# Patient Record
Sex: MALE | Race: WHITE | NOT HISPANIC OR LATINO | Employment: UNEMPLOYED | URBAN - METROPOLITAN AREA
[De-identification: names, ages, dates, MRNs, and addresses within clinical notes are randomized per-mention and may not be internally consistent; named-entity substitution may affect disease eponyms.]

---

## 2018-12-10 ENCOUNTER — HOSPITAL ENCOUNTER (OUTPATIENT)
Dept: BEHAVIORAL HEALTH | Facility: CLINIC | Age: 23
Discharge: HOME | End: 2018-12-10
Attending: SOCIAL WORKER | Admitting: SOCIAL WORKER
Payer: COMMERCIAL

## 2018-12-10 VITALS — HEIGHT: 77 IN | WEIGHT: 230 LBS | BODY MASS INDEX: 27.16 KG/M2

## 2018-12-10 PROCEDURE — H0001 ALCOHOL AND/OR DRUG ASSESS: HCPCS

## 2018-12-10 ASSESSMENT — PAIN SCALES - GENERAL: PAINLEVEL: NO PAIN (0)

## 2018-12-10 ASSESSMENT — ANXIETY QUESTIONNAIRES
5. BEING SO RESTLESS THAT IT IS HARD TO SIT STILL: NOT AT ALL
7. FEELING AFRAID AS IF SOMETHING AWFUL MIGHT HAPPEN: NOT AT ALL
GAD7 TOTAL SCORE: 0
1. FEELING NERVOUS, ANXIOUS, OR ON EDGE: NOT AT ALL
3. WORRYING TOO MUCH ABOUT DIFFERENT THINGS: NOT AT ALL
IF YOU CHECKED OFF ANY PROBLEMS ON THIS QUESTIONNAIRE, HOW DIFFICULT HAVE THESE PROBLEMS MADE IT FOR YOU TO DO YOUR WORK, TAKE CARE OF THINGS AT HOME, OR GET ALONG WITH OTHER PEOPLE: NOT DIFFICULT AT ALL
6. BECOMING EASILY ANNOYED OR IRRITABLE: NOT AT ALL
2. NOT BEING ABLE TO STOP OR CONTROL WORRYING: NOT AT ALL

## 2018-12-10 ASSESSMENT — PATIENT HEALTH QUESTIONNAIRE - PHQ9
SUM OF ALL RESPONSES TO PHQ QUESTIONS 1-9: 0
5. POOR APPETITE OR OVEREATING: NOT AT ALL

## 2018-12-10 ASSESSMENT — MIFFLIN-ST. JEOR: SCORE: 2145.65

## 2018-12-10 NOTE — PROGRESS NOTES
[facility]   [address] [place] [address]  [address] [place] [id number]      ADULT CD ASSESSMENT ADDENDUM      Patient Name: Sebastián Nogueira  Cell Phone:   Home: [phone] (home)    Mobile:   Telephone Information:   Mobile [phone]       Email:  [place]  Emergency Contact: Jaguar Nogueira   Tel: [phone]    ________________________________________________________________________      The patient reported being:  Single, no serious involvement    With which race do you identify? White    Initial Screening Questions     1. Are you currently having severe withdrawal symptoms that are putting yourself or others in danger?  No    2. Are you currently having severe medical problems that require immediate attention?  No    3. Are you currently having severe emotional or behavioral problems that are putting yourself or others at risk of harm?  No    4. Do you have sufficient reading skills that will enable you to understand written materials, including the program rules and client rights materials?  Yes    Family History and other additional information     Who raised you? (parents, grandparents, adoptive parents, step-parents, etc.)    Both Parents    Please tell me what it was like growing up in your family. (please include any history of substance abuse, mental health issues, emotional/physical/sexual abuse, forms of discipline, and support)     Per client:raised by parents, two sisters who are living, one brother who is living, both parents are living. No CD or MH within family. It was great and it was awesome, grew up with parents and they were cool. No abuse, felt supported by both equally. Grew up in [place], [place].       Do you have any children or Stepchildren? No    Are you being investigated by Child Protection Services? No    Do you have a child protection worker, probation office or ? Yes, please explain: client reported [facility]    How would you describe your current  "finances?  Some money problems    If you are having problems, (unpaid bills, bankruptcy, IRS problems) please explain:  No    If working or a student are you able to function appropriately in that setting? Yes    Describe your preferred learning style:  by reading, by hands-on practice and by watching someone else demonstrate    What are your some of your personal strengths?  \"willpower\"    Do you currently participate in community brie activities, such as attending Druze, temple, Latter-day or Mandaeism services?  No    How does your spirituality impact your recovery?  Declined reporting he does not participate in this     Do you currently self-administer your medications?  Yes    Have you ever had to lie to people important to you about how much you dooley?     No   Have you ever felt the need to bet more and more money?     No   Have you ever attempted treatment for a gambling problem?     No   Have you ever touched or fondled someone else inappropriately or forced them to have sex with you against their will?     No   Are you or have you ever been a registered sex offender?     No   Is there any history of sexual abuse in your family?     No   Have you ever felt obsessed by your sexual behavior, such as having sex with many partners, masturbating often, using pornography often?     No     Have you ever received therapy or stayed in the hospital for mental health problems?     No     Have you ever hurt yourself, such as cutting, burning or hitting yourself?     No     Have you ever purged, binged or restricted yourself as a way to control your weight?     No     Are you on a special diet?     No     Do you have any concerns regarding your nutritional status?     No     Have you had any appetite changes in the last 3 months?     No   Have you had weight loss or weight gain of more than 10 lbs in the last 3 months?   If patient gained or lost more than 10 lbs, then refer to program RN / attending Physician for " assessment.     No   Was the patient informed of BMI?    Above,  Referral to primary care physician     Yes   Have you engaged in any risk-taking behavior that would put you at risk for exposure to blood-borne or sexually transmitted diseases?     No   Do you have any dental problems?     No   Have you ever lived through any trauma or stressful life events?     No   In the past month, have you had any of the following symptoms related to the trauma listed above? (dreams, intense memories, flashbacks, physical reactions, etc.)     No   Have you ever believed people were spying on you, or that someone was plotting against you or trying to hurt you?     No   Have you ever believed someone was reading your mind or could hear your thoughts or that you could actually read someone's mind or hear what another person was thinking?     No   Have you ever believed that someone of some force outside of yourself was putting thoughts into your mind or made you act in a way that was not your usual self?  Have you ever though you were possessed?     No   Have you ever believed you were being sent special messages through the TV, radio or newspaper?     No   Have you ever heard things other people couldn't hear, such as voices or other noises?     No   Have you ever had visions when you were awake?  Or have you ever seen things other people couldn't see?     No   Do you have a valid 's license?       Yes     PHQ-9, LISA-7 and Suicide Risk Assessment   PHQ-9 on 12/10/2018 LISA-7 on 12/10/2018   The patient's PHQ-9 score was 0 out of 27, indicating no depression.     The patient's LISA-7 score was 0 out of 21, indicating minimal anxiety.         [place]-Suicide Severity Rating Scale   Suicide Ideation   1.) Have you ever wished you were dead or that you could go to sleep and not wake up?     Lifetime:  No Past Month:  No   2.) Have you actually had any thoughts of killing yourself?   Lifetime:  No Past Month:  No   3.) Have you  been thinking about how you might do this?     Lifetime:  NA Past Month:  NA   4.) Have you had these thoughts and had some intention of acting on them?     Lifetime:  NA Past Month:  NA   5.) Have you started to work out the details of how to kill yourself?   Lifetime:  NA Past Month:  NA   6.) Do you intend to carry out this plan?      Lifetime:  No Past Month:  NA   Intensity of Ideation   Intensity of ideation (1 being least severe, 5 being most severe):     Lifetime:  NA Past Month:  NA   How often do you have these thoughts?  N/A      When you have the thoughts how long do they last?  N/A   Can you stop thinking about killing yourself or wanting to die if you want to?  N/A   Are there things - anyone or anything (i.e. family, Zoroastrianism, pain of death) that stopped you from wanting to die or acting on thoughts of suicide?  Does not apply   What sort of reasons did you have for thinking about wanting to die or killing yourself (ie end pain, stop how you were feeling, get attention or reaction, revenge)?  Does not apply   Suicidal Behavior   (Suicide Attempt) - Have you made a suicide attempt?     Lifetime:  No Past Month:  NA   Have you engaged in self-harm (non-suicidal self-injury)?  No   (Interrupted Attempt) - Has there been a time when you started to do something to end your life but someone or something stopped you before you actually did anything?  NA   (Aborted or Self-Interrupted Attempt) - Has there been a time when you started to do something to try to end your life but you stopped yourself before you actually did anything?  NA   (Preparatory Acts of Behavior) - Have you taken any steps towards making suicide attempt or preparing to kill yourself (such as collecting pills, getting a gun, giving valuables away or writing a suicide note)?  NA   Actual Lethality/Medical Damage:  NA     2008  [employer]  Used with permission by Mary Espinoza, PhD.       Guide to C-SSRS  "Risk Ratings   NO IDEATION:  with no active thoughts IDEATION: with a wish to die. IDEATION: with active thoughts. Risk Ratings   If Yes No No 0 - Very Low Risk   If NA Yes No 1 - Low Risk   If NA Yes Yes 2 - Low/moderate risk   IDEATION: associated thoughts of methods without intent or plan INTENT: Intent to follow through on suicide PLAN: Plan to follow through on suicide Risk Ratings cont...   If Yes No No 3 - Moderate Risk   If Yes Yes No 4 - High Risk   If Yes Yes Yes 5 - High Risk   The patient's ADDITIONAL RISK FACTORS and lack of PROTECTIVE FACTORS may increase their overall suicide risk ratings.     Additional Risk Factors:    Significant legal problems including being at risk of incarceration   Protective Factors:    Having people in his/her life that would prevent the patient from considering a suicide attempt (i.e. young children, spouse, parents, etc.)     An absence of mental health issues or stable and well treated mental health issues     An absence of chronic health problems or stable and well treated chronic health issues     Having restricted access to highly lethal means of suicide     Risk Status   Past month:0. - Very Low Risk:  Evaluation Counselors:  Document in Epic / SBAR to counselor \"Very Low Risk\".      Treatment Counselors:  Reassess upon admission as applicable, assess weekly in progress notes under Dimension 3 and summarize in Discharge / Treatment summary under Dimension 3.    Past 24 hours:0. - Very Low Risk:  Evaluation Counselors:  Document in Epic / Tuition[employer]ioAR to counselor \"Very Low Risk\".      Treatment Counselors:  Reassess upon admission as applicable, assess weekly in progress notes under Dimension 3 and summarize in Discharge / Treatment summary under Dimension 3.   Additional information to support suicide risk rating: There was no additional information to provide at this time.  Please see the above suicide risk rating information.     Mental Health Status   Physical " Appearance/Attire: Appears stated age and Neat   Hygiene: well groomed   Eye Contact: at examiner   Speech Rate:  regular   Speech Volume: regular   Speech Quality: fluid   Cognitive/Perceptual:  reality based   Cognition: memory intact    Judgment: able to concentrate   Insight: able to concentrate   Orientation:  time, place, person and situation   Thought::   concrete   Hallucinations:  none   General Behavioral Tone: cooperative   Psychomotor Activity: no problem noted   Gait:  no problem   Mood: appropriate   Affect: congruence/appropriate   Counselor Notes: NA       Criteria for Diagnosis: DSM-5 Criteria for Substance Use Disorders      Alcohol Use Disorder Mild - 305.00 (F10.10)       Level of Care   I.) Intoxication and Withdrawal: 0   II.) Biomedical:  0   III.) Emotional and Behavioral:  0   IV.) Readiness to Change:  1   V.) Relapse Potential: 2   VI.) Recovery Environmental: 2       Initial Problem List     The patient is currently living in an unhealthy and/or using environment  The patient has current legal issues    Patient/Client is willing to follow treatment recommendations. Yes    Counselor: [facility]      Vulnerable Adult Checklist for OUTPATIENTS     1.  Do you have a physical, emotional or mental infirmity or dysfunction?       No    2.  Does this issue impair your ability to provide for your own care without help, including providing yourself with food, shelter, clothing, healthcare or supervision?       No    3.  Because of this issue, I need assistance to protect myself from maltreatment by others.      No    Based on the above information:    This person is not a functional Vulnerable Adult according to [place] Statute 626.5572 subdivision 21.

## 2018-12-10 NOTE — PROGRESS NOTES
COMPREHENSIVE ASSESSMENT SUMMARY    PATIENT NAME: Sebastián Nogueira  MEDICAL RECORD NUMBER: [id number]  PATIENT ADDRESS: [address] [place] [id number]  HOME TELEPHONE NUMBER: [phone] (home)   MOBILE TELEPHONE NUMBER:   Telephone Information:   Mobile [phone]     STATISTICS: YOB: 1995     Age: 23 year old     Gender: male    RELATIONSHIP STATUS:  Single, in no serious relationship    DATE OF ASSESSMENT: 12/10/18  EVALUATION COUNSELOR: SCOT [place]ays    REFERRAL SOURCE: Probation    REASON FOR EVALUATION:     Per EMR intake:  recvd call from client asking for a cd eval for probation     etoh     L/u @ July/2018  No medical  No mh  Legal:  2nd DWI; assessment required by probation  Hx of IP at [place] when an adolescent, hx of op in [place] after that     Per client:Probation, [facility], DWI, second, late July of 2018, just court ordered, no probation violation. I got pulled over had been drinking early. No accident. CHEPE, .17 just right over double the legal limit. First DWI, [facility], roughly 2015. Resolved prior to second DWI. I did one class for the first DWI.         HEALTH HISTORY AND MEDICATIONS:     Clt denied any chronic or acute physical health conditions. Client denied any prescribed medications.     HISTORY OF PREVIOUS TREATMENT AND COUNSELING:     Client reported two past treatment attendance as an adolescent. Client denied current counseling.     HISTORY OF ALCOHOL AND DRUG USE:     Alcohol:age of first use: 16. per EMR intake:etoh     L/u @ July/2018 Per client: on breathalyzer test four times per day until resolved court. January 2018 have another court date or can give them 1200 bucks and get off it. When they released me they screwed up and did not put me on right away but the next week they put me on it for release. No positive. False positive but blew into within the next 15 minutes and it was fine. Prior to July 2018, consume alcohol once or  "twice per month. Pattern since 19. Adolescent thought I was using marijuana then. Consumption 4-6 drinks prior to July of 2018. Per EMR collateral ED note on 3/1/14:  \"       Chief Complaint   Patient presents with     Alcohol Intoxication       Was found on campus wondering around.  Does not live on campus.  Was trasspassed and found not to have a place to go.    DIMITRIOS Nogueira is a 18 year old male who presents with alcohol intoxication.  The patient was wandering around the [facility] campus when he was found by campus police intoxicated.  The patient had an no place to go at that time and was brought to the emergency department.  He states that he was drinking with a friend earlier in the evening.  He states that he does not have a ride home.  He drinks alcohol occasionally and denies any other drug use.  He has no history of withdrawal or seizure.  He denies any pain.   Alcohol Breath Test 0.227 (*)   \". Date of last use July of 2018.       SUMMARY OF SUBSTANCE USE DISORDER SYMPTOMS ACKNOWLEDGED BY THE PATIENT: The patient identified positively with 3 of the 11 DSM-5 criteria for a primary diagnostic impression of substance use disorder mild.     SUMMARY OF COLLATERAL DATA:    Probation: Called collateral on 12/10/18 and left VM. No call back or VM received by collateral contact on 12/11/18. Left VM for collateral contact on 12/12/18. Received VM from collateral on 12/12/18. Pre-trial release for DWI,his DMT was a .17. Second DWI in 10 years, typically when this stuff happens we are looking for IOP and or level II driving with care class. He does have  DWI from 2015 from [facility], as well as a minor consumption in 2012, obstruction of justice in 2012, possession of 1.4g of marijuana from 2018 in [place]. Home monitoring for alcohol.         [facility] Records (EMR)-[facility] medical record was reviewed for collateral purposes of this assessment and largely agreed with the information from the " client.      IMPRESSION:    Alcohol Use Disorder Mild - 305.00 (F10.10)     [facility] PLACEMENT CRITERIA:    DIMENSION 1: Intoxication and Withdrawal:  The patient scored a 0.    Clt reported reason for the assessment was DWI. Clt reported last use date of alcohol as 07/2018 day of DWI. Clt reported he is currently on alcohol monitoring. Clt denied past admission to detox. Clt denied withdrawal symptoms.     DIMENSION 2: Biomedical Conditions:  The patient scored a 0.    Clt denied having any chronic or acute physical health conditions. Clt denied having a primary care clinic or provider. Clt was provided resource. Clt reported he is able to get the services he needs. Clt denied any prescribed medications at this time.     DIMENSION 3: Emotional and Behavioral:  The patient scored a 0.    Client denied past or current mental health diagnosis.Clt denied past or current prescribed medications for MH symptoms. Clt denied past or current therapy. Clt denied past or current trauma/abuse issues. Clt denied past or current SI. Clt denied past suicide attempts. Clt's PHQ-9 score was 0 out of 27, indicating no depression. Clt's LISA-7 score was 0 out of 21, indicating minimal anxiety.    DIMENSION 4: Readiness to Change:  The patient scored a 1.    Clt reported motivation for the assessment was to comply with court order. Clt denied anyone has expressed concern about his alcohol use to him. Clt denied having a problem with alcohol use. Clt reported he plans to comply with requirements of probation. Clt appears to be in the contemplative stage of change evidenced by his verbal report and past behaviors.    DIMENSION 5: Relapse Potential:  The patient scored a 2.    Client reported prior to the DWI it was not a problem for him to not drink. Clt denied cravings and triggers. Clt reported two past treatment for marijuana when he was an adolescent. Clt denied sober support group attendance. Clt would appear to benefit form a review of  substance abuse/CD.     DIMENSION 6: Recovery Environment:  The patient scored a 2.    Clt reported he works part time. Clt reported he lives with his parents who drink socially. Clt reported using is not important to his social connections. Clt reported his family is his supportive network. Clt denied being in a romantic relationship or having children at this time. Clt reported past and current legal issues.     RECOMMENDATIONS:    1. Abstain from using all non-prescribed mood altering chemicals and substances. Take all medication as prescribed and directed by licensed physicians.   2. Comply with all legal obligations and referrals made by [facility]. Provide random drug screenings and breathalyzers to probation. If continuous positive readings or incidences related to alcohol or other non-prescribed substances then recommendation would be an updated assessment at that time.   3. Complete a Driving with Care Level II class.         INITIAL PROBLEM LIST:    The patient is currently living in an unhealthy and/or using environment  The patient has current legal issues    RATIONALE: Clt reported his use has negatively impacted multiple areas of his life and he currently meets criteria for a substance use disorder occurring within a 12-month period. Clt would appear to benefit form a review of substance abuse/CD.     This information has been disclosed to you from records protected by Federal confidentiality rules (42 CFR part 2). The Federal rules prohibit you from making any further disclosure of this information unless further disclosure is expressly permitted by the written consent of the person to whom it pertains or as otherwise permitted by 42 CFR part 2. A general authorization for the release of medical or other information is NOT sufficient for this purpose. The Federal rules restrict any use of the information to criminally investigate or prosecute any alcohol or drug abuse patient.

## 2018-12-10 NOTE — PROGRESS NOTES
Rule 25 Assessment  Background Information   1. Date of Assessment Request  2. Date of Assessment  12/10/18 3. Date Service Authorized     4.   [facility]   5.  Phone Number   [phone] 6. Referent  Probation 7. Assessment Site  [facility]     8. Client Name   Sebastián Nogueira 9. Date of Birth  1995 Age  23 year old 10. Gender  male  11. PMI/ Insurance No.  [id number]   12. Client's Primary Language:  English 13. Do you require special accommodations, such as an  or assistance with written material? No   14. Current Address: [address] [place] [address] NO  [facility] [address]   15. Client Phone Numbers: [phone] (home)      16. Tell me what has happened to bring you here today.    Per EMR intake:  recvd call from client asking for a cd eval for probation     etoh     L/u @ July/2018  No medical  No mh  Legal:  2nd DWI; assessment required by probation  Hx of IP at [facility] when an adolescent, hx of op in [place] after that    Per client:Probation, [facility], DWI, second, late July of 2018, just court ordered, no probation violation. I got pulled over had been drinking early. No accident. CHEPE, .17 just right over double the legal limit. First DWI, [facility], roughly 2015. Resolved prior to second DWI. I did one class for the first DWI.         17. Have you had other rule 25 assessments?     Yes. When, Where, and What circumstances: Client reported one past treatment IP at [facility] as an adolescent and OP tx when I was 15-16 years old.     DIMENSION I - Acute Intoxication /Withdrawal Potential   1. Chemical use most recent 12 months outside a facility and other significant use history (client self-report)              X = Primary Drug Used   Age of First Use Most Recent Pattern of Use and Duration   Need enough information to show pattern (both frequency and amounts) and to show tolerance for each chemical that has a diagnosis   Date of last use  "and time, if needed   Withdrawal Potential? Requiring special care Method of use  (oral, smoked, snort, IV, etc)     X Alcohol     16  per EMR intake:  etoh     L/u @ July/2018    Per client: on breathalyzer test four times per day until resolved court. January 2018 have another court date or can give them 1200 bucks and get off it. When they released me they screwed up and did not put me on right away but the next week they put me on it for release. No positive. False positive but blew into within the next 15 minutes and it was fine.     Prior to July 2018, consume alcohol once or twice per month. Pattern since 19. Went to treatment not because of alcohol but parents thought I was using marijuana when I was an adolescent. Consumption 4-6 drinks prior to July of 2018.     Per EMR collateral ED note on 3/1/14:  \"  Chief Complaint   Patient presents with     Alcohol Intoxication       Was found on campus wondering around.  Does not live on campus.  Was trasspassed and found not to have a place to go.      DIMITRIOS Nogueira is a 18 year old male who presents with alcohol intoxication.  The patient [facility] the [facility] when he was found by campus police intoxicated.  The patient had an no place to go at that time and was brought to the emergency department.  He states that he was drinking with a friend earlier in the evening.  He states that he does not have a ride home.  He drinks alcohol occasionally and denies any other drug use.  He has no history of withdrawal or seizure.  He denies any pain.   Alcohol Breath Test 0.227 (*)   \".          July of 2018 no oral      Marijuana/  Hashish   N/A    Client reported he went to treatment not because of alcohol but parents thought I was using marijuana when I was an adolescent. Client adamantly denied using marijuana, presently or in the past.       Per probation-possession of 1.4g of marijuana from 2018 in [employer]. NA NA NA      Cocaine/Crack     N/A      "      Meth/  Amphetamines   N/A           Heroin     N/A           Other Opiates/  Synthetics   N/A           Inhalants     N/A           Benzodiazepines     N/A           Hallucinogens     N/A           Barbiturates/  Sedatives/  Hypnotics N/A           Over-the-Counter Drugs   N/A           Other     N/A           Nicotine     N/A          2. Do you use greater amounts of alcohol/other drugs to feel intoxicated or achieve the desired effect?  Yes.  Or use the same amount and get less of an effect?  Yes.  Example: Client reported tolerance to alcohol even based off reported CHEPE and collateral CHEPE reports.     3A. Have you ever been to detox?     No    3B. When was the first time?     NA    3C. How many times since then?     NA    3D. Date of most recent detox:     NA    4.  Withdrawal symptoms: Have you had any of the following withdrawal symptoms?  Past 12 months Recent (past 30 days)   None None     's Visual Observations and Symptoms: No visible withdrawal symptoms at this time    Based on the above information, is withdrawal likely to require attention as part of treatment participation?  No    Dimension I Ratings   Acute intoxication/Withdrawal potential - The placing authority must use the criteria in Dimension I to determine a client s acute intoxication and withdrawal potential.    RISK DESCRIPTIONS - Severity ratin Client displays full functioning with good ability to tolerate and cope with withdrawal discomfort. No signs or symptoms of intoxication or withdrawal or resolving signs or symptoms.    REASONS SEVERITY WAS ASSIGNED (What about the amount of the person s use and date of most recent use and history of withdrawal problems suggests the potential of withdrawal symptoms requiring professional assistance? )     Clt reported reason for the assessment was DWI. Clt reported last use date of alcohol as 2018 day of DWI. Clt reported he is currently on alcohol monitoring. Clt denied past  "admission to detox. Clt denied withdrawal symptoms.          DIMENSION II - Biomedical Complications and Conditions   1. Do you have any current health/medical conditions?(Include any infectious diseases, allergies, or chronic or acute pain, history of chronic conditions)       No    2. Do you have a health care provider? When was your most recent appointment? What concerns were identified?     Per client:have not been to doctor in forever. 2-3 years since seeing a pcp. Reported he is able to get the services he needs.     3. If indicated by answers to items 1 or 2: How do you deal with these concerns? Is that working for you? If you are not receiving care for this problem, why not?      NA    4A. List current medication(s) including over-the-counter or herbal supplements--including pain management:     Denied    4B. Do you follow current medical recommendations/take medications as prescribed?     NA    4C. When did you last take your medication?     NA    4D. Do you need a referral to have a follow up with a primary care physician?    Yes, Recommendations:   physical exam    5. Has a health care provider/healer ever recommended that you reduce or quit alcohol/drug use?     No    6. Are you pregnant?     No    7. Have you had any injuries, assaults/violence towards you, accidents, health related issues, overdose(s) or hospitalizations related to your use of alcohol or other drugs:     Yes, per EMR  Collateral ED note on 3/1/14:  \"  Chief Complaint   Patient presents with     Alcohol Intoxication       Was found on campus wondering around.  Does not live on campus.  Was trasspassed and found not to have a place to go.      DIMITRIOS Nogueira is a 18 year old male who presents with alcohol intoxication.  The patient was wandering around the [facility] campus when he was found by campus police intoxicated.  The patient had an no place to go at that time and was brought to the emergency department.  He states that he " was drinking with a friend earlier in the evening.  He states that he does not have a ride home.  He drinks alcohol occasionally and denies any other drug use.  He has no history of withdrawal or seizure.  He denies any pain.      Alcohol Breath Test 0.227 (*)       8. Do you have any specific physical needs/accommodations? No    Dimension II Ratings   Biomedical Conditions and Complications - The placing authority must use the criteria in Dimension II to determine a client s biomedical conditions and complications.   RISK DESCRIPTIONS - Severity ratin Client displays full functioning with good ability to cope with physical discomfort.    REASONS SEVERITY WAS ASSIGNED (What physical/medical problems does this person have that would inhibit his or her ability to participate in treatment? What issues does he or she have that require assistance to address?)    Clt denied having any chronic or acute physical health conditions. Clt denied having a primary care clinic or provider.Clt reported he is able to get the services he needs. Clt denied any prescribed medications at this time.          DIMENSION III - Emotional, Behavioral, Cognitive Conditions and Complications   1. (Optional) Tell me what it was like growing up in your family. (substance use, mental health, discipline, abuse, support)     Per client:raised by parents, two sisters who are living, one brother who is living, both parents are living. No CD or MH within family. It was great and it was awesome, grew up with parents and they were cool. No abuse, felt supported by both equally. Grew up in [place], [place].     2. When was the last time that you had significant problems...  A. with feeling very trapped, lonely, sad, blue, depressed or hopeless  about the future? Never    B. with sleep trouble, such as bad dreams, sleeping restlessly, or falling  asleep during the day? Never    C. with feeling very anxious, nervous, tense, scared, panicked, or  like  something bad was going to happen? Never    D. with becoming very distressed and upset when something reminded  you of the past? Never    E. with thinking about ending your life or committing suicide? Never    3. When was the last time that you did the following things two or more times?  A. Lied or conned to get things you wanted or to avoid having to do  something? Never    B. Had a hard time paying attention at school, work, or home? Never    C. Had a hard time listening to instructions at school, work, or home? Never    D. Were a bully or threatened other people? Never    E. Started physical fights with other people? Never    Note: These questions are from the Global Appraisal of Individual Needs--Short Screener. Any item marked  past month  or  2 to 12 months ago  will be scored with a severity rating of at least 2.     For each item that has occurred in the past month or past year ask follow up questions to determine how often the person has felt this way or has the behavior occurred? How recently? How has it affected their daily living? And, whether they were using or in withdrawal at the time?    NA    4A. If the person has answered item 2E with  in the past year  or  the past month , ask about frequency and history of suicide in the family or someone close and whether they were under the influence.     NA    Any history of suicide in your family? Or someone close to you?     No    4B. If the person answered item 2E  in the past month  ask about  intent, plan, means and access and any other follow-up information  to determine imminent risk. Document any actions taken to intervene  on any identified imminent risk.      NA    5A. Have you ever been diagnosed with a mental health problem?     No    5B. Are you receiving care for any mental health issues? If yes, what is the focus of that care or treatment?  Are you satisfied with the service? Most recent appointment?  How has it been helpful?     No,  denied past or current therapy.     6. Have you been prescribed medications for emotional/psychological problems?     No, denied past or current.    7. Does your MH provider know about your use?     NA    8A. Have you ever been verbally, emotionally, physically or sexually abused?      No     Follow up questions to learn current risk, continuing emotional impact.      NA    8B. Have you received counseling for abuse?      N/A    9. Have you ever experienced or been part of a group that experienced community violence, historical trauma, rape or assault?     No    10A. [place]:    No    11. Do you have problems with any of the following things in your daily life?    No    Note: If the person has any of the above problems, follow up with items 12, 13, and 14. If none of the issues in item 11 are a problem for the person, skip to item 15.    NA    12. Have you been diagnosed with traumatic brain injury or Alzheimer s?  No    13. If the answer to #12 is no, ask the following questions:    Have you ever hit your head or been hit on the head? No    Were you ever seen in the Emergency Room, hospital or by a doctor because of an injury to your head? No    Have you had any significant illness that affected your brain (brain tumor, meningitis, West Nile Virus, stroke or seizure, heart attack, near drowning or near suffocation)? No    14. If the answer to #12 is yes, ask if any of the problems identified in #11 occurred since the head injury or loss of oxygen. NA    15A. Highest grade of school completed:     High school graduate/GED    15B. Do you have a learning disability? No    15C. Did you ever have tutoring in Math or English? No    15D. Have you ever been diagnosed with Fetal Alcohol Effects or Fetal Alcohol Syndrome? No    16. If yes to item 15 B, C, or D: How has this affected your use or been affected by your use?     NA    Dimension III Ratings   Emotional/Behavioral/Cognitive - The placing authority must use the  criteria in Dimension III to determine a client s emotional, behavioral, and cognitive conditions and complications.   RISK DESCRIPTIONS - Severity ratin Client has good impulse control and coping skills and presents no risk of harm to self or others. Client functions in all significant life areas.     REASONS SEVERITY WAS ASSIGNED - What current issues might with thinking, feelings or behavior pose barriers to participation in a treatment program? What coping skills or other assets does the person have to offset those issues? Are these problems that can be initially accommodated by a treatment provider? If not, what specialized skills or attributes must a provider have?    Client denied past or current mental health diagnosis.Clt denied past or current prescribed medications for MH symptoms. Clt denied past or current therapy. Clt denied past or current trauma/abuse issues. Clt denied past or current SI. Clt denied past suicide attempts. Clt's PHQ-9 score was 0 out of 27, indicating no depression. Clt's LISA-7 score was 0 out of 21, indicating minimal anxiety.         DIMENSION IV - Readiness for Change   1. You ve told me what brought you here today. (first section) What do you think the problem really is?     Per client: to move through the court process.     2. Tell me how things are going. Ask enough questions to determine whether the person has use related problems or assets that can be built upon in the following areas: Family/friends/relationships; Legal; Financial; Emotional; Educational; Recreational/ leisure; Vocational/employment; Living arrangements (DSM)      Per client: family/friend relationships-really good, legal- see above, [place] PO, supposed to call every Monday, leave a message, conditional PO at this point, do not meet up with him or take UAs or anything. 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 Hobbies-hanging out, watching tv.     3. What  activities have you engaged in when using alcohol/other drugs that could be hazardous to you or others (i.e. driving a car/motorcycle/boat, operating machinery, unsafe sex, sharing needles for drugs or tattoos, etc     Per client:driving    4. How much time do you spend getting, using or getting over using alcohol or drugs? (DSM)     Per client: on breathalyzer test four times per day until resolved court. January 2018 have another court date or can give them 1200 bucks and get off it. When they released me they screwed up and did not put me on right away but the next week they put me on it for release. No positive. False positive but blew into within the next 15 minutes and it was fine.     Prior to July 2018, consume alcohol once or twice per month. Pattern since 19. Adolescent thought I was using marijuana then. Consumption 4-6 drinks prior to July of 2018.     Client reported:did not think about it, thought I would be okay. Did not have my glasses and could not find them, was wearing sunglasses, which did not help my driving. Got pulled over. Dumb wearing sunglasses at 10pm at night.     5. Reasons for drinking/drug use (Use the space below to record answers. It may not be necessary to ask each item.)  Like the feeling No   Trying to forget problems No   To cope with stress No   To relieve physical pain No   To cope with anxiety No   To cope with depression No   To relax or unwind No   Makes it easier to talk with people No   Partner encourages use N/A   Most friends drink or use No   To cope with family problems No   Afraid of withdrawal symptoms/to feel better No   Other (specify)  Yes client reported: something to do sometimes, every once in a while it would happen.      A. What concerns other people about your alcohol or drug use/Has anyone told you that you use too much? What did they say? (DSM)     Per client: denied    B. What did you think about that/ do you think you have a problem with alcohol or drug  use?     Per client:denied problem.     6. What changes are you willing to make? What substance are you willing to stop using? How are you going to do that? Have you tried that before? What interfered with your success with that goal?      Per client: whatever the courts ask me to do I guess. Abstaining currently.     7. What would be helpful to you in making this change?     Per client: nothing at this time.     Dimension IV Ratings   Readiness for Change - The placing authority must use the criteria in Dimension IV to determine a client s readiness for change.   RISK DESCRIPTIONS - Severity ratin Client is motivated with active reinforcement, to explore treatment and strategies for change, but ambivalent about illness or need for change.    REASONS SEVERITY WAS ASSIGNED - (What information did the person provide that supports your assessment of his or her readiness to change? How aware is the person of problems caused by continued use? How willing is she or he to make changes? What does the person feel would be helpful? What has the person been able to do without help?)      Clt reported motivation for the assessment was to comply with court order. Clt denied anyone has expressed concern about his alcohol use to him. Clt denied having a problem with alcohol use. Clt reported he plans to comply with requirements of probation. Clt appears to be in the contemplative stage of change evidenced by his verbal report and past behaviors.          DIMENSION V - Relapse, Continued Use, and Continued Problem Potential   1. In what ways have you tried to control, cut-down or quit your use? If you have had periods of sobriety, how did you accomplish that? What was helpful? What happened to prevent you from continuing your sobriety? (DSM)     Per client: alcohol monitoring-never used really that much, not really a problem for me to not drink prior. Trigger-client reported cannot think of anything.     2. Have you experienced  cravings? If yes, ask follow up questions to determine if the person recognizes triggers and if the person has had any success in dealing with them.     NA    3. Have you been treated for alcohol/other drug abuse/dependence?     Yes.  3B. Number of times(lifetime) (over what period) 2.  3C. Number of times completed treatment (lifetime) 2.  3D. During the past three years have you participated in outpatient and/or residential?  No-they were adolescent programs. Client reported his parents made him attend.     Per EMR intake:  Hx of IP at [facility] when an adolescent, hx of op in [place] after that    4. Support group participation: Have you/do you attend support group meetings to reduce/stop your alcohol/drug use? How recently? What was your experience? Are you willing to restart? If the person has not participated, is he or she willing?     Denied any attendance.     5. What would assist you in staying sober/straight?     Client reported nothing additional at this time.     Dimension V Ratings   Relapse/Continued Use/Continued problem potential - The placing authority must use the criteria in Dimension V to determine a client s relapse, continued use, and continued problem potential.   RISK DESCRIPTIONS - Severity ratin (A) Client has minimal recognition and understanding of relapse and recidivism issues and displays moderate vulnerability for further substance use or mental health problems. (B) Client has some coping skills inconsistently applied.    REASONS SEVERITY WAS ASSIGNED - (What information did the person provide that indicates his or her understanding of relapse issues? What about the person s experience indicates how prone he or she is to relapse? What coping skills does the person have that decrease relapse potential?)      Client reported prior to the DWI it was not a problem for him to not drink. Clt denied cravings and triggers. Clt reported two past treatment for marijuana when he was an adolescent.  Clt denied sober support group attendance. Clt would appear to benefit form a review of substance abuse/CD.          DIMENSION VI - Recovery Environment   1. Are you employed/attending school? Tell me about that.     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 Not in school.    2A. Describe a typical day; evening for you. Work, school, social, leisure, volunteer, spiritual practices. Include time spent obtaining, using, recovering from drugs or alcohol. (DSM)     Per client: usually work Tuesday, Wednesday, Thursday, and Fridays from 10-3, sometimes work Saturday nights, sometimes pick shifts up, varies, and then not much after.    2B. How often do you spend more time than you planned using or use more than you planned? (DSM)     Per client: Per client: on breathalyzer test four times per day until resolved court. January 2018 have another court date or can give them 1200 bucks and get off it. When they released me they screwed up and did not put me on right away but the next week they put me on it for release. No positive. False positive but blew into within the next 15 minutes and it was fine.     Prior to July 2018, consume alcohol once or twice per month. Pattern since 19. Consumption 4-6 drinks prior to July of 2018.     Client reported:did not think about it, thought I would be okay. Did not have my glasses and could not find them, was wearing sunglasses, which did not help my driving. Got pulled over. Dumb wearing sunglasses at 10pm at night.     3. How important is using to your social connections? Do many of your family or friends use?     Per client: not really no. Not a big part of social connections.     4A. Are you currently in a significant relationship?     No    4C. Sexual Orientation:     Heterosexual    5A. Who do you live with?      Client  "reported his parents    5B. Tell me about their alcohol/drug use and mental health issues.     Client reported socially drinkers. Not a lot.     5C. Are you concerned for your safety there? No    5D. Are you concerned about the safety of anyone else who lives with you? No    6A. Do you have children who live with you?     No    6B. Do you have children who do not live with you?     No    7A. Who supports you in making changes in your alcohol or drug use? What are they willing to do to support you? Who is upset or angry about you making changes in your alcohol or drug use? How big a problem is this for you?      \"family\"    7B. This table is provided to record information about the person s relationships and available support It is not necessary to ask each item; only to get a comprehensive picture of their support system.  How often can you count on the following people when you need someone?   Partner / Spouse N/A   Parent(s)/Aunt(s)/Uncle(s)/Grandparents Always supportive   Sibling(s)/Cousin(s) Usually supportive   Child(pretty) N/A   Other relative(s) N/A   Friend(s)/neighbor(s) Usually supportive   Child(pretty) s father(s)/mother(s) N/A   Support group member(s) N/A   Community of brie members N/A   /counselor/therapist/healer N/A   Other (specify) N/A     8A. What is your current living situation?     Client reported he is living with his parents.     8B. What is your long term plan for where you will be living?     Per client: where I am probably.     8C. Tell me about your living environment/neighborhood? Ask enough follow up questions to determine safety, criminal activity, availability of alcohol and drugs, supportive or antagonistic to the person making changes.      Per client:it is good, nice, safe.     9. Criminal justice history: Gather current/recent history and any significant history related to substance use--Arrests? Convictions? Circumstances? Alcohol or drug involvement? Sentences? Still " on probation or parole? Expectations of the court? Current court order? Any sex offenses - lifetime? What level? (DSM)    Per client: Probation, [facility], DWI, second, late 2018, just court ordered, no probation violation. I got pulled over had been drinking early. No accident. [place], .17 just right over double the legal limit. First DWI, [facility], roughly . Resolved prior to second DWI. I did one class for the first DWI. PO, supposed to call every Monday, leave a message, conditional PO at this point, do not meet up with him or take UAs or anything.     per EMR intake:Legal:  2nd DWI; assessment required by probation    10. What obstacles exist to participating in treatment? (Time off work, childcare, funding, transportation, pending alf time, living situation)     None    Dimension VI Ratings   Recovery environment - The placing authority must use the criteria in Dimension VI to determine a client s recovery environment.   RISK DESCRIPTIONS - Severity ratin Client is engaged in structured, meaningful activity, but peers, family, significant other, and living environment are unsupportive, or there is criminal justice involvement by the client or among the client s peers, significant others, or in the client s living environment.    REASONS SEVERITY WAS ASSIGNED - (What support does the person have for making changes? What structure/stability does the person have in his or her daily life that will increase the likelihood that changes can be sustained? What problems exist in the person s environment that will jeopardize getting/staying clean and sober?)     Kyler reported he works part time. Clt reported he lives with his parents who drink socially. Clt reported using is not important to his social connections. Clt reported his family is his supportive network. Clt denied being in a romantic relationship or having children at this time. Clt reported past and current legal issues.           Client Choice/Exceptions   Would you like services specific to language, age, gender, culture, Mandaen preference, race, ethnicity, sexual orientation or disability?  No    What particular treatment choices and options would you like to have? NA    Do you have a preference for a particular treatment program? NA    Criteria for Diagnosis     Criteria for Diagnosis  DSM-5 Criteria for Substance Use Disorder  Instructions: Determine whether the client currently meets the criteria for Substance Use Disorder using the diagnostic criteria in the DSM-V pp.481-586. Current means during the most recent 12 months outside a facility that controls access to substances    Category of Substance Severity (ICD-10 Code / DSM 5 Code)     Alcohol Use Disorder Mild  (F10.10) (305.00)   Cannabis Use Disorder NA   Hallucinogen Use Disorder NA   Inhalant Use Disorder NA   Opioid Use Disorder NA   Sedative, Hypnotic, or Anxiolytic Use Disorder NA   Stimulant Related Disorder NA   Tobacco Use Disorder NA   Other (or unknown) Substance Use Disorder NA       Collateral Contact Summary   Number of contacts made: 2    Contact with referring person:  Yes, PO.    If court related records were reviewed, summarize here: NA    Information from collateral contacts supported/largely agreed with information from the client and associated risk ratings.      Rule 25 Assessment Summary and Plan   's Recommendation    1. Abstain from using all non-prescribed mood altering chemicals and substances. Take all medication as prescribed and directed by licensed physicians.   2. Comply with all legal obligations and referrals made by [facility]. Provide random drug screenings and breathalyzers to probation. If continuous positive readings or incidences related to alcohol or other non-prescribed substances then recommendation would be an updated assessment at that time.   3. Complete a Driving with Care Level II class.          Collateral Contacts     Name:    [facility] (EMR)   Relationship:    Medical Records   Phone Number:    NA Releases:    NA     Clt medical record was reviewed for collateral purposes of this assessment and largely agreed with the information from the client.    Collateral Contacts     Name:    NA   Relationship:    NA   Phone Number:    NA Releases:    NA     Client reported he was going to think about signing a release of information for another collateral contact and reported he would come to the clinic to sign a release on 12/12/18. Client never showed.  left VM for client to see if he was declining to sign an additional release of information or if he planned to come in a different day. No VM or callback received on 12/1318. Client declined to sign an additional TANVIR at the time of the assessment for collateral and then did not respond to VM from .         Collateral Contacts     Name:    [facility] Probation/[place]   Relationship:    Probation/PO   Phone Number:    [phone]   Releases:    Yes     Called collateral on 12/10/18 and left VM. No call back or VM received by collateral contact on 12/11/18. Left VM for collateral contact on 12/12/18. Received VM from collateral on 12/12/18. Pre-trial release for DWI,his DMT was a .17. Second DWI in 10 years, typically when this stuff happens we are looking for IOP and or level II driving with care class. He does have  DWI from 2015 from [facility], as well as a minor consumption in 2012, obstruction of justice in 2012, possession of 1.4g of marijuana from 2018 in [place]. Home monitoring for alcohol.     ollateral Contacts      A problematic pattern of alcohol/drug use leading to clinically significant impairment or distress, as manifested by at least two of the following, occurring within a 12-month period:    Alcohol/drug is often taken in larger amounts or over a longer period than was  intended.  Recurrent alcohol/drug use in situations in which it is physically hazardous.  Tolerance, as defined by either of the following: A need for markedly increased amounts of alcohol/drug to achieve intoxication or desired effect. and A markedly diminished effect with continued use of the same amount of alcohol/drug.      Specify if: In early remission:  After full criteria for alcohol/drug use disorder were previously met, none of the criteria for alcohol/drug use disorder have been met for at least 3 months but for less than 12 months (with the exception that Criterion A4,  Craving or a strong desire or urge to use alcohol/drug  may be met).     In sustained remission:   After full criteria for alcohol use disorder were previously met, non of the criteria for alcohol/drug use disorder have been met at any time during a period of 12 months or longer (with the exception that Criterion A4,  Craving or strong desire or urge to use alcohol/drug  may be met).   Specify if:   This additional specifier is used if the individual is in an environment where access to alcohol is restricted.    Mild: Presence of 2-3 symptoms    Moderate: Presence of 4-5 symptoms    Severe: Presence of 6 or more symptoms

## 2018-12-11 ASSESSMENT — ANXIETY QUESTIONNAIRES: GAD7 TOTAL SCORE: 0

## 2021-03-18 ENCOUNTER — TRANSFERRED RECORDS (OUTPATIENT)
Dept: HEALTH INFORMATION MANAGEMENT | Facility: CLINIC | Age: 26
End: 2021-03-18

## 2021-04-02 ENCOUNTER — HOSPITAL ENCOUNTER (OUTPATIENT)
Dept: BEHAVIORAL HEALTH | Facility: CLINIC | Age: 26
End: 2021-04-02
Attending: FAMILY MEDICINE
Payer: COMMERCIAL

## 2021-04-02 PROCEDURE — 999N000216 HC STATISTIC ADULT CD FACE TO FACE-NO CHRG: Mod: TEL

## 2021-04-19 ENCOUNTER — HOSPITAL ENCOUNTER (OUTPATIENT)
Dept: BEHAVIORAL HEALTH | Facility: CLINIC | Age: 26
End: 2021-04-19
Attending: FAMILY MEDICINE
Payer: COMMERCIAL

## 2021-04-19 PROCEDURE — H2035 A/D TX PROGRAM, PER HOUR: HCPCS | Mod: GT

## 2021-04-19 NOTE — PROGRESS NOTES
Service Initiation Individua session.  Video visit     Duration 45 minutes     Data: Client and I discussed SI and Orientation materials.  Client also expressed that he has been trying to control his drinking without success.     Intervention: motivational interviewing, person centered approach      Assessment: Client seems to feel hopeful for recovery.      Plan: Client will do tx planning session week of 4/28.     Client will start group today.  He is going to write down 3 goals he has for self growth and learning about addiction.     Telemedicine Visit: The patient's condition can be safely assessed and treated via synchronous audio and visual telemedicine encounter.       Reason for Telemedicine Visit: Services only offered telehealth     Originating Site (Patient Location): Patient's home     Distant Site (Provider Location): Provider Remote Setting     Consent:  The patient/guardian has verbally consented to: the potential risks and benefits of telemedicine (video visit) versus in person care; bill my insurance or make self-payment for services provided; and responsibility for payment of non-covered services.      Mode of Communication:  Video Conference via uMentioned     As the provider I attest to compliance with applicable laws and regulations related to telemedicine.

## 2021-04-19 NOTE — PROGRESS NOTES
Client:  Sebastián Nogueira  MRN: [id number]    Comprehensive Assessment UPDATE/IS/KAMINI/Jemma Re-Assess   Comprehensive Assessment Update: 4/19/2021    Comprehensive assessment dated 3/18/21 was reviewed and updates are as follows: nONE  Reason for admission today:  IOP Treatment    Dates of last use and substance(s) used:  3/13/21  Patient does not have a history of opiate use.    Safety concerns:  None       Other:  nONE    Health Screening:  Given patient's past history, a medication, and physical condition, is there a fall risk?  No  Does the patient have any pain? No  Is the patient on a special diet? If yes, please explain: no  Does the patient have any concerns regarding your nutritional status? If yes, please explain: no  Has the patient had any appetite changes in the last 3 months?  No  Has the patient had any weight loss or weight gain in the last 3 months? No  Has the patient have a history of an eating disorder or been over-eating, avoiding meals, or inducing vomiting?  No  Does the patient have any dental concerns? (Problems with teeth, pain, cavities, braces)?  YES Due appointment. [place] teeth.  Are immunizations up to date?  No  Any recent exposure to TB, Hepatitis, Measles, or Strep?  No  Client's BMI is 31.8.  Client informed of BMI?  yes   Above,  Referral to primary care physician    Dimension Scale Ratings:      1.  1 Can tolerate and cope with withdrawal discomfort. Not honest with initial eval. PO sent him back for re- eval     2.  1  Tolerates and chrissy with physical discomfort and is able to get the services that s/he needs.     3.  0  Good impulse control and coping skills and presents no risk of harm to self or others. Functions in all life areas and displays no emotional, behavioral, or   cognitive problems or the problems are stable.     4.  2  Displays verbal compliance, but lacks consistent behaviors; has low motivation for change      5.  3  Poor recognition and understanding of  relapse and recidivism issues and displays moderately high vulnerability for further substance use   or mental health problems. Has few coping skills, rarely applied.     6.  3 Not engaged in structured, meaning full activity and criminal justice system is involved due to probation violation. Lives w/ parents and siblings and family is support system. Environment is safe. Clt is unemployed      Initial Service Plan (ISP)    Immediate health, safety, and preliminary service needs identified and plan includes the following based on available information from clients, referral sources, and collateral information.    Safety (SI, SIB, suicide attempts, aggressive behaviors):  NA      Health:  Client does NOT have health issues that would impede participation in treatment    Transportation: Self     Other: NA    Patient does not have any identified barriers to participating in referred services.    Vulnerable Adult Assessment    Does the patient possess a physical or mental infirmity or other physical, mental, or emotional dysfunction?  No. Patient is not a vulnerable adult.    This patient is not a functional Vulnerable Adult according to [place] Statute 626.5572 subdivision 21.      Treatment suggestions for client for the time period until the    initial treatment planning session:  Attend group acclimate to peers    [place] Re-Assessment:     Have you ever wished you were dead or that you could go to sleep and not wake up? Lifetime?  No   Past Month?  No     Have you actually had any thoughts of killing yourself?  Lifetime?  No   Past Month?  No     Have you been thinking about how you might do this? Lifetime?  N/A   Past Month?  N/A     Have you had these thoughts and had some intention of acting on them?  Lifetime?  N/A   Past Month?  N/A     Have you started to work out the details of how to kill yourself?  Lifetime?  N/A   Past Month?  N/A     Do you intend to carry out this plan?   N/A     When you have the  thoughts how long do they last?   N/A    Are there things - anyone or anything (ie Family, Anglican, pain of death) that stopped you from wanting to die or acting on thoughts of suicide?   Does not apply        2008  [employer]  Used with permission by Mary Espinoza, PhD.      [facility] White [facility] Hospital[facility]O       4/19/2021     11:26 AMClient:  Sebastián Nogueira  MRN: [id number]

## 2021-04-26 ENCOUNTER — HOSPITAL ENCOUNTER (INPATIENT)
Facility: CLINIC | Age: 26
LOS: 3 days | Discharge: PSYCHIATRIC OR SUBSTANCE USE FACILITY | End: 2021-04-29
Attending: FAMILY MEDICINE | Admitting: PSYCHIATRY & NEUROLOGY
Payer: COMMERCIAL

## 2021-04-26 ENCOUNTER — TELEPHONE (OUTPATIENT)
Dept: BEHAVIORAL HEALTH | Facility: CLINIC | Age: 26
End: 2021-04-26

## 2021-04-26 DIAGNOSIS — F10.930 ALCOHOL WITHDRAWAL, UNCOMPLICATED (H): ICD-10-CM

## 2021-04-26 DIAGNOSIS — Z20.822 COVID-19 RULED OUT BY LABORATORY TESTING: ICD-10-CM

## 2021-04-26 LAB
ALBUMIN SERPL-MCNC: 4 G/DL (ref 3.4–5)
ALCOHOL BREATH TEST: 0.36 (ref 0–0.01)
ALP SERPL-CCNC: 68 U/L (ref 40–150)
ALT SERPL W P-5'-P-CCNC: 38 U/L (ref 0–70)
AMPHETAMINES UR QL SCN: NEGATIVE
ANION GAP SERPL CALCULATED.3IONS-SCNC: 12 MMOL/L (ref 3–14)
APTT PPP: 25 SEC (ref 22–37)
AST SERPL W P-5'-P-CCNC: 32 U/L (ref 0–45)
BARBITURATES UR QL: NEGATIVE
BASOPHILS # BLD AUTO: 0 10E9/L (ref 0–0.2)
BASOPHILS NFR BLD AUTO: 0.6 %
BENZODIAZ UR QL: NEGATIVE
BILIRUB SERPL-MCNC: 0.4 MG/DL (ref 0.2–1.3)
BUN SERPL-MCNC: 7 MG/DL (ref 7–30)
CALCIUM SERPL-MCNC: 8.7 MG/DL (ref 8.5–10.1)
CANNABINOIDS UR QL SCN: POSITIVE
CHLORIDE SERPL-SCNC: 101 MMOL/L (ref 94–109)
CO2 SERPL-SCNC: 27 MMOL/L (ref 20–32)
COCAINE UR QL: NEGATIVE
CREAT SERPL-MCNC: 0.77 MG/DL (ref 0.66–1.25)
DIFFERENTIAL METHOD BLD: NORMAL
EOSINOPHIL # BLD AUTO: 0 10E9/L (ref 0–0.7)
EOSINOPHIL NFR BLD AUTO: 0.4 %
ERYTHROCYTE [DISTWIDTH] IN BLOOD BY AUTOMATED COUNT: 12.8 % (ref 10–15)
ETHANOL UR QL SCN: POSITIVE
FLUAV+FLUBV AG SPEC QL: NEGATIVE
FLUAV+FLUBV AG SPEC QL: NEGATIVE
GFR SERPL CREATININE-BSD FRML MDRD: >90 ML/MIN/{1.73_M2}
GGT SERPL-CCNC: 134 U/L (ref 0–75)
GLUCOSE SERPL-MCNC: 110 MG/DL (ref 70–99)
HCT VFR BLD AUTO: 42.5 % (ref 40–53)
HGB BLD-MCNC: 14.8 G/DL (ref 13.3–17.7)
IMM GRANULOCYTES # BLD: 0 10E9/L (ref 0–0.4)
IMM GRANULOCYTES NFR BLD: 0.2 %
INR PPP: 0.99 (ref 0.86–1.14)
LABORATORY COMMENT REPORT: NORMAL
LYMPHOCYTES # BLD AUTO: 1.9 10E9/L (ref 0.8–5.3)
LYMPHOCYTES NFR BLD AUTO: 40.7 %
MAGNESIUM SERPL-MCNC: 2.4 MG/DL (ref 1.6–2.3)
MCH RBC QN AUTO: 31.6 PG (ref 26.5–33)
MCHC RBC AUTO-ENTMCNC: 34.8 G/DL (ref 31.5–36.5)
MCV RBC AUTO: 91 FL (ref 78–100)
MONOCYTES # BLD AUTO: 0.4 10E9/L (ref 0–1.3)
MONOCYTES NFR BLD AUTO: 8.5 %
NEUTROPHILS # BLD AUTO: 2.3 10E9/L (ref 1.6–8.3)
NEUTROPHILS NFR BLD AUTO: 49.6 %
NRBC # BLD AUTO: 0 10*3/UL
NRBC BLD AUTO-RTO: 0 /100
OPIATES UR QL SCN: NEGATIVE
PLATELET # BLD AUTO: 232 10E9/L (ref 150–450)
POTASSIUM SERPL-SCNC: 3.4 MMOL/L (ref 3.4–5.3)
PROT SERPL-MCNC: 7.3 G/DL (ref 6.8–8.8)
RBC # BLD AUTO: 4.69 10E12/L (ref 4.4–5.9)
SARS-COV-2 RNA RESP QL NAA+PROBE: NEGATIVE
SODIUM SERPL-SCNC: 140 MMOL/L (ref 133–144)
SPECIMEN SOURCE: NORMAL
SPECIMEN SOURCE: NORMAL
TSH SERPL DL<=0.005 MIU/L-ACNC: 0.55 MU/L (ref 0.4–4)
WBC # BLD AUTO: 4.7 10E9/L (ref 4–11)

## 2021-04-26 PROCEDURE — 80053 COMPREHEN METABOLIC PANEL: CPT | Performed by: FAMILY MEDICINE

## 2021-04-26 PROCEDURE — 250N000011 HC RX IP 250 OP 636: Performed by: FAMILY MEDICINE

## 2021-04-26 PROCEDURE — 85025 COMPLETE CBC W/AUTO DIFF WBC: CPT | Performed by: FAMILY MEDICINE

## 2021-04-26 PROCEDURE — 87635 SARS-COV-2 COVID-19 AMP PRB: CPT | Performed by: FAMILY MEDICINE

## 2021-04-26 PROCEDURE — 85730 THROMBOPLASTIN TIME PARTIAL: CPT | Performed by: FAMILY MEDICINE

## 2021-04-26 PROCEDURE — 84443 ASSAY THYROID STIM HORMONE: CPT | Performed by: FAMILY MEDICINE

## 2021-04-26 PROCEDURE — 83735 ASSAY OF MAGNESIUM: CPT | Performed by: FAMILY MEDICINE

## 2021-04-26 PROCEDURE — 80307 DRUG TEST PRSMV CHEM ANLYZR: CPT | Performed by: FAMILY MEDICINE

## 2021-04-26 PROCEDURE — 250N000013 HC RX MED GY IP 250 OP 250 PS 637: Performed by: PSYCHIATRY & NEUROLOGY

## 2021-04-26 PROCEDURE — 85610 PROTHROMBIN TIME: CPT | Performed by: FAMILY MEDICINE

## 2021-04-26 PROCEDURE — 128N000004 HC R&B CD ADULT

## 2021-04-26 PROCEDURE — HZ2ZZZZ DETOXIFICATION SERVICES FOR SUBSTANCE ABUSE TREATMENT: ICD-10-PCS | Performed by: PSYCHIATRY & NEUROLOGY

## 2021-04-26 PROCEDURE — 96365 THER/PROPH/DIAG IV INF INIT: CPT | Performed by: FAMILY MEDICINE

## 2021-04-26 PROCEDURE — C9803 HOPD COVID-19 SPEC COLLECT: HCPCS | Performed by: FAMILY MEDICINE

## 2021-04-26 PROCEDURE — 82977 ASSAY OF GGT: CPT | Performed by: FAMILY MEDICINE

## 2021-04-26 PROCEDURE — 82075 ASSAY OF BREATH ETHANOL: CPT | Performed by: FAMILY MEDICINE

## 2021-04-26 PROCEDURE — 87804 INFLUENZA ASSAY W/OPTIC: CPT | Performed by: PSYCHIATRY & NEUROLOGY

## 2021-04-26 PROCEDURE — 99285 EMERGENCY DEPT VISIT HI MDM: CPT | Mod: 25 | Performed by: FAMILY MEDICINE

## 2021-04-26 PROCEDURE — 250N000013 HC RX MED GY IP 250 OP 250 PS 637: Performed by: FAMILY MEDICINE

## 2021-04-26 PROCEDURE — 80320 DRUG SCREEN QUANTALCOHOLS: CPT | Performed by: FAMILY MEDICINE

## 2021-04-26 PROCEDURE — 258N000001 HC RX 258: Performed by: FAMILY MEDICINE

## 2021-04-26 PROCEDURE — 250N000009 HC RX 250: Performed by: FAMILY MEDICINE

## 2021-04-26 PROCEDURE — 99285 EMERGENCY DEPT VISIT HI MDM: CPT | Performed by: FAMILY MEDICINE

## 2021-04-26 RX ORDER — ATENOLOL 50 MG/1
50 TABLET ORAL ONCE
Status: COMPLETED | OUTPATIENT
Start: 2021-04-26 | End: 2021-04-26

## 2021-04-26 RX ORDER — ONDANSETRON 4 MG/1
4 TABLET, ORALLY DISINTEGRATING ORAL EVERY 6 HOURS PRN
Status: DISCONTINUED | OUTPATIENT
Start: 2021-04-26 | End: 2021-04-29 | Stop reason: HOSPADM

## 2021-04-26 RX ORDER — ACETAMINOPHEN 325 MG/1
650 TABLET ORAL EVERY 4 HOURS PRN
Status: DISCONTINUED | OUTPATIENT
Start: 2021-04-26 | End: 2021-04-29 | Stop reason: HOSPADM

## 2021-04-26 RX ORDER — FOLIC ACID 1 MG/1
1 TABLET ORAL DAILY
Status: DISCONTINUED | OUTPATIENT
Start: 2021-04-27 | End: 2021-04-29 | Stop reason: HOSPADM

## 2021-04-26 RX ORDER — LIDOCAINE 40 MG/G
CREAM TOPICAL
Status: DISCONTINUED | OUTPATIENT
Start: 2021-04-26 | End: 2021-04-26

## 2021-04-26 RX ORDER — IBUPROFEN 600 MG/1
600 TABLET, FILM COATED ORAL EVERY 6 HOURS PRN
Status: DISCONTINUED | OUTPATIENT
Start: 2021-04-26 | End: 2021-04-29 | Stop reason: HOSPADM

## 2021-04-26 RX ORDER — ATENOLOL 50 MG/1
50 TABLET ORAL DAILY PRN
Status: DISCONTINUED | OUTPATIENT
Start: 2021-04-26 | End: 2021-04-29 | Stop reason: HOSPADM

## 2021-04-26 RX ORDER — MULTIPLE VITAMINS W/ MINERALS TAB 9MG-400MCG
1 TAB ORAL DAILY
Status: DISCONTINUED | OUTPATIENT
Start: 2021-04-27 | End: 2021-04-29 | Stop reason: HOSPADM

## 2021-04-26 RX ORDER — LOPERAMIDE HCL 2 MG
2 CAPSULE ORAL 4 TIMES DAILY PRN
Status: DISCONTINUED | OUTPATIENT
Start: 2021-04-26 | End: 2021-04-29 | Stop reason: HOSPADM

## 2021-04-26 RX ORDER — TRAZODONE HYDROCHLORIDE 50 MG/1
50 TABLET, FILM COATED ORAL
Status: DISCONTINUED | OUTPATIENT
Start: 2021-04-26 | End: 2021-04-29 | Stop reason: HOSPADM

## 2021-04-26 RX ORDER — DIAZEPAM 5 MG
5-20 TABLET ORAL EVERY 30 MIN PRN
Status: DISCONTINUED | OUTPATIENT
Start: 2021-04-26 | End: 2021-04-29 | Stop reason: HOSPADM

## 2021-04-26 RX ORDER — MAGNESIUM HYDROXIDE/ALUMINUM HYDROXICE/SIMETHICONE 120; 1200; 1200 MG/30ML; MG/30ML; MG/30ML
30 SUSPENSION ORAL EVERY 4 HOURS PRN
Status: DISCONTINUED | OUTPATIENT
Start: 2021-04-26 | End: 2021-04-29 | Stop reason: HOSPADM

## 2021-04-26 RX ORDER — AMOXICILLIN 250 MG
1 CAPSULE ORAL 2 TIMES DAILY PRN
Status: DISCONTINUED | OUTPATIENT
Start: 2021-04-26 | End: 2021-04-29 | Stop reason: HOSPADM

## 2021-04-26 RX ORDER — HYDROXYZINE HYDROCHLORIDE 25 MG/1
25 TABLET, FILM COATED ORAL EVERY 4 HOURS PRN
Status: DISCONTINUED | OUTPATIENT
Start: 2021-04-26 | End: 2021-04-29 | Stop reason: HOSPADM

## 2021-04-26 RX ORDER — DIAZEPAM 5 MG
5-20 TABLET ORAL EVERY 30 MIN PRN
Status: DISCONTINUED | OUTPATIENT
Start: 2021-04-26 | End: 2021-04-26

## 2021-04-26 RX ORDER — LANOLIN ALCOHOL/MO/W.PET/CERES
100 CREAM (GRAM) TOPICAL DAILY
Status: DISCONTINUED | OUTPATIENT
Start: 2021-04-27 | End: 2021-04-29 | Stop reason: HOSPADM

## 2021-04-26 RX ADMIN — LIDOCAINE HYDROCHLORIDE 30 ML: 20 SOLUTION ORAL; TOPICAL at 21:51

## 2021-04-26 RX ADMIN — DIAZEPAM 10 MG: 5 TABLET ORAL at 21:07

## 2021-04-26 RX ADMIN — TRAZODONE HYDROCHLORIDE 50 MG: 50 TABLET ORAL at 23:18

## 2021-04-26 RX ADMIN — DIAZEPAM 10 MG: 5 TABLET ORAL at 20:18

## 2021-04-26 RX ADMIN — DIAZEPAM 10 MG: 5 TABLET ORAL at 23:17

## 2021-04-26 RX ADMIN — FOLIC ACID: 5 INJECTION, SOLUTION INTRAMUSCULAR; INTRAVENOUS; SUBCUTANEOUS at 17:47

## 2021-04-26 RX ADMIN — DIAZEPAM 10 MG: 5 TABLET ORAL at 21:56

## 2021-04-26 RX ADMIN — ATENOLOL 50 MG: 50 TABLET ORAL at 21:57

## 2021-04-26 ASSESSMENT — ENCOUNTER SYMPTOMS
VOMITING: 0
APPETITE CHANGE: 0
SLEEP DISTURBANCE: 1
ACTIVITY CHANGE: 0
WEAKNESS: 0
WOUND: 0
FATIGUE: 0
DYSURIA: 0
NUMBNESS: 0
CHILLS: 0
TROUBLE SWALLOWING: 0
SHORTNESS OF BREATH: 0
JOINT SWELLING: 0
VOICE CHANGE: 0
ABDOMINAL PAIN: 0
BACK PAIN: 0
BRUISES/BLEEDS EASILY: 0
NAUSEA: 0
DYSPHORIC MOOD: 1
COUGH: 0
FEVER: 0
SEIZURES: 0
AGITATION: 0
RHINORRHEA: 0
NERVOUS/ANXIOUS: 1
DECREASED CONCENTRATION: 1

## 2021-04-26 ASSESSMENT — ACTIVITIES OF DAILY LIVING (ADL)
CONCENTRATING,_REMEMBERING_OR_MAKING_DECISIONS_DIFFICULTY: NO
DRESS: INDEPENDENT;STREET CLOTHES
DIFFICULTY_COMMUNICATING: NO
FALL_HISTORY_WITHIN_LAST_SIX_MONTHS: NO
VISION_MANAGEMENT: GLASSES WITH PATIENT
ORAL_HYGIENE: INDEPENDENT
DRESSING/BATHING_DIFFICULTY: NO
WALKING_OR_CLIMBING_STAIRS_DIFFICULTY: NO
DOING_ERRANDS_INDEPENDENTLY_DIFFICULTY: NO
HEARING_DIFFICULTY_OR_DEAF: NO
HYGIENE/GROOMING: INDEPENDENT
DIFFICULTY_COMMUNICATING: NO
DIFFICULTY_EATING/SWALLOWING: NO
WEAR_GLASSES_OR_BLIND: YES
TOILETING_ISSUES: NO
LAUNDRY: WITH SUPERVISION

## 2021-04-26 NOTE — TELEPHONE ENCOUNTER
S: 5:24PM- ED MD called to request detox inpt for 25 yrs old male in the [facility] ED.     B: Pt presents to the ED via ambulance seeking detox from alcohol.  Pt had a couple months of sobriety then relapsed.  Pt lives in his parent's condo.  Pt was at [facility] a week ago for intoxication, spent a night there and discharged the next day.  Pt reports drinking 1 L per day; he drinks different things.  His last use was an hour PTA.  Pt denies SI, no active plan.  No medical concerns.  Pt reports getting shaky, anxious when he stops drinking.  No hx of w/d seizures nor DTs.    Pt has no symptoms of COVID.  COVID will be collected.  Pt blew a 0.364 so will need to sober up a little.  Did not check if he could ambulate independently.     COVID: pending  UTOX: positive for cannabis and alcohol.  CBC: WNL  CMP: WNL  Magnesium: 2.4 (H)  INR: WNL  Partial Thromboplastin time: WNL  Breathalyzer: 0.364 @ 3:10PM  Vitals: /93, pulse 137,  everything else is stable    Pt is medically cleared.      A:  Vol.     7:34PM- Dr. Castro accepts for 3A/Veluvali.       Patient cleared and ready for behavioral bed placement: Yes

## 2021-04-27 PROCEDURE — 99221 1ST HOSP IP/OBS SF/LOW 40: CPT | Performed by: PHYSICIAN ASSISTANT

## 2021-04-27 PROCEDURE — 128N000004 HC R&B CD ADULT

## 2021-04-27 PROCEDURE — 250N000011 HC RX IP 250 OP 636: Performed by: PSYCHIATRY & NEUROLOGY

## 2021-04-27 PROCEDURE — 250N000013 HC RX MED GY IP 250 OP 250 PS 637: Performed by: PSYCHIATRY & NEUROLOGY

## 2021-04-27 PROCEDURE — 99207 PR CONSULT E&M CHANGED TO INITIAL LEVEL: CPT | Performed by: PHYSICIAN ASSISTANT

## 2021-04-27 PROCEDURE — 99223 1ST HOSP IP/OBS HIGH 75: CPT | Mod: AI | Performed by: PSYCHIATRY & NEUROLOGY

## 2021-04-27 RX ADMIN — DIAZEPAM 10 MG: 5 TABLET ORAL at 08:41

## 2021-04-27 RX ADMIN — ONDANSETRON 4 MG: 4 TABLET, ORALLY DISINTEGRATING ORAL at 08:41

## 2021-04-27 RX ADMIN — DIAZEPAM 10 MG: 5 TABLET ORAL at 12:11

## 2021-04-27 RX ADMIN — DIAZEPAM 5 MG: 5 TABLET ORAL at 16:15

## 2021-04-27 ASSESSMENT — ACTIVITIES OF DAILY LIVING (ADL)
ORAL_HYGIENE: INDEPENDENT
HYGIENE/GROOMING: HANDWASHING;INDEPENDENT
ORAL_HYGIENE: INDEPENDENT
DRESS: STREET CLOTHES;INDEPENDENT
DRESS: STREET CLOTHES;INDEPENDENT
HYGIENE/GROOMING: HANDWASHING;INDEPENDENT

## 2021-04-27 NOTE — PROGRESS NOTES
Met with Pt to begin discharge planning.  Pt is requesting referral to residential treatment.  Coached Pt to complete paperwork for assessment. Pt has [facility].

## 2021-04-27 NOTE — PROGRESS NOTES
04/27/21 0604   Sleep/Rest/Relaxation   Sleep/Rest/Relaxation appears asleep   Night Time # Hours 6.75 hours     MSSA=6 and 5; patient endorsed mild sweats. We'll continue to monitor.

## 2021-04-27 NOTE — H&P
IDENTIFICATION   Sebastián Nogueira is a 25 year old male who presented to the ER yesterday seeking detox and was admitted to         HISTORY OF PRESENT ILLNESS     Sebastián says that his alcohol use has been out of control. He notes multiple ER visits/hospitalizations in recent months. He notes that he is on probation and does not want to be violated and has been trying to fulfill his probation requirements. He had hoped to do an outpatient treatment program, but was not able to stay sober long enough for this to begin. He was hospitalized for withdrawal in March at [facility], and was able to remain sober for a few few weeks, but the relapsed and has been drinking heavily for the last several weeks. He has been drinking a liter per day. On 4/20 he was observed again at the [facility] ED for intoxication. His friend had called 911 because she was concerned about decreased level of consciousness. In the ED he was noted to have a BAL of 561. He got agitated and had to be restrained and was kept there overnight. He states he is alarmed by how high his alcohol level was, and is seeking detox and residential treatment in order to get help with his alcohol use. He is also concerned about the possibility of getting violated and he believes that getting treatment will be helpful with his legal situation.     Pt has been admitted to  and is being treated with diazepam. He is feeling anxious, which is typical for him with withdrawal.       SUBSTANCE USE HISTORY                                                                 Pt uses cannabis, which he states has not been problematic for him. He denies any recent or regular use of other substances other than alcohol.     He has been using alcohol since age 12. He notes, that even at that point, he would have trouble controlling his use of it and would drink to the point of blacking out. His use of alcohol progressed over the years. He has done one residential  treatment program, which he completed, when he was 14 years old. He tried naltrexone at one point but it made him feel loopy. His longest period of sobriety was 4 months. He was working and was very busy at the time which was helpful. He has not had a withdrawal seizure or withdrawal delirium. Regarding his alcohol use, he acknowledges the following:    - recurrently drinking more than intended  - multiple unsuccessful attempts to stop / control drinking  - cravings  - interpersonal problems due to continued alcohol use  - recurrent use in physically hazardous situations  - tolerance  - withdrawal      RECENT SYMPTOMS   [PSYCH ROS]     PANIC ATTACK:  denies   DEPRESSION:    DENIES- suicidal ideation, depressed mood and anhedonia  DYSREGULATION:   DENIES- suicidal ideation and violent ideation  PSYCHOSIS:    DENIES- auditory hallucinations and visual hallucinations  RONALDO/HYPOMANIA:    DENIES- increased energy and increased activity  TRAUMA RELATED:  none       PSYCHIATRIC HISTORY     Pt denies any history of periods of being down, depressed, lower interest in things, except sometimes during periods of heavy alcohol use. Not while sober. Denies any history of periods of abnormally elevated mood or energy. Denies a history of anxiety or excessive worry.  He denies a history of psychiatric hospitalizations, suicide attempts, or psychotropic medications.     SOCIAL HISTORY                                                                           Pt lives with his parents, who are supportive. He also has a sister who is supportive. 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     FAMILY HISTORY                                                                       patient reported     He denies any family history of significance in terms of alcoholism or mental illness.     MEDICAL / SURGICAL HISTORY                                   Denies any medical history. No history of seizures  "or TBI.     ALLERGY                                Patient has no known allergies.  MEDICATIONS                               No current outpatient medications on file.       VITALS   /85   Pulse 79   Temp 97.8  F (36.6  C) (Temporal)   Resp 16   SpO2 97%    MENTAL STATUS EXAM                                                             Appearance: awake, alert  Attitude: cooperative and pleasant  Eye Contact: good  Mood: \"feeling OK\".   Affect: mood congruent and full range  Speech:  clear, coherent and normal prosody  Language: fluent in English  Psychomotor Behavior:  no evidence of tardive dyskinesia, dystonia, or tics. Some increased psychomotor activity is noted (fidgety, tapping his legs)  Gait/Station: normal  Thought Process:  linear, logical, goal oriented  Associations:  no loose associations  Thought Content:  Denying active suicide ideation plan or intent no evidence of psychotic thinking  Insight:  good  Judgement: intact  Oriented to:  time, person, and place  Attention Span and Concentration:  intact  Recent and Remote Memory:  intact  Fund of Knowledge: appropriate      LABS and DATA     Recent Labs   Lab Test 04/26/21  1636   CR 0.77   GFRESTIMATED >90     Recent Labs   Lab Test 04/26/21  1636   AST 32   ALT 38   ALKPHOS 68           SUBSTANCE USE/PSYCHIATRIC DIAGNOSES                                                                                                    Severe Alcohol Use Disorder         Assessment:   Inpatient psychiatric hospitalization is warranted at this time for safety, stabilization, and possible adjustment in medications.    Patient does not have a biological predisposition.  Psychologically patient is experiencing anxiety  Patient has these particular stressors legal trouble  Patient has chronic illness exacerbation leading to hospitalization progression as described.     Patient has been unable to stop using drugs in the community due to both physical and " psychological symptoms.  Continued use will put the patient at risk for medical and/or psychiatric complications.  Further diagnostic clarification is not needed.  There are no medical comorbidities which impact this treatment [].    [place] PRESCRIPTION MONITORING PROGRAM [] was not checked today:  .     PLAN                                                                                                       Severe alcohol use disorder  - pt manifesting alcohol withdrawal with anxiety, tremor, increased psychomotor activity, insomnia.   - pt being evaluated on MSSA scale for withdrawal, most recently scored a 9  - continue diazepam as needed for MSSA score. Has so far received a total of 60 mg of diazepam.  - continue multivitamin, thiamine, folate supplements  - pt is interested in residential treatment and is working on this with case management  - pt may be interested in ongoing discussion about pharmacologic therapy for alcohol use disorder.         TREATMENT RISK STATEMENT:  The risks, benefits, alternatives and potential adverse effects have been explained and are understood by the pt. The pt agrees to the treatment plan with the ability to do so. The pt knows to call the clinic for any problems or to access emergency care if needed.  Medical and CD concerns are documented above.  Psychotropic drug interaction check was done, including changes made today, and is discussed above.    ADDICTION FELLOW: Jorge Luis Rey MD      Patient was staffed  on 3a with Dr. Kemp who will sign the note.    Supervisor is Dr. Kemp    This patient has been seen and evaluated by me, Timmy Kemp MD on the date of this note. I have discussed this patient with the the fellow and I agree with the findings and plan in this note. I have reviewed today's vital signs, medications, labs and imaging.     I  TIMMY KEMP Was present with the fellow t during hte service and documentation of the note, I have a verified  the history and personally performed the physical exam and medical decision making, I agree with the assessment and plan or care as documented in the note.   I, sabrina genao MD, have personally performed an examination of this patient and I have reviewed the fellow documentation.  I have edited the note to reflect all relevant changes.  I have discussed this patient with the house staff today   I agree with resident findings and plan in this resident H&P.  I have reviewed all vitals and laboratory findings.  ,SABRINA GENAO MD

## 2021-04-27 NOTE — CONSULTS
[facility]  Internal Medicine Consult     Sebastián Nogueira MRN# [id number]   Age: 25 year old YOB: 1995     Date of Admission: 4/26/2021  Date of Consult:  4/27/2021    Primary Care Provider: No Ref-Primary, Physician    Requesting Service: Psychiatry  Reason for Consult: General Medical Evaluation         Assessment and Recommendations:   Sebastián Nogueira is a 25 year old male with a hx of alcohol abuse who was admitted to [facility] station 3A seeking detox from alcohol.     # Alcohol abuse, dependence, acute withdrawal. Management per primary team, psychiatry. Lab work up unremarkable. VSS.   - Agree with MSSA using valium  - Agree with thiamine, folic acid, MVI      Internal Medicine will sign off at this time. Please notify if any intercurrent medical questions or concerns arise. Thank you for involving me in this patients care.         Estephania Mena PA-C  Hospitalist Service  [phone]           Chief Complaint:   Alcohol abuse         History of Present Illness:   Sebastián Nogueira is a 25 year old male with a hx of alcohol abuse who was admitted to [facility] station 3A seeking detox from alcohol.   Pt states that he has been drinking 1L of vodka or cierra daily, on and off, for some time. He states that he will go days drinking, then will take a break for a few days, but will always return to drinking. He states that his longest period of sobriety is 4 months. He also smoke marijuana on occasion, but otherwise denies any other drug use. He denies any hx of heart or lung disease and is not taking any outpatient medications.   He currently denies any chest pain, shortness of breath or difficulty breathing. He endorses anxiety, nausea and overall feels achy. Denies any hx of withdrawal seizures or DTs.         Review of Systems:   A 10 point review of systems was performed and is negative unless otherwise noted in HPI.          Past Medical History:   No past medical history on  file.         Past Surgical History:    No past surgical history on file.          Family History:     Family History   Problem Relation Age of Onset     Dementia Paternal Grandfather              Social History:     Social History     Tobacco Use     Smoking status: Former Smoker     Smokeless tobacco: Never Used   Substance Use Topics     Alcohol use: Yes     Comment: 3 times a week             Medications:     Current Facility-Administered Medications   Medication     acetaminophen (TYLENOL) tablet 650 mg     alum & mag hydroxide-simethicone (MAALOX) suspension 30 mL     atenolol (TENORMIN) tablet 50 mg     diazepam (VALIUM) tablet 5-20 mg     folic acid (FOLVITE) tablet 1 mg     hydrOXYzine (ATARAX) tablet 25 mg     ibuprofen (ADVIL/MOTRIN) tablet 600 mg     loperamide (IMODIUM) capsule 2 mg     multivitamin w/minerals (THERA-VIT-M) tablet 1 tablet     ondansetron (ZOFRAN-ODT) ODT tab 4 mg     senna-docusate (SENOKOT-S/PERICOLACE) 8.6-50 MG per tablet 1 tablet     thiamine (B-1) tablet 100 mg     traZODone (DESYREL) tablet 50 mg            Allergies:   No Known Allergies          Physical Exam:   /80   Pulse 90   Temp 97.9  F (36.6  C) (Temporal)   Resp 16   SpO2 97%    GENERAL: Alert and oriented x 3. NAD. Anxious appearing.   HEENT: Anicteric sclera. Mucous membranes moist.   CV: RRR. S1, S2. No murmurs appreciated.   RESPIRATORY: Effort normal on room air. Lungs CTAB with no wheezing, rales, rhonchi.   GI: Abdomen soft and non distended with normoactive bowel sounds present in all quadrants. No tenderness, rebound, guarding.   MUSCULOSKELETAL: No joint swelling or tenderness. Moves all extremities.   NEUROLOGICAL: No focal deficits. Strength 5/5 bilaterally in upper and lower extremities.   EXTREMITIES: No peripheral edema. Intact bilateral pedal pulses.   SKIN: No jaundice. No rashes.          Labs:   CBC:  Recent Labs   Lab Test 04/26/21  1636   WBC 4.7   RBC 4.69   HGB 14.8   HCT 42.5   MCV 91    MCH 31.6   MCHC 34.8   RDW 12.8          CMP:  Recent Labs   Lab Test 04/26/21  1636      POTASSIUM 3.4   CHLORIDE 101   MARILYN 8.7   CO2 27   BUN 7   CR 0.77   *   AST 32   ALT 38   BILITOTAL 0.4   ALBUMIN 4.0   PROTTOTAL 7.3   ALKPHOS 68       INR:   Recent Labs   Lab Test 04/26/21  1636   INR 0.99             Estephania Mena PA-C  Internal Medicine [facility] Hospitalist   [facility]   Pager: [phone]

## 2021-04-27 NOTE — PLAN OF CARE
Behavioral Team Discussion: (4/27/2021)    Continued Stay Criteria/Rationale: Patient admitted for alcohol withdrawal, complicated by suicidal ideation.  Plan: The following services will be provided to the patient; psychiatric assessment, medication management, therapeutic milieu, individual and group support, and skills groups.   Participants: 3A Provider: Dr. Debbie Kemp MD; 3A RN's: Jay Guerra RN; 3A CM's: Yamilet Dykes MA [facility] and Radha Ervin [facility]   Summary/Recommendation: Providers will assess today for treatment recommendations, discharge planning, and aftercare plans. CM will meet with pt for discharge planning.   Medical/Physical: Internal medicine consult to be completed 4/27/2021.  Precautions:   Behavioral Orders   Procedures     Code 1 - Restrict to Unit     Routine Programming     As clinically indicated     Status 15     Every 15 minutes.     Withdrawal precautions     Rationale for change in precautions or plan: N/A  Progress: No Change.

## 2021-04-27 NOTE — PROGRESS NOTES
04/26/21 6758   Patient Belongings   Did you bring any home meds/supplements to the hospital?  No   Patient Belongings other (see comments)   Patient Belongings Put in Hospital Secure Location (Security or Locker, etc.) other (see comments)   Belongings Search Yes   Clothing Search Yes   Second Staff bill, 3b     Storage bin: 2 pants with string, bag of chips, shoes, back pack, large jacket, robe with string    Box in med room: wallet, phone, essential oil, speaker with cord    Security env: $$167.00 cash, 3 visa cards, [place] passport    ADMISSION:  I am responsible for any personal items that are not sent to the safe or pharmacy. [facility] is not responsible for loss, theft or damage of any property in my possession.    Patient Signature _____________________ Date/Time _____________________    Staff Signature _______________________ Date/Time _____________________    2nd Staff person, if patient is unable/unwilling to sign    ___________________________________ Date/Time _____________________    DISCHARGE:    All personal items have been returned to me.    Patient Signature _____________________ Date/Time _____________________    Staff Signature _______________________ Date/Time _____________________

## 2021-04-27 NOTE — PLAN OF CARE
"S:     Patient admitted voluntarily from [place] ED for alcohol detox.     B:     Patient has been drinking since he was 13-14 years old. First treatment center at age 15-16. States he, \"Gets sober for a month, 4 months, and then I f--- it up again and drink.\" Latest drinking pattern is 1 Liter of Vodka daily for a \"two week binge.\" Last drink this afternoon. UDS positive for marijuana. States he smokes small amounts daily. Last use today. \"But I don't need that like alcohol, I can stop. With alcohol I cannot stop.\"     S:     Patient is calm and cooperative on admission. Ambulatory. States he is, \"Starting to feel the effects of coming off.\" Denies SI, SIB, HI and psychotic symptom. Has access to hunting rifles at home with his parents, \"We keep them locked up.\"     No reported hx of seizures or DTs.     ALT 38  AST 32    Per MAR, patient received a total of 30 mg of Valium in the ED prior to arrival to unit.     Staff RN assessed patient: MSSA on admission 9. Received Valium 10 mg.     Covid-19 negative.       R:    Monitor on MSSA with Valium.    IM consult per unit protocol.     Monitor on withdrawal precautions.   "

## 2021-04-27 NOTE — PLAN OF CARE
Problem: Substance Withdrawal  Goal: Substance Withdrawal  Description: Signs and symptoms of listed problems will be absent or manageable.  Outcome: No Change     S: Pt admitted for alcohol withdrawal, MSSA 12 & 9 requiring valium X 2.     A: Pt reports he is eating about 50% of meals, he was nauseated this am and declined am vitamins.  He was given Zofran with some improvement of nausea, no emesis but reported diarrhea X 2, declined imodium.  He reports anxiety 6/10, depression 2/10 but denies current thoughts plan or intent for self harm or harm towards others.  He signed TANVIR for his sister and she was updated and provided with the number to the patient phone line. His affect is flat, he is disheveled, up for meals only and denies any specific needs or concerns.   R. He was encouraged to take a shower, increase fluids and work on his CD assessment paperwork.  He is interested in Inpatient CD treatment and reports he is on probation.  He was educated on social distancing, wearing a mask, safety on the unit and handwashing.  Will continue to monitor and assist with discharge planning.

## 2021-04-27 NOTE — ED PROVIDER NOTES
ED Provider Note  [facility]      History     Chief Complaint   Patient presents with     Alcohol Intoxication     Requesting detox, drinks 1 liter daily. Denies hx of seizures. Last drink: 1 hr PTA.      Suicidal     SI without a plan.      HPI  Sebastián Nogueira is a 25 year old male who presents emergency room seeking detox of alcohol.  Patient has history the past of having alcohol withdrawal history of detox and treatment.  Patient states that he had been sober for a month but the last month he has been drinking he drinks at least a liter a day of different types of alcohol.  Gets quite shaky and has a lot of anxiety.  Patient denies seizures or active DTs denies any other drug use.  No other medical concerns at this point.  Patient admits to having intermittently chronically some passive suicidal thoughts but has no plan at all.  He does not feel actively suicidal when discussing with patient at this point.  He is here for his alcohol treatment and detox.  Denies any visual changes this point.  No other drug ingestions.    Past Medical History  No past medical history on file.  No past surgical history on file.  No current outpatient medications on file.    No Known Allergies  Family History  Family History   Problem Relation Age of Onset     Dementia Paternal Grandfather      Social History   Social History     Tobacco Use     Smoking status: Former Smoker     Smokeless tobacco: Never Used   Substance Use Topics     Alcohol use: Yes     Comment: 3 times a week     Drug use: No     Comment: vague on the answer      Past medical history, past surgical history, medications, allergies, family history, and social history were reviewed with the patient. No additional pertinent items.       Review of Systems   Constitutional: Negative for activity change, appetite change, chills, fatigue and fever.   HENT: Negative for congestion, rhinorrhea, trouble swallowing and voice change.    Eyes:  Negative for visual disturbance.   Respiratory: Negative for cough and shortness of breath.    Cardiovascular: Negative for chest pain and leg swelling.   Gastrointestinal: Negative for abdominal pain, nausea and vomiting.   Genitourinary: Negative for dysuria.   Musculoskeletal: Negative for back pain, gait problem and joint swelling.   Skin: Negative for rash and wound.   Allergic/Immunologic: Negative for immunocompromised state.   Neurological: Negative for seizures, weakness and numbness.   Hematological: Does not bruise/bleed easily.   Psychiatric/Behavioral: Positive for decreased concentration, dysphoric mood and sleep disturbance. Negative for agitation and suicidal ideas. The patient is nervous/anxious.    All other systems reviewed and are negative.    A complete review of systems was performed with pertinent positives and negatives noted in the HPI, and all other systems negative.    Physical Exam   BP: (!) 144/93  Pulse: 137  Temp: 96  F (35.6  C)  Resp: 18  SpO2: 99 %  Physical Exam  Vitals signs and nursing note reviewed.   Constitutional:       General: He is in acute distress.      Appearance: He is well-developed. He is not ill-appearing, toxic-appearing or diaphoretic.      Comments: Patient nontoxic and cooperative sitting in a chair.  Interactive.  No tremors noted.   HENT:      Head: Normocephalic and atraumatic.      Nose: No congestion.      Mouth/Throat:      Mouth: Mucous membranes are moist.      Pharynx: Oropharynx is clear.   Eyes:      General: No scleral icterus.     Extraocular Movements: Extraocular movements intact.      Conjunctiva/sclera: Conjunctivae normal.      Pupils: Pupils are equal, round, and reactive to light.   Neck:      Musculoskeletal: Normal range of motion and neck supple.   Cardiovascular:      Rate and Rhythm: Regular rhythm. Tachycardia present.   Pulmonary:      Effort: No respiratory distress.      Breath sounds: No stridor.   Abdominal:      General: There is  no distension.      Tenderness: There is no abdominal tenderness.   Musculoskeletal:         General: No swelling or tenderness.   Skin:     General: Skin is warm and dry.      Capillary Refill: Capillary refill takes less than 2 seconds.      Coloration: Skin is not jaundiced or pale.      Findings: No rash.   Neurological:      General: No focal deficit present.      Mental Status: He is alert and oriented to person, place, and time. Mental status is at baseline.   Psychiatric:         Thought Content: Thought content normal.         Judgment: Judgment normal.      Comments: Mild anxiousness noted.         ED Course       Patient eluate here in the ER.  IV established banana bag given.  Breathalyzer 0.364.  Other labs CBC chemistries been reviewed they are stable with a normal limits this point.  Covid testing was negative drug screen positive for THC and for alcohol only.  Patient here is eating a meal sitting cooperative [facility].    Here in the ER planning to admit the patient to 3 a detox.  Patient here stable although still tachycardic per protocol did receive 50 mg of atenolol also along with the Valium as noted.  Patient will continue to encourage oral intake and will reassess still planning for 3 a detox.      Procedures               Results for orders placed or performed during the hospital encounter of 04/26/21   Drug abuse screen 6 urine (tox)     Status: Abnormal   Result Value Ref Range    Amphetamine Qual Urine Negative NEG^Negative    Barbiturates Qual Urine Negative NEG^Negative    Benzodiazepine Qual Urine Negative NEG^Negative    Cannabinoids Qual Urine Positive (A) NEG^Negative    Cocaine Qual Urine Negative NEG^Negative    Ethanol Qual Urine Positive (A) NEG^Negative    Opiates Qualitative Urine Negative NEG^Negative   CBC with platelets differential     Status: None   Result Value Ref Range    WBC 4.7 4.0 - 11.0 10e9/L    RBC Count 4.69 4.4 - 5.9 10e12/L     Hemoglobin 14.8 13.3 - 17.7 g/dL    Hematocrit 42.5 40.0 - 53.0 %    MCV 91 78 - 100 fl    MCH 31.6 26.5 - 33.0 pg    MCHC 34.8 31.5 - 36.5 g/dL    RDW 12.8 10.0 - 15.0 %    Platelet Count 232 150 - 450 10e9/L    Diff Method Automated Method     % Neutrophils 49.6 %    % Lymphocytes 40.7 %    % Monocytes 8.5 %    % Eosinophils 0.4 %    % Basophils 0.6 %    % Immature Granulocytes 0.2 %    Nucleated RBCs 0 0 /100    Absolute Neutrophil 2.3 1.6 - 8.3 10e9/L    Absolute Lymphocytes 1.9 0.8 - 5.3 10e9/L    Absolute Monocytes 0.4 0.0 - 1.3 10e9/L    Absolute Eosinophils 0.0 0.0 - 0.7 10e9/L    Absolute Basophils 0.0 0.0 - 0.2 10e9/L    Abs Immature Granulocytes 0.0 0 - 0.4 10e9/L    Absolute Nucleated RBC 0.0    Partial thromboplastin time     Status: None   Result Value Ref Range    PTT 25 22 - 37 sec   INR     Status: None   Result Value Ref Range    INR 0.99 0.86 - 1.14   Comprehensive metabolic panel     Status: Abnormal   Result Value Ref Range    Sodium 140 133 - 144 mmol/L    Potassium 3.4 3.4 - 5.3 mmol/L    Chloride 101 94 - 109 mmol/L    Carbon Dioxide 27 20 - 32 mmol/L    Anion Gap 12 3 - 14 mmol/L    Glucose 110 (H) 70 - 99 mg/dL    Urea Nitrogen 7 7 - 30 mg/dL    Creatinine 0.77 0.66 - 1.25 mg/dL    GFR Estimate >90 >60 mL/min/[1.73_m2]    GFR Estimate If Black >90 >60 mL/min/[1.73_m2]    Calcium 8.7 8.5 - 10.1 mg/dL    Bilirubin Total 0.4 0.2 - 1.3 mg/dL    Albumin 4.0 3.4 - 5.0 g/dL    Protein Total 7.3 6.8 - 8.8 g/dL    Alkaline Phosphatase 68 40 - 150 U/L    ALT 38 0 - 70 U/L    AST 32 0 - 45 U/L   Magnesium     Status: Abnormal   Result Value Ref Range    Magnesium 2.4 (H) 1.6 - 2.3 mg/dL   TSH     Status: None   Result Value Ref Range    TSH 0.55 0.40 - 4.00 mU/L   Asymptomatic SARS-CoV-2 COVID-19 Virus (Coronavirus) by PCR     Status: None    Specimen: Nasopharyngeal   Result Value Ref Range    SARS-CoV-2 Virus Specimen Source Nasopharyngeal     SARS-CoV-2 PCR Result NEGATIVE     SARS-CoV-2 PCR  Comment (Note)    GGT     Status: Abnormal   Result Value Ref Range     (H) 0 - 75 U/L   Alcohol breath test POCT     Status: Abnormal   Result Value Ref Range    Alcohol Breath Test 0.364 (A) 0.00 - 0.01     Medications   diazepam (VALIUM) tablet 5-20 mg (10 mg Oral Given 4/26/21 2317)   atenolol (TENORMIN) tablet 50 mg (has no administration in time range)   thiamine (B-1) tablet 100 mg (has no administration in time range)   folic acid (FOLVITE) tablet 1 mg (has no administration in time range)   multivitamin w/minerals (THERA-VIT-M) tablet 1 tablet (has no administration in time range)   acetaminophen (TYLENOL) tablet 650 mg (has no administration in time range)   alum & mag hydroxide-simethicone (MAALOX) suspension 30 mL (has no administration in time range)   senna-docusate (SENOKOT-S/PERICOLACE) 8.6-50 MG per tablet 1 tablet (has no administration in time range)   traZODone (DESYREL) tablet 50 mg (50 mg Oral Given 4/26/21 2318)   hydrOXYzine (ATARAX) tablet 25 mg (has no administration in time range)   ibuprofen (ADVIL/MOTRIN) tablet 600 mg (has no administration in time range)   loperamide (IMODIUM) capsule 2 mg (has no administration in time range)   ondansetron (ZOFRAN-ODT) ODT tab 4 mg (has no administration in time range)   dextrose 5% and 0.45% NaCl 1,000 mL with Infuvite Adult 10 mL, thiamine 100 mg, folic acid 1 mg infusion ( Intravenous Stopped 4/26/21 1847)   lidocaine (XYLOCAINE) 2 % 15 mL, alum & mag hydroxide-simethicone (MAALOX) 15 mL GI Cocktail (30 mLs Oral Given 4/26/21 2151)   atenolol (TENORMIN) tablet 50 mg (50 mg Oral Given 4/26/21 2157)        Assessments & Plan (with Medical Decision Making)  Dystonia 5-year-old male history of alcohol use presents emergency room intoxicated seeking alcohol detox with withdrawal symptoms.  Patient underlying anxiety is not actively suicidal homicidal here in the ER.  Patient given a banana bag labs are otherwise stable drug screen positive THC  and alcohol only.  Covid testing negative.  [facility]  Still some persistent tachycardia without any other symptoms more likely alcohol withdrawal he received 50 mg of atenolol orally in the ER along with Valium and oral fluids.  Plan at this point symptoms are still consistent with alcohol withdrawal planning to 3 a detox.  Patient is voluntary.       I have reviewed the nursing notes. I have reviewed the findings, diagnosis, plan and need for follow up with the patient.    There are no discharge medications for this patient.      Final diagnoses:   Alcohol withdrawal, uncomplicated (H)       --  Genaro Darnell  [facility] EMERGENCY DEPARTMENT  4/26/2021    This note was created at least in part by the use of dragon voice dictation system. Inadvertent typographical errors may still exist.  Genaor Darnell MD.    Patient evaluated in the emergency department during the COVID-19 pandemic period. Careful attention to patients safety was addressed throughout the evaluation. Evaluation and treatment management was initiated with disposition made efficiently and appropriate as possible to minimize any risk of potential exposure to patient during this evaluation.       Genaro Darnell MD  04/26/21 7692

## 2021-04-28 PROCEDURE — 250N000013 HC RX MED GY IP 250 OP 250 PS 637: Performed by: PSYCHIATRY & NEUROLOGY

## 2021-04-28 PROCEDURE — 128N000004 HC R&B CD ADULT

## 2021-04-28 PROCEDURE — H0001 ALCOHOL AND/OR DRUG ASSESS: HCPCS

## 2021-04-28 PROCEDURE — 99231 SBSQ HOSP IP/OBS SF/LOW 25: CPT | Performed by: PSYCHIATRY & NEUROLOGY

## 2021-04-28 RX ORDER — LANOLIN ALCOHOL/MO/W.PET/CERES
3 CREAM (GRAM) TOPICAL
Status: DISCONTINUED | OUTPATIENT
Start: 2021-04-28 | End: 2021-04-29 | Stop reason: HOSPADM

## 2021-04-28 RX ADMIN — THIAMINE HCL TAB 100 MG 100 MG: 100 TAB at 07:57

## 2021-04-28 RX ADMIN — MULTIPLE VITAMINS W/ MINERALS TAB 1 TABLET: TAB at 07:57

## 2021-04-28 RX ADMIN — TRAZODONE HYDROCHLORIDE 50 MG: 50 TABLET ORAL at 20:47

## 2021-04-28 RX ADMIN — HYDROXYZINE HYDROCHLORIDE 25 MG: 25 TABLET, FILM COATED ORAL at 16:19

## 2021-04-28 RX ADMIN — FOLIC ACID 1 MG: 1 TABLET ORAL at 07:57

## 2021-04-28 ASSESSMENT — ACTIVITIES OF DAILY LIVING (ADL)
LAUNDRY: WITH SUPERVISION
DRESS: STREET CLOTHES
HYGIENE/GROOMING: INDEPENDENT
ORAL_HYGIENE: INDEPENDENT

## 2021-04-28 NOTE — PROGRESS NOTES
04/28/21 0616   Sleep/Rest/Relaxation   Sleep/Rest/Relaxation appears asleep   Night Time # Hours 6.5 hours     MSSA=5; patient denied having withdrawal symptoms. We'll continue to monitor.

## 2021-04-28 NOTE — PROGRESS NOTES
[facility] [place]   Psychiatric Progress Note        Interim history   This is a 25 year old male with Severe Alcohol Use Disorder.Pt seen in rounds.   The patient's care was discussed with the treatment team during the daily team meeting and/or staff's chart notes were reviewed.  Staff report patient has been visible in the milieu,  no acute eventsovernight.     Patient's mood is better  Energy Level:MODERATE  Sleep:No concerns, sleeps well through night  Appetite:fair  Improving motivation interest   Denied any Suicidal/homicidal ideation/plan intent.  Denied any psychosis  No prior suicde attempts  No access to gun  Pt is in alcohol withdrawal still being monitered every 4 hrs for it,   Pt mssa score are monitered  Tolerating meds and has no side effects.              Medications:     Current Facility-Administered Medications   Medication     acetaminophen (TYLENOL) tablet 650 mg     alum & mag hydroxide-simethicone (MAALOX) suspension 30 mL     atenolol (TENORMIN) tablet 50 mg     diazepam (VALIUM) tablet 5-20 mg     folic acid (FOLVITE) tablet 1 mg     hydrOXYzine (ATARAX) tablet 25 mg     ibuprofen (ADVIL/MOTRIN) tablet 600 mg     loperamide (IMODIUM) capsule 2 mg     multivitamin w/minerals (THERA-VIT-M) tablet 1 tablet     ondansetron (ZOFRAN-ODT) ODT tab 4 mg     senna-docusate (SENOKOT-S/PERICOLACE) 8.6-50 MG per tablet 1 tablet     thiamine (B-1) tablet 100 mg     traZODone (DESYREL) tablet 50 mg             Allergies:   No Known Allergies         Psychiatric Examination:   Blood pressure 131/86, pulse 81, temperature 97.6  F (36.4  C), temperature source Temporal, resp. rate 16, SpO2 97 %.  Weight is 0 lbs 0 oz  There is no height or weight on file to calculate BMI.    Appearance:  awake, alert and adequately groomed  Attitude:  cooperative  Eye Contact:  good  Mood:  better  Affect:  appropriate and in normal range and mood congruent  Speech:  clear, coherent rate /rhythm  are good  Psychomotor Behavior:  no evidence of tardive dyskinesia, dystonia, or tics and intact station, gait and muscle tone  Throught Process:  logical  Associations:  no loose associations  Thought Content:  no evidence of suicidal ideation or homicidal ideation, no evidence of psychotic thought, no auditory hallucinations present and no visual hallucinations present  Insight:  good  Judgement:  intact  Oriented to:  time, person, and place  Attention Span and Concentration:  intact  Recent and Remote Memory:  intact  Language fund of knowledge are adequate         Labs:     No results found for: NTBNPI, NTBNP  Lab Results   Component Value Date    WBC 4.7 04/26/2021    HGB 14.8 04/26/2021    HCT 42.5 04/26/2021    MCV 91 04/26/2021     04/26/2021     Lab Results   Component Value Date    TSH 0.55 04/26/2021         DX Severe Alcohol Use Disorder     PLAN   Alcohol intoxication/withdrawal, presently is on MSSA protocol with Valium. Continue the same MSSA protocol as ordered. Continue thiamine 100 mg p.o. daily, M.V.I. one p.o. daily and folate 1 mg p.o. Daily  Will continue mssa protocal to detox off alcohol on valium,  Pt is c/o of termor , agitation poor sleep and poor appetite, he has sweats, feels shakey  On mssa client scored4 scored today and  needed needed0  mg po as of yet , total dose since admission was 65    MSSA    Eating Disturbances: ate and enjoyed all of it or not applicable  Tremor: 1 - not visibly apparent but can be felt by the examiner placing his fingertip slightly against the patient's fingertips  Sleep Disturbance: slept through the night or not applicable  Clouding of Sensorium: no evidence  Hallucinations: 0 - none  Quality of Contact: 0 - awareness of examiner and people around him/her  Agitation: 0 - normal activity  Paroxysmal Sweats: 0 - no observed sweating  Temperature: 99.5 or below  Pulse: 2 - 80 to 89  Total MSSA Score: 4    Pt interested in antabuse/  Will give info  about campral   Laboratory/Imaging: reviewed with patient   Consults: internal medicine consult reviewed  Patient will be treated in therapeutic milieu with appropriate individual and group therapies as described.  PDMP CHECKED     Supportive psychotheraoy provided, tanya talked about recovery enviroment, relapse prevention, triggers to use.  Discussed with patient many issues of addiction,triggers, relapse, and establishing a solid recovery program.  Asked pt to be med complinat   Medical diagnoses to be addressed this admission:    Plan:         Assessment and Recommendations:   Sebastián Nogueira is a 25 year old male with a hx of alcohol abuse who was admitted to [facility] station 3A seeking detox from alcohol.      # Alcohol abuse, dependence, acute withdrawal. Management per primary team, psychiatry. Lab work up unremarkable. VSS.   - Agree with MSSA using valium  - Agree with thiamine, folic acid, MVI        Internal Medicine will sign off at this time. Please notify if any intercurrent medical questions or concerns arise. Thank you for involving me in this patients care.        Legal Status: voluntary    Safety Assessment:   Checks:  15 min  Precautions: withdrawal precautions  Pt has not required locked seclusion or restraints in the past 24 hours to maintain safety, please refer to RN documentation for further details.  Discussed with patient many issues of addiction,triggers, relapse, and establishing a solid recovery program.  Able to give informed consent:  YES   Discussed Risks/Benefits/Side Effects/Alternatives: YES    After discussion of the indications, risks, benefits, alternatives and consequences of no treatment, the patient elects to complete detox and do trt.

## 2021-04-28 NOTE — PLAN OF CARE
Problem: Substance Withdrawal  Goal: Substance Withdrawal  Description: Signs and symptoms of listed problems will be absent or manageable.  4/27/2021 2229 by Marti Guerra RN  Outcome: Improving    Pt scored 8 & 5 on MSSA requiring valium x 1 this shift.  He was visible on the unit, attended group and ate most of his dinner.  He reports some mild anxiety but denies any needs or concerns.

## 2021-04-28 NOTE — PLAN OF CARE
BRIDGER      Sebastián Nogueira is a 25 year old year old male with a chief complaint of alcohol withdrawl    S = Situation:     Pt's MSSA =3. He has not required Valium in the last 24hrs . He has complaints of anxiety 6/10. He requested prn medication to decrease anxiety.        B  = Background:     Pt's vitals signs are stable.   He was given atrax 25 mg.x2.         A  =  Assessment:     Pt is not displaying withdrawal symptoms.Pt reports atarax was helpful in decreasing his anxiety.    R =   Request or Recommendation:   Pt has been discontinued of alcohol withdrawal precations

## 2021-04-28 NOTE — PLAN OF CARE
"Pt MSSA score today is a 4, no valium indicated. Last valium administered Tuesday April 27, 2021 1615. 35 mg valium administered since arrival to unit 3a. Pt endorses feeling \"a little bit\" of happiness and hopefulness. Also endorses feeling tense and irritable. Denies anxiety and depression. Denies suicidal ideation. States is interested in treatment after detox is complete. Will continue to monitor and intervene as warranted. Pt denies any unmet needs at this time.  "

## 2021-04-28 NOTE — PROGRESS NOTES
"[facility]  UNIVERSAL ADULT DIAGNOSTIC ASSESSMENT - Substance Use Disorder    Provider Name and Credentials: Leann Schuster, [facility], [facility]     PATIENT'S NAME: Sebastián Nogueira  PREFERRED NAME: Jamar  PRONOUNS: he/him/his     MRN: [id number]  : 1995   Last 4 SSN: [id number]  ACCT. NUMBER:  [id number]  DATE OF SERVICE: 2021   START TIME: 9:30 AM  END TIME: 10:15 AM  PREFERRED PHONE: [phone]   May we leave a program related message: Yes  SERVICE MODALITY:  Phone Visit:      Provider verified identity through the following two step process.  Patient provided:  Patient  and Patient's last 4 digits of SSN    The patient has been notified of the following:      \"We have found that certain health care needs can be provided without the need for a face to face visit.  This service lets us provide the care you need with a phone conversation.     a  I will have full access to your [facility] medical record during this entire phone call.   I will be taking notes for your medical record.      Since this is like an office visit, we will bill your insurance company for this service.       There are potential benefits and risks of telephone visits (e.g. limits to patient confidentiality) that differ from in-person visits.?Confidentiality still applies for telephone services, and nobody will record the visit.  It is important to be in a quiet, private space that is free of distractions (including cell phone or other devices) during the visit.??      If during the course of the call I believe a telephone visit is not appropriate, you will not be charged for this service\"     Consent has been obtained for this service by care team member: Yes       Identifying Information:  Patient is a 25 year old,  male who was referred for an assessment by self. The pronoun use throughout this assessment reflects the patient's chosen pronoun. Patient attended the session alone.     Chief Complaint:   The " "reason for seeking services at this time is: \"alcohol treatment.\"  The problem(s) began at age 12. Patient has attempted to resolve these concerns in the past through treatment.  Patient is in active withdrawal, but is currently admitted to [facility] for medical detoxification and withdrawal monitoring and is not an imminent safety risk to self or others, and may proceed with the assessment interview.     Social/Family History:  Patient reported he grew up in [place], [place]. Patient was raised by both parents. Patient reported that his childhood was \"good\".  Patient describes current relationships with family of origin as positive but strained.      The patient describes his cultural background as White, American.  Cultural influences and impact on patient's life structure, values, norms, and healthcare: None identified.  Contextual influences on patient's health include: Individual Factors MH/CD issues.  Patient identified his preferred language to be English. Patient reported he does not need the assistance of an  or other support involved in therapy.     Patient reports he is not involved in community of brie activities. Patients reports spirituality impacts his recovery in the following ways: \"it doesn't, really\".     Patient reported had no significant delays in developmental tasks.  Patient's highest education level was high school graduate. Patient identified the following learning problems: none reported.  Patient reports he is able to understand written materials.    Patient reported the following relationship history.  Patient's current relationship status is single.   Patient identified his sexual orientation as straight.  Patient reported having zero child(pretty).     Patient's current living/housing situation involves staying with his parents.  Patient lives with his parents and he reports that housing is stable. Patient identified family and friends as part of his support " "system.  Patient identified the quality of these relationships as stable and meaningful.      Patient reports engaging in the following recreational/leisure activities: watching TV. Patient is currently unemployed. 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 Patient reports his income is obtained through his savings.  Patient does identify finances as a current stressor.      Patient reports the following substance related arrests or legal issues: pt is on probation for second degree DWI. Patient does report being on probation / parole / [facility], not sure when it ends.    Patient's Strengths and Limitations:  Patient identified the following strengths or resources that will help him succeed in treatment: \"I want it to work\". Things that may interfere with the patient's success in treatment include: legal issues.     Personal and Family Medical History:   Patient did not report a family history of mental health concerns.  Patient reports the following family history:   Family History   Problem Relation Age of Onset     Dementia Paternal Grandfather       Patient reported the following previous mental health diagnoses: None.  Patient reports their primary mental health symptoms include: mild anxiety related to his legal involvement and these do not impact his ability to function.   Patient has not received mental health services in the past.  Psychiatric Hospitalizations: None.  Patient denies a history of civil commitment.  Current mental health services/providers include:  None.    GAIN-SS:  GAIN-SS Tool:    When was the last time that you had significant problems... 4/28/2021   with feeling very trapped, lonely, sad, blue, depressed or hopeless about the future? Past month   with sleep trouble, such as bad dreams, sleeping restlessly, or falling asleep during the " day? Past Month   with feeling very anxious, nervous, tense, scared, panicked or like something bad was going to happen? Past month   with becoming very distressed & upset when something reminded you of the past? Past month   with thinking about ending your life or committing suicide? Never     When was the last time that you did the following things 2 or more times? 4/28/2021   Lied or conned to get things you wanted or to avoid having to do something? Past month   Had a hard time paying attention at school, work or home? Past month   Had a hard time listening to instructions at school, work or home? Past month   Were a bully or threatened other people? Never   Started physical fights with other people? Never     Patient has not had a physical exam to rule out medical causes for current symptoms.  Date of last physical exam was greater than a year ago and client was encouraged to schedule an exam with PCP. The patient does not have a Primary Care Provider and was encouraged to establish care with a PCP.. Patient reports no current medical concerns.  Patient denies any issues with pain..   Patient denies pregnancy.. There are significant appetite / nutritional concerns / weight changes: when drinking, doesn't eat properly, has no appetite. Patient does not report a history of an eating disorder. Patient does not report a history of head injury / trauma / cognitive impairment.     Patient reports not taking any current medications prior to hospitalization.     Medication Adherence:  NA    Patient Allergies:  No Known Allergies    Medical History:  No past medical history on file.    Rating Scales:    PHQ9:    PHQ-9 SCORE 12/10/2018   PHQ-9 Total Score 0       Substance Use:  Patient reported the following biological family members or relatives with chemical health issues: None identified.  Patient has received substance use disorder and/or gambling treatment in the past.  Patient reports the following dates and  locations of treatment services:  [facility] at age 14.  Patient has been to detox.  Patient is not currently receiving any chemical dependency treatment. Patient reports they have attended the following support groups: AA in the past.      Substance Age of first use Pattern and duration of use (include amounts and frequency) Date of last use     Withdrawal potential Route of administration   Has used Alcohol 12 Current/heaviest: Daily, 1L, maybe a bit more for about the past year. Prior to the past year was drinking daily but not as much.  4/26/21  Yes oral   Has used Marijuana   12 Current: Most days, not sure of exact amount, it varies day to day. This has been consistent for the past 10 years.  4/26/21 No smoked        Patient reported the following problems as a result of their substance use: family problems, financial problems, legal issues, occupational / vocational problems and relationship problems.  Patient is concerned about substance use.     Patient reports experiencing the following withdrawal symptoms within the past 12 months: sweating, shaky/jittery/tremors, unable to sleep, agitation, headache, fatigue, sad/depressed feeling, muscle aches, vivid/unpleasant dreams, irritability, sensitivity to noise, high blood pressure, nausea/vomiting, dizziness, diarrhea, diminished appetite, unable to eat, confused/disrupted speech and anxiety/worry and the following within the past 30 days: sweating, shaky/jittery/tremors, unable to sleep, agitation, headache, fatigue, sad/depressed feeling, muscle aches, vivid/unpleasant dreams, irritability, sensitivity to noise, high blood pressure, nausea/vomiting, dizziness, diarrhea, diminished appetite, unable to eat, confused/disrupted speech and anxiety/worry.   Patients reports urges to use Alcohol.  Patient reports he has used more Alcohol than intended and over a longer period of time than intended. Patient reports he has had unsuccessful attempts to cut  "down or control use of Alcohol.  Patient reports only short periods of no use and return to use was due to \"not having coping skills\". Patient reports he has needed to use more Alcohol to achieve the same effect.  Patient does  report diminished effect with use of same amount of Alcohol.     Patient does  report a great deal of time is spent in activities necessary to obtain, use, or recover from Alcohol effects.  Patient does  report important social, occupational, or recreational activities are given up or reduced because of Alcohol use.  Alcohol use is continued despite knowledge of having a persistent or recurrent physical or psychological problem that is likely to have caused or exacerbated by use.  Patient reports the following problem behaviors while under the influence of substances: \"doing stupid stuff\". Patient reports his recovery goals are \"get sober\".     Patient reports substance use has not impacted his ability to function in a school setting. Patient reports substance use has impacted his ability to function in a work setting.  Patients demographics and history impact his recovery in the following ways: current legal involvement, lack of structure or routine. Patient reports engaging in the following recreation/leisure activities while using: None.  Patient reports the following people are supportive of recovery: Family, friends.     Patient does not have a history of gambling concerns and/or treatment. Patient does not have other addictive behaviors he is concerned about.       Dimension Scale Ratings:    Dimension 1 -  Acute Intoxication/Withdrawal: 1 - Minor Problem  Dimension 2 - Biomedical: 0 - No Problem  Dimension 3 - Emotional/Behavioral/Cognitive Conditions: 1 - Minor Problem  Dimension 4 - Readiness to Change:  2 - Moderate Problem  Dimension 5 - Relapse/Continued Use/ Continued Problem Potential: 4 - Extreme Problem  Dimension 6 - Recovery Environment:  3 - Severe Problem    Significant " "Losses / Trauma / Abuse / Neglect Issues:   Patient did not serve in the .  There are indications or report of significant loss, trauma, abuse or neglect issues related to: are no indications and client denies any losses, trauma, abuse, or neglect concerns.  Concerns for possible neglect are not present.     Safety Assessment:   Current Safety Concerns:  Cana Suicide Severity Rating Scale (Short Version)  Cana Suicide Severity Rating (Short Version) 4/26/2021   Over the past 2 weeks have you felt down, depressed, or hopeless? yes   Over the past 2 weeks have you had thoughts of killing yourself? yes   Have you ever attempted to kill yourself? no   Q1 Wished to be Dead (Past Month) yes   Q2 Suicidal Thoughts (Past Month) yes   Q3 Suicidal Thought Method no   Q4 Suicidal Intent without Specific Plan no   Q5 Suicide Intent with Specific Plan no   Q6 Suicide Behavior (Lifetime) no   High Risk Required Interventions On continuous in person observation   Required Interventions Provider notified   Interventions DEC consulted;Monitored via video     Patient denies current homicidal ideation and behaviors.  Patient denies current self-injurious ideation and behaviors.    Patient reported \"doing stupid stuff\" associated with substance use.  Patient denies any high risk behaviors associated with mental health symptoms.  Patient reports the following current concerns for their personal safety: None.  Patient reports there are not  firearms in the house.      History of Safety Concerns:  Patient denied a history of homicidal ideation.     Patient denied a history of personal safety concerns.    Patient denied a history of assaultive behaviors.    Patient denied a history of sexual assault behaviors.     Patient denied a history of risk behaviors associated with substance use.  Patient denies any history of high risk behaviors associated with mental health symptoms.  Patient reports the following protective " factors: positive relationships positive social network, dedication to family/friends, agreement to use safety plan, uses community crisis resources and pets    Risk Plan:  See Recommendations for Safety and Risk Management Plan    Review of Symptoms per patient report:  Substance Use:  blackouts, passing out, vomiting, hangovers, daily use, substance related legal problems, substance use at work, work absence due to substance use, family relationship problems due to substance use, social problems related to substance use and cravings/urges to use     Diagnostic Criteria:  OP BEH DELGADO CRITERIA: Substance is often taken in larger amounts or over a longer period than was intended.  Met for:  Alcohol, There is persistent desire or unsuccessful efforts to cut down or control use of the substance.  Met for:  Alcohol,  A great deal of time is spent in activities necessary to obtain the substance, use the substance, or recover from its effects.  Met for:  Alcohol, Craving, or a strong desire or urge to use the substance.  Met for:  Alcohol, Recurrent use of the substance resulting in a failure to fulfill major role obligations at work, school, or home.  Met for:  Alcohol, Continued use of the substance despite having persistent or recurrent social or interpersonal problems caused or exacerbated by the effects of its use.  Met for:  Alcohol, Important social, occupational, or recreational activities are given up or reduced because of the substance.  Met for:  Alcohol, Recurrent use of the substance in which it is physically hazardous.  Met for:  Alcohol, Use of the substance is continued despite knowledge of having a persistent or recurrent physical or psychological problem that is likely to have been cause or exacerbated by the substance.  Met for:  Alcohol, Tolerance:  either a need for markedly increased amounts of the substance to achieve the desired effect or a markedly diminished effect with continued use of the dame  amount of the substance.  Met for:  Alcohol, Withdrawal:  either patient endorses characteristic withdrawal syndrome for the substance or the substance (or closely related substance) is taken to relieve or avoid withdrawal symptoms.  Met for:  Alcohol       As evidenced by self report and criteria, client meets the following DSM5 Diagnoses:   (Sustained by DSM5 Criteria Listed Above)  Alcohol Use Disorder   303.90 (F10.20) Severe In a controlled environment  304.30 (F12.20) Cannabis Use Disorder Moderate  In a controlled environment.    Recommendations:     1. Plan for Safety and Risk Management:   Recommended that patient call 911 or go to the local ED should there be a change in any of these risk factors..            Report to child / adult protection services was NA.     2. Patient's identified mental health and substance use concerns with a cultural influence will be addressed by standard programming.     3. Recommendations for treatment focus:    Alcohol / Substance Use - Tolerance, withdrawal, cravings, progressive use, loss of control.      4.  Mental Health Referrals:   The following referral(s) will be initiated: Outpatient Chemical Health Treatment with [facility]. Next Scheduled Appointment: Not yet scheduled.    5. DELGADO Referrals:    Recommendations:  Residential or IOP w/ Lodging such as [facility].  Patient reports they are willing to follow these recommendations. Patient does not have a history of opiate use.    6. Records:   These were reviewed at time of assessment.  Information in this assessment was obtained from the medical record and provided by patient who is a good historian.   Patient will have open access to their mental health medical record.    [facility] Assessment ID: [id number]  Provider Name/ Credentials:  Leann Schuster, [facility]   April 28, 2021

## 2021-04-29 ENCOUNTER — BEH TREATMENT PLAN (OUTPATIENT)
Dept: BEHAVIORAL HEALTH | Facility: CLINIC | Age: 26
End: 2021-04-29
Attending: FAMILY MEDICINE

## 2021-04-29 ENCOUNTER — HOSPITAL ENCOUNTER (OUTPATIENT)
Dept: BEHAVIORAL HEALTH | Facility: CLINIC | Age: 26
End: 2021-04-29
Attending: FAMILY MEDICINE
Payer: COMMERCIAL

## 2021-04-29 ENCOUNTER — TELEPHONE (OUTPATIENT)
Dept: BEHAVIORAL HEALTH | Facility: CLINIC | Age: 26
End: 2021-04-29

## 2021-04-29 VITALS
DIASTOLIC BLOOD PRESSURE: 94 MMHG | HEART RATE: 82 BPM | OXYGEN SATURATION: 100 % | SYSTOLIC BLOOD PRESSURE: 133 MMHG | RESPIRATION RATE: 16 BRPM | TEMPERATURE: 98.1 F

## 2021-04-29 VITALS — TEMPERATURE: 98 F | HEIGHT: 77 IN | OXYGEN SATURATION: 100 % | WEIGHT: 275 LBS | BODY MASS INDEX: 32.47 KG/M2

## 2021-04-29 DIAGNOSIS — Z11.3 SCREEN FOR STD (SEXUALLY TRANSMITTED DISEASE): Primary | ICD-10-CM

## 2021-04-29 DIAGNOSIS — F19.20 CHEMICAL DEPENDENCY (H): ICD-10-CM

## 2021-04-29 PROCEDURE — 1002N00001 HC LODGING PLUS FACILITY CHARGE ADULT

## 2021-04-29 PROCEDURE — 250N000013 HC RX MED GY IP 250 OP 250 PS 637: Performed by: PSYCHIATRY & NEUROLOGY

## 2021-04-29 PROCEDURE — H2035 A/D TX PROGRAM, PER HOUR: HCPCS | Mod: HQ

## 2021-04-29 PROCEDURE — 99239 HOSP IP/OBS DSCHRG MGMT >30: CPT | Performed by: PSYCHIATRY & NEUROLOGY

## 2021-04-29 RX ORDER — AMOXICILLIN 250 MG
2 CAPSULE ORAL DAILY PRN
COMMUNITY
End: 2021-05-22

## 2021-04-29 RX ORDER — ACETAMINOPHEN 325 MG/1
325-650 TABLET ORAL EVERY 4 HOURS PRN
COMMUNITY
End: 2021-05-22

## 2021-04-29 RX ORDER — MAGNESIUM HYDROXIDE/ALUMINUM HYDROXICE/SIMETHICONE 120; 1200; 1200 MG/30ML; MG/30ML; MG/30ML
30 SUSPENSION ORAL EVERY 6 HOURS PRN
COMMUNITY
End: 2021-05-22

## 2021-04-29 RX ORDER — LANOLIN ALCOHOL/MO/W.PET/CERES
100 CREAM (GRAM) TOPICAL DAILY
Qty: 30 TABLET | Refills: 0 | Status: SHIPPED | OUTPATIENT
Start: 2021-04-30 | End: 2021-07-26

## 2021-04-29 RX ORDER — LANOLIN ALCOHOL/MO/W.PET/CERES
3 CREAM (GRAM) TOPICAL
COMMUNITY
End: 2021-05-22

## 2021-04-29 RX ORDER — LORATADINE 10 MG/1
10 TABLET ORAL DAILY PRN
COMMUNITY
End: 2021-05-22

## 2021-04-29 RX ORDER — DISULFIRAM 250 MG/1
250 TABLET ORAL DAILY
Qty: 30 TABLET | Refills: 0 | Status: ON HOLD | OUTPATIENT
Start: 2021-04-29 | End: 2021-07-28

## 2021-04-29 RX ORDER — MULTIPLE VITAMINS W/ MINERALS TAB 9MG-400MCG
1 TAB ORAL DAILY
Qty: 30 TABLET | Refills: 0 | Status: SHIPPED | OUTPATIENT
Start: 2021-04-30 | End: 2021-07-26

## 2021-04-29 RX ORDER — IBUPROFEN 200 MG
400 TABLET ORAL EVERY 6 HOURS PRN
COMMUNITY
End: 2021-05-22

## 2021-04-29 RX ORDER — TRAZODONE HYDROCHLORIDE 50 MG/1
50 TABLET, FILM COATED ORAL
Qty: 30 TABLET | Refills: 0 | Status: SHIPPED | OUTPATIENT
Start: 2021-04-29 | End: 2021-07-26

## 2021-04-29 RX ADMIN — FOLIC ACID 1 MG: 1 TABLET ORAL at 08:37

## 2021-04-29 RX ADMIN — THIAMINE HCL TAB 100 MG 100 MG: 100 TAB at 08:37

## 2021-04-29 RX ADMIN — MULTIPLE VITAMINS W/ MINERALS TAB 1 TABLET: TAB at 08:37

## 2021-04-29 ASSESSMENT — ACTIVITIES OF DAILY LIVING (ADL)
ORAL_HYGIENE: INDEPENDENT
DRESS: SCRUBS (BEHAVIORAL HEALTH)
LAUNDRY: WITH SUPERVISION
HYGIENE/GROOMING: INDEPENDENT

## 2021-04-29 ASSESSMENT — MIFFLIN-ST. JEOR: SCORE: 2339.77

## 2021-04-29 NOTE — GROUP NOTE
Psychoeducation Group Documentation    PATIENT'S NAME: Sebastián Nogueira  MRN:   [id number]  :   1995  ACCT. NUMBER: [id number]  DATE OF SERVICE: 21  START TIME: 1500  END TIME: 1600  FACILITATOR(S): Mulu Lorenzo LADC; Jani Parham LADC; Essence Reinoso ADC-T  TOPIC: BEH Pyschoeducation  Number of patients attending the group: 2  Group Length:  1 Hours    Skills Group Therapy Type: Daily living/independence skills    Summary of Group / Topics Discussed:    Balanced lifestyle skills    Group Attendance:  Attended group session    Patient's response to the group topic/interactions:  cooperative with task    Patient appeared to be Actively participating, Attentive and Engaged.         Client specific details: Pt participated in a group project to examine things that can be changed vs things that can't be changed, personal accountability, and recovery.

## 2021-04-29 NOTE — DISCHARGE INSTRUCTIONS
Behavioral Discharge Planning and Instructions  THANK YOU FOR CHOOSING [address]  [phone]    Summary: You were admitted to [facility] on 4/26/2021 for detoxification from alcohol.  A medical exam was performed that included lab work. You have met with a  and opted to enter [facility].  Please take care and make your recovery a daily priority, Sebastián! It was a pleasure working with you and the entire treatment team here wishes you the very best in your recovery!     Main Diagnosis:  Per Dr. Kemp  Severe Alcohol Use Disorder    Major Treatments, Procedures and Findings:  You were treated for alcohol detoxification using valium administered based on the [facility] protocol. You completed a chemical dependency assessment. You had labs drawn and those results were reviewed with you. Please take a copy of your lab work with you to your next primary care provider appointment.    Symptoms to Report:  If you experience more anxiety, confusion, sleeplessness, deep sadness or thoughts of suicide, notify your treatment team or notify your primary care provider. IF ANY OF THE SYMPTOMS YOU ARE EXPERIENCING ARE A MEDICAL EMERGENCY CALL 911 IMMEDIATELY.     Lifestyle Adjustment: Adjust your lifestyle to get enough sleep, relaxation, exercise and good nutrition. Continue to develop healthy coping skills to decrease stress and promote a sober living environment. Do not use mood altering substances including alcohol, illegal drugs or addictive medications other than what is currently prescribed.     Disposition: [facility]    Facts about COVID19 at www.cdc.gov/COVID19 and www.[place].gov/covid19    Keeping hands clean is one of the most important steps we can take to avoid getting sick and spreading germs to others.  Please wash your hands frequently and lather with soap for at least 20 seconds!    Follow-up Appointment:   You declined to set up a follow up appointment prior to discharge stating you do not  have a primary provider. Please establish a primary provider and be seen within 3 weeks.     Recovery apps for your phone to locate current in person and zoom recovery meetings  [employer] - meeting elena  AA  - meeting elena  Meeting guide - meeting elena  Quick NA meeting - meeting elena  Jamelumer- has various apps    Resources:  *due to covid-19 most AA/NA meetings are being held online*  AA meetings online search for them at: https://aaBenesightintergroup.org (worldwide meeting listings)  AA meetings for [place] area can be found online at: https://a[place].org (click local online meetings listings)  NA meetings for [place] area can be found online at: https://www.[place].org  (click find a meeting)  AA and NA Sponsors are excellent resources for support and you can find one at any support group meeting.   Alcoholics Anonymous (https://aa.org/): for information 24 hours/day  AA Intergroup service office in [place] (http://www.aastpaul.org/) 172.651.6868  AA Intergroup service office in [facility]: [phone]. (http://www.Synergos.org/)  Narcotics Anonymous (www.nam[place].org) (492) 383-3284  https://aafairviewriverside.org/meetings  SMART Recovery - self management for addiction recovery:  www.smartrecovery.org  Pathways ~ A Health Crisis Resource & Support Center:  769.886.1135.  https://prescribetoprevent.org/patient-education/videos/  http://www.harmreduction.org  [facility] [place] 379-570-2706  Support Group:  AA/NA and Sponsor/support.  National Disputanta on Mental Illness (www.[place].myla.org): 554.923.9944 or 428-820-0011.  Alcoholics Anonymous (www.alcoholics-anonymous.org): Check your phone book for your local chapter.  Suicide Awareness Voices of Education (SAVE) (www.save.org): 840-339-MKOE (2799)  National Suicide Prevention Line (www.mentalhealthmn.org): 571-560-KJOG (1335)  Mental Health Consumer/Survivor Network of [place] (www.mhcsn.net): 128-095-5144 or 249-843-2432  Mental Health  Association of [place] (www.mentalhealth.org): 421.266.1576 or 747-945-0540   Substance Abuse and Mental Health Services (www.samhsa.gov)  [place] Opioid Prevention Coalition: www.opioidcoalition.org    [place] Recovery Connection (MRC)  [facility] connects people seeking recovery to resources that help foster and sustain long-term recovery.  Whether you are seeking resources for treatment, transportation, housing, job training, education, health care or other pathways to recovery, [facility] is a great place to start.  726.208.4698.  www.[place]arecovery.org    Great Pod casts for nutrition and wellness  Listen on Apple Podcasts  Dishing Up Nutrition   [employer]   Nutrition       Understand the connection between what you eat and how you feel. Hosted by licensed nutritionists and dietitians from [employer] we share practical, real-life solutions for healthier living through nutrition.     General Medication Instructions:   See your medication sheet(s) for instructions.   Take all medications as prescribed.  Make no changes unless your primary care provider suggests them.   Go to all your primary care provider visits.  Be sure to have all your required lab tests. This way, your medicines can be refilled on time.  Do not use any forms of alcohol.    Please Note:  If you have any questions at anytime after you are discharged please call [facility] detox unit [facility] at [phone].  [facility], Behavioral Intake [phone]  Medical Records call [phone]  Outpatient Behavioral Intake call [phone]  [facility] Wait List/Bed Availability call [phone]    Please remember to take all of your behavioral discharge planning and lab paperwork to any follow up appointments, it contains your lab results, diagnosis, medication list and discharge recommendations.      THANK YOU FOR CHOOSING [facility]

## 2021-04-29 NOTE — PROGRESS NOTES
04/29/21 0609   Sleep/Rest/Relaxation   Sleep/Rest/Relaxation appears asleep   Night Time # Hours 7.5 hours     No concerns voiced.

## 2021-04-29 NOTE — PROGRESS NOTES
Pt given copy of their discharge instructions and medication administration instructions. All discharge plans and labs were discussed with patient. Pt reports no questions at this time regarding discharge plans, labs or medications. Pt denies any suicidal ideation, plans or intent at this time. Patient discharge assisted via Stu WELCH directly to [facility]. Patient plan is to begin treatment today. Patient is discharged at this time.

## 2021-04-29 NOTE — PROGRESS NOTES
Progress Note    This patient had a Rule 25 Assessment on 4/28/21 completed by SCOT Mishra.  This patient was seen for a face to face update of the Assessment and Placement Summary Update on 4/29/2021 by SCOT Holden.  INSIDE: The patient's Assessment and Placement Summary Update completed on 4/29/21 is in the patient's electronic medical record in Epic in the Chart Review section under the Notes/Trans Tab.    Alcohol/Drug use since the last CD evaluation (include date of last use):     No additional substances use since the last CD evaluation     Please note any other clinical changes since the last CD evaluation (such as medication changes, additional legal charges, detoxification admissions, overdoses, etc.)     No significant changes since the last CD evaluation       ASAM Dimensions Original scores Current Scores   I.) Intoxication and Withdrawal: 1 0   II.) Biomedical:  0 0   III.) Emotional and Behavioral:  1 1   IV.) Readiness to Change:  2 2   V.) Relapse Potential: 4 4   VI.) Recovery Environmental: 3 3     Please list clinical justifications for the above ASAM score changes since the original comprehensive assessment:     None of the ASAM scores on the six dimensions had changed since the Rule 25 Assessment was completed on 4/28/21.       Current CHEPE: Current UA:     No CHEPE as the patient was a direct transfer from [facility] IP detoxification unit at [facility] in [place], [place].     No UA screen as the patient was a direct transfer from 3 A IP detoxification unit at [facility] in [place], [place].        PHQ-9, LISA-7   PHQ-9 on 4/29/2021 LISA-7 on 4/29/2021   The patient's PHQ-9 score was 18 out of 27, indicating moderately severe depression.   The patient's LISA-7 score was 18 out of 21, indicating severe anxiety.       [place]-Suicide Severity Rating Scale Reassessment   Have you ever wished you were dead or that you could go to sleep and not wake up?  Past Month:  No    "  Have you actually had any thoughts of killing yourself?  Past Month:  No     Have you been thinking about how you might do this?     Past Month:  No   Lifetime:  No   Have you had these thoughts and had some intention of acting on them?     Past Month:  No   Lifetime:  No   Have you started to work out the details of how to kill yourself?   Past Month:  No   Lifetime:  No   Do you intend to carry out this plan?   No     When you have the thoughts how long do they last?  The patient denied ever having any suicidal thoughts in life.     Are there things - anyone or anything (i.e. family, Rastafarian, pain of death) that stopped you from wanting to die or acting on thoughts of suicide?  Does not apply       2008  [employer]  Used with permission by Mary Espinoza, PhD.       Guide to C-SSRS Risk Ratings   NO IDEATION:  with no active thoughts IDEATION: with a wish to die. IDEATION: with active thoughts. Risk Ratings   If Yes No No 0 - Very Low Risk   If NA Yes No 1 - Low Risk   If NA Yes Yes 2 - Low/moderate risk   IDEATION: associated thoughts of methods without intent or plan INTENT: Intent to follow through on suicide PLAN: Plan to follow through on suicide Risk Ratings cont...   If Yes No No 3 - Moderate Risk   If Yes Yes No 4 - High Risk   If Yes Yes Yes 5 - High Risk   The patient's ADDITIONAL RISK FACTORS and lack of PROTECTIVE FACTORS may increase their overall suicide risk ratings.     Additional Risk Factors:    No additional risk factors   Protective Factors:    Having people in his/her life that would prevent the patient from considering a suicide attempt (i.e. young children, spouse, parents, etc.)     An absence of mental health issues or stable and well treated mental health issues     Having restricted access to highly lethal means of suicide     Risk Status   0. - Very Low Risk:  Evaluation Counselors:  Document in Epic / SBAR to counselor \"Very Low Risk\".      " Treatment Counselors:  Reassess upon admission as applicable, assess weekly in progress notes under Dimension 3 and summarize in Discharge / Treatment summary under Dimension 3.     Additional information to support suicide risk rating: There was no additional information to provide at this time.

## 2021-04-29 NOTE — PROGRESS NOTES
Pt given handouts from [employer] for campral and antabuse. Informed him to ask any RN or speak with Dr. Kemp about these medications if he has any questions.

## 2021-04-29 NOTE — TELEPHONE ENCOUNTER
Dr Saul, In screening this pt - Have you engaged in any risk-taking behavior that would put you at risk for exposure to blood-borne or sexually transmitted diseases? pt stated he has has had high risk sexual encounters 2-3 times weekly for the last year. (Pt was tested for STD 1 yr ago) He says he is currently having sexual encounters with approx 4-5 women, who also have high risk encounters with others.   Pt would like to be tested for all STD's.  He currently has no symptoms.    Would you want to put in orders for outpt lab?   Please advise

## 2021-04-29 NOTE — PROGRESS NOTES
"[address] [address]        Assessment and Placement Summary Update     Patient name:   Sebastián Nogueira   Patient phone: [phone] (home)    Last #:   [id number]   : 1995      PMI #:    Patient address:   [address] [place] [id number]     Date of Original Assessment / Last Update: 19 Update Assessment Date: 2021   Updated by:   SCOT Holden    phone number: [phone]   Referred by:   [facility] / phone number:    Referral to:   the [facility] at [facility] in [place], [place]   NPI: [place] NPI #: [id number]   Summary:  This patient had a Rule 25 Assessment on 21 at [facility] completed by SCOT Mishra.  INSIDE: The patient's Assessment and Placement Summary Update completed on 21 is in the patient's electronic medical record in Epic in the Chart Review section under the Notes/Trans Tab.    Reason for today's update: Admission to program       Substance Use History Update:           X = Primary Drug Used   Age of First Use Most Recent Pattern of Use and Duration   Need enough information to show pattern (both frequency and amounts) and to show tolerance for each chemical that has a diagnosis   Date of last use and time, if needed   Withdrawal Potential? Requiring special care Method of use  (oral, smoked, snort, IV, etc)      Alcohol     12  a liter/day either vodka or whiskey    no oral      Marijuana/  Hashish   12  daily/ \"shatever I felt like smoking\"  no smoked      Cocaine/Crack     No use          Meth/  Amphetamines   No use          Heroin     No use          Other Opiates/  Synthetics   No use          Inhalants     No use          Benzodiazepines     No use          Hallucinogens     No use          Barbiturates/  Sedatives/  Hypnotics No use          Over-the-Counter Drugs   No use          Other     No use          Nicotine     12 " Lia  no      Dimension: Severity Rating/ Reason for Changes from Previous Assessment:  Dimension I: Acute intoxication/Withdrawal potential     Previous ratin Current ratin   Comments:   No reported issues     Dimension II: Biomedical Conditions and Complications     Previous ratin Current ratin   Comments:   No reported issues     Dimension III: Emotional/Behavioral/Cognitive     Previous ratin Current ratin   Comments:   No reported dx     Dimension IV: Readiness for Change     Previous ratin Current ratin   Comments:        Dimension V: Relapse/Continued Use/Continued problem potential     Previous ratin Current ratin   Comments:   Minimal nsight into relapse prevention. Reports longest period of sobriety is 4 mos.     Dimension VI: Recovery environment    Previous rating:   3 Current rating:   3   Comments:   Lives with parents. Probation for 2nd degree DWI. Current violation?       Summary of Assessment Update and Recommendations:   What was the outcome of last referral?  Admission to residential tx. Initial referral to outpatient services. Pt continued to drink and was discharged     Reason for changes in the Risk Description since last assessment? none     Recommendation and rationale for current request and significant issues that need to be addressed:    Refer to original assessment

## 2021-04-29 NOTE — PLAN OF CARE
Patient has not required any valium for alcohol detox since 04/27/21 1431. All MSSA scores since that time have been less than 8. Pt is now removed from alcohol detox monitoring status. Await disposition likely to treatment once plans are finalized.

## 2021-04-29 NOTE — PROGRESS NOTES
"[facility] Nursing Health Assessment      Vital signs:     There were no vitals taken for this visit.      Transfer from  detox    Counselor: Ashley  Drug of Choice: Alcohol   Last use: Monday, 4/26/21  Home clinic/MD:pt does not have one, but is encouraged to get one  Psychiatrist/therapist: pt does not have    Medical history/current conditions:  Pt denies    H&P Screen:  H&P within the last 90 days: Yes.  Date: 4/29/2021 Location: 3A/detox      Mental Health diagnosis: pt denies  Medication compliant?: na  Recent sucidal thoughts? no     When? na  Current thought of self-harm? no    Plan? na    Pain assessment:   Pt. Experiencing pain at this time?  No    [facility] Screen for COVID19    In the last 2 weeks have you been in an large group gatherings (more than 10 people)? no    Have you been covering your nose and mouth while out in the community? yes    Have you come in contact with anyone in the last month who \"was suspected of\" or \"tested positive\" for COVID-19? no    Have you received COVID Vaccination? no    Have you tested positive for COVID-19 in the past 90 days? no  -If yes pt should not be re-tested until 90 days from day one of symptom onset   UNLESS patient is COVID symptomatic, contact infection prevention for recommendation    \"Do you\" or \"have you\" had....any of the following symptoms in the last 2 weeks? no      Fever or chills     Cough     Shortness of breath or difficulty breathing     New muscle or body aches    New headache     New loss of taste or smell    Sore throat     Congestion or runny nose     New and unexplained Nausea or vomiting     Diarrhea    New and unexplained fatigue    Does the above COVID screen need to be reviewed by Infection Prevention? no    COVID TEST COMPLETED BY [facility] ? No.  Negative in ED on 4/26/21    COVID-19 - Pt informed of the following while at [facility]:    1)Staff will take temperature and O2Sats once daily    2) Practice good hand washing hygiene and " avoid touching face    3) If pt has any of the symptoms below, notify staff immediately.      Fever     Cough     Shortness of breath or difficulty breathing     Chills     Repeated shaking with chills    Muscle pain     4) COVID testing maybe initiated at admission. Depending on the situation amd symptoms patients may be tested more than once during their stay.  Patient will be required to stay in room until lab results confirm negative. If you are unable to stay in your room you may be asked to leave the program.  If COVID results are positive, You will have to exit the program quarantine as recommended per CDC and then may return for CD treatment after symptoms have resolved    5) Per COVID protocol, during your stay at , social distancing is required AND mouth and nose must be covered at all times with facial mask while out in milieu.      6) Patients will not be allowed to go to any outside appointments, all outside appointments will need to be virtual or by phone    RN Assessment of Patient's Ability to Safely Manage and Self-Administer Respiratory Treatments    Has experience in the management of Respiratory (If NA, indicate and move to Integrative Therapies): No    List Essential Oils requested by pt pt denies    Patient tobacco use:    Do you use tobacco? Pt states he usses occasionally (vapes and occasional cigarette)   Type? vape/cigarette  How often? daily  How much? 3 times a day   Are you interested in quitting? no   NRT (Nicotine Replacement therapy) ordered? no   Pt is aware of the dangers of tobacco cessation and in contemplation.    Pt given written education.    Nutritional Assessment:    Have you ever purged, binged or restricted yourself as a way to control your weight?   No     Are you on a special diet?   No     Do you have any concerns regarding your nutritional status?   No     Have you had any appetite changes in the last 3 months?   No   Have you had weight loss or weight gain of more  than 10 lbs in the last 3 months?   If patient gained or lost more than 10 lbs, then refer to program RN / attending Physician for assessment.   No   Was the patient informed of BMI?    Above,  General nutrition education   Yes   Have you engaged in any risk-taking behavior that would put you at risk for exposure to blood-borne or sexually transmitted diseases?   Yes, explain: In the last 1 year, pt stated he has has had high risk sexual encounters 2-3 times weekly.He says he is currently having sexual encounters with approx 4-5 women, who also have high risk encounters with others.   Pt would like to be tested for all STD's.  He currently has no symptoms.    Dr Saul was notified for out pt lab orders   Do you have any dental problems?   No     Nursing Assessment Summary:    Pt ordered a hospital bed during his stay at [facility] to accommodate his height    Pt informed of COVID protocol while at [facility]    On-going nursing intervention required?   No    Acute care visit recommended: yes.   requests testing for or is symptomatic of sexually transmitted disease.      Dr Saul notified via telephone encounter asking for STD orders due to pt high risk sexual activity in community

## 2021-04-29 NOTE — PROGRESS NOTES
Initial Services Plan        Before your first treatment group, please do the following    Immediate health & safety concerns: Go to the emergency room if you start to have withdrawal symptoms.    Suggestions for client during the time between intake & completion of treatment plan:  Tour your treatment center (unit or outpatient clinic).  Introduce yourself to the treatment group.  Spend time getting to know your peers.  Complete your psycho-social paperwork.  Complete the problem list for your treatment plan.  Review your patient or client handbook.    Client issues to be addressed in the first treatment sessions:  Identify motivations(s) for coming to treatment, i.e. legal, family, job, self  Identify concerns about coming into treatment, i.e. fear of failing again, sharing a room in treatment  Identify concerns about going to group, i.e. fear of talking in group  Identify outside concerns that may interfere with treatment, i.e. bills not getting paid, homesick for children      Ton Thrasher, [facility]  4/29/2021  1:29 PM

## 2021-04-29 NOTE — DISCHARGE SUMMARY
Sebastián Nogueira MRN# [id number]   Age: 25 year old YOB: 1995     Date of Admission:  4/26/2021  Date of Discharge:  4/29/2021  Admitting Physician:  Debbie Kemp MD  Discharge Physician:  Debbie Kemp MD      DISCHARGE  DX    Severe Alcohol Use Disorder   nicotine use dx moderate -vapes          Event Leading to Hospitalization:     See Admission note by admitting provider for patient encounter. for additional details.          Hospital Course:   PATIENT was admitted to [facility]with attending  under DR kemp, please review the detailed admit note on    The patient was placed under status 15 (15 minute checks) to ensure patient safety.   MSSA protocol was initiated due to the patient's history of alcohol abuse and concern for withdrawal symptoms.  CBC, BMP and utox obtained.    All outpatient medications were continued    PATIENTdid participate in groups and was visible in the milieu.     The patient's symptoms of withdrawl improved.     Patients energy motivation , sleep appetite improved.  Pt completed detox . It was un eventful.      Discussed with patient medications for craving.  Spoke with patient about triggers coping skills relapse prevention.    CONSULTS DONE DURING PATIENTS HOSPITALIZATION.  Patient was seen by medicine on date 4/27/21    This as per their medical consult           Assessment and Recommendations:   Sebastián Nogueira is a 25 year old male with a hx of alcohol abuse who was admitted to [facility] 3A seeking detox from alcohol.      # Alcohol abuse, dependence, acute withdrawal. Management per primary team, psychiatry. Lab work up unremarkable. VSS.   - Agree with MSSA using valium  - Agree with thiamine, folic acid, MVI        Internal Medicine will sign off at this time. Please notify if any intercurrent medical questions or concerns arise. Thank you for involving me in this patients care.            Pt was seen by cm  As per recommendations from cm    Pt  funding approved by Allison Gasca at [facility].  CPA to be sent by Carol Mars.         Labs:reviewed with patient     No results found for this or any previous visit (from the past 48 hour(s)).      Recent Results (from the past 240 hour(s))   Alcohol breath test POCT    Collection Time: 04/26/21  3:10 PM   Result Value Ref Range    Alcohol Breath Test 0.364 (A) 0.00 - 0.01   Drug abuse screen 6 urine (tox)    Collection Time: 04/26/21  3:32 PM   Result Value Ref Range    Amphetamine Qual Urine Negative NEG^Negative    Barbiturates Qual Urine Negative NEG^Negative    Benzodiazepine Qual Urine Negative NEG^Negative    Cannabinoids Qual Urine Positive (A) NEG^Negative    Cocaine Qual Urine Negative NEG^Negative    Ethanol Qual Urine Positive (A) NEG^Negative    Opiates Qualitative Urine Negative NEG^Negative   CBC with platelets differential    Collection Time: 04/26/21  4:36 PM   Result Value Ref Range    WBC 4.7 4.0 - 11.0 10e9/L    RBC Count 4.69 4.4 - 5.9 10e12/L    Hemoglobin 14.8 13.3 - 17.7 g/dL    Hematocrit 42.5 40.0 - 53.0 %    MCV 91 78 - 100 fl    MCH 31.6 26.5 - 33.0 pg    MCHC 34.8 31.5 - 36.5 g/dL    RDW 12.8 10.0 - 15.0 %    Platelet Count 232 150 - 450 10e9/L    Diff Method Automated Method     % Neutrophils 49.6 %    % Lymphocytes 40.7 %    % Monocytes 8.5 %    % Eosinophils 0.4 %    % Basophils 0.6 %    % Immature Granulocytes 0.2 %    Nucleated RBCs 0 0 /100    Absolute Neutrophil 2.3 1.6 - 8.3 10e9/L    Absolute Lymphocytes 1.9 0.8 - 5.3 10e9/L    Absolute Monocytes 0.4 0.0 - 1.3 10e9/L    Absolute Eosinophils 0.0 0.0 - 0.7 10e9/L    Absolute Basophils 0.0 0.0 - 0.2 10e9/L    Abs Immature Granulocytes 0.0 0 - 0.4 10e9/L    Absolute Nucleated RBC 0.0    Partial thromboplastin time    Collection Time: 04/26/21  4:36 PM   Result Value Ref Range    PTT 25 22 - 37 sec   INR    Collection Time: 04/26/21  4:36 PM   Result Value Ref Range    INR 0.99 0.86 - 1.14   Comprehensive metabolic panel     Collection Time: 04/26/21  4:36 PM   Result Value Ref Range    Sodium 140 133 - 144 mmol/L    Potassium 3.4 3.4 - 5.3 mmol/L    Chloride 101 94 - 109 mmol/L    Carbon Dioxide 27 20 - 32 mmol/L    Anion Gap 12 3 - 14 mmol/L    Glucose 110 (H) 70 - 99 mg/dL    Urea Nitrogen 7 7 - 30 mg/dL    Creatinine 0.77 0.66 - 1.25 mg/dL    GFR Estimate >90 >60 mL/min/[1.73_m2]    GFR Estimate If Black >90 >60 mL/min/[1.73_m2]    Calcium 8.7 8.5 - 10.1 mg/dL    Bilirubin Total 0.4 0.2 - 1.3 mg/dL    Albumin 4.0 3.4 - 5.0 g/dL    Protein Total 7.3 6.8 - 8.8 g/dL    Alkaline Phosphatase 68 40 - 150 U/L    ALT 38 0 - 70 U/L    AST 32 0 - 45 U/L   Magnesium    Collection Time: 04/26/21  4:36 PM   Result Value Ref Range    Magnesium 2.4 (H) 1.6 - 2.3 mg/dL   TSH    Collection Time: 04/26/21  4:36 PM   Result Value Ref Range    TSH 0.55 0.40 - 4.00 mU/L   GGT    Collection Time: 04/26/21  4:36 PM   Result Value Ref Range     (H) 0 - 75 U/L   Asymptomatic SARS-CoV-2 COVID-19 Virus (Coronavirus) by PCR    Collection Time: 04/26/21  5:52 PM    Specimen: Nasopharyngeal   Result Value Ref Range    SARS-CoV-2 Virus Specimen Source Nasopharyngeal     SARS-CoV-2 PCR Result NEGATIVE     SARS-CoV-2 PCR Comment (Note)    Influenza A & B Antigen    Collection Time: 04/26/21  5:52 PM   Result Value Ref Range    Influenza A/B Agn Specimen Nasopharyngeal     Influenza A Negative NEG^Negative    Influenza B Negative NEG^Negative            Because this patient meets criteria for an Alcohol Use Disorder, I performed the following brief intervention on the date of this note:              1) Expressed concern that the patient is drinking at unhealthy levels known to increase their risk of alcohol related problems              2) Gave feedback linking alcohol use and health, including personalized feedback explaining how alcohol use can interact with their medical and/or psychiatric problems, and with prescribed medications.              3) Advised  patient to abstain.    PT counseled on nicotine cessation and nicotine replacement provided    Discussed with patient many issues of addiction,triggers, relapse, and establishing a solid recovery program.    DISCHARGE MENTAL STATUS EXAMINATION:  The patient is alert, oriented x3.  Good fund of knowledge.  Good use of language.  Recent and remote memory, language, fund of knowledge are all adequate.  Euthymic mood congruent affect  Speech normal rate/rhythm linear tp no loose asso,The patient does not have any active suicidal or homicidal ideation.  Does not have any auditory or visual hallucination.  Fair insight/judgment At this time, the patient was stable to be discharged.        Pt was not determined to not be a danger to himself or others. At the current time of discharge, the patient does not meet criteria for involuntary hospitalization. On the day of discharge, the patient reports that they do not have suicidal or homicidal ideation and would never hurt themselves or others. Steps taken to minimize risk include: assessing patient s behavior and thought process daily during hospital stay, discharging patient with adequate plan for follow up for mental and physical health and discussing safety plan of returning to the hospital should the patient ever have thoughts of harming themselves or others. Therefore, based on all available evidence including the factors cited above, the patient does not appear to be at imminent risk for self-harm, and is appropriate for outpatient level of care.     Educated about side effects/risk vs benefits /alternative including non treatment.Pt consented to be on medication.     .Total time spent on discharge summary more than 35 min  More than  20 min  planning, coordination of care, medication reconciliation and performance of physical exam on day of discharge.Care was coordinated with unit RN and unit therapist       Sebastián Nogueira   Home Medication Instructions  "NADIA:[id number]    Printed on:04/29/21 0901   Medication Information                      disulfiram (ANTABUSE) 250 MG tablet  Take 1 tablet (250 mg) by mouth daily             multivitamin w/minerals (THERA-VIT-M) tablet  Take 1 tablet by mouth daily             thiamine (B-1) 100 MG tablet  Take 1 tablet (100 mg) by mouth daily             traZODone (DESYREL) 50 MG tablet  Take 1 tablet (50 mg) by mouth nightly as needed for sleep (may repeat after 60 minutes)                  Disposition: lp      Facts about COVID19 at www.cdc.gov/COVID19 and www.[place].gov/covid19     Keeping hands clean is one of the most important steps we can take to avoid getting sick and spreading germs to others.  Please wash your hands frequently and lather with soap for at least 20 seconds!             \"Much or all of the text in this note was generated through the use of Dragon Dictate voice to text software. Errors in spelling or words which appear to be out of contact are unintentional, may be present due having escaped editing\"     "

## 2021-04-29 NOTE — PROGRESS NOTES
Name: Sebastián Nogueira  Date: 4/29/2021  Medical Record: [id number]  Envelope Number: [id number]  List of Contents (List each item separately in new row):   Wireless ; cell phone  Stored in room F678  Admission:  I am responsible for any personal items that are not sent to the safe or pharmacy.  [facility] is not responsible for loss, theft or damage of any property in my possession.  Patient Signature:  ___________________________________________       Date/Time:__________________________    Staff Signature: __________________________________       Date/Time:__________________________    2nd Staff person, if patient is unable/unwilling to sign:      __________________________________________________________       Date/Time: __________________________  Discharge:  [facility] has returned all of my personal belongings:    Patient Signature: ________________________________________     Date/Time: ____________________________________    Staff Signature: ______________________________________     Date/Time:_____________________________________

## 2021-04-30 ENCOUNTER — HOSPITAL ENCOUNTER (OUTPATIENT)
Dept: BEHAVIORAL HEALTH | Facility: CLINIC | Age: 26
End: 2021-04-30
Attending: FAMILY MEDICINE
Payer: COMMERCIAL

## 2021-04-30 VITALS — TEMPERATURE: 97.6 F | OXYGEN SATURATION: 99 %

## 2021-04-30 PROBLEM — F19.20 CHEMICAL DEPENDENCY (H): Status: ACTIVE | Noted: 2021-04-30

## 2021-04-30 PROCEDURE — H2035 A/D TX PROGRAM, PER HOUR: HCPCS | Mod: HQ

## 2021-04-30 PROCEDURE — 1002N00001 HC LODGING PLUS FACILITY CHARGE ADULT

## 2021-04-30 NOTE — PROGRESS NOTES
"Comprehensive Assessment Summary      Based on client interview, review of previous assessments and   comprehensive assessment interview the following diagnosis and recommendations are:      Patient: Sebastián Nogueira  MRN: [id number]   : 1995  Age: 25 years old Sex: Male      Client meets criteria for:  Alcohol Use Disorder Severe - 303.90 (F10.20)  Cannabis Use Disorder Moderate - 304.30 (F12.20)     Dimension One: Acute Intoxication/Withdrawal Potential     Ratin   (Consider the client's ability to cope with withdrawal symptoms and current state of intoxication)    Patient reports his last use date of alcohol and cannabis as 21. Patient was medically detoxified on unit 3A and cleared for admission to [facility].     Dimension Two: Biomedical Condition and Complications    Ratin  (Consider the degree to which any physical disorder would interfere with treatment for substance abuse, and the client's ability to tolerate any related discomfort; determine the impact of continued chemical use on the unborn child if the client is pregnant)    Patient denies any medical concerns that would interfere with his ability to participate in treatment. Patient appears able to access medical aid independently. He does not have  PCP at this time, but was encouraged to find one and establish care.     Dimension Three: Emotional/Behavioral/Cognitive Conditions & Complications   Ratin  (Determine the degree to which any condition or complications are likely to interfere with treatment for substance abuse or with functioning in significant life areas and the likelihood of risk of harm to self or others)    Patient denies any formal mental health diagnosis or history of mental health treatment. Upon admission, client's PHQ-9 score was 18/27, indicating moderately severe depression.  Upon admission, client's LISA-7 score was 18/21, indicating severe anxiety.  Patient's suicide risk assessment rated them as \"Very " "Low Risk\".  Patient will continue to be monitored throughout treatment.     Dimension Four: Treatment Acceptance/Resistance     Ratin  (Consider the amount of support and encouragement necessary to keep the client involved in treatment)     Patient verbalizes an internal desire for sobriety, as well as external motivation from legal consequences and family pressures. Patient has continued drinking despite severe life consequences, such as negative impact on relationships, legal consequences, and engaging in high-risk behavior while under the influence.  Patient would benefit from increasing his internal motivation for change and examining the impact his substance use has had on himself and others.       Dimension Five: Continued Use/Relaspe Prevention     Ratin  (Consider the degree to which the client's recognizes relapse issues and has the skills to prevent relapse of either substance use or mental health problems)     Patient lacks insight into his personal relapse process, warning signs, triggers, and lacks coping skills to maintain sobriety. He has attempted to resolve his substance use disorder in the past through treatment, but has been unable to follow through with recovery behaviors. He remains a high risk for relapse at this time.    Dimension Six: Recovery Environment    Rating: 3  (Consider the degree to which key areas of the client's life are supportive of or antagonistic to treatment participation and recovery)     Patient reports being single and has no children.  Patient reports being unemployed, and identifies finances as a current stressor. Patient lives with his parents, and reports this is a stable environment. Patient is on probation in [facility] [place] for second degree DWI. Patient lacks a sober support network and would benefit from engaging in sober support meetings.     I have reviewed the information on the assessment, psychosocial and medical history and checklist:        it is " current

## 2021-04-30 NOTE — GROUP NOTE
Group Therapy Documentation    PATIENT'S NAME: Sebastián Nogueira  MRN:   [id number]  :   1995  ACCT. NUMBER: [id number]  DATE OF SERVICE: 21  START TIME:  9:00 AM  END TIME: 11:00 AM  FACILITATOR(S): Jani Parham LADC  TOPIC: BEH Group Therapy  Number of patients attending the group:  7  Group Length:  2 Hours    Group Therapy Type: Recovery strategies    Summary of Group / Topics Discussed:    Recovery Principles, Sober coping skills, Balanced lifestyle, and Disease of addiction      Group Attendance:  Attended group session    Patient's response to the group topic/interactions:  cooperative with task    Patient appeared to be Actively participating.        Client specific details:  Introduced self to group. Shared history of drug use and previous treatment as adolescent at age 14. Patient differentiates between his DOC, alcohol, and marijuana use. Patient says his parents support his use of marijuana instead of alcohol.

## 2021-04-30 NOTE — GROUP NOTE
Group Therapy Documentation    PATIENT'S NAME: Sebastián Nogueira  MRN:   [id number]  :   1995  ACCT. NUMBER: [id number]  DATE OF SERVICE: 21  START TIME: 12:30 PM  END TIME:  2:30 PM  FACILITATOR(S): Jani Parham LADC  TOPIC: BEH Group Therapy  Number of patients attending the group:  7  Group Length:  2 Hours    Group Therapy Type: Recovery strategies    Summary of Group / Topics Discussed:    Sober coping skills, Relationship/socialization, and Disease of addiction      Group Attendance:  Attended group session    Patient's response to the group topic/interactions:  cooperative with task    Patient appeared to be Actively participating.        Client specific details:  Patient viewed film about recovery in the face of adversity. Gave feedback as to what he learned from the film.

## 2021-05-01 ENCOUNTER — HOSPITAL ENCOUNTER (OUTPATIENT)
Dept: BEHAVIORAL HEALTH | Facility: CLINIC | Age: 26
End: 2021-05-01
Attending: FAMILY MEDICINE
Payer: COMMERCIAL

## 2021-05-01 VITALS — OXYGEN SATURATION: 98 % | TEMPERATURE: 98.2 F

## 2021-05-01 PROCEDURE — H2035 A/D TX PROGRAM, PER HOUR: HCPCS | Mod: HQ

## 2021-05-01 PROCEDURE — 1002N00001 HC LODGING PLUS FACILITY CHARGE ADULT

## 2021-05-01 NOTE — GROUP NOTE
"Group Therapy Documentation    PATIENT'S NAME: Sebastián Nogueira  MRN:   [id number]  :   1995  ACCT. NUMBER: [id number]  DATE OF SERVICE: 21  START TIME: 12:00 PM  END TIME:  2:30 PM  FACILITATOR(S): Ashley Hunter LADC  TOPIC: BEH Group Therapy  Number of patients attending the group:  5  Group Length:  2 Hours    Group Therapy Type: Recovery strategies    Summary of Group / Topics Discussed:    Relapse prevention      Group Attendance:  Attended group session    Patient's response to the group topic/interactions:  cooperative with task    Patient appeared to be Engaged.        Client specific details:  Patient was an active participant in the afternoon workshop on relapse prevention and the \"voice of addiction.\"  "

## 2021-05-01 NOTE — GROUP NOTE
Group Therapy Documentation    PATIENT'S NAME: Sebastián Nogueira  MRN:   [id number]  :   1995  ACCT. NUMBER: [id number]  DATE OF SERVICE: 21  START TIME:  9:00 AM  END TIME: 11:00 AM  FACILITATOR(S): Karlene Box LADC; Ashley Hunter LADC; Katie Alvarez  TOPIC: BEH Group Therapy  Number of patients attending the group:  5  Group Length:  2 Hours    Group Therapy Type: Recovery strategies, Emotion processing, and Health and wellbeing     Summary of Group / Topics Discussed:    Sober coping skills, Co-occurring illnesses symptom management, and Relapse prevention      Group Attendance:  Attended group session    Patient's response to the group topic/interactions:  cooperative with task    Patient appeared to be Attentive and Engaged.        Client specific details:  Patient participated in relapse prevention and mental health workshop. Patient seemed interested and engaged in completing the activity.

## 2021-05-02 ENCOUNTER — HOSPITAL ENCOUNTER (OUTPATIENT)
Dept: BEHAVIORAL HEALTH | Facility: CLINIC | Age: 26
End: 2021-05-02
Attending: FAMILY MEDICINE
Payer: COMMERCIAL

## 2021-05-02 VITALS — TEMPERATURE: 98.2 F | OXYGEN SATURATION: 100 %

## 2021-05-02 PROCEDURE — H2035 A/D TX PROGRAM, PER HOUR: HCPCS | Mod: HQ

## 2021-05-02 PROCEDURE — 1002N00001 HC LODGING PLUS FACILITY CHARGE ADULT

## 2021-05-02 NOTE — ADDENDUM NOTE
Encounter addended by: Karlene Box LADC on: 5/2/2021 8:06 AM   Actions taken: Charge Capture section accepted

## 2021-05-02 NOTE — GROUP NOTE
Group Therapy Documentation    PATIENT'S NAME: Sebastián Nogueira  MRN:   [id number]  :   1995  ACCT. NUMBER: [id number]  DATE OF SERVICE: 21  START TIME:  8:45 AM  END TIME: 10:30 AM  FACILITATOR(S): Karlene Box LADC; Jennie Alegria RN  TOPIC: BEH Group Therapy  Number of patients attending the group:  5  Group Length:  2 Hours    Group Therapy Type: Health and wellbeing     Summary of Group / Topics Discussed:    Self-care activities      Group Attendance:  Attended group session    Patient's response to the group topic/interactions:  cooperative with task    Patient appeared to be Attentive and Engaged.        Client specific details: Patient attended self-care workshop, presented by lodging plus nurse, Jennie Alegria RN. Patient was educated about diets, eating disorder, vitamins/supplements, and sleep. Patient participated in all activities, including meditation .

## 2021-05-02 NOTE — GROUP NOTE
Group Therapy Documentation    PATIENT'S NAME: Sebastián Nogueira  MRN:   [id number]  :   1995  ACCT. NUMBER: [id number]  DATE OF SERVICE: 21  START TIME: 12:30 PM  END TIME:  1:30 PM  FACILITATOR(S): Katie Alvarez  TOPIC: BEH Group Therapy  Number of patients attending the group:  23  Group Length:  1 Hour    Group Therapy Type: Health and wellbeing     Summary of Group / Topics Discussed:    Self-care activities      Group Attendance:  Attended group session    Patient's response to the group topic/interactions:  cooperative with task    Patient appeared to be Actively participating, Attentive and Engaged.        Client specific details:  Patient was attentive and participative during lecture..

## 2021-05-03 ENCOUNTER — HOSPITAL ENCOUNTER (OUTPATIENT)
Dept: BEHAVIORAL HEALTH | Facility: CLINIC | Age: 26
End: 2021-05-03
Attending: FAMILY MEDICINE
Payer: COMMERCIAL

## 2021-05-03 VITALS — TEMPERATURE: 97.6 F | OXYGEN SATURATION: 100 %

## 2021-05-03 DIAGNOSIS — Z11.3 SCREEN FOR STD (SEXUALLY TRANSMITTED DISEASE): ICD-10-CM

## 2021-05-03 LAB
HCV AB SERPL QL IA: NONREACTIVE
HIV 1+2 AB+HIV1 P24 AG SERPL QL IA: NONREACTIVE
T PALLIDUM AB SER QL: NONREACTIVE

## 2021-05-03 PROCEDURE — H2035 A/D TX PROGRAM, PER HOUR: HCPCS | Mod: HQ

## 2021-05-03 PROCEDURE — 87389 HIV-1 AG W/HIV-1&-2 AB AG IA: CPT | Performed by: FAMILY MEDICINE

## 2021-05-03 PROCEDURE — 36415 COLL VENOUS BLD VENIPUNCTURE: CPT | Performed by: FAMILY MEDICINE

## 2021-05-03 PROCEDURE — 86803 HEPATITIS C AB TEST: CPT | Performed by: FAMILY MEDICINE

## 2021-05-03 PROCEDURE — 87591 N.GONORRHOEAE DNA AMP PROB: CPT | Performed by: FAMILY MEDICINE

## 2021-05-03 PROCEDURE — 87491 CHLMYD TRACH DNA AMP PROBE: CPT | Performed by: FAMILY MEDICINE

## 2021-05-03 PROCEDURE — 1002N00001 HC LODGING PLUS FACILITY CHARGE ADULT

## 2021-05-03 PROCEDURE — 86780 TREPONEMA PALLIDUM: CPT | Performed by: FAMILY MEDICINE

## 2021-05-03 NOTE — GROUP NOTE
Group Therapy Documentation    PATIENT'S NAME: Sebastián Nogueira  MRN:   [id number]  :   1995  ACCT. NUMBER: [id number]  DATE OF SERVICE: 21  START TIME: 12:30 AM  END TIME:  2:30 PM  FACILITATOR(S): Ashley Hunter LADC; Toney Santillan LADC  TOPIC: BEH Group Therapy  Number of patients attending the group:  6  Group Length:  2 Hours    Group Therapy Type: Recovery strategies    Summary of Group / Topics Discussed:    Recovery Principles      Group Attendance:  {Group Attendance:691607}    Patient's response to the group topic/interactions:  {OPBEHCLIENTRESPONSE:764231}    Patient appeared to be {Engagement:[id number]}.        Client specific details:  ***.

## 2021-05-03 NOTE — GROUP NOTE
Group Therapy Documentation    PATIENT'S NAME: Sebastián Nogueira  MRN:   [id number]  :   1995  ACCT. NUMBER: [id number]  DATE OF SERVICE: 21  START TIME:  9:00 AM  END TIME: 11:00 AM  FACILITATOR(S): Mulu Lorenzo LADC; Ashley Hunter LADC  TOPIC: BEH Group Therapy  Number of patients attending the group: 4  Group Length:  2 Hours    Group Therapy Type: Emotion processing    Summary of Group / Topics Discussed:    Disease of addiction and Emotions/expression      Group Attendance:  Attended group session    Patient's response to the group topic/interactions:  cooperative with task    Patient appeared to be Actively participating, Attentive and Engaged.        Client specific details: Sebastián was an active participant in morning group therapy session. He participated in the group introductions and discussion on trust.

## 2021-05-03 NOTE — PROGRESS NOTES
[facility]  Adult Chemical Dependency Program  Treatment Plan Requirements    These services are provided by the facility for each patient/client according to the individual's treatment plan:    Individual and group counseling    Education    Transition services    Services to address any co-occurring mental illness    Service coordination    Initial Treatment Plan Goals:  1. Complete all the requirements of Program Orientation.  2. Maintain medication compliance throughout the program.  3. Complete requirements for workshop/skills groups based on identified issues on your problem list.  4. Complete the support group attendance feedback sheet weekly.  5. Gain family involvement in treatment process to address family issues from the problem list.  6. Attend and participate in all required groups per individual treatment plan.  7. Focus attention to individualized issues from the treatment plan.  8. Complete all requirements for UA's, alcohol screening tests and other testing.  9. Schedule a physical examination if recommended.    In addition to the above, complete all individual goals as specifically outlines on your treatment plan.    Criteria for discharge:  Patients/clients are discharged from the program following completion of the entire program including Phase I and II or acceptance of other post-treatment referrals such as snf house, or aftercare at other facilities.  Patients/clients may also be discharged for inappropriate behavior or chemical use.      Favorable Discharge - Patients/clients have completed agreed upon treatment goals, understand their diagnosis and appear motivated about the follow-up care.    Guarded Discharge - Patients/clients have demonstrated some understanding of their diagnosis and recovery process, and have completed some of their treatment goals.  This prognosis also includes patients/clients who have completed some treatment goals but have not made  commitment to community support or follow through with referrals.    Unfavorable Discharge - Patients/clients have not completed agreed upon treatment goals due to their own choice, have limited understanding of their diagnosis, and have shown minimal or inconsistent behavior conducive to recovery.  Those patients/clients discharged due to behavioral problems will also be unfavorable discharges.        ADULT CD INDIVIDUALIZED TREATMENT PLAN       Patient: Sebastián Nogueira    MRN: [id number]  : 1995   Age: 25 years old   Sex: Male  -------------------------------------------------------------------------------------------------------------------------------  Acute Intoxication/Withdrawal Potential   DIMENSION 1  RISK FACTOR: 0             Tx Plan  Date Source Problem, Goal & Interventions Target  Date Int. Outcome Complete  Date   5/3/21 History, Assess. & Self:   Current Problem: Substance use, cravings and urges. Last use date was reported as 21.    Goal:  Be able to manage mild to moderate withdrawal symptoms.  Intervention:   1. Report to counselor and group any alcohol or drug use.   2. Report to nurse any increase in withdrawal symptoms.              Ongoing                 CY               EFF    EFF             21         -------------------------------------------------------------------------------------------------------------------------------  Biomedical Conditions and Complaints   DIMENSION 2  RISK FACTOR: 0      Tx Plan  Date Source Problem, Goal & Interventions Target  Date Int. Outcome Complete  Date   5/3/21 History, Assess. & Self:   Current Problem: Patient denies any biomedical concerns.   Goal: Follow recommendations of medical provider.    Intervention:   1. Continue to take prescribed medications and follow-up with medical interventions as needed.           Ongoing CY           EFF           21  "  -------------------------------------------------------------------------------------------------------------------------------  Emotional/Behavioral/Cognitive Conditions and Complications   DIMENSION 3  RISK FACTOR: 1       Tx Plan  Date Source Problem, Goal & Interventions Target  Date Int. Outcome Complete  Date   5/3/21 History, Assess. & Self:   Current Problem: Patient denies any formal mental health diagnoses, but has experienced symptoms of depression and anxiety.  Goal: Stabilize and maintain mental health.  Intervention:   1. Complete a safety plan and review it with counselor in the first week of treatment (to be reassessed weekly).           1. 4/30/21 CY           EFF           4/30/21   5/3/21 History, Assess. & Self:   Current Problem: Patient reports a distorted sense of self related to addiction and associated behaviors.  Goal: Begin to work on building self-esteem and verbalizing emotions.  Intervention:   1. Complete \"Self-Esteem\" assignment and present in group.             1. 5/7/21 CY             EFF             5/7/21     -------------------------------------------------------------------------------------------------------------------------------  Readiness to Change   DIMENSION 4  RISK FACTOR: 2      Tx Plan  Date Source Problem, Goal & Interventions Target  Date Int. Outcome Complete  Date   5/3/21 History, Assess. & Self:   Current Problem: Patient has consequences to self and others due to use.  Goal: Acknowledge negative consequences to self and others.  Intervention:   1. Complete assignment Drug Use History, and present in group.   2. Complete \"First Step\" assignment and present in group.           1. 5/13/21    2. 5/5/21 CY               EFF               5/6/21   5/3/21 History, Assess. & Self:   Current Problem: Verbalizes both external and internal motivation for change.  Goal: Increase internal motivation for sobriety.  Intervention:   1.Complete group project: Individual " "collage, what life will look in 5 years sober versus 5 years of continued use.          1. TBD CY         EFF         5/12/21     -------------------------------------------------------------------------------------------------------------------------------  Relapse/Continued Use/Continued Problem Potential   DIMENSION 5  RISK FACTOR: 4       Tx Plan  Date Source Problem, Goal & Interventions Target  Date Int. Outcome Complete  Date   5/3/21 History, Assess. & Self:   Current Problem: Patient lacks insight into personal relapse process, triggers, warnings signs and lacks coping skills.  Goal: Gain insight about personal relapse process, triggers, warning signs and develop coping skills to prevent relapse.  Intervention:   1. Attend weekend \"Relapse Prevention\" workshops and complete all activities.  2. Complete \"Relapse Triggers and Cues: Situations and Feelings\" and present in group.               1. TBD    2. 5/26/21 CY               EFF    EFF               5/1/21  5/8/21  5/26/21   5/3/21   History, Assess. 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  Goal: Gain insight into the pros and cons of complete abstinence.  Intervention:  1. Read \"A Look at Cross Addiction\" packet. Then complete the \"CBA Worksheet\" on the pros and cons of both quitting marijuana use and continuing to use marijuana. Share in group.           1. 5/17/21   CY           EFF           5/17/21   5/3/21 History, Assess. & Self:   Current Problem: Patient s relationships with loved ones are strained due to use.   Goal: Provide opportunity for open, honest communication.  Intervention:    1. Participate in weekend \"Relationships\" workshops and complete all activities.  2. Read \"Getting Rid of Resentments\" packet. Then complete the \"Resentment\" handout and present in group.  3. Complete \"Forming Stable Relationships\" worksheet and present in group.           1. TBD    2. 5/11/21      3. 5/20/21   CY " "          EFF    EFF      EFF           5/15/21  5/22/21  5/11/21      5/21/21   -------------------------------------------------------------------------------------------------------------------------------  Recovery Environment   DIMENSION 6  RISK FACTOR: 3             Tx Plan  Date Source Problem, Goal & Interventions Target  Date Int. Outcome Complete  Date   5/3/21 History,Assess. & Self:   Current Problem: Patient's environment may not be conducive to long-term recovery.  Goal: Establish an aftercare plan for after leaving [facility].  Intervention:   1. Work with [facility] staff to develop an aftercare plan that will support long-term recovery and continued growth.  2. Consider at least 3 jobs/careers you could pursue and complete the \"Right-Job Checklist\" for each. Discuss with your counselor.           1. Ongoing      2. 5/24/21 CY           EFF      EFF           5/26/21      5/23/21   5/3/21 History, Assess. & Self:   Current Problem: Patient lacks a sober support network.  Goal: Develop relationships with people in recovery.  Intervention:   1. Attend @ least 3 zoom 12-step meetings per week while in [facility].  2. Seek opportunity to secure a temporary sponsor.           Ongoing   CY           EFF    Declined           5/26/21         Treatment amount, frequency and duration:    A total of 30 hours of therapeutic programming will be completed weekly.    A total of 7 hours of peer-led recovery group attendance will be completed weekly.    Treatment schedule to be followed weekly for duration of treatment:    2 sessions of 2-hour long group therapy each day Monday through Saturday for duration of treatment  1 session of 1-hour group therapy each Sunday for duration of treatment  3 1-hour sessions of skills development Tuesday, Wednesday and Thursday weekly for duration of treatment  1 2-hour session of skills development Sunday  1 half-hour individual session with [facility] Counselor 1 time weekly for duration of treatment.    1 " hour of peer-led recovery meetings each night, Monday through Sunday, for duration of treatment.      Individual abuse prevention plan (required for lodging plus) : specific actions, referral:   No additional protection measures required other than the Program Abuse Prevention Plan - No

## 2021-05-03 NOTE — PROGRESS NOTES
Acknowledgement of Current Treatment Plan - Initial Treatment Plan     INITIAL TREATMENT PLAN:     1. I have participated in creating my treatment plan with my therapist / counselor on 5/3/2021.     I agree with the plan as it is written in the electronic health record.    Name Signature/Date   Sebastián Nogueira    Name of Therapist / Counselor Signature/Date   QUINCY Escalante, [facility] [place]      2. I have completed and reviewed my Safety Plan with my counselor and signed this on 4/30/21. I have been given the hard copy of this plan.    Patient signature/date:      _____________________________________________________________________________    3. Last Use Date: __________    Patient signature/date:     _____________________________________________________________________________

## 2021-05-04 ENCOUNTER — HOSPITAL ENCOUNTER (OUTPATIENT)
Dept: BEHAVIORAL HEALTH | Facility: CLINIC | Age: 26
End: 2021-05-04
Attending: FAMILY MEDICINE
Payer: COMMERCIAL

## 2021-05-04 VITALS — OXYGEN SATURATION: 97 % | TEMPERATURE: 97.7 F

## 2021-05-04 LAB
C TRACH DNA SPEC QL NAA+PROBE: NEGATIVE
N GONORRHOEA DNA SPEC QL NAA+PROBE: NEGATIVE
SPECIMEN SOURCE: NORMAL
SPECIMEN SOURCE: NORMAL

## 2021-05-04 PROCEDURE — H2035 A/D TX PROGRAM, PER HOUR: HCPCS | Mod: HQ

## 2021-05-04 PROCEDURE — 1002N00001 HC LODGING PLUS FACILITY CHARGE ADULT

## 2021-05-04 NOTE — GROUP NOTE
"Group Therapy Documentation    PATIENT'S NAME: Sebastián Nogueira  MRN:   [id number]  :   1995  ACCT. NUMBER: [id number]  DATE OF SERVICE: 21  START TIME:  9:00 AM  END TIME: 11:00 AM  FACILITATOR(S): Mulu Lorenzo LADC; Ashley Hunter LADC  TOPIC: BEH Group Therapy  Number of patients attending the group: 5  Group Length:  2 Hours    Group Therapy Type: Emotion processing    Summary of Group / Topics Discussed:    Relationship/socialization, Disease of addiction, and Emotions/expression      Group Attendance:  Attended group session    Patient's response to the group topic/interactions:  cooperative with task    Patient appeared to be Actively participating, Attentive and Engaged.        Client specific details: Jamar was an active participant in morning group therapy session.  He contributed to the group discussion on pride and was engaged in the group \"Travis Breaking\" exercise.  "

## 2021-05-04 NOTE — GROUP NOTE
"Group Therapy Documentation    PATIENT'S NAME: Sebastián Nogueira  MRN:   [id number]  :   1995  ACCT. NUMBER: [id number]  DATE OF SERVICE: 21  START TIME: 12:30 PM  END TIME:  2:30 PM  FACILITATOR(S): Essence Reinoso ADC-T  TOPIC: BEH Group Therapy  Number of patients attending the group:  5  Group Length:  2 Hours    Group Therapy Type: Recovery strategies and Daily living/independence skills    Summary of Group / Topics Discussed:    Sober coping skills, Relationship/socialization, and Emotions/expression    Group Attendance:  Attended group session    Patient's response to the group topic/interactions:  cooperative with task, expressed understanding of topic and gave appropriate feedback to peers    Patient appeared to be Actively participating, Attentive and Engaged.        Client specific details:  Pt participated in a group discussion of boundaries, healthy relationship norms, red flags/green flags, how addiction can \"lower the bar\" in what we find acceptable in relationship, and relationship ideals. He shared that he values equity and (future) fatherhood, and that he believes he qualifies for a healthy relationship when he's sober.  "

## 2021-05-04 NOTE — GROUP NOTE
Psychoeducation Group Documentation    PATIENT'S NAME: Sebastián Nogueira  MRN:   [id number]  :   1995  ACCT. NUMBER: [id number]  DATE OF SERVICE: 21  START TIME:  3:00 PM  END TIME:  4:00 PM  FACILITATOR(S): Mulu Lorenzo LADC; Ashley Hunter LADC; Seamus Andersen; Essence Reinoso LADC  TOPIC: BEH Pyschoeducation  Number of patients attending the group:  6  Group Length:  1 Hours    Skills Group Therapy Type: Recovery skills    Summary of Group / Topics Discussed:    Balanced lifestyle skills          Group Attendance:  Attended group session    Patient's response to the group topic/interactions:  cooperative with task    Patient appeared to be Attentive.         Client specific details:  Patient participated in a group lecture presented by Dr. Seamus Andersen on holistic practices in recovery.

## 2021-05-05 ENCOUNTER — HOSPITAL ENCOUNTER (OUTPATIENT)
Dept: BEHAVIORAL HEALTH | Facility: CLINIC | Age: 26
End: 2021-05-05
Attending: FAMILY MEDICINE
Payer: COMMERCIAL

## 2021-05-05 VITALS — TEMPERATURE: 97.5 F | OXYGEN SATURATION: 99 %

## 2021-05-05 PROCEDURE — H2035 A/D TX PROGRAM, PER HOUR: HCPCS | Mod: HQ

## 2021-05-05 PROCEDURE — 1002N00001 HC LODGING PLUS FACILITY CHARGE ADULT

## 2021-05-05 NOTE — ADDENDUM NOTE
Encounter addended by: Ashley Hunter LADC on: 5/5/2021 3:16 PM   Actions taken: Charge Capture section accepted

## 2021-05-05 NOTE — GROUP NOTE
Group Therapy Documentation    PATIENT'S NAME: Sebastián Nogueira  MRN:   [id number]  :   1995  ACCT. NUMBER: [id number]  DATE OF SERVICE: 21  START TIME: 10:00 AM  END TIME: 11:30 AM  FACILITATOR(S): Ashley Hunter LADC  TOPIC: BEH Group Therapy  Number of patients attending the group:  6  Group Length:  1.5 Hours    Group Therapy Type: Emotion processing    Summary of Group / Topics Discussed:    Relationship/socialization and Disease of addiction      Group Attendance:  Attended group session    Patient's response to the group topic/interactions:  cooperative with task    Patient appeared to be Actively participating.        Client specific details: Jamar processed a recent situation that felt upsetting to him and discussed healthy coping skills. Jamar also engaged in a group discussion on identifying and building personal qualities in a healthy relationship.

## 2021-05-05 NOTE — GROUP NOTE
Group Therapy Documentation    PATIENT'S NAME: Sebastián Nogueira  MRN:   [id number]  :   1995  ACCT. NUMBER: [id number]  DATE OF SERVICE: 21  START TIME: 12:30 PM  END TIME:  2:30 PM  FACILITATOR(S): Mulu Lorenzo LADC; Amy Car  TOPIC: BEH Group Therapy  Number of patients attending the group: 6  Group Length:  2 Hours    Group Therapy Type: Emotion processing    Summary of Group / Topics Discussed:    Spiritual Care      Group Attendance:  Attended group session    Patient's response to the group topic/interactions:  cooperative with task    Patient appeared to be Actively participating, Attentive and Engaged.        Client specific details: Jamar was an active participant in Spirituality Group facilitated by Amy Car.

## 2021-05-05 NOTE — ADDENDUM NOTE
Encounter addended by: Mulu Lorenzo LADC on: 5/5/2021 3:15 PM   Actions taken: Clinical Note Signed, Charge Capture section accepted

## 2021-05-06 ENCOUNTER — HOSPITAL ENCOUNTER (OUTPATIENT)
Dept: BEHAVIORAL HEALTH | Facility: CLINIC | Age: 26
End: 2021-05-06
Attending: FAMILY MEDICINE
Payer: COMMERCIAL

## 2021-05-06 VITALS — OXYGEN SATURATION: 98 % | TEMPERATURE: 97.6 F

## 2021-05-06 PROCEDURE — H2035 A/D TX PROGRAM, PER HOUR: HCPCS | Mod: HQ

## 2021-05-06 PROCEDURE — 1002N00001 HC LODGING PLUS FACILITY CHARGE ADULT

## 2021-05-06 NOTE — PROGRESS NOTES
Name: Sebastián Nogueira  Date: 5/6/2021  Medical Record: [id number]    Envelope Number: [id number]    List of Contents (List each item separately in new row):   Stored in room F678    Wireless   2 Cell Phone  1 Phone         Admission:  I am responsible for any personal items that are not sent to the safe or pharmacy.  [facility] is not responsible for loss, theft or damage of any property in my possession.      Patient Signature:  ___________________________________________       Date/Time:__________________________    Staff Signature: __________________________________       Date/Time:__________________________    [facility] Staff person, if patient is unable/unwilling to sign:      __________________________________________________________       Date/Time: __________________________      Discharge:  [facility] has returned all of my personal belongings:    Patient Signature: ________________________________________     Date/Time: ____________________________________    Staff Signature: ______________________________________     Date/Time:_____________________________________

## 2021-05-06 NOTE — ADDENDUM NOTE
Encounter addended by: Jaguar Luke on: 5/6/2021 1:46 AM   Actions taken: Charge Capture section accepted

## 2021-05-06 NOTE — GROUP NOTE
Psychoeducation Group Documentation    PATIENT'S NAME: Sebastián Nogueira  MRN:   [id number]  :   1995  ACCT. NUMBER: [id number]  DATE OF SERVICE: 21  START TIME:  3:00 PM  END TIME:  4:00 PM  FACILITATOR(S): Alxeus Sanchez LADC; Essence Reinoso ADC-T  TOPIC: BEH Pyschoeducation  Number of patients attending the group:  6  Group Length:  1 Hours    Skills Group Therapy Type: Healthy behaviors development    Summary of Group / Topics Discussed:    Balanced lifestyle skills, Relapse prevention skills, and gambling cross-addiction      Group Attendance:  Attended group session    Patient's response to the group topic/interactions:  cooperative with task    Patient appeared to be Attentive and Engaged.         Client specific details: Pt listened attentively to a lecture about cross-addiction to gambling and the prevention, management and treatment thereof.

## 2021-05-06 NOTE — GROUP NOTE
"Group Therapy Documentation    PATIENT'S NAME: Sebastián Nogueira  MRN:   [id number]  :   1995  ACCT. NUMBER: [id number]  DATE OF SERVICE: 21  START TIME: 12:30 PM  END TIME:  2:30 PM  FACILITATOR(S): Mulu Lorenzo LADC  TOPIC: BEH Group Therapy  Number of patients attending the group: 6  Group Length:  2 Hours    Group Therapy Type: Emotion processing    Summary of Group / Topics Discussed:    Sober coping skills and Disease of addiction      Group Attendance:  Attended group session    Patient's response to the group topic/interactions:  cooperative with task    Patient appeared to be Actively participating, Attentive and Engaged.        Client specific details: Jamar was an active participant in afternoon group therapy session. He participated in the group discussion on the \"Cycle of Broken Promises\" and provided good insight into his own cycle of addiction.  "

## 2021-05-06 NOTE — GROUP NOTE
"Group Therapy Documentation    PATIENT'S NAME: Sebastián Nogueira  MRN:   [id number]  :   1995  ACCT. NUMBER: [id number]  DATE OF SERVICE: 21  START TIME:  9:00 AM  END TIME: 11:00 AM  FACILITATOR(S): Alexus Sanchez LADC  TOPIC: BEH Group Therapy  Number of patients attending the group:  6  Group Length:  2 Hours    Group Therapy Type: Recovery strategies and Emotion processing    Summary of Group / Topics Discussed:    Recovery Principles, Emotions/expression, and Relapse prevention      Group Attendance:  Attended group session    Patient's response to the group topic/interactions:  cooperative with task and discussed personal experience with topic    Patient appeared to be Actively participating.        Client specific details:  Sebastián reports feeling, \"confused\" this morning due to having \"some weird dreams\". Patient shares that he is excited to see how the day goes and is relieved to be here. Patient completed and presented his treatment plan assignment, \"First Step\". Patient received favorable feedback from his peers and this writer.   "

## 2021-05-06 NOTE — PROGRESS NOTES
Patient:  Sebastián Nogueira    ATTENDANCE for the following date span:   (date, type, and amount of each treatment service completed)       Day Thursday  4/29/21 Friday   4/30/21 Saturday  05/01/21 Jhony    05/02/21 Monday  05/03/21 Tuesday   05/04/21 Wednesday  05/05/21   Group Hours   1 1111 1111 11 1111 1111 1111   Skills Hours   1   1  1 1   Individual Session (LADC)            Individual Session  (psychotherapy)            Peer-led Recovery Group   1 1 1 1 1 1                Adult CD Progress Note and Treatment Plan Review     Attendance  Please refer to OP BEH CD Adult Attendance Record Documentation Flowsheet    Support group attended this week: yes    Reporting sobriety:  yes        Treatment Plan     Treatment Plan Review competed on: 5/06/21       Client preferred learning style: Visual  Hands on    Staff Members contributing : Ashley Hunter [facility], Mulu Lorenzo [facility],     Alexus Sanchez [facility]     Received Supervision: yes    Client: contributed to goals and plan.    Client received copy of plan/revised plan: Yes    Client agrees with plan/revised plan: Yes  Changes to Treatment Plan: Yes    New Goals added since last review : None added: This is the patients first treatment plan review note.     Goals worked on since last review: Increasing internal motivation for recovery and acknowledging negative consequences to self and others, relapse prevention: gaining insight into personal relapse process, warning signs and coping skills to prevent relapse, developing relationships with people in recovery.     Strategies effective: yes    Strategies need these changes: None     1) Care Coordination Activities:  Patient is working with his primary counselors on aftercare plans.   2) Medical, Mental Health and other appointments the client attended: None   3) Medication issues: None reported  4) Physical and mental health problems: None reported  5) Any changes in Vulnerable Adult Status?  No If yes, add to  "treatment plan and individual abuse prevention plan.  6) Review and evaluation of the individual abuse prevention plan: Current IAPP for this program is adequate for this client          ASAM Risk Rating:    Dimension 1 : 0: Patient reports his last date of use of alcohol and cannabis on 4/26/21. He denies symptoms of withdrawal.     Dimension 2 : 0: Patient denies any medical concerns that would interfere with his ability to participate in treatment. He reports not having a  PCP at this time, but was encouraged to seek one and establish care. Patient appears able to access medical care as needed. Patient participated on 5/2 nursing lecture on self care.      Dimension 3 : 1: Patient denies a mental health diagnosis. He shares noticing a significant change within his mood this past week, \"from getting sober\". Patient reports feeling irritable and having higher anxiety this past week. He shares noticing changes within his stress levels from \"being here\" in treatment utilized coping strategies to deal with his difficult emotions and stress of \"breathing techniques and \"leaving [ the situation]  if possible\".  Patient denies any suicidal ideation or thoughts of self harm. Patient completed a safety plan and will continue to be monitored throughout treatment.     Dimension 4 : 2: Patient completed his \"first step assignment\" identifying consequences of his use and insane behaviors. Patient has been an active  participant within all group therapy and lectures. He shares his motivation for reason for being in treatment as \"my life being better while sober\". Patient appears in the contemplative sages of change within the stages of change model.     Dimension 5 : 4: Patient reports cravings this week of a \"5\" on a scale of 1(low)-10(high). He shares utilizing coping skills of \"distraction and breathing techniques\" as a way he managed his cravings. Patient attended a relapse prevention workshop over the weekend, gaining " insight into his triggers, cravings and ways to cope. Patient also attended spirituality group led by staff Jose Raul lenz and SCOT Escalante. Patient is beginning to gain insight into his addiction and ways to cope, however appears at high risk for relapse at this time.     Dimension 6 : 3: Patient has been attending supportive meetings ( AA) via zoom and spending free time with his peers. Patient reports a desire to attend an Outpatient program once he has completed the LP program. Patient is working with his primary counselors on aftercare plans at this time. Patient is currently unemployed and lived with his parents. He also has legal issues of DWI and is on probation.       Guide to Risk Ratings for Suicidality:   IDEATION: Active thoughts of suicide? INTENT: Intent to follow on suicide? PLAN: Plan to follow through on suicide? Level of Risk:   IF Yes Yes Yes Patient = High Emergent   IF Yes Yes No Patient = High Urgent/Non-Emergent   IF Yes No No Patient = Moderate Non-Urgent   IF No No   No Patient = Low Risk   The patient's ADDITIONAL RISK FACTORS and lack of PROTECTIVE FACTORS may increase their overall suicide risk ratings.     Patient's/client's current risk rating:  Low Risk    Dimension Scale Review      Prior ratings: Dim1 - 0 DIM2 - 0 DIM3 - 1 DIM4 - 2 DIM5 - 4 DIM6 -3      Current ratings: Dim1 - 0 DIM2 - 0 DIM3 - 1 DIM4 - 2 DIM5 - 4 DIM6 -3      Family Involvement:   Patient shares phone contact    Data:   offered feedback good insight client did actively participate      Intervention:   Aftercare planning  Cognitive Behavioral Therapy  Counselor feedback  Education  Emotional management  Group feedback  Motivational Enhancement Therapy  Relapse prevention  Twelve Step facilitation  Client & counselor reviewed and signed ISP & assessment summary  Mental health education      Assessment:   Stages of Change Model  Contemplation    Appears/Sounds:  Cooperative  Engaged      Plan:  Focus  on recovery environment  Monitor emotional/physical health  Continue to address treatment plan goals     SCOT Jacques

## 2021-05-07 ENCOUNTER — HOSPITAL ENCOUNTER (OUTPATIENT)
Dept: BEHAVIORAL HEALTH | Facility: CLINIC | Age: 26
End: 2021-05-07
Attending: FAMILY MEDICINE
Payer: COMMERCIAL

## 2021-05-07 VITALS — TEMPERATURE: 97.8 F | OXYGEN SATURATION: 97 %

## 2021-05-07 PROCEDURE — 1002N00001 HC LODGING PLUS FACILITY CHARGE ADULT

## 2021-05-07 PROCEDURE — H2035 A/D TX PROGRAM, PER HOUR: HCPCS | Mod: HQ

## 2021-05-07 NOTE — PROGRESS NOTES
[address] and [address] Fl[address]  [place] [address]      5/7/2021    Sebastián UBALDO Jero  1995        To Whom It May Concern:    This is to verify that the above named client participated in a program for substance use disorder treatment at [facility] at the location above.    The individual was admitted on 4/29/2021.    Currently enrolled in treatment with a tentative discharge date of 5/27/2021.    This individual participated in the following program(s):  [facility]      Additional comments: Primary Counselors are QUINCY Hill LADC and QUINCY Escalante LADC  Direct office line: [phone]  Fax: [phone]        SCOT Escalante

## 2021-05-07 NOTE — GROUP NOTE
Group Therapy Documentation    PATIENT'S NAME: Sebastián Nogueira  MRN:   [id number]  :   1995  ACCT. NUMBER: [id number]  DATE OF SERVICE: 21  START TIME: 12:30 PM  END TIME:  2:30 PM  FACILITATOR(S): Mulu Lorenzo LADC; Alexus Sanchez LADC  TOPIC: BEH Group Therapy  Number of patients attending the group: 6  Group Length:  2 Hours    Group Therapy Type: Recovery strategies    Summary of Group / Topics Discussed:    Leisure explorations/use of leisure time      Group Attendance:  Attended group session    Patient's response to the group topic/interactions:  cooperative with task    Patient appeared to be Attentive and Engaged.        Client specific details: Sebastián participated in viewing a film on addiction and recovery, and contributed to discussion on how he could relate to the main character.

## 2021-05-07 NOTE — GROUP NOTE
Group Therapy Documentation    PATIENT'S NAME: Sebastián Nogueira  MRN:   [id number]  :   1995  ACCT. NUMBER: [id number]  DATE OF SERVICE: 21  START TIME:  9:00 AM  END TIME: 11:00 AM  FACILITATOR(S): Mulu Lorenzo LADC  TOPIC: BEH Group Therapy  Number of patients attending the group:  6  Group Length:  2 Hours    Group Therapy Type: Emotion processing    Summary of Group / Topics Discussed:    Sober coping skills, Relationship/socialization, and Disease of addiction      Group Attendance:  Attended group session    Patient's response to the group topic/interactions:  cooperative with task    Patient appeared to be Actively participating, Attentive and Engaged.        Client specific details: Sebastián was an active participant in morning group therapy session. He contributed to the discussion on empathy and forgiveness, and gave appropriate feedback to his peers.

## 2021-05-08 ENCOUNTER — HOSPITAL ENCOUNTER (OUTPATIENT)
Dept: BEHAVIORAL HEALTH | Facility: CLINIC | Age: 26
End: 2021-05-08
Attending: FAMILY MEDICINE
Payer: COMMERCIAL

## 2021-05-08 VITALS — TEMPERATURE: 98.2 F | OXYGEN SATURATION: 98 %

## 2021-05-08 PROCEDURE — 1002N00001 HC LODGING PLUS FACILITY CHARGE ADULT

## 2021-05-08 PROCEDURE — H2035 A/D TX PROGRAM, PER HOUR: HCPCS | Mod: HQ

## 2021-05-08 NOTE — GROUP NOTE
Group Therapy Documentation    PATIENT'S NAME: Sebastián Nogueira  MRN:   [id number]  :   1995  ACCT. NUMBER: [id number]  DATE OF SERVICE: 21  START TIME: 12:30 PM  END TIME:  2:30 PM  FACILITATOR(S): Essence Reinoso ADC-T; Art Cruz LADC; Toney Santillan LADC  TOPIC: BEH Group Therapy  Number of patients attending the group:  6  Group Length:  2 Hours    Group Therapy Type: Recovery strategies and Health and wellbeing     Summary of Group / Topics Discussed:    Trauma informed care, Relapse prevention, and Self-care activities    Group Attendance:  Attended group session    Patient's response to the group topic/interactions:  cooperative with task    Patient appeared to be Attentive and Engaged.        Client specific details: Pt listened attentively to a lecture on the effects of trauma and addiction on the brain, and the treatment modalities thereof.

## 2021-05-08 NOTE — GROUP NOTE
Group Therapy Documentation    PATIENT'S NAME: Sebastián Nogueira  MRN:   [id number]  :   1995  ACCT. NUMBER: [id number]  DATE OF SERVICE: 21  START TIME:  9:00 AM  END TIME: 11:00 AM  FACILITATOR(S): Essence Reinoso ADC-T; Toney Santillan LADC; Art Cruz LADC  TOPIC: BEH Group Therapy  Number of patients attending the group:  6  Group Length:  2 Hours    Group Therapy Type: Recovery strategies    Summary of Group / Topics Discussed:    Relapse prevention    Group Attendance:  Attended group session    Patient's response to the group topic/interactions:  cooperative with task    Patient appeared to be Attentive and Engaged.        Client specific details:  Pt listened attentively to a lecture on relapse prevention.

## 2021-05-09 ENCOUNTER — HOSPITAL ENCOUNTER (OUTPATIENT)
Dept: BEHAVIORAL HEALTH | Facility: CLINIC | Age: 26
End: 2021-05-09
Attending: FAMILY MEDICINE
Payer: COMMERCIAL

## 2021-05-09 VITALS — OXYGEN SATURATION: 96 % | TEMPERATURE: 97.9 F

## 2021-05-09 PROCEDURE — H2035 A/D TX PROGRAM, PER HOUR: HCPCS | Mod: HQ

## 2021-05-09 PROCEDURE — 1002N00001 HC LODGING PLUS FACILITY CHARGE ADULT

## 2021-05-09 NOTE — GROUP NOTE
Group Therapy Documentation    PATIENT'S NAME: Sebastián Nogueira  MRN:   [id number]  :   1995  ACCT. NUMBER: [id number]  DATE OF SERVICE: 21  START TIME:  8:40 AM  END TIME: 10:30 AM  FACILITATOR(S): Art Cruz LADC; Autumn Brown RN  TOPIC: BEH Group Therapy  Number of patients attending the group:  6  Group Length:  2 Hours    Group Therapy Type: Health and wellbeing     Summary of Group / Topics Discussed:    Self-care activities      Group Attendance:  Attended group session    Patient's response to the group topic/interactions:  cooperative with task    Patient appeared to be Attentive.        Client specific details:  Sebastián gave appropriate feedback..

## 2021-05-09 NOTE — GROUP NOTE
Group Therapy Documentation    PATIENT'S NAME: Sebastián Nogueira  MRN:   [id number]  :   1995  ACCT. NUMBER: [id number]  DATE OF SERVICE: 21  START TIME: 12:30 PM  END TIME:  1:30 PM  FACILITATOR(S): Essence Reinoso ADC-T; Art Cruz, [facility]  TOPIC: BEH Group Therapy  Number of patients attending the group:  6  Group Length:  1 Hours    Group Therapy Type: Recovery strategies    Summary of Group / Topics Discussed:    Sober coping skills, Relapse prevention, and Self-care activities    Group Attendance:  Attended group session    Patient's response to the group topic/interactions:  cooperative with task    Patient appeared to be Attentive and Engaged.        Client specific details: Pt attended a lecture on the effects and types of stress and participated in an activity compiling sober coping skills.

## 2021-05-10 ENCOUNTER — HOSPITAL ENCOUNTER (OUTPATIENT)
Dept: BEHAVIORAL HEALTH | Facility: CLINIC | Age: 26
End: 2021-05-10
Attending: FAMILY MEDICINE
Payer: COMMERCIAL

## 2021-05-10 VITALS — TEMPERATURE: 97.7 F | OXYGEN SATURATION: 99 %

## 2021-05-10 PROCEDURE — H2035 A/D TX PROGRAM, PER HOUR: HCPCS | Mod: HQ

## 2021-05-10 PROCEDURE — 1002N00001 HC LODGING PLUS FACILITY CHARGE ADULT

## 2021-05-10 NOTE — GROUP NOTE
Group Therapy Documentation    PATIENT'S NAME: Sebastián Nogueira  MRN:   [id number]  :   1995  ACCT. NUMBER: [id number]  DATE OF SERVICE: 5/10/21  START TIME:  9:00 AM  END TIME: 11:00 AM  FACILITATOR(S): Mulu Lorenzo LADC; Ashley Hunter LADC  TOPIC: BEH Group Therapy  Number of patients attending the group: 6  Group Length:  2 Hours    Group Therapy Type: Emotion processing    Summary of Group / Topics Discussed:    Disease of addiction and Emotions/expression      Group Attendance:  Attended group session    Patient's response to the group topic/interactions:  cooperative with task    Patient appeared to be Actively participating, Attentive and Engaged.        Client specific details: Jamar was an active participant in morning group therapy session. He shared that he needs to 'let go of' a negative interaction he had with a restaurant employee over the weekend. He gave positive feedback to his peers who presented treatment plan assignments.

## 2021-05-10 NOTE — GROUP NOTE
"Group Therapy Documentation    PATIENT'S NAME: Sebastián Nogueira  MRN:   [id number]  :   1995  ACCT. NUMBER: [id number]  DATE OF SERVICE: 5/10/21  START TIME: 12:30 PM  END TIME:  2:30 PM  FACILITATOR(S): Ashley Hunter LADC  TOPIC: BEH Group Therapy  Number of patients attending the group: 6  Group Length:  2 Hours    Group Therapy Type: Recovery strategies    Summary of Group / Topics Discussed:    Relationship/socialization and Relapse prevention      Group Attendance:  Attended group session    Patient's response to the group topic/interactions:  cooperative with task    Patient appeared to be Engaged.        Client specific details:  Jamar presented his assignment on self-esteem and identified his core personal values. He also participated in a group discussion on \"stinking thinking\" and relapse.   "

## 2021-05-10 NOTE — PROGRESS NOTES
Writer called [facility] this AM and spoke with RN Reema to verify total dose of Wellbutrin perescribed from [facility].   Pt was prescribed Wellbutrin on 5/5/21.  2 bottles came up (150mg take 1 daily AND 300mg Take 1 tab daily) The instructions on bottle did not verify that pt should take a total dose of 450mg daily.  Pt verifying this order with [facility] nurse and asking for updated med list and progress note.  Writer received this fax at [phone] today

## 2021-05-11 ENCOUNTER — HOSPITAL ENCOUNTER (OUTPATIENT)
Dept: BEHAVIORAL HEALTH | Facility: CLINIC | Age: 26
End: 2021-05-11
Attending: FAMILY MEDICINE
Payer: COMMERCIAL

## 2021-05-11 VITALS — TEMPERATURE: 97.8 F | OXYGEN SATURATION: 100 %

## 2021-05-11 PROCEDURE — H2035 A/D TX PROGRAM, PER HOUR: HCPCS | Mod: HQ

## 2021-05-11 PROCEDURE — 1002N00001 HC LODGING PLUS FACILITY CHARGE ADULT

## 2021-05-11 NOTE — GROUP NOTE
Group Therapy Documentation    PATIENT'S NAME: Sebastián Nogueira  MRN:   [id number]  :   1995  ACCT. NUMBER: [id number]  DATE OF SERVICE: 21  START TIME:  3:00 PM  END TIME:  4:00 PM  FACILITATOR(S): Morris Perla LADC  TOPIC: BEH Group Therapy  Number of patients attending the group:  5  Group Length:  1 Hours    Group Therapy Type: Recovery strategies    Summary of Group / Topics Discussed:    Sober coping skills      Group Attendance:  Attended group session    Patient's response to the group topic/interactions:  cooperative with task    Patient appeared to be Actively participating.        Client specific details:  Sebastián participated and interacted appropriately with peers and staff in skills group. No triggers to use noted or discussed.

## 2021-05-11 NOTE — PROGRESS NOTES
Patient:  Sebastián FRIEDMAN [facility] Weekly Treatment Plan Review      ATTENDANCE for the following date span:  5/05/21-5/11/21     Day Monday  5/10/21 Tuesday  5/11/21 Wednesday  5/05/21 Thursday  5/06/21 Friday  5/07/21 Saturday  5/08/21 Jhony  5/09/21   Group Hours   4 4 4 4 4 4 2   Skills Hours    1 1 1   1   Individual Session (LADC)            Individual Session  (psychotherapy)            Peer-led Recovery Group   1 1 1 1 1 1 1               Adult CD Progress Note and Treatment Plan Review     Attendance  Please refer to OP BEH CD Adult Attendance Record Documentation Flowsheet    Support group attended this week: yes    Reporting sobriety:  yes    Treatment Plan     Treatment Plan Review competed on: 5/11/21     Client preferred learning style: Visual  Hands on  Verbal  Demonstration    Staff Members contributing Ashley Hunter [facility]; Mulu Lorenzo [facility]                         Received Supervision: YES    Client: contributed to goals and plan: Yes    Client received copy of plan/revised plan: Yes    Client agrees with plan/revised plan: Yes    Changes to Treatment Plan: No    New Goals added since last review No additional goals added at this time    Goals worked on since last review: Self-esteem assignment, First Step, relapse prevention workshop, spirituality group, building sober support,     Strategies effective: yes    Strategies need these changes: No changes needed at this time    1) Care Coordination Activities:  None this week   2) Medical, Mental Health and other appointments the client attended: None this week   3) Medication issues: See  MAR  4) Physical and mental health problems: See Dimension 2 and 3  5) Any changes in Vulnerable Adult Status?  No If yes, add to treatment plan and individual abuse prevention plan.  6) Review and evaluation of the individual abuse prevention plan: Current IAPP for this program is adequate for this client    ASAM Risk Rating:  Dimension 1, 0:  "Patient reports their last date of use as 4/26/21. Patient denies any withdrawal symptoms that would interfere with full participation in treatment programming at this time. Patient will continue to be monitored throughout treatment.   Dimension 2, 0: Patient denies any biomedical concerns that would interfere with full participation in treatment programming at this time. Patient reports that he is currently medication compliant. Patient appears able to access medical aid as needed and will continue to be monitored throughout treatment. Patient attended staff RN lecture on STI's/Pregnancy.  Dimension 3, 1: Patient denies a mental health diagnosis. Patient reports his mood has been \"up and down\" this week. Patient reported current stressors include getting a new roommate while in [facility], however, patient reports he is able to manage anxiety and stress triggers. Patient is practicing breathing exercises and distraction techniques to cope with emotional distress.  Patient denies any suicidal thoughts or ideations at this time. Patient will continue to be monitored throughout treatment.   Dimension 4, 2: Patient reports current motivation for treatment is for \"probation\" and that his addiction is a life or death situation. Patient is participating in all groups and lectures and appears to be gaining internal motivation for change. Patient appears to be in the contemplation stage of change at this time. Patient continues to report ambivalence about remaining abstinent from marijuana.   Dimension 5, 4: Patient rates urges and cravings this week a \"5\" on a scale of 1-10(high). Patient continues to learn and practice coping skills and verbalizes breathing tools and distraction have been helpful. Patient participated in a weekend workshop on relapse prevention and completed all activities.   Dimension 6, 3: Patient is attending sober support meetings (via zoom) and building relationships with peers. Patient's aftercare " plan at this time includes returning home to his parents, attending outpatient, and resuming employment.     Guide to Risk Ratings for Suicidality:   IDEATION: Active thoughts of suicide? INTENT: Intent to follow on suicide? PLAN: Plan to follow through on suicide? Level of Risk:   IF Yes Yes Yes Patient = High Emergent   IF Yes Yes No Patient = High Urgent/Non-Emergent   IF Yes No No Patient = Moderate Non-Urgent   IF No No   No Patient = Low Risk   The patient's ADDITIONAL RISK FACTORS and lack of PROTECTIVE FACTORS may increase their overall suicide risk ratings.     Patient's/client's current risk rating:  Low Risk    Family Involvement:   Facetime with family due to Covid-19 restrictions    Data:   offered feedback good insight client did actively participate    Intervention:   Behavior modification  Cognitive Behavioral Therapy  Counselor feedback  Education  Emotional management  Group feedback  Motivational Enhancement Therapy  Relapse prevention  Twelve Step facilitation  Mental health education    Assessment:   Stages of Change Model  Contemplation    Appears/Sounds:  Cooperative  Engaged  Ambivalent  Anxious    Plan:  Focus on recovery environment  Monitor emotional/physical health  Continue working through treatment plan goals.     SCOT Hill

## 2021-05-11 NOTE — GROUP NOTE
Group Therapy Documentation    PATIENT'S NAME: Sebastián Nogueira  MRN:   [id number]  :   1995  ACCT. NUMBER: [id number]  DATE OF SERVICE: 21  START TIME:  9:00 AM  END TIME: 11:00 AM  FACILITATOR(S): Mulu Lorenzo LADC  TOPIC: BEH Group Therapy  Number of patients attending the group: 5  Group Length:  2 Hours    Group Therapy Type: Emotion processing    Summary of Group / Topics Discussed:    Emotions/expression      Group Attendance:  Attended group session    Patient's response to the group topic/interactions:  cooperative with task    Patient appeared to be Actively participating, Attentive and Engaged.        Client specific details: Jamar was an active participant in morning group therapy session. He contributed to the group discussion on self-honesty and challenging automatic negative thoughts.

## 2021-05-11 NOTE — GROUP NOTE
"Group Therapy Documentation    PATIENT'S NAME: Sebastián Nogueira  MRN:   [id number]  :   1995  ACCT. NUMBER: [id number]  DATE OF SERVICE: 21   START TIME: 12:30 PM  END TIME:  2:30 PM  FACILITATOR(S): Ashley Hunter LADC  TOPIC: BEH Group Therapy  Number of patients attending the group:  5  Group Length:  2 Hours    Group Therapy Type: Recovery strategies    Summary of Group / Topics Discussed:    Mindfulness/Relaxation and Leisure explorations/use of leisure time      Group Attendance:  Attended group session    Patient's response to the group topic/interactions:  cooperative with task    Patient appeared to be Actively participating.        Client specific details:  Jamar participated in the \"5 and 5\" group therapy activity.  "

## 2021-05-12 ENCOUNTER — HOSPITAL ENCOUNTER (OUTPATIENT)
Dept: BEHAVIORAL HEALTH | Facility: CLINIC | Age: 26
End: 2021-05-12
Attending: FAMILY MEDICINE
Payer: COMMERCIAL

## 2021-05-12 VITALS — TEMPERATURE: 97.2 F | OXYGEN SATURATION: 99 %

## 2021-05-12 PROCEDURE — H2035 A/D TX PROGRAM, PER HOUR: HCPCS | Mod: HQ

## 2021-05-12 PROCEDURE — 1002N00001 HC LODGING PLUS FACILITY CHARGE ADULT

## 2021-05-12 NOTE — GROUP NOTE
Group Therapy Documentation    PATIENT'S NAME: Sebastián Nogueira  MRN:   [id number]  :   1995  ACCT. NUMBER: [id number]  DATE OF SERVICE: 21  START TIME: 12:30 PM  END TIME:  2:30 PM  FACILITATOR(S): Mulu Lorenzo LADC; Amy Car  TOPIC: BEH Group Therapy  Number of patients attending the group: 6  Group Length:  2 Hours    Group Therapy Type: Recovery strategies    Summary of Group / Topics Discussed:    Spiritual Care      Group Attendance:  Attended group session    Patient's response to the group topic/interactions:  cooperative with task    Patient appeared to be Actively participating, Attentive and Engaged.        Client specific details: Jamar was an active participant in Spiritual Care group facilitated by Amy Car.

## 2021-05-12 NOTE — GROUP NOTE
"Group Therapy Documentation    PATIENT'S NAME: Sebastián Nogueira  MRN:   [id number]  :   1995  ACCT. NUMBER: [id number]  DATE OF SERVICE: 21  START TIME: 10:00 AM  END TIME: 11:30 AM  FACILITATOR(S): Ashley Hunter LADC  TOPIC: BEH Group Therapy  Number of patients attending the group:  6  Group Length:  1.5 Hours    Group Therapy Type: Emotion processing    Summary of Group / Topics Discussed:    Disease of addiction and Emotions/expression      Group Attendance:  Attended group session    Patient's response to the group topic/interactions:  cooperative with task    Patient appeared to be Actively participating.        Client specific details:  Jamar presented his \"Ending Our Resentments\" assignmnet. He spoke of his own responsibility with the people and institutions he is resentful towards. Jamar appeared tearful while processing his cousin's death from an overdose and letting go of his anger towards addiction.  "

## 2021-05-12 NOTE — GROUP NOTE
Psychoeducation Group Documentation    PATIENT'S NAME: Sebastián Nogueira  MRN:   [id number]  :   1995  ACCT. NUMBER: [id number]  DATE OF SERVICE: 21  START TIME:  8:30 AM  END TIME:  9:30 AM  FACILITATOR(S): Gus Lim LADC; Ton Trotter LADC; Katie Alvarez  TOPIC: BEH Pyschoeducation  Number of patients attending the group:  24  Group Length:  1 Hours    Skills Group Therapy Type: Recovery skills    Summary of Group / Topics Discussed:    Relationship/social skills, Coping/DBT skills, and Symptom management skills          Group Attendance:  Attended group session    Patient's response to the group topic/interactions:  cooperative with task    Patient appeared to be Attentive and Engaged.         Client specific details:  .

## 2021-05-13 ENCOUNTER — HOSPITAL ENCOUNTER (OUTPATIENT)
Dept: BEHAVIORAL HEALTH | Facility: CLINIC | Age: 26
End: 2021-05-13
Attending: FAMILY MEDICINE
Payer: COMMERCIAL

## 2021-05-13 VITALS — OXYGEN SATURATION: 97 % | TEMPERATURE: 98.2 F

## 2021-05-13 PROCEDURE — H2035 A/D TX PROGRAM, PER HOUR: HCPCS | Mod: HQ

## 2021-05-13 PROCEDURE — 1002N00001 HC LODGING PLUS FACILITY CHARGE ADULT

## 2021-05-13 NOTE — GROUP NOTE
Group Therapy Documentation    PATIENT'S NAME: Sebastián Nogueira  MRN:   [id number]  :   1995  ACCT. NUMBER: [id number]  DATE OF SERVICE: 21  START TIME: 12:30 PM  END TIME:  2:30 PM  FACILITATOR(S): Ashley Hunter LADC  TOPIC: BEH Group Therapy  Number of patients attending the group:  6  Group Length:  2 Hours    Group Therapy Type: Recovery strategies    Summary of Group / Topics Discussed:    Sober coping skills and Co-occurring illnesses symptom management      Group Attendance:  Attended group session    Patient's response to the group topic/interactions:  cooperative with task    Patient appeared to be Actively participating.        Client specific details:  Jamar participated in a group activity on anxiety management and PMRT.

## 2021-05-13 NOTE — GROUP NOTE
Psychoeducation Group Documentation    PATIENT'S NAME: Sebastián Nogueira  MRN:   [id number]  :   1995  ACCT. NUMBER: [id number]  DATE OF SERVICE: 21  START TIME:  3:00 PM  END TIME:  4:00 PM  FACILITATOR(S): Essence Reinoso ADC-T; Ev Herndon [facility]; Ton Trotter [facility]  TOPIC: BEH Pyschoeducation  Number of patients attending the group:  6  Group Length:  1 Hours    Skills Group Therapy Type: Daily living/independence skills and Healthy behaviors development    Summary of Group / Topics Discussed:    Balanced lifestyle skills and Symptom management skills    Group Attendance:  Attended group session    Patient's response to the group topic/interactions:  cooperative with task    Patient appeared to be Attentive and Engaged.         Client specific details:  Pt attended Dr. Saul's lecture on the effects of addiction on the brain.

## 2021-05-13 NOTE — GROUP NOTE
Group Therapy Documentation    PATIENT'S NAME: Sebastián Nogueira  MRN:   [id number]  :   1995  ACCT. NUMBER: [id number]  DATE OF SERVICE: 21  START TIME:  9:00 AM  END TIME: 11:00 AM  FACILITATOR(S): Mulu Lorenzo LADC  TOPIC: BEH Group Therapy  Number of patients attending the group: 6  Group Length:  2 Hours    Group Therapy Type: Emotion processing    Summary of Group / Topics Discussed:    Sober coping skills, Emotions/expression, and Relapse prevention      Group Attendance:  Attended group session    Patient's response to the group topic/interactions:  cooperative with task    Patient appeared to be Actively participating, Attentive and Engaged.        Client specific details: Jamar was an active participant in morning group therapy session. They contributed to the group discussion and gave supportive feedback to a peer who shared a treatment plan assignment.

## 2021-05-14 ENCOUNTER — HOSPITAL ENCOUNTER (OUTPATIENT)
Dept: BEHAVIORAL HEALTH | Facility: CLINIC | Age: 26
End: 2021-05-14
Attending: FAMILY MEDICINE
Payer: COMMERCIAL

## 2021-05-14 VITALS — OXYGEN SATURATION: 97 % | TEMPERATURE: 98 F

## 2021-05-14 PROCEDURE — 1002N00001 HC LODGING PLUS FACILITY CHARGE ADULT

## 2021-05-14 PROCEDURE — H2035 A/D TX PROGRAM, PER HOUR: HCPCS | Mod: HQ

## 2021-05-14 NOTE — GROUP NOTE
Group Therapy Documentation    PATIENT'S NAME: Sebastián Nogueira  MRN:   [id number]  :   1995  ACCT. NUMBER: [id number]  DATE OF SERVICE: 21  START TIME: 12:30 PM  END TIME:  2:30 PM  FACILITATOR(S): Mulu Lorenzo LADC  TOPIC: BEH Group Therapy  Number of patients attending the group: 7  Group Length:  2 Hours    Group Therapy Type: Recovery strategies    Summary of Group / Topics Discussed:    Sober coping skills and Leisure explorations/use of leisure time      Group Attendance:  Attended group session    Patient's response to the group topic/interactions:  cooperative with task    Patient appeared to be Actively participating, Attentive and Engaged.        Client specific details: Jamar was receptive to group leisure activity and contributed appropriately to discussion.

## 2021-05-14 NOTE — GROUP NOTE
Group Therapy Documentation    PATIENT'S NAME: Sebastián Nogueira  MRN:   [id number]  :   1995  ACCT. NUMBER: [id number]  DATE OF SERVICE: 21  START TIME:  9:00 AM  END TIME: 11:00 AM  FACILITATOR(S): Ashley Hunter LADC  TOPIC: BEH Group Therapy  Number of patients attending the group: 7  Group Length:  2 Hours    Group Therapy Type: Recovery strategies    Summary of Group / Topics Discussed:    Trauma informed care and Emotions/expression      Group Attendance:  Attended group session    Patient's response to the group topic/interactions:  cooperative with task    Patient appeared to be Actively participating.        Client specific details: Jamar participated in a group discussion on seeking safety and practicing coping skills.

## 2021-05-15 ENCOUNTER — HOSPITAL ENCOUNTER (OUTPATIENT)
Dept: BEHAVIORAL HEALTH | Facility: CLINIC | Age: 26
End: 2021-05-15
Attending: FAMILY MEDICINE
Payer: COMMERCIAL

## 2021-05-15 VITALS — OXYGEN SATURATION: 95 % | TEMPERATURE: 97.7 F

## 2021-05-15 PROCEDURE — H2035 A/D TX PROGRAM, PER HOUR: HCPCS | Mod: HQ

## 2021-05-15 PROCEDURE — 1002N00001 HC LODGING PLUS FACILITY CHARGE ADULT

## 2021-05-15 NOTE — GROUP NOTE
Group Therapy Documentation    PATIENT'S NAME: Sebastián Nogueira  MRN:   [id number]  :   1995  ACCT. NUMBER: [id number]  DATE OF SERVICE: 5/15/21  START TIME: 12:30 PM  END TIME:  2:30 PM  FACILITATOR(S): Morris Perla LADC; Alexus Sanchez LADC; Ton Trotter LADC  TOPIC: BEH Group Therapy  Number of patients attending the group: 7  Group Length:  2 Hours    Group Therapy Type: Recovery strategies    Summary of Group / Topics Discussed:    Relationship/socialization      Group Attendance:  Attended group session    Patient's response to the group topic/interactions:  cooperative with task    Patient appeared to be Attentive and Engaged.        Client specific details:  Sebastián participated in afternoon session of weekend Relationships workshop.

## 2021-05-15 NOTE — GROUP NOTE
Group Therapy Documentation    PATIENT'S NAME: Sebastián Nogueira  MRN:   [id number]  :   1995  ACCT. NUMBER: [id number]  DATE OF SERVICE: 5/15/21  START TIME:  9:00 AM  END TIME: 11:00 AM  FACILITATOR(S): Ton Trotter LADC; Morris Perla LADC; Alexus Sanchez LADC  TOPIC: BEH Group Therapy  Number of patients attending the group:  7  Group Length:  2 Hours    Group Therapy Type: Recovery strategies    Summary of Group / Topics Discussed:    Relationship/socialization      Group Attendance:  Attended group session    Patient's response to the group topic/interactions:  cooperative with task    Patient appeared to be Actively participating.        Client specific details:  Sebastián participated in morning session of weekend Relationships workshop.

## 2021-05-16 ENCOUNTER — HOSPITAL ENCOUNTER (OUTPATIENT)
Dept: BEHAVIORAL HEALTH | Facility: CLINIC | Age: 26
End: 2021-05-16
Attending: FAMILY MEDICINE
Payer: COMMERCIAL

## 2021-05-16 VITALS — TEMPERATURE: 97.6 F | OXYGEN SATURATION: 97 %

## 2021-05-16 PROCEDURE — H2035 A/D TX PROGRAM, PER HOUR: HCPCS | Mod: HQ

## 2021-05-16 PROCEDURE — 1002N00001 HC LODGING PLUS FACILITY CHARGE ADULT

## 2021-05-16 NOTE — GROUP NOTE
Psychoeducation Group Documentation    PATIENT'S NAME: Sebastián Nogueira  MRN:   [id number]  :   1995  ACCT. NUMBER: [id number]  DATE OF SERVICE: 21  START TIME:  8:45 AM  END TIME: 10:35 AM  FACILITATOR(S): Jennie Alegria RN; Alexus Sanchez LADC; Ton Trotter LADC  TOPIC: BEH Pyschoeducation  Number of patients attending the group:  27  Group Length:  2 Hours    Skills Group Therapy Type: Healthy behaviors development and Medication education    Summary of Group / Topics Discussed:    Symptom management skills, Medication management skills, and Aids and HIV          Group Attendance:  Attended group session    Patient's response to the group topic/interactions:  cooperative with task    Patient appeared to be Actively participating, Attentive and Engaged.         Client specific details:  .

## 2021-05-16 NOTE — GROUP NOTE
Group Therapy Documentation    PATIENT'S NAME: Sebastián Nogueira  MRN:   [id number]  :   1995  ACCT. NUMBER: [id number]  DATE OF SERVICE: 21  START TIME: 12:30 PM  END TIME:  1:30 PM  FACILITATOR(S): Ton Trotter LADC  TOPIC: BEH Group Therapy  Number of patients attending the group:  27  Group Length:  1 Hours    Group Therapy Type: Recovery strategies and Daily living/independence skills    Summary of Group / Topics Discussed:    Recovery Principles and Relationship/socialization      Group Attendance:  Attended group session    Patient's response to the group topic/interactions:  cooperative with task    Patient appeared to be Attentive and Engaged.        Client specific details:  .

## 2021-05-17 ENCOUNTER — HOSPITAL ENCOUNTER (OUTPATIENT)
Dept: BEHAVIORAL HEALTH | Facility: CLINIC | Age: 26
End: 2021-05-17
Attending: FAMILY MEDICINE
Payer: COMMERCIAL

## 2021-05-17 VITALS — OXYGEN SATURATION: 100 % | TEMPERATURE: 97.8 F

## 2021-05-17 PROCEDURE — H2035 A/D TX PROGRAM, PER HOUR: HCPCS | Mod: HQ

## 2021-05-17 PROCEDURE — 1002N00001 HC LODGING PLUS FACILITY CHARGE ADULT

## 2021-05-17 NOTE — GROUP NOTE
Group Therapy Documentation    PATIENT'S NAME: Sebastián Nogueira  MRN:   [id number]  :   1995  ACCT. NUMBER: [id number]  DATE OF SERVICE: 21  START TIME: 12:30 PM  END TIME:  2:30 PM  FACILITATOR(S): Ashley Hunter LADC; Cecy Lafleur LADC  TOPIC: BEH Group Therapy  Number of patients attending the group:  7  Group Length:  2 Hours    Group Therapy Type: Recovery strategies    Summary of Group / Topics Discussed:    Recovery Principles      Group Attendance:  Attended group session    Patient's response to the group topic/interactions:  cooperative with task    Patient appeared to be Actively participating.        Client specific details:  Jamar participated in a group discussion on recovery principles and relapse.

## 2021-05-17 NOTE — GROUP NOTE
Group Therapy Documentation    PATIENT'S NAME: Sebastián Nogueira  MRN:   [id number]  :   1995  ACCT. NUMBER: [id number]  DATE OF SERVICE: 21  START TIME:  9:00 AM  END TIME: 11:00 AM  FACILITATOR(S): Mulu Lorenzo LADC  TOPIC: BEH Group Therapy  Number of patients attending the group: 7  Group Length:  2 Hours    Group Therapy Type: Emotion processing    Summary of Group / Topics Discussed:    Disease of addiction and Emotions/expression      Group Attendance:  Attended group session    Patient's response to the group topic/interactions:  cooperative with task    Patient appeared to be Actively participating, Attentive and Engaged.        Client specific details: Jamar was an active participant in morning group therapy session. He gave appropriate feedback to his peers and shared an affirmation he can say to himself today.

## 2021-05-18 ENCOUNTER — HOSPITAL ENCOUNTER (OUTPATIENT)
Dept: BEHAVIORAL HEALTH | Facility: CLINIC | Age: 26
End: 2021-05-18
Attending: FAMILY MEDICINE
Payer: COMMERCIAL

## 2021-05-18 VITALS — OXYGEN SATURATION: 97 % | TEMPERATURE: 98 F

## 2021-05-18 PROCEDURE — 1002N00001 HC LODGING PLUS FACILITY CHARGE ADULT

## 2021-05-18 PROCEDURE — H2035 A/D TX PROGRAM, PER HOUR: HCPCS | Mod: HQ

## 2021-05-18 NOTE — GROUP NOTE
Group Therapy Documentation    PATIENT'S NAME: Sebastián Nogueira  MRN:   [id number]  :   1995  ACCT. NUMBER: [id number]  DATE OF SERVICE: 21  START TIME: 12:30 PM  END TIME:  2:30 PM  FACILITATOR(S): Ashley Hunter LADC  TOPIC: BEH Group Therapy  Number of patients attending the group: 8  Group Length:  2 Hours    Group Therapy Type: Recovery strategies    Summary of Group / Topics Discussed:    Relationship/socialization and Trauma informed care      Group Attendance:  Attended group session    Patient's response to the group topic/interactions:  cooperative with task    Patient appeared to be Actively participating.        Client specific details: Jamar participated in a group activity on building resiliency and story telling.

## 2021-05-18 NOTE — GROUP NOTE
"Group Therapy Documentation    PATIENT'S NAME: Sebastián Nogueira  MRN:   [id number]  :   1995  ACCT. NUMBER: [id number]  DATE OF SERVICE: 21  START TIME:  9:00 AM  END TIME: 11:00 AM  FACILITATOR(S): Mulu Lorenzo LADC  TOPIC: BEH Group Therapy  Number of patients attending the group:  7  Group Length:  2 Hours    Group Therapy Type: Emotion processing    Summary of Group / Topics Discussed:    Co-occurring illnesses symptom management, Disease of addiction, and Emotions/expression      Group Attendance:  Attended group session    Patient's response to the group topic/interactions:  cooperative with task    Patient appeared to be Actively participating, Attentive and Engaged.        Client specific details: Jamar was an active participant in morning group therapy session. He shared his \"Drug Use History\" assignment and was receptive to feedback from his peers.  "

## 2021-05-18 NOTE — GROUP NOTE
Group Therapy Documentation    PATIENT'S NAME: Sebastián Nogueira  MRN:   [id number]  :   1995  ACCT. NUMBER: [id number]  DATE OF SERVICE: 21  START TIME:  3:00 PM  END TIME:  4:00 PM  FACILITATOR(S): Gus Lim LADC; Katie Alvarez  TOPIC: BEH Group Therapy  Number of patients attending the group:  20  Group Length:  1 Hours    Group Therapy Type: Recovery strategies    Summary of Group / Topics Discussed:    Recovery Principles      Group Attendance:  Attended group session    Patient's response to the group topic/interactions:  cooperative with task    Patient appeared to be Attentive.        Client specific details:  Jamar took part in Skills Group. Group discussed motivation and and the importance of recovery community.

## 2021-05-19 ENCOUNTER — HOSPITAL ENCOUNTER (OUTPATIENT)
Dept: BEHAVIORAL HEALTH | Facility: CLINIC | Age: 26
End: 2021-05-19
Attending: FAMILY MEDICINE
Payer: COMMERCIAL

## 2021-05-19 VITALS — TEMPERATURE: 98 F | OXYGEN SATURATION: 98 %

## 2021-05-19 PROCEDURE — H2035 A/D TX PROGRAM, PER HOUR: HCPCS | Mod: HQ

## 2021-05-19 PROCEDURE — 1002N00001 HC LODGING PLUS FACILITY CHARGE ADULT

## 2021-05-19 NOTE — GROUP NOTE
"Group Therapy Documentation    PATIENT'S NAME: Sebastián Nogueira  MRN:   [id number]  :   1995  ACCT. NUMBER: [id number]  DATE OF SERVICE: 21  START TIME: 10:00 AM  END TIME: 11:30 AM  FACILITATOR(S): Ashley Hunter LADC; Mluu Lorenzo LADC  TOPIC: BEH Group Therapy  Number of patients attending the group:  7  Group Length:  1.5 Hours    Group Therapy Type: Emotion processing    Summary of Group / Topics Discussed:    Disease of addiction and Emotions/expression      Group Attendance:  Attended group session    Patient's response to the group topic/interactions:  cooperative with task    Patient appeared to be Actively participating.        Client specific details:  Jamar participated in emotional processing group and shared her \"Telling My Story\" assignment.  "

## 2021-05-19 NOTE — PROGRESS NOTES
[facility] Weekly Treatment Plan Review    ATTENDANCE for the following date span: 5/12/21-5/18/21     Day Monday  5/17/21 Tuesday  5/18/21 Wednesday  5/12/21 Thursday  5/13/21 Friday  5/14/21 Saturday  5/15/21 Jhony  5/16/21   Group Hours   4 4 4 4 4 4 2   Skills Hours    1 1 1   1   Individual Session (LADC)            Individual Session  (psychotherapy)            Peer-led Recovery Group   1 1 1 1 1 1 1     Patient: Sebastián Nogueira            Adult CD Progress Note and Treatment Plan Review     Attendance  Please refer to OP BEH CD Adult Attendance Record Documentation Flowsheet    Support group attended this week: Yes    Reporting sobriety:  Yes    Treatment Plan     Treatment Plan Review completed on: 5/19/21     Client preferred learning style: Visual  Hands on  Verbal  Demonstration    Staff Members contributing: Ashley Hunter [facility]; Mulu Lorenzo [facility]                      Received Supervision: No     Client: Patient contributed to goals and plan.    Client received copy of plan/revised plan: Yes    Client agrees with plan/revised plan: Yes    Changes to Treatment Plan: None    New Goals added since last review: No additional goals added at this time    Goals worked on since last review: Patient has completed his Drug Use History and Cross-Addiction assignments this week. He continues to work on developing assertive communication skills and gaining insight into the nature of his addiction.    Strategies effective: Yes    Strategies need these changes: No changes needed at this time    1) Care Coordination Activities: Patient will continue to work with  staff to develop an aftercare plan that is conducive to recovery.  2) Medical, Mental Health and other appointments the client attended: None reported.  3) Medication issues: None reported.  4) Physical and mental health problems: See dimensions 2 and 3 below.  5) Any changes in Vulnerable Adult Status?  No If yes, add to treatment plan and  "individual abuse prevention plan.  6) Review and evaluation of the individual abuse prevention plan: Current IAPP for this program is adequate for this client    ASAM Risk Rating:    Dimension 1, 0: Patient reports his last date of use as 4/26/21. Patient denies any withdrawal symptoms that would interfere with full participation in treatment programming at this time. Patient will continue to be monitored throughout treatment.     Dimension 2, 0: Patient denies any biomedical concerns that would interfere with full participation in treatment programming at this time. Patient reports that he is currently medication compliant and appears able to access medical aid as needed.  Patient will continue to be monitored throughout treatment. Patient attended staff RN lecture on HIV/AIDS on 5/16/21.    Dimension 3, 1: Patient denies any formal mental health diagnoses. Patient denies any changes to his mood or stress level this past week.  Patient reports \"distraction\" as his primary coping skills he uses to manage stress and difficult emotions.  Patient denies any suicidal thoughts or ideations at this time. Patient will continue to be monitored throughout treatment.     Dimension 4, 2: Patient verbally reports being motivated for long-term recovery. Patient reports \"my future\" as his primary motivation to stay sober and in treatment this week.  Patient's group attendance and participation have been positive and he continues to demonstrate insight gained about the nature of his addiction.      Dimension 5, 4: Patient rated his cravings this week on a scale from 1(low) to 10(high) as a \"3\".  He reports \"distraction\" as the coping skills he has utilized to manage these cravings. Patient attended Spirituality Group facilitated by Amy Car.    Dimension 6, 3: Patient's aftercare plan currently includes outpatient treatment.  Patient will continue to work with  staff to develop an aftercare plan that is conducive to " recovery.  Patient has been attending 12-step meetings via Zoom while at . He also attended weekend Relationships workshop.    Guide to Risk Ratings for Suicidality:   IDEATION: Active thoughts of suicide? INTENT: Intent to follow on suicide? PLAN: Plan to follow through on suicide? Level of Risk:   IF Yes Yes Yes Patient = High Emergent   IF Yes Yes No Patient = High Urgent/Non-Emergent   IF Yes No No Patient = Moderate Non-Urgent   IF No No   No Patient = Low Risk   The patient's ADDITIONAL RISK FACTORS and lack of PROTECTIVE FACTORS may increase their overall suicide risk ratings.     Patient's/client's current risk rating:  Low Risk    Family Involvement:   Phone contact due to COVID-19 restrictions.    Data:   offered feedback good insight client did actively participate    Intervention:   Behavior modification  Cognitive Behavioral Therapy  Counselor feedback  Education  Emotional management  Group feedback  Motivational Enhancement Therapy  Relapse prevention  Twelve Step facilitation  Mental health education    Assessment:   Stages of Change Model  Contemplation    Appears/Sounds:  Cooperative  Engaged    Plan:  Focus on recovery environment  Monitor emotional/physical health  Continue working through treatment plan goals.       SCOT Escalante

## 2021-05-19 NOTE — GROUP NOTE
Group Therapy Documentation    PATIENT'S NAME: Sebastián Nogueira  MRN:   [id number]  :   1995  ACCT. NUMBER: [id number]  DATE OF SERVICE: 21  START TIME:  8:30 AM  END TIME:  9:30 AM  FACILITATOR(S): Art Cruz LADC; Venkat Saul MD  TOPIC: BEH Group Therapy  Number of patients attending the group:  8  Group Length:  1 Hours    Group Therapy Type: Recovery strategies    Summary of Group / Topics Discussed:    Disease of addiction and Medication management      Group Attendance:  Attended group session    Patient's response to the group topic/interactions:  cooperative with task    Patient appeared to be Attentive.        Client specific details:  Jamar gave appropriate feedback..

## 2021-05-19 NOTE — GROUP NOTE
Group Therapy Documentation    PATIENT'S NAME: Sebastián Nogueira  MRN:   [id number]  :   1995  ACCT. NUMBER: [id number]  DATE OF SERVICE: 21  START TIME: 12:30 PM  END TIME:  2:30 PM  FACILITATOR(S): Ashley Hunter LADC; Amy Car  TOPIC: BEH Group Therapy  Number of patients attending the group:  7  Group Length:  2 Hours    Group Therapy Type: Recovery strategies    Summary of Group / Topics Discussed:    Spiritual Care      Group Attendance:      Patient's response to the group topic/interactions:      Patient appeared to be .        Client specific details:  ***.

## 2021-05-20 ENCOUNTER — HOSPITAL ENCOUNTER (OUTPATIENT)
Dept: BEHAVIORAL HEALTH | Facility: CLINIC | Age: 26
End: 2021-05-20
Attending: FAMILY MEDICINE
Payer: COMMERCIAL

## 2021-05-20 VITALS — OXYGEN SATURATION: 100 % | TEMPERATURE: 97.8 F

## 2021-05-20 PROCEDURE — H2035 A/D TX PROGRAM, PER HOUR: HCPCS | Mod: HQ

## 2021-05-20 PROCEDURE — 1002N00001 HC LODGING PLUS FACILITY CHARGE ADULT

## 2021-05-20 NOTE — GROUP NOTE
Group Therapy Documentation    PATIENT'S NAME: Sebastián Nogueira  MRN:   [id number]  :   1995  ACCT. NUMBER: [id number]  DATE OF SERVICE: 21  START TIME:  9:00 AM  END TIME: 11:00 AM  FACILITATOR(S): Mulu Lorenzo LADC  TOPIC: BEH Group Therapy  Number of patients attending the group: 7  Group Length:  2 Hours    Group Therapy Type: Emotion processing    Summary of Group / Topics Discussed:    Sober coping skills, Disease of addiction, and Emotions/expression      Group Attendance:  Attended group session    Patient's response to the group topic/interactions:  cooperative with task    Patient appeared to be Actively participating, Attentive and Engaged.        Client specific details: Jamar was an active participant in morning group therapy session. He gave supportive feedback to his peers who presented treatment plan assignments and identified one difficult change he can make to help support his recovery.

## 2021-05-20 NOTE — GROUP NOTE
Group Therapy Documentation    PATIENT'S NAME: Sebastián Nogueira  MRN:   [id number]  :   1995  ACCT. NUMBER: [id number]  DATE OF SERVICE: 21  START TIME:  3:00 PM  END TIME:  4:00 PM  FACILITATOR(S): Ashley Hunter LADC  TOPIC: BEH Group Therapy  Number of patients attending the group:  7  Group Length:  2 Hours    Group Therapy Type: Recovery strategies    Summary of Group / Topics Discussed:    Co-occurring illnesses symptom management      Group Attendance:  Attended group session    Patient's response to the group topic/interactions:  cooperative with task    Patient appeared to be Attentive.        Client specific details:  Jamar was engaged during the skills group lecture on Co-Occurring Disorders presented by Dr. Glenn Gutiérrez.

## 2021-05-20 NOTE — GROUP NOTE
Group Therapy Documentation    PATIENT'S NAME: Sebastián Nogueira  MRN:   [id number]  :   1995  ACCT. NUMBER: [id number]  DATE OF SERVICE: 21  START TIME: 12:30 PM  END TIME:  2:30 PM  FACILITATOR(S): Ashley Hunter LADC  TOPIC: BEH Group Therapy  Number of patients attending the group:  7  Group Length:  2 Hours    Group Therapy Type: Recovery strategies    Summary of Group / Topics Discussed:    Emotions/expression      Group Attendance:  Attended group session    Patient's response to the group topic/interactions:  cooperative with task    Patient appeared to be Actively participating.        Client specific details:  Jamar provided supportive feedback during peer assignment presentations and participated in an emotional awareness group exercise.

## 2021-05-21 ENCOUNTER — HOSPITAL ENCOUNTER (OUTPATIENT)
Dept: BEHAVIORAL HEALTH | Facility: CLINIC | Age: 26
End: 2021-05-21
Attending: FAMILY MEDICINE
Payer: COMMERCIAL

## 2021-05-21 VITALS — OXYGEN SATURATION: 97 % | TEMPERATURE: 97.8 F

## 2021-05-21 PROCEDURE — H2035 A/D TX PROGRAM, PER HOUR: HCPCS | Mod: HQ

## 2021-05-21 PROCEDURE — 1002N00001 HC LODGING PLUS FACILITY CHARGE ADULT

## 2021-05-21 NOTE — GROUP NOTE
Group Therapy Documentation    PATIENT'S NAME: Sebastián Nogueira  MRN:   [id number]  :   1995  ACCT. NUMBER: [id number]  DATE OF SERVICE: 21  START TIME: 12:30 PM  END TIME:  2:30 PM  FACILITATOR(S): Jani Parham LADC  TOPIC: BEH Group Therapy  Number of patients attending the group:  7  Group Length:  2 Hours    Group Therapy Type: Recovery strategies    Summary of Group / Topics Discussed:    Recovery Principles and Sober coping skills      Group Attendance:  Attended group session    Patient's response to the group topic/interactions:  cooperative with task    Patient appeared to be Actively participating.        Client specific details:  Patient participated in journal exercise. Gave feedback to others.

## 2021-05-21 NOTE — GROUP NOTE
Group Therapy Documentation    PATIENT'S NAME: Sebastián Nogueira  MRN:   [id number]  :   1995  ACCT. NUMBER: [id number]  DATE OF SERVICE: 21  START TIME:  9:00 AM  END TIME: 11:00 AM  FACILITATOR(S): Jani Parham LADC  TOPIC: BEH Group Therapy  Number of patients attending the group:  7  Group Length:  2 Hours    Group Therapy Type: Recovery strategies    Summary of Group / Topics Discussed:    Recovery Principles, Sober coping skills, Disease of addiction, and Emotions/expression      Group Attendance:  Attended group session    Patient's response to the group topic/interactions:  cooperative with task    Patient appeared to be Attentive.        Client specific details:  Patient gave feedback to others.

## 2021-05-21 NOTE — GROUP NOTE
Group Therapy Documentation    PATIENT'S NAME: Sebastián Nogueira  MRN:   [id number]  :   1995  ACCT. NUMBER: [id number]  DATE OF SERVICE: 21  START TIME: 12:30 PM  END TIME:  2:30 PM  FACILITATOR(S): Ashley Hunter LADC  TOPIC: BEH Group Therapy  Number of patients attending the group:  7  Group Length:  2 Hours    Group Therapy Type: Recovery strategies and Emotion processing    Summary of Group / Topics Discussed:    Relationship/socialization, Emotions/expression, and Leisure explorations/use of leisure time      Group Attendance:  Attended group session    Patient's response to the group topic/interactions:  cooperative with task    Patient appeared to be Actively participating.        Client specific details:  Jamar  participated in a group discussion on sponsorship, sober fun, and peer graduation ceremony. .

## 2021-05-22 ENCOUNTER — HOSPITAL ENCOUNTER (OUTPATIENT)
Dept: BEHAVIORAL HEALTH | Facility: CLINIC | Age: 26
End: 2021-05-22
Attending: FAMILY MEDICINE
Payer: COMMERCIAL

## 2021-05-22 VITALS — TEMPERATURE: 98.1 F | OXYGEN SATURATION: 98 %

## 2021-05-22 DIAGNOSIS — F17.200 NICOTINE DEPENDENCE: Primary | ICD-10-CM

## 2021-05-22 PROCEDURE — 1002N00001 HC LODGING PLUS FACILITY CHARGE ADULT

## 2021-05-22 PROCEDURE — H2035 A/D TX PROGRAM, PER HOUR: HCPCS | Mod: HQ

## 2021-05-22 NOTE — IP AVS SNAPSHOT
Medication List       Patient: STACEY CHAMPION   : 1995   Physician: No Ref-Primary, Physician           This is your record.  Keep this with you and show to your community pharmacist(s) and physician(s) at each visit.     Allergies:  No Known Allergies          Medications  Valid as of: May 22, 2021 -  1:55 PM    Generic Name Brand Name Tablet Size Instructions for use    Disulfiram ANTABUSE 250 MG Take 1 tablet (250 mg) by mouth daily        Multiple Vitamins-Minerals THERA-VIT-M  Take 1 tablet by mouth daily        Nicotine Polacrilex NICORETTE 4 MG Place 1 each (4 mg) inside cheek as needed for smoking cessation        Thiamine HCl B-1 100 MG Take 1 tablet (100 mg) by mouth daily        traZODone HCl DESYREL 50 MG Take 1 tablet (50 mg) by mouth nightly as needed for sleep (may repeat after 60 minutes)        .           .           .           .

## 2021-05-22 NOTE — GROUP NOTE
"Group Therapy Documentation    PATIENT'S NAME: Sebastián Nogueira  MRN:   [id number]  :   1995  ACCT. NUMBER: [id number]  DATE OF SERVICE: 21  START TIME:  9:00 AM  END TIME: 11:00 AM  FACILITATOR(S): Felipe Conley LAD; Gus Lim LAD; Ev Herndon [facility]  TOPIC: BEH Group Therapy  Number of patients attending the group: 25  Group Length:  2 Hours    Group Therapy Type: Recovery strategies    Summary of Group / Topics Discussed:    Sober coping skills      Group Attendance:  Attended group session    Patient's response to the group topic/interactions:  cooperative with task    Patient appeared to be Attentive.        Client specific details: Pt was appropriate and attentive in group during the weekend Relationship workshop on Relapse Triggers\" this Am.  "

## 2021-05-22 NOTE — PROGRESS NOTES
Nursing Discharge Planning Meeting    Writer completed discharge planning meeting with patient. Discharge is planned for Thursday, 5/27/2021.    Discussed appropriate follow up care to manage DELGADO, MH and medical and to obtain medication refills. Patient given a copy of their current medications for reference. Questions were answered at this time and the patient verbalized an understanding of the post-discharge follow up plan.    Patient to schedule an appointment with their PCP: Pt admits to not having PCP.  Writer strongly encouraged pt to obtain one both at admission and throughout his stay/tx at . Resources offered    Continue to support patient in discharge planning as needed to assure appropriate continuity of care.     Tobacco Cessation  Patient participated in the nicotine replacement therapy for tobacco cessation or reduction during their treatment programming: No. Pt is not interested in tobacco cessation at this time    The patient was provided with community resources for follow-up to continue tobacco cessation support once in the community. Also the patient was encouraged to discuss their tobacco cessation efforts with the primary care provider.

## 2021-05-22 NOTE — GROUP NOTE
Group Therapy Documentation    PATIENT'S NAME: Sebastián Nogueira  MRN:   [id number]  :   1995  ACCT. NUMBER: [id number]  DATE OF SERVICE: 21  START TIME:  1:00 PM  END TIME:  2:30 PM  FACILITATOR(S): Gus Lim LADC  TOPIC: BEH Group Therapy  Number of patients attending the group:  25  Group Length:  1.5 Hours    Group Therapy Type: Recovery strategies and Daily living/independence skills    Summary of Group / Topics Discussed:    Recovery Principles and Self-care activities      Group Attendance:  Attended group session    Patient's response to the group topic/interactions:  cooperative with task    Patient appeared to be Attentive.        Client specific details:  Jamar  attended PM workshop. She took part in a group project on the importance of finding recovery support, and identifying strengths.

## 2021-05-22 NOTE — IP AVS SNAPSHOT
After Visit Summary Template Not Found    This Print Group is only intended to be used in the After Visit Summary and can only be used in a report that uses a released After Visit Summary Template.                       MRN:[id number]                      After Visit Summary   5/22/2021    Sebastián Nogueira    MRN: [id number]           Visit Information        Provider Department      5/22/2021  7:00 AM ADULT LODGING PLUS C [facility]        Your next 10 appointments already scheduled    May 23, 2021  7:00 AM  Treatment with ADULT LODGING PLUS C  [facility] ([facility] ) [address] [place] [id number] [phone]      May 24, 2021  7:00 AM  Treatment with ADULT LODGING PLUS C  [facility] ([facility] ) [address] [place] [id number] [phone]      May 25, 2021  7:00 AM  Treatment with ADULT LODGING PLUS C  [facility] ([facility] ) [address] [place] [id number] [phone]      May 26, 2021  7:00 AM  Treatment with ADULT LODGING PLUS C  [facility] ([facility] ) [address] [place] [id number] [phone]         Care EveryWhere ID    This is your Care EveryWhere ID. This could be used by other organizations to access your [facility] medical records  [id number]       Equal Access to Services    JESSE LUDWIG : Eunice Holliday, waharjit zamora, qamckinley aquino. So [facility] [phone].    ATENCIÓN: Si habla español, tiene a go disposición  servicios gratuitos de asistencia lingüística. Shelbie gonzalez [phone].    We comply with applicable federal and state civil rights laws, including the [place] Human Rights Act. We do not discriminate on the basis of race, color, creed, Jainism, national origin, marital status, age, disability, sex, sexual orientation, or gender identity.    If you would like an itemization of your charges they will now be available in qianchengwuyou 30 days after discharge. To access the itemized statements in qianchengwuyou go to billing/billing summary. From there select view account. There will be multiple tabs showing an overview of your account, detail, payments, and communications. From the communications tab you can see your monthly statements, your itemized statements, and any billing letters generated for your account. If you do not have a qianchengwuyou account and need help getting access please contact qianchengwuyou support at [phone].  If you would prefer to have your itemized statements mailed please contact our automated itemized bill request line at [phone] option  2.

## 2021-05-23 ENCOUNTER — HOSPITAL ENCOUNTER (OUTPATIENT)
Dept: BEHAVIORAL HEALTH | Facility: CLINIC | Age: 26
End: 2021-05-23
Attending: FAMILY MEDICINE
Payer: COMMERCIAL

## 2021-05-23 VITALS — OXYGEN SATURATION: 98 % | TEMPERATURE: 97.7 F

## 2021-05-23 PROCEDURE — 1002N00001 HC LODGING PLUS FACILITY CHARGE ADULT

## 2021-05-23 PROCEDURE — H2035 A/D TX PROGRAM, PER HOUR: HCPCS | Mod: HQ

## 2021-05-23 NOTE — GROUP NOTE
Group Therapy Documentation    PATIENT'S NAME: Sebastián Nogueira  MRN:   [id number]  :   1995  ACCT. NUMBER: [id number]  DATE OF SERVICE: 21  START TIME:  9:00 AM  END TIME: 11:00 AM  FACILITATOR(S): Ev Herndon [facility]; Gus Lim LADC  TOPIC: BEH Group Therapy  Number of patients attending the group:  25  Group Length:  2 Hours    Group Therapy Type: Recovery strategies    Summary of Group / Topics Discussed:    Recovery Principles      Group Attendance:  Attended group session    Patient's response to the group topic/interactions:  cooperative with task    Patient appeared to be Attentive.        Client specific details:  Jamar attended AM group. The group discussed the importance of laughter and feeling good in recovery. They also took part in  psychoeducation regarding TB, Hep A,B,C.

## 2021-05-23 NOTE — GROUP NOTE
Group Therapy Documentation    PATIENT'S NAME: Sebastián Nogueira  MRN:   [id number]  :   1995  ACCT. NUMBER: [id number]  DATE OF SERVICE: 21  START TIME:  1:00 PM  END TIME:  2:00 PM  FACILITATOR(S): Ev Herndon LADC  TOPIC: BEH Group Therapy  Number of patients attending the group: 25  Group Length:  2 Hours    Group Therapy Type: Recovery strategies    Summary of Group / Topics Discussed:    Recovery Principles and Relationship/socialization      Group Attendance:  Attended group session    Patient's response to the group topic/interactions:  cooperative with task    Patient appeared to be Actively participating and Attentive.        Client specific details:  Pt was appropriate and attentive in PM skills group. Topic included Recovery Network and The Importance of Relationship.

## 2021-05-23 NOTE — PROGRESS NOTES
Pt requesting PCP:    Appt made for pt at the following clinic near his home:    Friday, 5/28/21 at 1pm (in-person appt)  Ton López, DO  [address], [place] [address]  Phone: [phone]

## 2021-05-24 ENCOUNTER — HOSPITAL ENCOUNTER (OUTPATIENT)
Dept: BEHAVIORAL HEALTH | Facility: CLINIC | Age: 26
End: 2021-05-24
Attending: FAMILY MEDICINE
Payer: COMMERCIAL

## 2021-05-24 VITALS — TEMPERATURE: 98 F | OXYGEN SATURATION: 98 %

## 2021-05-24 PROCEDURE — H2035 A/D TX PROGRAM, PER HOUR: HCPCS | Mod: HQ

## 2021-05-24 PROCEDURE — 1002N00001 HC LODGING PLUS FACILITY CHARGE ADULT

## 2021-05-24 NOTE — GROUP NOTE
"Group Therapy Documentation    PATIENT'S NAME: Sebastián Nogueira  MRN:   [id number]  :   1995  ACCT. NUMBER: [id number]  DATE OF SERVICE: 21  START TIME: 12:30 PM  END TIME:  2:30 PM  FACILITATOR(S): Katie Alvarez  TOPIC: BEH Group Therapy  Number of patients attending the group:  6  Group Length:  2 Hours    Group Therapy Type: Recovery strategies, Emotion processing, and Daily living/independence skills    Summary of Group / Topics Discussed:    Recovery Principles, Sober coping skills, Relationship/socialization, Disease of addiction, Emotions/expression, and Relapse prevention      Group Attendance:  Attended group session    Patient's response to the group topic/interactions:  cooperative with task    Patient appeared to be Actively participating, Attentive and Engaged.        Client specific details:  Patient completed his \"Forming Stable Relationships\" assignment and presented in group. Patient stretched outside of his comfort zone by answering questions that made him uncomfortable, but did so anyway and received positive feedback from his peers in the group./  "

## 2021-05-24 NOTE — GROUP NOTE
Group Therapy Documentation    PATIENT'S NAME: Sebastián Nogueira  MRN:   [id number]  :   1995  ACCT. NUMBER: [id number]  DATE OF SERVICE: 21  START TIME:  9:00 AM  END TIME: 11:00 AM  FACILITATOR(S): Mulu Lorenzo LADC  TOPIC: BEH Group Therapy  Number of patients attending the group: 6  Group Length:  2 Hours    Group Therapy Type: Emotion processing    Summary of Group / Topics Discussed:    Recovery Principles, Sober coping skills, and Emotions/expression      Group Attendance:  Attended group session    Patient's response to the group topic/interactions:  cooperative with task    Patient appeared to be Actively participating, Attentive and Engaged.        Client specific details: Jamar was an active participant in morning group therapy session. He took time to process a frustrating situation from over the weekend, and gave supportive feedback to his peer who shared her Drug Use History assignment.

## 2021-05-25 ENCOUNTER — HOSPITAL ENCOUNTER (OUTPATIENT)
Dept: BEHAVIORAL HEALTH | Facility: CLINIC | Age: 26
End: 2021-05-25
Attending: FAMILY MEDICINE
Payer: COMMERCIAL

## 2021-05-25 VITALS — TEMPERATURE: 98.1 F | OXYGEN SATURATION: 98 %

## 2021-05-25 PROCEDURE — 1002N00001 HC LODGING PLUS FACILITY CHARGE ADULT

## 2021-05-25 PROCEDURE — H2035 A/D TX PROGRAM, PER HOUR: HCPCS | Mod: HQ

## 2021-05-25 NOTE — GROUP NOTE
Group Therapy Documentation    PATIENT'S NAME: Sebastián Nogueira  MRN:   [id number]  :   1995  ACCT. NUMBER: [id number]  DATE OF SERVICE: 21  START TIME: 12:30 PM  END TIME:  2:30 PM  FACILITATOR(S): Mulu Lorenzo LADC  TOPIC: BEH Group Therapy  Number of patients attending the group: 6  Group Length:  2 Hours    Group Therapy Type: Emotion processing    Summary of Group / Topics Discussed:    Emotions/expression      Group Attendance:  Attended group session    Patient's response to the group topic/interactions:  cooperative with task    Patient appeared to be Actively participating, Attentive and Engaged.        Client specific details: Jamar was an active participant in group therapy session. He shared about how slowing down and self-care can be beneficial to recovery, and gave supportive feedback to a peer who processed a traumatic experience.

## 2021-05-25 NOTE — GROUP NOTE
Group Therapy Documentation    PATIENT'S NAME: Sebastián Nogueira  MRN:   [id number]  :   1995  ACCT. NUMBER: [id number]  DATE OF SERVICE: 21  START TIME:  3:00 PM  END TIME:  4:00 PM  FACILITATOR(S): Essence Reinoso LADC; Ton Trotter LADC; Katie Alvarez  TOPIC: BEH Group Therapy  Number of patients attending the group:  27  Group Length:  1 Hours    Group Therapy Type: Recovery strategies    Summary of Group / Topics Discussed:    Recovery Principles, Sober coping skills, and Emotions/expression      Group Attendance:  Attended group session    Patient's response to the group topic/interactions:  cooperative with task    Patient appeared to be Actively participating, Attentive and Engaged.        Client specific details:  .

## 2021-05-25 NOTE — GROUP NOTE
"Group Therapy Documentation    PATIENT'S NAME: Sebastián Nogueira  MRN:   [id number]  :   1995  ACCT. NUMBER: [id number]  DATE OF SERVICE: 21  START TIME:  9:00 AM  END TIME: 11:00 AM  FACILITATOR(S): Katie Alvarez  TOPIC: BEH Group Therapy  Number of patients attending the group:  6  Group Length:  2 Hours    Group Therapy Type: Recovery strategies, Emotion processing, and Health and wellbeing     Summary of Group / Topics Discussed:    Recovery Principles, Sober coping skills, Relationship/socialization, Relapse prevention, and Self-care activities      Group Attendance:  Attended group session    Patient's response to the group topic/interactions:  cooperative with task    Patient appeared to be Actively participating, Attentive and Engaged.        Client specific details:  Patient completed his \"List of Jobs\" assignment and presented in group. 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  "

## 2021-05-25 NOTE — PROGRESS NOTES
Patient met with program  to review and initiate aftercare placement opportunities. Patient reports that he will be moving back home with his parents after completing LP. He is currently unemployed and lacks financial resources. Patient presents as a high risk for relapse with limited independent sober living skills.  Patient acknowledges a need to enter a program offering outpatient services.  Arrangements have been made for patient to attend the [facility] outpatient program for aftercare.

## 2021-05-26 ENCOUNTER — HOSPITAL ENCOUNTER (OUTPATIENT)
Dept: BEHAVIORAL HEALTH | Facility: CLINIC | Age: 26
End: 2021-05-26
Attending: FAMILY MEDICINE
Payer: COMMERCIAL

## 2021-05-26 VITALS — TEMPERATURE: 97.6 F | OXYGEN SATURATION: 98 %

## 2021-05-26 PROCEDURE — 1002N00001 HC LODGING PLUS FACILITY CHARGE ADULT

## 2021-05-26 PROCEDURE — H2035 A/D TX PROGRAM, PER HOUR: HCPCS | Mod: HQ

## 2021-05-26 NOTE — GROUP NOTE
Group Therapy Documentation    PATIENT'S NAME: Sebastián Nogueira  MRN:   [id number]  :   1995  ACCT. NUMBER: [id number]  DATE OF SERVICE: 21  START TIME:  8:30 AM  END TIME:  9:30 AM  FACILITATOR(S): Ashley Hunter LADC; Katie Alvarez; Toney Santillan LADC  TOPIC: BEH Group Therapy  Number of patients attending the group:  5  Group Length:  1 Hours    Group Therapy Type: Recovery strategies    Summary of Group / Topics Discussed:    Co-occurring illnesses symptom management      Group Attendance:  Attended group session    Patient's response to the group topic/interactions:  cooperative with task    Patient appeared to be Attentive.        Client specific details:  Patient appeared attentive during the psycho educational lecture on Negative Emotions and Substance Use Disorders presented by Dr. Nguyễn Joy.

## 2021-05-26 NOTE — PROGRESS NOTES
[facility] Discharge Summary/Instructions     Patient: Sebastián Nogueira  MRN: [id number]   : 1995 Age: 25 year old Sex: male   -  Focus of Treatment / Discharge Recommendations    Personal Safety/ Management of Symptoms    * Follow your safety plan.  Report increased symptoms to your care team and /or go to the nearest Emergency Department.    * Call crisis lines as needed    [facility] [phone]                [facility] [phone]  [facility] [phone]              [facility] [phone]  [facility] [phone]              [facility] [phone]  [facility] [phone]          National Suicide Prevention [phone]  [facility] [phone]            Suicide Prevention [phone]  [facility] [phone]    Abstinence/Relapse Prevention  * Take all medicines as directed.  Carry a current list of medicines with you.  * Use coping skills: Humor, grounding skills, breathing/meditation, community sober support,affirmations (Carly lowe),   sponsorship, artistic endeavors, outdoor/nature activities, reading, music, and any sober related hobbies and activities you enjoy.    * Do not use illicit (street) drugs, controlled substances (narcotics) or alcohol.    Develop/Improve Independent Living/Socialization Skills: Ensure living environment is conducive to sobriety.     Community Resources/Supports and Discharge Planning:    Follow up with primary care provider:  Ton López DO     Next visit:21  [address], [place] [address]  Phone: [phone]    Go to group therapy and / or support groups at: [facility] location Evening IOP   Counselor Elie Erazo, [facility] [phone]    Client Signature:_______________________   Date / Time:___________    Staff Signature:________________________   Date / Time:___________

## 2021-05-26 NOTE — GROUP NOTE
Group Therapy Documentation    PATIENT'S NAME: Sebastián Nogueira  MRN:   [id number]  :   1995  ACCT. NUMBER: [id number]  DATE OF SERVICE: 21  START TIME: 10:00 AM  END TIME: 11:00 AM  FACILITATOR(S): Mulu Lorenzo LADC; Ashley Hunter LADC  TOPIC: BEH Group Therapy  Number of patients attending the group:  6  Group Length:  1.5 Hours    Group Therapy Type: Recovery strategies    Summary of Group / Topics Discussed:    Relationship/socialization and Disease of addiction      Group Attendance:  Attended group session    Patient's response to the group topic/interactions:  cooperative with task    Patient appeared to be Actively participating.        Client specific details:  Jamar presented his Relapse Prevention Plan and received feedback regarding his honesty and thoughtfulness.

## 2021-05-26 NOTE — PROGRESS NOTES
[address]  [place] [address]              Sebastián Nogueira,  1995, was admitted for evaluation/treatment of chemical dependency at [facility].  This person took part in these program(s):     ______ The Inpatient Program   ______ The Outpatient Program   ___X___ The Lodging Plus Program   ______ Lodging Day Outpatient         Date admitted: 21  Date discharged: 21     Type of discharge:   __X___ Satisfactory - completed evaluation / treatment   ______ Discharged without completing   ______ Behavioral discharge   ______ Transferred to another chemical dependency program   ______ Transferred to another type of service   ______ Left against medical advice (AMA) / Eloped               Counselor: SCOT Escalante        Date: 21            Time: 3:00pm

## 2021-05-26 NOTE — GROUP NOTE
Group Therapy Documentation    PATIENT'S NAME: Sebastián Nogueira  MRN:   [id number]  :   1995  ACCT. NUMBER: [id number]  DATE OF SERVICE: 21  START TIME: 12:30 PM  END TIME:  2:30 PM  FACILITATOR(S): Amy Car; Ashley Hunter LADC  TOPIC: BEH Group Therapy  Number of patients attending the group:  6    Group Length:  2 Hours    Group Therapy Type: Recovery strategies    Summary of Group / Topics Discussed:    Spiritual Care      Group Attendance:  Attended group session    Patient's response to the group topic/interactions:  cooperative with task    Patient appeared to be Actively participating.        Client specific details:  Jamar participated in spiritual care group facilitated by Amy Car.

## 2021-05-27 NOTE — DISCHARGE SUMMARY
"CHEMICAL DEPENDENCY DISCHARGE SUMMARY   NAME: Sebastián Nogueira  : 1995  MR #  [id number]    EVALUATION COUNSELOR: This patient had a Rule 25 Assessment on 21 at [facility] completed by ZULY Mishra.     TREATMENT COUNSELOR:  Ashley Hunter [facility]; Mulu Lorenzo [facility]; Ton Thrasher [facility]    REFERRAL SOURCE:  Self      PROGRAM: [facility]    ADMISSION DATE:  21  LAST SESSION DATE: 21  DISCHARGE DATE: 21    ADMISSION DIAGNOSIS: F10.20 Alcohol Use Disorder, Severe            F12.20 Cannabis Use Disorder, Moderate    DISCHARGE DIAGNOSIS: F10.20 Alcohol Use Disorder, Severe              F12.20 Cannabis Use Disorder, Moderate    DISCHARGE STATUS: Patient was discharged with staff approval.    LAST USE DATE: 2021  DAYS OF TREATMENT COMPLETED: 28      PRESENTING INFORMATION: The reason for seeking services at this time is: \"alcohol treatment.\" Patient reports alcohol became a problem at age 12. Patient has attempted to resolve these concerns in the past through outpatient treatment. Patient is in active withdrawal, but is currently admitted to [facility] for medical detoxification and withdrawal monitoring and is not an imminent safety risk to self or others.    SERVICES PROVIDED:  Services included assessment, orientation, treatment planning, individual counseling, group therapy sessions, family therapy, spiritual care counseling, aftercare planning, acupuncture, nutrition in recovery lecture, workshops focusing on relapse prevention and relationships, education on chemical dependency, mental illness, and AIDS/HIV awareness.    ISSUES ADDRESSED IN TREATMENT:      DIMENSION 1/ACUTE WITHDRAWAL ISSUES/DETOX: Admit RR: 0   DC RR: 0    Patient reports his last date of alcohol use as 2020. Patient denied any withdrawal symptoms that would interfere with his treatment participation. Risk rating remains a  0.     DIMENSION 2/BIOMEDICAL:  " "Admit RR: 0   DC RR: 0  Patient denied any biomedical conditions that would interfere with treatment and was able to fully participate in all programming. Patient has an initial primary care provider appointment with  at [facility] on 5/28/21.Risk rating remains a \"0.\"     DIMENSION 3/EMOTIONAL/BEHAVIORAL Admit RR: 1   DC RR:  1  Patient denied any previous mental health diagnosis, but has experienced symptoms of depression and anxiety.  Patient completed a suicide re-assessment upon admission, which indicated  very low-risk.   Patient completed a required safety plan with primary counselor, and he denied suicidal ideation or thoughts of self-harm while in treatment. Patient was able to manage anxiety and stress triggers effectively throughout the course of treatment. Patient worked to increase a healthy sense of self by practicing emotional vulnerability and giving and receiving feedback during group processing Patient participated in weekly spirituality groups facilitated by Amy Car and supervised by licensed counseling staff. Patient would benefit from continued support building self-esteem, managing stress triggers, and healing unresolved anger and grief.  Patient's risk rating remains a  1.       DIMENSION 4/READINESS TO CHANGE:  Admit RR: 2   DC RR: 2   Patient acknowledged the negative consequences to himself and others due to substance use by completing the  Use History and Consequences  assignment. Patient worked to increase his internal motivation for sobriety and contemplated on what his life will look like five years sober versus five years of continued alcohol use by creating an individual collage. Patient's risk rating in this area remains a  2  as he continues to need support and encouragement with implementing behavioral change conducive to a sober lifestyle.    DIMENSION 5/RELAPSE & CONTINUED PROBLEM POTENTIAL: Admit RR: 4   DC RR: 3  Patient worked to develop sober " "relationships and connections within the recovery community by attending weekly 12-step meetings (via zoom.) Patient participated in two relapse prevention workshops and gained insight into the emotional, mental, and physical cues that precede relapse. Patient evaluated the pros and cons of both quitting and continuing to use marijuana. Patient also read an educational packet on \"[facility].\"  He showed improvement over the course of treatment in regards to challenging his defenses and  increasing his self-esteem. Risk rating decreased from  4  to  3.      DIMENSION 6/RECOVERY ENVIRONMENT: Admit RR: 3   DC RR: 3  Patient participated in two workshops on relationship building and gained insight into healthy versus unhealthy relationships. Patient recognizes his need for social connection; he identified resources in the community that include weekly 12-step meetings, sober hobbies, and outpatient programming. Patient plans to resume employment while participating in evening outpatient programming. Patient completed the \"Right-Job Checklist\" and discussed possible career avenues that he'd like to pursue. Patient's risk rating remains a  3  in this dimension based on the completion of all treatment plan goals in this area.     STRENGTHS:  Patient appears insightful and is able to communicate his thoughts, feelings, and behaviors.  Patient maintained a respectful attitude towards staff and peers while bringing a touch of humor to the group process.     PROGNOSIS: The prognosis is favorable as long at patient complies with continuing care recommendations and referrals.     LIVING ARRANGEMENTS AT DISCHARGE:  The patient will be returning home.     CONTINUING CARE RECOMMENDATIONS AND REFERRALS:   1.  Abstain from all mood-altering chemicals except those prescribed and non-addictive.   2.  Attendance at a minimum of three sober support meetings per week and seek out a sponsor.  3.  Admit to outpatient program at [facility] programming ([facility] [place]) and follow staff recommendations.   5.  Monitor and comply with the advice of doctor regarding mental and physical health issues. Take all medications as prescribed.   6.  Continue to invest in building a sober support network.   7.  Continue to monitor and gain understanding of relapse triggers and stressors through the use and development of healthy coping skills.   8. Maintain communication with and complete all requirements of probation.       This information has been disclosed to you from records protected by Federal confidentiality rules (42 CFR part 2). The Federal rules prohibit you from making any further disclosure of this information unless further disclosure is expressly permitted by the written consent of the person to whom it pertains or as otherwise permitted by 42 CFR part 2. A general authorization for the release of medical or other information is NOT sufficient for this purpose. The Federal rules restrict any use of the information to criminally investigate or prosecute any alcohol or drug abuse patient.

## 2021-05-27 NOTE — ADDENDUM NOTE
Encounter addended by: Ashley Hunter LADC on: 5/27/2021 12:24 PM   Actions taken: Clinical Note Signed

## 2021-06-01 ENCOUNTER — HOSPITAL ENCOUNTER (OUTPATIENT)
Dept: BEHAVIORAL HEALTH | Facility: CLINIC | Age: 26
End: 2021-06-01
Attending: FAMILY MEDICINE
Payer: COMMERCIAL

## 2021-06-01 PROCEDURE — H2035 A/D TX PROGRAM, PER HOUR: HCPCS | Mod: GT

## 2021-06-01 PROCEDURE — H2035 A/D TX PROGRAM, PER HOUR: HCPCS | Mod: HQ,GT | Performed by: SOCIAL WORKER

## 2021-06-01 NOTE — PROGRESS NOTES
Client:  Sebastián Nogueira  MRN: [id number]    Comprehensive Assessment UPDATE/ISP/[facility] Re-Assess   Comprehensive Assessment Update: 6/1/2021    Comprehensive assessment dated 4/29/21 was reviewed and updates are as follows: Client reported no changes  Reason for admission today:  Clt stepping down from  [facility] to [facility] level of care.    Dates of last use and substance(s) used:  4/26/21  Patient does not have a history of opiate use.    Safety concerns:  None       Other:  None    Health Screening:  Given patient's past history, a medication, and physical condition, is there a fall risk?  No  Does the patient have any pain? No  Is the patient on a special diet? If yes, please explain: no  Does the patient have any concerns regarding your nutritional status? If yes, please explain: no  Has the patient had any appetite changes in the last 3 months?  No  Has the patient had any weight loss or weight gain in the last 3 months? No  Has the patient have a history of an eating disorder or been over-eating, avoiding meals, or inducing vomiting?  No  Does the patient have any dental concerns? (Problems with teeth, pain, cavities, braces)?  NO  Are immunizations up to date?  Yes  Any recent exposure to TB, Hepatitis, Measles, or Strep?  No  Client's BMI is 31.8.  Client informed of BMI?  yes   Above,  Referral to primary care physician    Dimension Scale Ratings:    Dimension 1: 0 Client displays full functioning with good ability to tolerate and cope with withdrawal discomfort. No signs or symptoms of intoxication or withdrawal or resolving signs or symptoms.    Dimension 2: 0 Client displays full functioning with good ability to cope with physical discomfort.    Dimension 3: 1 Client has impulse control and coping skills. Client presents a mild to moderate risk of harm to self or others or displays symptoms of emotional, behavioral or cognitive problems. Client has a mental health diagnosis and is stable.  Client functions adequately in significant life areas.    Dimension 4: 2 Client displays verbal compliance, but lacks consistent behaviors; has low motivation for change; and is passively involved in treatment.    Dimension 5: 3 Client has poor recognition and understanding of relapse and recidivism issues and displays moderately high vulnerability for further substance use or mental health problems. Client has few coping skills and rarely applies coping skills.    Dimension 6: 3 Client is not engaged in structured, meaningful activity and the client's peers, family, significant other, and living environment are unsupportive, or there is significant criminal justice system involvement.    Initial Service Plan (FISP)    Immediate health, safety, and preliminary service needs identified and plan includes the following based on available information from clients, referral sources, and collateral information.    Safety (SI, SIB, suicide attempts, aggressive behaviors):  NA  A safety and risk management plan has been developed including: Patient consented to co-developed safety plan.  Safety and risk management plan was completed.  Patient agreed to use safety plan should any safety concerns arise.  A copy was given to the patient.     Health:  Client does NOT have health issues that would impede participation in treatment    Transportation: Client will be transported to treatment by Self.     Other:  NA    Patient does not have any identified barriers to participating in referred services.    Vulnerable Adult Assessment    Does the patient possess a physical or mental infirmity or other physical, mental, or emotional dysfunction?  No. Patient is not a vulnerable adult.    This patient is not a functional Vulnerable Adult according to [place] Statute 626.5572 subdivision 21.      Treatment suggestions for client for the time period until the    initial treatment planning session:  Acclimate to group and  [facility] Re-Assessment:     Have you ever wished you were dead or that you could go to sleep and not wake up? Lifetime?  No   Past Month?  No     Have you actually had any thoughts of killing yourself?  Lifetime?  No   Past Month?  N/A     Have you been thinking about how you might do this? Lifetime?  N/A   Past Month?  N/A     Have you had these thoughts and had some intention of acting on them?  Lifetime?  N/A   Past Month?  N/A     Have you started to work out the details of how to kill yourself?  Lifetime?  N/A   Past Month?  N/A     Do you intend to carry out this plan?   N/A     When you have the thoughts how long do they last?   N/A    Are there things - anyone or anything (ie Family, Roman [employer], pain of death) that stopped you from wanting to die or acting on thoughts of suicide?   Does not apply        2008  [employer]  Used with permission by Mary Espinoza, PhD.      SCOT Dillon       6/1/2021     4:19 PM

## 2021-06-01 NOTE — PROGRESS NOTES
SIS   Service Initiation Individua session.  Video visit     Duration 45 minutes     Data: Client and I discussed SI and Orientation materials.  Client also expressed that he has been t       doing well in [place] and looking forward to IOP     Intervention: motivational interviewing, person centered approach      Assessment: Client seems to feel hopeful for recovery      Plan: Client will do tx planning session week of 6/8.     Client will start group today.  He is going to write down 3 goals he has for self growth and learning about addiction.     Telemedicine Visit: The patient's condition can be safely assessed and treated via synchronous audio and visual telemedicine encounter.       Reason for Telemedicine Visit: Services only offered telehealth     Originating Site (Patient Location): Patient's home     Distant Site (Provider Location): Provider Remote Setting     Consent:  The patient/guardian has verbally consented to: the potential risks and benefits of telemedicine (video visit) versus in person care; bill my insurance or make self-payment for services provided; and responsibility for payment of non-covered services.      Mode of Communication:  Video Conference via [employer]     As the provider I attest to compliance with applicable laws and regulations related to telemedicine.

## 2021-06-02 NOTE — GROUP NOTE
Group Therapy Documentation    PATIENT'S NAME: Sebastián Nogueira  MRN:   [id number]  :   1995  ACCT. NUMBER: [id number]  DATE OF SERVICE: 21  START TIME:  5:30 PM  END TIME:  8:30 PM  FACILITATOR(S): Tiffany Bundy LICSW  TOPIC: BEH Group Therapy  Number of patients attending the group:  3  Group Length:  3 Hours    Group Therapy Type: Addiction    Summary of Group / Topics Discussed:    Psychoeducation/Skills Values, Connection and Committee Action (IOP)  Clients learn key concepts of traditional CBT and build on these by learning three core concepts of third wave therapies (Values, Committed Action, and Self-as-Context) and how to apply these in recovery.     Objective(s):    Identify 2 reasons it is important to re-identify and prioritize values that guide behaviors.    Identify 2 ways that the escalation of  addiction may change previous values-based behaviors in negative ways    Identify 3 positive impacts of making connections socially, environmentally, and within the community     Structure (modalities, homework, worksheets, etc.):    Group activity-mapping psychological flexibility     Excerpts from documentary on importance of connections    Group activity-brainstorming universal and personal values     Values worksheet with Value, Goal, Step to take this week    Expected therapeutic outcome(s):     Reinstatement of values that may have been overlooked in active addiction    Reduction of guilt and remorse     Increased satisfaction in relationships and connections    Improved mood and sense of belonging     Therapeutic outcome(s) measured by:   Teachback and Group Review and Evaluation form.  Telemedicine Visit: The patient's condition can be safely assessed and treated via synchronous audio and visual telemedicine encounter.      Reason for Telemedicine Visit: Services only offered telehealth     Originating Site (Patient Location): Patient's home     Distant Site (Provider Location): Provider  "Remote Setting     Consent:  The patient/guardian has verbally consented to: the potential risks and benefits of telemedicine (video visit) versus in person care; bill my insurance or make self-payment for services provided; and responsibility for payment of non-covered services.      Mode of Communication:  Video Conference via [employer]     As the provider I attest to compliance with applicable laws and regulations related to telemedicine.      Group Attendance:  Attended group session    Patient's response to the group topic/interactions:  discussed personal experience with topic and expressed readiness to alter behaviors    Patient appeared to be Engaged.        Client specific details:  Sebastián goes by Jamar. This was Jamar's first session with this therapist and he shared what brought him to treatment today. He came from  residential program and said his DOC was alcohol and his last use date is 4/26/21. He is familiar with mindfulness but doesn't practice this regularly and said he is familiar with some guided meditation. His stress is at 4 and he said his cravings come and go, \"they dissipate in 5-15 minutes and are not frequent.\" He has an appt with his MD to request a prescription of cannabis but did not share details whether this was for pain issues or anxiety or something else while in group setting. The values he was focused on today are [place], Peace and Fulfillment. The value he wants to work on this week is Time as he has a lot of time-stress right now. He said that coming from [facility]idential [facility] he was told he would not have to go through P1 and the MH Skills groups but start in P2. This program assesses each individual separately when they start in P1 which is from their assessment and then they step down to P2 when ready. I told him I would speak to him about this after the group. He said the step he would take this week is to \"not waste time\" and he plans to \"get up earlier in the morning.\" I spoke with " "him after group and he was able to summarize some of what he learned in [place] and why some of these topics are redundant for him \"when it was so recent.\" I spoke to Elie White, his primary counselor after this session and he agreed to individualize his treatment. Jamar was open to coming to 1 or 2 MH Skills group topics and this can be scheduled if he is moved to P2 earlier. He said he was working on communicating and felt he needed to let me know what he was feeling and he was praised for bringing this up so we could problem solve. He understands that Elie would have to approve any change in his Tx Program. He stated that the grounding exercise from today was a good reminder for him and easy to do anytime and any where. This topic completes one of the six MH Skills Groups required under D3 of his Treatment Plan.     "

## 2021-07-26 ENCOUNTER — HOSPITAL ENCOUNTER (INPATIENT)
Facility: CLINIC | Age: 26
LOS: 2 days | Discharge: HOME | End: 2021-07-28
Attending: FAMILY MEDICINE | Admitting: PSYCHIATRY & NEUROLOGY
Payer: COMMERCIAL

## 2021-07-26 ENCOUNTER — TELEPHONE (OUTPATIENT)
Dept: BEHAVIORAL HEALTH | Facility: CLINIC | Age: 26
End: 2021-07-26

## 2021-07-26 DIAGNOSIS — F10.930 ALCOHOL WITHDRAWAL, UNCOMPLICATED (H): ICD-10-CM

## 2021-07-26 DIAGNOSIS — F10.229 ALCOHOL DEPENDENCE WITH INTOXICATION WITH COMPLICATION (H): ICD-10-CM

## 2021-07-26 DIAGNOSIS — Z11.52 ENCOUNTER FOR SCREENING LABORATORY TESTING FOR COVID-19 VIRUS: ICD-10-CM

## 2021-07-26 LAB
ALBUMIN SERPL-MCNC: 4.2 G/DL (ref 3.4–5)
ALCOHOL BREATH TEST: 0.13 (ref 0–0.01)
ALP SERPL-CCNC: 66 U/L (ref 40–150)
ALT SERPL W P-5'-P-CCNC: 49 U/L (ref 0–70)
AMPHETAMINES UR QL SCN: NORMAL
ANION GAP SERPL CALCULATED.3IONS-SCNC: 12 MMOL/L (ref 3–14)
AST SERPL W P-5'-P-CCNC: 34 U/L (ref 0–45)
BARBITURATES UR QL: NORMAL
BASOPHILS # BLD AUTO: 0.1 10E3/UL (ref 0–0.2)
BASOPHILS NFR BLD AUTO: 1 %
BENZODIAZ UR QL: NORMAL
BILIRUB SERPL-MCNC: 1 MG/DL (ref 0.2–1.3)
BUN SERPL-MCNC: 16 MG/DL (ref 7–30)
CALCIUM SERPL-MCNC: 8.7 MG/DL (ref 8.5–10.1)
CANNABINOIDS UR QL SCN: NORMAL
CHLORIDE BLD-SCNC: 100 MMOL/L (ref 94–109)
CO2 SERPL-SCNC: 23 MMOL/L (ref 20–32)
COCAINE UR QL: NORMAL
CREAT SERPL-MCNC: 0.84 MG/DL (ref 0.66–1.25)
EOSINOPHIL # BLD AUTO: 0 10E3/UL (ref 0–0.7)
EOSINOPHIL NFR BLD AUTO: 0 %
ERYTHROCYTE [DISTWIDTH] IN BLOOD BY AUTOMATED COUNT: 11.9 % (ref 10–15)
GFR SERPL CREATININE-BSD FRML MDRD: >90 ML/MIN/1.73M2
GLUCOSE BLD-MCNC: 110 MG/DL (ref 70–99)
HCT VFR BLD AUTO: 43.5 % (ref 40–53)
HGB BLD-MCNC: 15.2 G/DL (ref 13.3–17.7)
IMM GRANULOCYTES # BLD: 0 10E3/UL
IMM GRANULOCYTES NFR BLD: 0 %
LYMPHOCYTES # BLD AUTO: 2.3 10E3/UL (ref 0.8–5.3)
LYMPHOCYTES NFR BLD AUTO: 37 %
MCH RBC QN AUTO: 29.9 PG (ref 26.5–33)
MCHC RBC AUTO-ENTMCNC: 34.9 G/DL (ref 31.5–36.5)
MCV RBC AUTO: 86 FL (ref 78–100)
MONOCYTES # BLD AUTO: 0.5 10E3/UL (ref 0–1.3)
MONOCYTES NFR BLD AUTO: 9 %
NEUTROPHILS # BLD AUTO: 3.2 10E3/UL (ref 1.6–8.3)
NEUTROPHILS NFR BLD AUTO: 53 %
NRBC # BLD AUTO: 0 10E3/UL
NRBC BLD AUTO-RTO: 0 /100
OPIATES UR QL SCN: NORMAL
PLATELET # BLD AUTO: 252 10E3/UL (ref 150–450)
POTASSIUM BLD-SCNC: 3.4 MMOL/L (ref 3.4–5.3)
PROT SERPL-MCNC: 7.3 G/DL (ref 6.8–8.8)
RBC # BLD AUTO: 5.08 10E6/UL (ref 4.4–5.9)
SARS-COV-2 RNA RESP QL NAA+PROBE: NEGATIVE
SODIUM SERPL-SCNC: 135 MMOL/L (ref 133–144)
WBC # BLD AUTO: 6 10E3/UL (ref 4–11)

## 2021-07-26 PROCEDURE — 36415 COLL VENOUS BLD VENIPUNCTURE: CPT | Performed by: FAMILY MEDICINE

## 2021-07-26 PROCEDURE — C9803 HOPD COVID-19 SPEC COLLECT: HCPCS | Performed by: FAMILY MEDICINE

## 2021-07-26 PROCEDURE — 80053 COMPREHEN METABOLIC PANEL: CPT | Performed by: FAMILY MEDICINE

## 2021-07-26 PROCEDURE — 80307 DRUG TEST PRSMV CHEM ANLYZR: CPT | Performed by: FAMILY MEDICINE

## 2021-07-26 PROCEDURE — 128N000004 HC R&B CD ADULT

## 2021-07-26 PROCEDURE — 250N000011 HC RX IP 250 OP 636: Performed by: PSYCHIATRY & NEUROLOGY

## 2021-07-26 PROCEDURE — 82075 ASSAY OF BREATH ETHANOL: CPT | Performed by: FAMILY MEDICINE

## 2021-07-26 PROCEDURE — U0003 INFECTIOUS AGENT DETECTION BY NUCLEIC ACID (DNA OR RNA); SEVERE ACUTE RESPIRATORY SYNDROME CORONAVIRUS 2 (SARS-COV-2) (CORONAVIRUS DISEASE [COVID-19]), AMPLIFIED PROBE TECHNIQUE, MAKING USE OF HIGH THROUGHPUT TECHNOLOGIES AS DESCRIBED BY CMS-2020-01-R: HCPCS | Performed by: FAMILY MEDICINE

## 2021-07-26 PROCEDURE — 250N000013 HC RX MED GY IP 250 OP 250 PS 637: Performed by: PSYCHIATRY & NEUROLOGY

## 2021-07-26 PROCEDURE — 250N000013 HC RX MED GY IP 250 OP 250 PS 637: Performed by: FAMILY MEDICINE

## 2021-07-26 PROCEDURE — HZ2ZZZZ DETOXIFICATION SERVICES FOR SUBSTANCE ABUSE TREATMENT: ICD-10-PCS | Performed by: PSYCHIATRY & NEUROLOGY

## 2021-07-26 PROCEDURE — 99285 EMERGENCY DEPT VISIT HI MDM: CPT | Mod: 25 | Performed by: FAMILY MEDICINE

## 2021-07-26 PROCEDURE — 99284 EMERGENCY DEPT VISIT MOD MDM: CPT | Performed by: FAMILY MEDICINE

## 2021-07-26 PROCEDURE — 85025 COMPLETE CBC W/AUTO DIFF WBC: CPT | Performed by: FAMILY MEDICINE

## 2021-07-26 RX ORDER — LANOLIN ALCOHOL/MO/W.PET/CERES
100 CREAM (GRAM) TOPICAL DAILY
Status: DISCONTINUED | OUTPATIENT
Start: 2021-07-27 | End: 2021-07-28 | Stop reason: HOSPADM

## 2021-07-26 RX ORDER — FOLIC ACID 1 MG/1
1 TABLET ORAL DAILY
Status: DISCONTINUED | OUTPATIENT
Start: 2021-07-27 | End: 2021-07-28 | Stop reason: HOSPADM

## 2021-07-26 RX ORDER — MAGNESIUM HYDROXIDE/ALUMINUM HYDROXICE/SIMETHICONE 120; 1200; 1200 MG/30ML; MG/30ML; MG/30ML
30 SUSPENSION ORAL EVERY 4 HOURS PRN
Status: DISCONTINUED | OUTPATIENT
Start: 2021-07-26 | End: 2021-07-28 | Stop reason: HOSPADM

## 2021-07-26 RX ORDER — DIAZEPAM 5 MG
5-20 TABLET ORAL EVERY 30 MIN PRN
Status: DISCONTINUED | OUTPATIENT
Start: 2021-07-26 | End: 2021-07-26

## 2021-07-26 RX ORDER — AMOXICILLIN 250 MG
1 CAPSULE ORAL 2 TIMES DAILY PRN
Status: DISCONTINUED | OUTPATIENT
Start: 2021-07-26 | End: 2021-07-28 | Stop reason: HOSPADM

## 2021-07-26 RX ORDER — ACETAMINOPHEN 325 MG/1
650 TABLET ORAL EVERY 4 HOURS PRN
Status: DISCONTINUED | OUTPATIENT
Start: 2021-07-26 | End: 2021-07-28 | Stop reason: HOSPADM

## 2021-07-26 RX ORDER — ATENOLOL 50 MG/1
50 TABLET ORAL DAILY PRN
Status: DISCONTINUED | OUTPATIENT
Start: 2021-07-26 | End: 2021-07-28 | Stop reason: HOSPADM

## 2021-07-26 RX ORDER — DIAZEPAM 5 MG
5 TABLET ORAL ONCE
Status: COMPLETED | OUTPATIENT
Start: 2021-07-26 | End: 2021-07-26

## 2021-07-26 RX ORDER — DIAZEPAM 5 MG
5-20 TABLET ORAL EVERY 30 MIN PRN
Status: DISCONTINUED | OUTPATIENT
Start: 2021-07-26 | End: 2021-07-28 | Stop reason: HOSPADM

## 2021-07-26 RX ORDER — ONDANSETRON 4 MG/1
4 TABLET, ORALLY DISINTEGRATING ORAL EVERY 6 HOURS PRN
Status: DISCONTINUED | OUTPATIENT
Start: 2021-07-26 | End: 2021-07-28 | Stop reason: HOSPADM

## 2021-07-26 RX ORDER — TRAZODONE HYDROCHLORIDE 50 MG/1
50 TABLET, FILM COATED ORAL
Status: DISCONTINUED | OUTPATIENT
Start: 2021-07-26 | End: 2021-07-28 | Stop reason: HOSPADM

## 2021-07-26 RX ORDER — MULTIPLE VITAMINS W/ MINERALS TAB 9MG-400MCG
1 TAB ORAL DAILY
Status: DISCONTINUED | OUTPATIENT
Start: 2021-07-27 | End: 2021-07-28 | Stop reason: HOSPADM

## 2021-07-26 RX ORDER — HYDROXYZINE HYDROCHLORIDE 25 MG/1
25 TABLET, FILM COATED ORAL EVERY 4 HOURS PRN
Status: DISCONTINUED | OUTPATIENT
Start: 2021-07-26 | End: 2021-07-28 | Stop reason: HOSPADM

## 2021-07-26 RX ORDER — LOPERAMIDE HCL 2 MG
2 CAPSULE ORAL 4 TIMES DAILY PRN
Status: DISCONTINUED | OUTPATIENT
Start: 2021-07-26 | End: 2021-07-28 | Stop reason: HOSPADM

## 2021-07-26 RX ADMIN — DIAZEPAM 5 MG: 5 TABLET ORAL at 17:20

## 2021-07-26 RX ADMIN — DIAZEPAM 5 MG: 5 TABLET ORAL at 19:21

## 2021-07-26 RX ADMIN — DIAZEPAM 5 MG: 5 TABLET ORAL at 20:20

## 2021-07-26 RX ADMIN — TRAZODONE HYDROCHLORIDE 50 MG: 50 TABLET ORAL at 21:22

## 2021-07-26 RX ADMIN — HYDROXYZINE HYDROCHLORIDE 25 MG: 25 TABLET, FILM COATED ORAL at 21:22

## 2021-07-26 RX ADMIN — ONDANSETRON 4 MG: 4 TABLET, ORALLY DISINTEGRATING ORAL at 21:22

## 2021-07-26 RX ADMIN — DIAZEPAM 10 MG: 5 TABLET ORAL at 21:22

## 2021-07-26 ASSESSMENT — MIFFLIN-ST. JEOR: SCORE: 2355.64

## 2021-07-26 NOTE — ED TRIAGE NOTES
ETOH withdrawls.  Last drink 1pint earlier today; pt here for detox, pt hasn't been able to keep N/V under control.  Last detox 04/21; no seizure hx; normally drinks 1L daily

## 2021-07-26 NOTE — TELEPHONE ENCOUNTER
S: MD Steel, Hope, 25/M, detox    B: Pt seeking detox  Pt reports drinking a liter of vodka daily  Pt reports drinking heavily for months  Pt reports trying to wean himself but then goes back to binge drinking  Pt reports a liter daily for the last two weeks.  Pt LD was @ 1pm  Pt has hx of DTs  Pt denies seizure  Pt experiences tremors and tachy, pt also reports nausea   Pt last on 3A in April     Patient cleared and ready for behavioral bed placement: Yes   UTOX neg  .128   COVID processing    A: Vol    R:3A/[place]  627pm- provider accepted  630pm-unit notified, will call ED when COVID resulted   633pm- ED notified by page

## 2021-07-26 NOTE — ED PROVIDER NOTES
ED Provider Note  [facility]      History     Chief Complaint   Patient presents with     Alcohol Problem     1L daily, last drink earlier     HPI  Sebastián Nogueira is a 25 year old male who has a history of alcohol abuse and dependence including delirium tremens.  He relapsed drinking about a month after his last chemical dependency treatment and alcohol use has been fluctuating for several months.  Patient will drink up to a liter a day provide care for 1 to 2 weeks at a time, and then will be able to wean himself to a lesser amount but then again goes back to drinking a liter.  This current time he has been unable to wean himself.  His last drink was at 1:00 today.  When he attempts to stop drinking he becomes shaky, nauseated and has had DTs in the past though not recently.  No history of withdrawal seizures.  He denies other medical or psychiatric complaints.  He is motivated for sobriety.    Past Medical History  History reviewed. No pertinent past medical history.  History reviewed. No pertinent surgical history.  disulfiram (ANTABUSE) 250 MG tablet  multivitamin w/minerals (THERA-VIT-M) tablet  thiamine (B-1) 100 MG tablet  traZODone (DESYREL) 50 MG tablet      No Known Allergies  Family History  Family History   Problem Relation Age of Onset     Dementia Paternal Grandfather      Social History   Social History     Tobacco Use     Smoking status: Former Smoker     Smokeless tobacco: Never Used   Substance Use Topics     Alcohol use: Yes     Comment: 3 times a week     Drug use: No     Comment: vague on the answer      Past medical history, past surgical history, medications, allergies, family history, and social history were reviewed with the patient. No additional pertinent items.       Review of Systems  A complete review of systems was performed with pertinent positives and negatives noted in the HPI, and all other systems negative.    Physical Exam   BP: (!) 139/100  Pulse:  97  Temp: 98.8  F (37.1  C)  Resp: 12  SpO2: 98 %  Physical Exam  Nursing note reviewed.   Constitutional:       General: He is not in acute distress.     Appearance: He is not diaphoretic.   HENT:      Head: Atraumatic.   Eyes:      General: No scleral icterus.     Pupils: Pupils are equal, round, and reactive to light.   Cardiovascular:      Heart sounds: Normal heart sounds.   Pulmonary:      Effort: No respiratory distress.      Breath sounds: Normal breath sounds.   Abdominal:      General: Bowel sounds are normal.      Palpations: Abdomen is soft.      Tenderness: There is no abdominal tenderness.   Musculoskeletal:         General: No tenderness.   Skin:     General: Skin is warm.      Findings: No rash.         ED Course      Procedures       The medical record was reviewed and interpreted.  Current labs reviewed and interpreted.  Previous labs reviewed and interpreted.       Results for orders placed or performed during the hospital encounter of 07/26/21   Drug abuse screen 1 urine (ED)     Status: Normal   Result Value Ref Range    Amphetamines Urine Screen Negative Screen Negative    Barbiturates Urine Screen Negative Screen Negative    Benzodiazepines Urine Screen Negative Screen Negative    Cannabinoids Urine Screen Negative Screen Negative    Cocaine Urine Screen Negative Screen Negative    Opiates Urine Screen Negative Screen Negative   Alcohol breath test POCT     Status: Abnormal   Result Value Ref Range    Alcohol Breath Test 0.128 (A) 0.00 - 0.01   Urine Drugs of Abuse Screen     Status: Normal    Narrative    The following orders were created for panel order Urine Drugs of Abuse Screen.  Procedure                               Abnormality         Status                     ---------                               -----------         ------                     Drug abuse screen 1 urin...[[id number]]  Normal              Final result                 Please view results for these tests on the  individual orders.   CBC with platelets differential     Status: None ()    Narrative    The following orders were created for panel order CBC with platelets differential.  Procedure                               Abnormality         Status                     ---------                               -----------         ------                     CBC with platelets and d...[[id number]]                                                   Please view results for these tests on the individual orders.     Medications   diazepam (VALIUM) tablet 5 mg (has no administration in time range)   diazepam (VALIUM) tablet 5-20 mg (has no administration in time range)        Assessments & Plan (with Medical Decision Making)   25-year-old male with a history of alcohol abuse and dependence including history of DTs.  He is presenting seeking detox from alcohol.  Patient is mildly intoxicated today and is experiencing alcohol withdrawal symptoms already including tremor, restlessness and sweats.  Labs were obtained and reviewed for medical clearance, no significant metabolic abnormality, with and the patient appears to be an appropriate candidate for voluntary detox admission.  He denies other medical or psychiatric concerns.  He is being monitored and treated via the [facility] protocol.  He will be signed out to the evening physician at shift change pending bed placement.    I have reviewed the nursing notes. I have reviewed the findings, diagnosis, plan and need for follow up with the patient.    New Prescriptions    No medications on file       Final diagnoses:   Alcohol dependence with intoxication with complication (H)       --  Aguilar Steel MD  [facility] located within [facility] EMERGENCY DEPARTMENT  7/26/2021     Aguilar Steel MD  07/26/21 5731

## 2021-07-27 LAB
CHOLEST SERPL-MCNC: 121 MG/DL
FASTING STATUS PATIENT QL REPORTED: NO
GGT SERPL-CCNC: 108 U/L (ref 0–75)
HDLC SERPL-MCNC: 60 MG/DL
LDLC SERPL CALC-MCNC: 20 MG/DL
NONHDLC SERPL-MCNC: 61 MG/DL
TRIGL SERPL-MCNC: 204 MG/DL
TSH SERPL DL<=0.005 MIU/L-ACNC: 3.22 MU/L (ref 0.4–4)

## 2021-07-27 PROCEDURE — 84443 ASSAY THYROID STIM HORMONE: CPT | Performed by: PSYCHIATRY & NEUROLOGY

## 2021-07-27 PROCEDURE — 36415 COLL VENOUS BLD VENIPUNCTURE: CPT | Performed by: PSYCHIATRY & NEUROLOGY

## 2021-07-27 PROCEDURE — 82977 ASSAY OF GGT: CPT | Performed by: PSYCHIATRY & NEUROLOGY

## 2021-07-27 PROCEDURE — 99207 PR CONSULT E&M CHANGED TO INITIAL LEVEL: CPT | Performed by: PHYSICIAN ASSISTANT

## 2021-07-27 PROCEDURE — 80061 LIPID PANEL: CPT | Performed by: PSYCHIATRY & NEUROLOGY

## 2021-07-27 PROCEDURE — 99223 1ST HOSP IP/OBS HIGH 75: CPT | Mod: AI | Performed by: PSYCHIATRY & NEUROLOGY

## 2021-07-27 PROCEDURE — 128N000004 HC R&B CD ADULT

## 2021-07-27 PROCEDURE — 250N000013 HC RX MED GY IP 250 OP 250 PS 637: Performed by: PSYCHIATRY & NEUROLOGY

## 2021-07-27 PROCEDURE — H2032 ACTIVITY THERAPY, PER 15 MIN: HCPCS

## 2021-07-27 RX ADMIN — HYDROXYZINE HYDROCHLORIDE 25 MG: 25 TABLET, FILM COATED ORAL at 16:24

## 2021-07-27 RX ADMIN — DIAZEPAM 5 MG: 5 TABLET ORAL at 04:44

## 2021-07-27 RX ADMIN — ACETAMINOPHEN 650 MG: 325 TABLET, FILM COATED ORAL at 16:24

## 2021-07-27 RX ADMIN — THIAMINE HCL TAB 100 MG 100 MG: 100 TAB at 08:25

## 2021-07-27 RX ADMIN — FOLIC ACID 1 MG: 1 TABLET ORAL at 08:25

## 2021-07-27 RX ADMIN — DIAZEPAM 10 MG: 5 TABLET ORAL at 00:37

## 2021-07-27 RX ADMIN — HYDROXYZINE HYDROCHLORIDE 25 MG: 25 TABLET, FILM COATED ORAL at 20:18

## 2021-07-27 RX ADMIN — TRAZODONE HYDROCHLORIDE 50 MG: 50 TABLET ORAL at 21:38

## 2021-07-27 RX ADMIN — DIAZEPAM 10 MG: 5 TABLET ORAL at 08:25

## 2021-07-27 RX ADMIN — Medication 1 TABLET: at 08:25

## 2021-07-27 ASSESSMENT — ACTIVITIES OF DAILY LIVING (ADL)
LAUNDRY: WITH SUPERVISION
HYGIENE/GROOMING: INDEPENDENT
ORAL_HYGIENE: INDEPENDENT
DRESS: STREET CLOTHES
DRESS: INDEPENDENT
ORAL_HYGIENE: INDEPENDENT
HYGIENE/GROOMING: INDEPENDENT

## 2021-07-27 NOTE — PLAN OF CARE
Behavioral Team Discussion: (7/27/2021)    Continued Stay Criteria/Rationale: Patient admitted for alcohol withdrawal and Use Disorder.  Plan: The following services will be provided to the patient; psychiatric assessment, medication management, therapeutic milieu, individual and group support, and skills groups.   Participants: 3A Provider: Dr. Debbie Kemp MD; 3A RN: Lupillo Hatfield, RN; 3A CM's: Radha Ervin and Leann Schuster.  Summary/Recommendation: Providers will assess today for treatment recommendations, discharge planning, and aftercare plans. CM will meet with pt for discharge planning.   Medical/Physical: none noted  Precautions:   Behavioral Orders   Procedures     Code 1 - Restrict to Unit     Routine Programming     As clinically indicated     Status 15     Every 15 minutes.     Withdrawal precautions     Rationale for change in precautions or plan: N/A  Progress: No Change.

## 2021-07-27 NOTE — PROGRESS NOTES
07/26/21 2132   Patient Belongings   Did you bring any home meds/supplements to the hospital?  No   Patient Belongings other (see comments)   Patient Belongings Remaining with Patient other (see comments)   Patient Belongings Put in Hospital Secure Location (Security or Locker, etc.) other (see comments)   Belongings Search Yes   Clothing Search Yes   Second Staff Yonatan   Comment See Notes     Small bin:  -Wallet, phone  Security # [id number]  - $249 Cash  - 4 Visa cards  - 2 [place] D/L  A               Admission:  I am responsible for any personal items that are not sent to the safe or pharmacy.  [facility] is not responsible for loss, theft or damage of any property in my possession.    Signature:  _________________________________ Date: _______  Time: _____                                              Staff Signature:  ____________________________ Date: ________  Time: _____      2nd Staff person, if patient is unable/unwilling to sign:    Signature: ________________________________ Date: ________  Time: _____     Discharge:  [facility] has returned all of my personal belongings:    Signature: _________________________________ Date: ________  Time: _____                                          Staff Signature:  ____________________________ Date: ________  Time: _____

## 2021-07-27 NOTE — PHARMACY-ADMISSION MEDICATION HISTORY
Admission Medication History Completed by Pharmacy    See [facility] Admission Navigator for allergy information, preferred outpatient pharmacy, prior to admission medications and immunization status.     Medication History Sources:     Care Everywhere, Patient     Changes made to PTA medication list (reason):    Added: None    Deleted: Not taking per patient   o Multivitamin tablet: 1xdaily   o Thiamine 100 mg tablet: 1xdaily    o Trazodone 50 mg tablet: 1 qhs prn     Changed: None    Additional Information:    Patient reportedly stopped taking disulfiram 2-3 weeks ago and does not take any other prescription or OTC medications at home currently.     Prior to Admission medications    Medication Sig Last Dose Taking? Auth Provider   disulfiram (ANTABUSE) 250 MG tablet Take 1 tablet (250 mg) by mouth daily Past Month Yes Debbie Kemp MD       Date completed: 07/26/21    Medication history completed by: Dina Morris - Pharmacy Intern

## 2021-07-27 NOTE — ED NOTES
ED to Behavioral Floor Handoff    SITUATION  Sebastián Nogueira is a 25 year old male who speaks English and lives in a home with family members The patient arrived in the ED by private car from home with a complaint of Alcohol Problem (1L daily, last drink earlier)  .The patient's current symptoms started/worsened 4 day(s) ago and during this time the symptoms have increased.   In the ED, pt was diagnosed with   Final diagnoses:   Alcohol dependence with intoxication with complication (H)        Initial vitals were: BP: (!) 139/100  Pulse: 97  Temp: 98.8  F (37.1  C)  Resp: 12  SpO2: 98 %   --------  Is the patient diabetic? No   If yes, last blood glucose? --     If yes, was this treated in the ED? --  --------  Is the patient inebriated (ETOH) No or Impaired on other substances? No  MSSA done? Yes  Last MSSA score: --    Were withdrawal symptoms treated? Yes  Does the patient have a seizure history? No. If yes, date of most recent seizure--  --------  Is the patient patient experiencing suicidal ideation? denies current or recent suicidal ideation     Homicidal ideation? denies current or recent homicidal ideation or behaviors.    Self-injurious behavior/urges? denies current or recent self injurious behavior or ideation.  ------  Was pt aggressive in the ED No  Was a code called No  Is the pt now cooperative? Yes  -------  Meds given in ED:   Medications   diazepam (VALIUM) tablet 5-20 mg (5 mg Oral Given 7/26/21 1921)   diazepam (VALIUM) tablet 5 mg (5 mg Oral Given 7/26/21 1720)      Family present during ED course? No  Family currently present? No    BACKGROUND  Does the patient have a cognitive impairment or developmental disability? No  Allergies: No Known Allergies.   Social demographics are   Social History     Socioeconomic History     Marital status: Single     Spouse name: None     Number of children: None     Years of education: None     Highest education level: None   Occupational History     None    Tobacco Use     Smoking status: Former Smoker     Smokeless tobacco: Never Used   Substance and Sexual Activity     Alcohol use: Yes     Comment: 3 times a week     Drug use: No     Comment: vague on the answer     Sexual activity: None   Other Topics Concern     Parent/sibling w/ CABG, MI or angioplasty before 65F 55M? Not Asked   Social History Narrative     None     Social Determinants of Health     Financial Resource Strain:      Difficulty of Paying Living Expenses:    Food Insecurity:      Worried About Running Out of Food in the Last Year:      Ran Out of Food in the Last Year:    Transportation Needs:      Lack of Transportation (Medical):      Lack of Transportation (Non-Medical):    Physical Activity:      Days of Exercise per Week:      Minutes of Exercise per Session:    Stress:      Feeling of Stress :    Social Connections:      Frequency of Communication with Friends and Family:      Frequency of Social Gatherings with Friends and Family:      Attends [employer] Services:      Active Member of Clubs or Organizations:      Attends Club or Organization Meetings:      Marital Status:    Intimate Partner Violence:      Fear of Current or Ex-Partner:      Emotionally Abused:      Physically Abused:      Sexually Abused:         ASSESSMENT  Labs results   Labs Ordered and Resulted from Time of ED Arrival Up to the Time of Departure from the ED   COMPREHENSIVE METABOLIC PANEL - Abnormal; Notable for the following components:       Result Value    Glucose 110 (*)     All other components within normal limits   ALCOHOL BREATH TEST POCT - Abnormal; Notable for the following components:    Alcohol Breath Test 0.128 (*)     All other components within normal limits   DRUG ABUSE SCREEN 1 URINE (ED) - Normal   CBC WITH PLATELETS AND DIFFERENTIAL   SARS-COV2 (COVID-19) VIRUS RT-PCR   MSSA SCORE AND VS   MSSA SCORE AND VS   NOTIFY   COVID-19 VIRUS (CORONAVIRUS) BY PCR    Narrative:     The following orders were  created for panel order Asymptomatic COVID-19 Virus (Coronavirus) by PCR Oropharynx.  Procedure                               Abnormality         Status                     ---------                               -----------         ------                     SARS-COV2 (COVID-19) Vir...[[id number]]                      In process                   Please view results for these tests on the individual orders.   URINE DRUGS OF ABUSE SCREEN    Narrative:     The following orders were created for panel order Urine Drugs of Abuse Screen.  Procedure                               Abnormality         Status                     ---------                               -----------         ------                     Drug abuse screen 1 urin...[[id number]]  Normal              Final result                 Please view results for these tests on the individual orders.   CBC WITH PLATELETS & DIFFERENTIAL    Narrative:     The following orders were created for panel order CBC with platelets differential.  Procedure                               Abnormality         Status                     ---------                               -----------         ------                     CBC with platelets and d...[[id number]]                      Final result                 Please view results for these tests on the individual orders.      Imaging Studies: No results found for this or any previous visit (from the past 24 hour(s)).   Most recent vital signs BP (!) 134/93   Pulse 118   Temp 99  F (37.2  C) (Oral)   Resp 18   SpO2 97%    Abnormal labs/tests/findings requiring intervention:---   Pain control: pt had none  Nausea control: pt had none    RECOMMENDATION  Are any infection precautions needed (MRSA, VRE, etc.)? No If yes, what infection? --  ---  Does the patient have mobility issues? independently. If yes, what device does the pt use? ---  ---  Is patient on 72 hour hold or commitment? No If on 72 hour hold, have hold and rights  been given to patient? N/A  Are admitting orders written if after 10 p.m. ?N/A  Tasks needing to be completed:---     Nury Boyle, LINDEN    [phone] [facility]   [phone] [facility]

## 2021-07-27 NOTE — PLAN OF CARE
"  Problem: Alcohol Withdrawal  Goal: Alcohol Withdrawal Symptom Control  Outcome: Declining   SBAR        S = Situation:   Voluntary Admit       B  = Background:   per ED note  \"25 year old male who has a history of alcohol abuse and dependence including delirium tremens.  He relapsed drinking about a month after his last chemical dependency treatment and alcohol use has been fluctuating for several months.  Patient will drink up to a liter a day provide care for 1 to 2 weeks at a time, and then will be able to wean himself to a lesser amount but then again goes back to drinking a liter.  This current time he has been unable to wean himself.  His last drink was at 1:00 today.  When he attempts to stop drinking he becomes shaky, nauseated and has had DTs in the past though not recently.  No history of withdrawal seizures.  He denies other medical or psychiatric complaints.\"      A  =  Assessment:  Pt is alert and oriented x 4. He is slightly diaphoretic and tremulous. He states that his use is almost Planned\" because he stops using his antabuse 1 week before he is planning to drink. He says that he could usually wean himself off and go back on the antabuse but this time started hallucinating. MSSA score of 10 given 10 mg of valium along with other PRN medications (SEE MAR). He denies SI/HI/SIB.       R =   Request or Recommendation:   Continue to monitor the patient's withdrawal and to medicate him as needed.      Psychiatry and CM to assess    Pt wants treatment options.        "

## 2021-07-27 NOTE — CONSULTS
Consult Date: 07/27/2021    HISTORY OF PRESENT ILLNESS:  The patient is a 25-year-old male with history of alcohol use disorder, admitted to station 3 for alcohol abuse and detoxification.  Drinking a liter of vodka daily with breathalyzer on admission 0.128.  Last used yesterday at approximately 1:00 p.m.  An internal medicine consultation was ordered by Dr. Kemp to assess medical problems including elevated blood pressure.  At this time, Sebastián admits to feeling slightly better since admission.  Still with occasional nausea, but denies vomiting.  He was able to eat a moderate amount of his breakfast this morning.  Denies fever or chills.  Denies sore throat and cough.  Denies chest pain, shortness of breath.  Denies abdominal pain.  Denies bowel or bladder concerns.    PAST MEDICAL HISTORY:    1.  Alcohol use disorder and other psychiatric history per Dr. Kemp.  2.  Status post appendectomy.  3.  Denies history of major medical problems and other surgeries including cardiopulmonary disease, hypertension, and diabetes.    ADMISSION MEDICATIONS:  None.    ALLERGIES:  NO KNOWN DRUG ALLERGIES.    SOCIAL HISTORY:  Single.  No children.  Lives with his parents in [place].  States he is currently collecting unemployment.  Occasionally smokes cigarettes.    Family History   Problem Relation Age of Onset     Dementia Paternal Grandfather         REVIEW OF SYSTEMS:  A 10-point review of systems is negative except for stated above in history of present illness.    PHYSICAL EXAMINATION:    GENERAL:  Nontoxic-appearing gentleman in no acute distress.  VITAL SIGNS:  Stable, temperature afebrile, pulse in the 80s, blood pressure 140s/90s.  LUNGS:  Clear.  CARDIOVASCULAR:  Regular rate and rhythm.  ABDOMEN:  Soft, nontender.  EXTREMITIES:  No edema.  SKIN:  No rash on exposed areas.  NEUROLOGIC:  He is awake, alert and oriented x3.  Cranial nerves grossly intact.  No acute focal deficits noted.    LABORATORY DATA:   Breathalyzer on admission 0.128.  .  Total cholesterol 204.  Otherwise, CBC, comprehensive metabolic panel and rest of lipid panel and TSH normal.  Urine drug screen negative.  COVID testing negative.    IMPRESSION:    1.  Alcohol abuse and withdrawal per Dr. Kemp.  2.  Elevated blood pressures, stable.  Most likely secondary to alcohol withdrawal and anxiety.  3.  Mild hypertriglyceridemia on admission labs, likely due to diet.  4.  Otherwise, overall apparent normally good physical health.    PLAN:  Continue to monitor his blood pressures closely.  He was instructed to follow-up with family physician after discharge in 4 to 6 weeks for hospital followup and obtain a repeat fasting lipid panel and blood pressure review.  No further medical recommendations at this time.  The patient is medically stable.  Medicine signing off.  Please feel free to call with questions.    Tomer Prince PA-C        D: 2021   T: 2021   MTKevin clemente    Name:     STACEY CHAMPIONMagaly  MRN:      [id number]        Account:      [id number]   :      1995           Consult Date: 2021     Document: [id number]

## 2021-07-27 NOTE — PLAN OF CARE
Pt MSSA scores today are 10 and 6, 10 mg po valium administered. Reports anxiety rated 7 of 10 in severity. Denies depression. States poor concentration. Endorses feeling sad, tense and irritable but is polite and cooperative in interactions. Denies suicidal ideation plans or intent.     Outcome: mild alcohol withdrawal with anxiety/irritability.    Will continue to monitor and intervene as warranted.

## 2021-07-27 NOTE — H&P
Sebastián Nogueira is a 25 year old male who was referred by self History was provided by CLEMENTE who was  A fairhistorian.   CHIEF COMPLAINT:    Relapse  HISTORY OF PRESENT ILLNESS:    Patient is a 25-year-old  male who came to the emergency room wanting help.    Patient completed lodging plus in [facility] to do outpatient treatment as an aftercare.  He felt this was repetition of what he learned inpatient he did not continue it.  He was sober for 1 month after he stopped lodging plus.  He has since relapsed has been drinking on and off.  This particular relapse is for 1 week he has been drinking 1 L    Patient will drink up to a liter a day provide care for 1 to 2 weeks at a time, and then will be able to wean himself to a lesser amount but then again goes back to drinking a liter.   Patient has been using the following substances:   Started at age 12, became a problem at 17    Patient has tolerance, withdrawal, progressive use, loss of control, spending more time and more amount than intended. Patient has made attempts to quit, is experiencing cravings, and reports negative consequences.            Patient does not have a history of seizures.  Patient does have a history of delirium tremens.  Patient is experiencing some hallucinations see below               Denies thoughts of suicide or harming others.      Denies auditory or visual hallucinations.     Patient smokes 0 cigarettes  Denies using any illicit drugs except occasional marijuana    Patient denied any gambling                                                            PSYCHIATRIC REVIEW OF SYSTEMS:         Psychiatric Review of Systems:   Depression:     Denies: depressed mood, suicidal ideation, decreased interest, changes in sleep, changes in appetite, guilt, hopelessness, helplessness, impaired concentration, decreased energy, irritability.  Juana:   denied: sleeplessness, impulsiveness, racing thoughts, increased goal-directed activities,  pressured speech, increase in energy  Juana Feeling euphoric,Distractible,Impulsive,Grandiose,Talking excessively,Have energy without sleeping,Mood swings,Irritability  Denies: sleeplessness, increased goal-directed activities, abrupt increase in energy, pressured speech  Psychosis:   .  + visual hallucinations,+ auditory hallucinations in the context alcohol withdrawal , no paranoia  Anxiety:   denied excessive worries that are difficult to control for the past 6 months,   Chronic anxiety , not able to stop worrying impacting sleep, poor conc, irritable , muscle tension      Denied any Panic attacks    PTSD:     Denies: re-experiencing past trauma, nightmares, increased arousal, avoidance of traumatic stimuli, impaired function.  OCD:     Denies: obsessions, checking, symmetry, cleaning, skin picking.  ED:     Denies: restriction, binging, purging.    Denied any Symptoms of attention deficit disorder include a failure to pay attention to detail, a pattern of careless mistakes, a pattern of inattentive listening, a failure to follow through with projects, poor personal organization, losing necessary objects, distractibility, forgetfulness.    Denied any Symptoms of borderline personality disorder include a fear of abandonment, unstable self-image, impulsive behavior, dissociative feeling, intense anger, unstable personal relationships, chronic feelings of boredom, periods of intense depressed mood.              PSYCHIATRIC HISTORY         Past court commitments: none  SIB /SUICIDE ATTEMPTS NONE  Psych Hosp :none  Outpatient Programs none  Inpatient cd trt 2  Out pt cd trt a few did not complete them           SOCIAL HISTORY                                                                         Pt lives with his parents, who are supportive. He also has a sister who is supportive. 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         Family History:   FAMILY HISTORY:   Family  History   Problem Relation Age of Onset     Dementia Paternal Grandfather      Family Mental Health History-  none    Substance Use Problems - none             PTA Medications:     Medications Prior to Admission   Medication Sig Dispense Refill Last Dose     disulfiram (ANTABUSE) 250 MG tablet Take 1 tablet (250 mg) by mouth daily 30 tablet 0 Past Month          Allergies:   No Known Allergies       Labs:     Recent Results (from the past 48 hour(s))   Alcohol breath test POCT    Collection Time: 07/26/21  3:34 PM   Result Value Ref Range    Alcohol Breath Test 0.128 (A) 0.00 - 0.01   Drug abuse screen 1 urine (ED)    Collection Time: 07/26/21  4:13 PM   Result Value Ref Range    Amphetamines Urine Screen Negative Screen Negative    Barbiturates Urine Screen Negative Screen Negative    Benzodiazepines Urine Screen Negative Screen Negative    Cannabinoids Urine Screen Negative Screen Negative    Cocaine Urine Screen Negative Screen Negative    Opiates Urine Screen Negative Screen Negative   Comprehensive metabolic panel    Collection Time: 07/26/21  5:11 PM   Result Value Ref Range    Sodium 135 133 - 144 mmol/L    Potassium 3.4 3.4 - 5.3 mmol/L    Chloride 100 94 - 109 mmol/L    Carbon Dioxide (CO2) 23 20 - 32 mmol/L    Anion Gap 12 3 - 14 mmol/L    Urea Nitrogen 16 7 - 30 mg/dL    Creatinine 0.84 0.66 - 1.25 mg/dL    Calcium 8.7 8.5 - 10.1 mg/dL    Glucose 110 (H) 70 - 99 mg/dL    Alkaline Phosphatase 66 40 - 150 U/L    AST 34 0 - 45 U/L    ALT 49 0 - 70 U/L    Protein Total 7.3 6.8 - 8.8 g/dL    Albumin 4.2 3.4 - 5.0 g/dL    Bilirubin Total 1.0 0.2 - 1.3 mg/dL    GFR Estimate >90 >60 mL/min/1.73m2   CBC with platelets and differential    Collection Time: 07/26/21  5:11 PM   Result Value Ref Range    WBC Count 6.0 4.0 - 11.0 10e3/uL    RBC Count 5.08 4.40 - 5.90 10e6/uL    Hemoglobin 15.2 13.3 - 17.7 g/dL    Hematocrit 43.5 40.0 - 53.0 %    MCV 86 78 - 100 fL    MCH 29.9 26.5 - 33.0 pg    MCHC 34.9 31.5 - 36.5 g/dL  "   RDW 11.9 10.0 - 15.0 %    Platelet Count 252 150 - 450 10e3/uL    % Neutrophils 53 %    % Lymphocytes 37 %    % Monocytes 9 %    % Eosinophils 0 %    % Basophils 1 %    % Immature Granulocytes 0 %    NRBCs per 100 WBC 0 <1 /100    Absolute Neutrophils 3.2 1.6 - 8.3 10e3/uL    Absolute Lymphocytes 2.3 0.8 - 5.3 10e3/uL    Absolute Monocytes 0.5 0.0 - 1.3 10e3/uL    Absolute Eosinophils 0.0 0.0 - 0.7 10e3/uL    Absolute Basophils 0.1 0.0 - 0.2 10e3/uL    Absolute Immature Granulocytes 0.0 <=0.0 10e3/uL    Absolute NRBCs 0.0 10e3/uL   SARS-COV2 (COVID-19) Virus RT-PCR    Collection Time: 07/26/21  5:47 PM    Specimen: Oropharynx; Swab   Result Value Ref Range    SARS CoV2 PCR Negative Negative   GGT    Collection Time: 07/27/21  7:21 AM   Result Value Ref Range     (H) 0 - 75 U/L   TSH with free T4 reflex and/or T3 as indicated    Collection Time: 07/27/21  7:21 AM   Result Value Ref Range    TSH 3.22 0.40 - 4.00 mU/L   Lipid panel    Collection Time: 07/27/21  7:21 AM   Result Value Ref Range    Cholesterol 121 <200 mg/dL    Triglycerides 204 (H) <150 mg/dL    Direct Measure HDL 60 >=40 mg/dL    LDL Cholesterol Calculated 20 <=100 mg/dL    Non HDL Cholesterol 61 <130 mg/dL    Patient Fasting > 8hrs? No          BP (!) 144/100 (BP Location: Left arm)   Pulse 82   Temp 97.7  F (36.5  C) (Temporal)   Resp 16   Ht 1.981 m (6' 6\")   Wt 124.7 kg (275 lb)   SpO2 97%   BMI 31.78 kg/m    Weight is 275 lbs 0 oz  Body mass index is 31.78 kg/m .    Physical Exam:     ROS: 10 point ROS neg other than the symptoms noted above in the HPI.            Past Medical History:   PAST MEDICAL HISTORY: History reviewed. No pertinent past medical history.    PAST SURGICAL HISTORY: History reviewed. No pertinent surgical history.    -    -           MENTAL STATUS EXAM:      Constitutional: General appearance of patient:  Appearance:  awake, alert, appeared as age stated, adequate groomed and slightly unkempt  Attitude:  " cooperative  Eye Contact:  good  Mood:  ok  Affect:  congruent   Speech:  clear, coherent normal rate   Psychomotor Behavior:  no evidence of tardive dyskinesia, dystonia, or tics  Thought Process:  logical, linear and goal oriented  Associations:  no loose associations  Thought Content:  no evidence of psychotic thought and active suicidal ideation present  Denied any active suicidal /homicidation ideation plan intent   Insight:  fair  Judgment:  fair  Oriented to:  time, person, and place  Attention Span and Concentration:  intact  Recent and Remote Memory:  intact  Language:  english with appropriate syntax and vocabulary  Fund of Knowledge: appropriate  Muscle Strength and Tone: normal  Gait and Station: Normal     There are no abnormal or psychotic thoughts, no preoccupations, no overvalued ideas, no rumination, no obsessions, no compulsions, no somatic concerns, no hypochrondriasis, no ideas of reference, and no delusions.  Patient denies homicidal thoughts.   Patient denies suicidal thoughts.  Patient appears to have good judgment and good insight.     Musculoskeletal: Patient shows no abnormalities of motor activity: there is no tremor, no tic, and no dystonia.  There is no apparent muscle atrophy, strength and tone appear normal, and there are no abnormal movements.  Patient has normal gait and stance.    DISCUSSION:         Assessment:         Psychologically patient is experiencing alcohol use disorder with tolerance withdrawal progress loss of control  He is experiencing hallucinations and withdrawal    Patient has chronic illness exacerbation leading to hospitalization progression as described.     Patient has been unable to stop using drugs in the community due to both physical and psychological symptoms.  Continued use will put the patient at risk for medical and/or psychiatric complications.      Inpatient psychiatric hospitalization is warranted at this time for safety, stabilization, and possible  adjustment in medications.       Diagnoses:    Alcohol use disorder severe alcohol withdrawal severe          Plan:   Problem list  1#alcohol use disorder severe alcohol withdrawal severe     - [facility] protocol using Valium for management of alcohol withdrawal  Patient has a pulse of 80, 90 blood pressure 142/95  Eating disorder tremor sweats was also having hallucinations  He scored a 10 and required 10 mg of diazepam since admission is required 50 mg of diazepam  - Continue thiamine, folate, and multivitamin daily          - Consider anti-craving medications prior to discharge. Pt willing to review additional information about both naltrexone and Antabuse.    Alcohol withdrawal nausea prn Zofran as needed for nausea     hydroxyzine 25 mg q4h prn for acute anxiety  Trazodone 50 mg at bedtime prn for sleep disturbances       Patient has been unable to stop using drugs in the community due to both physical and psychological symptoms.  Continued use will put the patient at risk for medical and/or psychiatric complications.    I HAVE REVIEWED LABS WITH PT AND TALKED ABOUT RESULTS WITH PT  I HAVE REVIEWED AND SUMMARIZED OLD RECORDS including his medication reconcilation of his home medications  and PDMP   I HAVE SPOKEN WITH RN ABOUT MEDICATIONS AND DETOX SCORES  I HAVE SPOKEN WITH CM ABOUT PTS TREATMENT OPTIONS             Laboratory/Imaging:    Liver Function Studies -   Recent Labs   Lab Test 07/26/21  1711   PROTTOTAL 7.3   ALBUMIN 4.2   BILITOTAL 1.0   ALKPHOS 66   AST 34   ALT 49      Last Comprehensive Metabolic Panel:  Sodium   Date Value Ref Range Status   07/26/2021 135 133 - 144 mmol/L Final   04/26/2021 140 133 - 144 mmol/L Final     Potassium   Date Value Ref Range Status   07/26/2021 3.4 3.4 - 5.3 mmol/L Final   04/26/2021 3.4 3.4 - 5.3 mmol/L Final     Chloride   Date Value Ref Range Status   07/26/2021 100 94 - 109 mmol/L Final   04/26/2021 101 94 - 109 mmol/L Final     Carbon Dioxide   Date Value Ref Range  Status   04/26/2021 27 20 - 32 mmol/L Final     Carbon Dioxide (CO2)   Date Value Ref Range Status   07/26/2021 23 20 - 32 mmol/L Final     Anion Gap   Date Value Ref Range Status   07/26/2021 12 3 - 14 mmol/L Final   04/26/2021 12 3 - 14 mmol/L Final     Glucose   Date Value Ref Range Status   07/26/2021 110 (H) 70 - 99 mg/dL Final   04/26/2021 110 (H) 70 - 99 mg/dL Final     Urea Nitrogen   Date Value Ref Range Status   07/26/2021 16 7 - 30 mg/dL Final   04/26/2021 7 7 - 30 mg/dL Final     Creatinine   Date Value Ref Range Status   07/26/2021 0.84 0.66 - 1.25 mg/dL Final   04/26/2021 0.77 0.66 - 1.25 mg/dL Final     GFR Estimate   Date Value Ref Range Status   07/26/2021 >90 >60 mL/min/1.73m2 Final     Comment:     As of July 11, 2021, eGFR is calculated by the CKD-EPI creatinine equation, without race adjustment. eGFR can be influenced by muscle mass, exercise, and diet. The reported eGFR is an estimation only and is only applicable if the renal function is stable.   04/26/2021 >90 >60 mL/min/[1.73_m2] Final     Comment:     Non  GFR Calc  Starting 12/18/2018, serum creatinine based estimated GFR (eGFR) will be   calculated using the Chronic Kidney Disease Epidemiology Collaboration   (CKD-EPI) equation.       Calcium   Date Value Ref Range Status   07/26/2021 8.7 8.5 - 10.1 mg/dL Final   04/26/2021 8.7 8.5 - 10.1 mg/dL Final     Bilirubin Total   Date Value Ref Range Status   07/26/2021 1.0 0.2 - 1.3 mg/dL Final   04/26/2021 0.4 0.2 - 1.3 mg/dL Final     Alkaline Phosphatase   Date Value Ref Range Status   07/26/2021 66 40 - 150 U/L Final   04/26/2021 68 40 - 150 U/L Final     ALT   Date Value Ref Range Status   07/26/2021 49 0 - 70 U/L Final   04/26/2021 38 0 - 70 U/L Final     AST   Date Value Ref Range Status   07/26/2021 34 0 - 45 U/L Final   04/26/2021 32 0 - 45 U/L Final                   Medical treatment/interventions:  Medical concerns: As above    - Consults: IM consult placed.  "Appreciate assistance.     Legal Status: Voluntary     Safety Assessment:   Checks: Status 15  Pt has not required locked seclusion or restraints in the past 24 hours to maintain safety, please refer to RN documentation for further details.    The risks, benefits, alternatives and side effects have been discussed and are understood by the patient.       Patient will be treated in therapeutic milieu with appropriate individual and group therapies as described.  Disposition: Pending clinical stabilization. Pt does  appear interested in COMPLETE DETOX AND DO TRT  Length of stay 3-5 days        \"Much or all of the text in this note was generated through the use of Dragon Dictate voice to text software. Errors in spelling or words which appear to be out of contact are unintentional, may be present due having escaped editing\"     "

## 2021-07-27 NOTE — PROGRESS NOTES
MSSA scores of 8/8,pt received 20mgs of Valium for alcohol withdrawal this shift and is observed to sleep throughout the noc.

## 2021-07-28 VITALS
WEIGHT: 275 LBS | DIASTOLIC BLOOD PRESSURE: 92 MMHG | HEIGHT: 78 IN | HEART RATE: 74 BPM | RESPIRATION RATE: 16 BRPM | BODY MASS INDEX: 31.82 KG/M2 | OXYGEN SATURATION: 99 % | TEMPERATURE: 98 F | SYSTOLIC BLOOD PRESSURE: 144 MMHG

## 2021-07-28 PROCEDURE — 250N000013 HC RX MED GY IP 250 OP 250 PS 637: Performed by: PSYCHIATRY & NEUROLOGY

## 2021-07-28 PROCEDURE — 99239 HOSP IP/OBS DSCHRG MGMT >30: CPT | Performed by: PSYCHIATRY & NEUROLOGY

## 2021-07-28 RX ORDER — MULTIPLE VITAMINS W/ MINERALS TAB 9MG-400MCG
1 TAB ORAL DAILY
Qty: 30 TABLET | Refills: 0 | Status: ON HOLD | OUTPATIENT
Start: 2021-07-29 | End: 2021-11-01

## 2021-07-28 RX ORDER — DISULFIRAM 250 MG/1
250 TABLET ORAL DAILY
Status: DISCONTINUED | OUTPATIENT
Start: 2021-07-28 | End: 2021-07-28 | Stop reason: HOSPADM

## 2021-07-28 RX ORDER — FOLIC ACID 1 MG/1
1 TABLET ORAL DAILY
Qty: 30 TABLET | Refills: 0 | Status: ON HOLD | OUTPATIENT
Start: 2021-07-29 | End: 2021-11-01

## 2021-07-28 RX ORDER — LANOLIN ALCOHOL/MO/W.PET/CERES
100 CREAM (GRAM) TOPICAL DAILY
Qty: 30 TABLET | Refills: 0 | Status: ON HOLD | OUTPATIENT
Start: 2021-07-29 | End: 2021-11-01

## 2021-07-28 RX ORDER — DISULFIRAM 250 MG/1
250 TABLET ORAL DAILY
Qty: 30 TABLET | Refills: 1 | Status: ON HOLD | OUTPATIENT
Start: 2021-07-28 | End: 2021-11-01

## 2021-07-28 RX ORDER — GABAPENTIN 100 MG/1
100 CAPSULE ORAL 2 TIMES DAILY PRN
Qty: 30 CAPSULE | Refills: 1 | Status: ON HOLD | OUTPATIENT
Start: 2021-07-28 | End: 2021-09-06

## 2021-07-28 RX ADMIN — DISULFIRAM 250 MG: 250 TABLET ORAL at 12:26

## 2021-07-28 RX ADMIN — THIAMINE HCL TAB 100 MG 100 MG: 100 TAB at 08:05

## 2021-07-28 RX ADMIN — Medication 1 TABLET: at 08:05

## 2021-07-28 RX ADMIN — FOLIC ACID 1 MG: 1 TABLET ORAL at 08:05

## 2021-07-28 ASSESSMENT — ACTIVITIES OF DAILY LIVING (ADL)
DRESS: SCRUBS (BEHAVIORAL HEALTH)
HYGIENE/GROOMING: INDEPENDENT
ORAL_HYGIENE: INDEPENDENT
LAUNDRY: WITH SUPERVISION

## 2021-07-28 NOTE — PROGRESS NOTES
Pt given copy of their discharge instructions and medication administration instructions. All discharge plans and labs were discussed with patient. Pt reports no questions at this time regarding discharge plans, labs or medications. Pt denies any suicidal ideation, plans or intent at this time. Patient discharge assisted via Minerva WELCH directly to the lobby. Patient plan is to return home and pursue support group meetings and obtain a sponsor. Patient is discharged at this time.

## 2021-07-28 NOTE — DISCHARGE INSTRUCTIONS
Behavioral Discharge Planning and Instructions  THANK YOU FOR CHOOSING [address]  [phone]    Summary: You were admitted to [facility] on 7/26/2021 for detoxification from alcohol.  A medical exam was performed that included lab work. You have met with a  and opted to return home and follow-up with psychiatry and therapy.  Please take care and make your recovery a daily priority, Sebastián!  It was a pleasure working with you and the entire treatment team here wishes you the very best in your recovery!     Recommendation:  Therapy and psychiatry    Main Diagnosis:  Per Dr. Margi Llanos MD;  Alcohol use disorder severe   Alcohol withdrawal severe    Major Treatments, Procedures and Findings:  You were treated for alcohol detoxification using valium administered based on the [facility] protocol. You declined a chemical dependency assessment. You had labs drawn and those results were reviewed with you. Please take a copy of your lab work with you to your next primary care provider appointment.    Symptoms to Report:  If you experience more anxiety, confusion, sleeplessness, deep sadness or thoughts of suicide, notify your treatment team or notify your primary care provider. IF ANY OF THE SYMPTOMS YOU ARE EXPERIENCING ARE A MEDICAL EMERGENCY CALL 911 IMMEDIATELY.     Lifestyle Adjustment: Adjust your lifestyle to get enough sleep, relaxation, exercise and good nutrition. Continue to develop healthy coping skills to decrease stress and promote a sober living environment. Do not use mood altering substances including alcohol, illegal drugs or addictive medications other than what is currently prescribed.     Disposition: Home    Facts about COVID19 at www.cdc.gov/COVID19 and www.[place].gov/covid19    Keeping hands clean is one of the most important steps we can take to avoid getting sick and spreading germs to others.  Please wash your hands frequently and lather with soap for at least 20  seconds!    Medical Follow-Up:  [facility]  Appointment: August 18th at 9:30 am  [address]  [address]  [place] [address]  [phone]    Psychiatry Follow-Up:  [facility]  Appointment: Monday August 2nd at 9:00 am  Oliverio Almaraz NP   [address] [place] [address]  [phone]  *Virtual appointment via video or telephone    Therapy Follow-Up:  [facility]  Appointment: Monday September 13th at 9:00 am  Dr. Torey Byrne LP  [address] [place] [address]  [phone]  *In person    Recovery apps for your phone to locate current in person and zoom recovery meetings  [place] - meeting elena  AA  - meeting elena  Meeting guide - meeting elena  Quick NA meeting - meeting elena  Amanda- has various apps    Resources:  *due to covid-19 most AA/NA meetings are being held online*  AA meetings online search for them at: https://aa-intergroup.org (worldwide meeting listings)  AA meetings for [place] area can be found online at: https://a[place].org (click local online meetings listings)  NA meetings for [place] area can be found online at: https://www.nam[place].org  (click find a meeting)  AA and NA Sponsors are excellent resources for support and you can find one at any support group meeting.   Alcoholics Anonymous (https://aa.org/): for information 24 hours/day  AA Intergroup service office in [place] (http://www.aastpaul.org/) [phone]  AA Intergroup service office in [facility]: [phone]. (http://www.a[place].org/)  Narcotics Anonymous (www.nam[place].org) (581) 761-7956  https://aafairviewriverside.org/meetings  SMART Recovery - self management for addiction recovery:  www.smartrecovery.org  Pathways ~ A Health Crisis Resource & Support Center:  400.595.1850.  https://prescribetoprevent.org/patient-education/videos/  http://www.harmreduction.org  [facility] 467-110-3160  Support  Group:  AA/NA and Sponsor/support.  National [place] on Mental Illness (www.[place].myla.org): 414.368.1519 or 453-356-7380.  Alcoholics Anonymous (www.alcoholics-anonymous.org): Check your phone book for your local chapter.  Suicide Awareness Voices of Education (SAVE) (www.save.org): 289-194-JJHY (8740)  National Suicide Prevention Line (www.mentalhealthmn.org): 843-415-FKUH (1539)  Mental Health Consumer/Survivor Network of [place] (www.mhcsn.net): 948.434.1397 or 832-165-5184  Mental Health Association of [place] (www.mentalhealth.org): 965.410.7173 or 614-412-7611   Substance Abuse and Mental Health Services (www.samhsa.gov)  [place] Opioid Prevention Coalition: www.opioidcoalition.org    [place] Recovery Connection ([facility])  [facility] connects people seeking recovery to resources that help foster and sustain long-term recovery.  Whether you are seeking resources for treatment, transportation, housing, job training, education, health care or other pathways to recovery, [facility] is a great place to start.  841.296.6622.  www.minnesotarec[place].org    Great Pod casts for nutrition and wellness  Listen on Apple Podcasts  Dishing Up Nutrition   [employer]   Nutrition       Understand the connection between what you eat and how you feel. Hosted by licensed nutritionists and dietitians from [employer] we share practical, real-life solutions for healthier living through nutrition.     General Medication Instructions:   See your medication sheet(s) for instructions.   Take all medications as prescribed.  Make no changes unless your primary care provider suggests them.   Go to all your primary care provider visits.  Be sure to have all your required lab tests. This way, your medicines can be refilled on time.  Do not use any forms of alcohol.    Please Note:  If you have any questions at anytime after you are discharged please call [facility] detox unit [facility] at [phone].  [facility]  Behavioral Intake [phone]  Medical Records call [phone]  Outpatient Behavioral Intake call [phone]  LP+ Wait List/Bed Availability call [phone]    Please remember to take all of your behavioral discharge planning and lab paperwork to any follow up appointments, it contains your lab results, diagnosis, medication list and discharge recommendations.      THANK YOU FOR CHOOSING [facility]

## 2021-07-28 NOTE — PLAN OF CARE
Pt participated in Therapeutic Recreation group utilizing a group discussion with focus on leisure education and healthy coping strategies. Pt was fully engaged and cooperative in the group discussion and added to the benefits of healthy daily recreation. Pt participated through the entire duration of the group. Pt discussed many healthy interests enjoyed during free time during group discussion.  Pt shared with the group a positive coping strategy that was helpful with sobriety.

## 2021-07-28 NOTE — PROGRESS NOTES
Pt in lounge for most of evening, participating in programming social with peers.  Pt reports minimal withdrawal symptoms, eating well and drinking fluids.  Pt reports that he intends to return home and go to AA.  He is aware of a meeting close to his home.   Discussed the value of sponsorship with patient and his home environment.  Pt acknowledges that no one will make him sober if he doesn't make the effort to stay sober and get help.  Pt is anticipating a possible discharge tomorrow--he is not seeking in patient or outpatient treatment at this time.  Medication compliant--taking hydroxyzine for anxiety--pt reports that his thoughts are sometimes over active making it difficult for him to feel calm or rest.  Will continue to monitor MSSA per protocol

## 2021-07-28 NOTE — PROGRESS NOTES
Pt scheduled for psychiatry and therapy with [facility] in [place]. Pt also scheduled follow-up primary care appointment. AVS. Pt ready for discharge.

## 2021-07-28 NOTE — DISCHARGE SUMMARY
Sebastián Nogueira MRN# [id number]   Age: 25 year old YOB: 1995     Date of Admission:  7/26/2021  Date of Discharge:  7/28/2021  Admitting Physician:  Debbie Kemp MD  Discharge Physician:  Debbie Kemp MD      DISCHARGE  DX  Alcohol use disorder severe            Event Leading to Hospitalization:     See Admission note by admitting provider for patient encounter. for additional details.          Hospital Course:   PATIENT was admitted to [facility] attending  under DR kemp, please review the detailed admit note on 7/27/21   The patient was placed under status 15 (15 minute checks) to ensure patient safety.   MSSA protocol was initiated due to the patient's history of alcohol abuse and concern for withdrawal symptoms.  CBC, BMP and utox obtained.    All outpatient medications were continued    PATIENTdid participate in groups and was visible in the milieu.     The patient's symptoms of ALOCHOL WITHDRAWL improved.     Patients energy motivation , sleep appetite improved.  Pt completed detox . It was un eventful.      Discussed with patient medications for craving.  Spoke with patient about triggers coping skills relapse prevention.    CONSULTS DONE DURING PATIENTS HOSPITALIZATION.  Patient was seen by medicine on date7/27/21    This as per their medical consult      LABORATORY DATA:  Breathalyzer on admission 0.128.  .  Total cholesterol 204.  Otherwise, CBC, comprehensive metabolic panel and rest of lipid panel and TSH normal.  Urine drug screen negative.  COVID testing negative.     IMPRESSION:    1.  Alcohol abuse and withdrawal per Dr. Kemp.  2.  Elevated blood pressures, stable.  Most likely secondary to alcohol withdrawal and anxiety.  3.  Mild hypertriglyceridemia on admission labs, likely due to diet.  4.  Otherwise, overall apparent normally good physical health.     PLAN:  Continue to monitor his blood pressures closely.  He was instructed to follow-up with  family physician after discharge in 4 to 6 weeks for hospital followup and obtain a repeat fasting lipid panel and blood pressure review.  No further medical recommendations at this time.  The patient is medically stable.  Medicine signing off.  Please feel free to call with questions.      Pt was seen by cm  As per recommendations from cm  Pt scheduled for psychiatry and therapy with [facility] in [place]. Pt also scheduled follow-up primary care appointment. AVS. Pt ready for discharge.           Labs:reviewed with patient       Recent Results (from the past 48 hour(s))   Alcohol breath test POCT    Collection Time: 07/26/21  3:34 PM   Result Value Ref Range    Alcohol Breath Test 0.128 (A) 0.00 - 0.01   Drug abuse screen 1 urine (ED)    Collection Time: 07/26/21  4:13 PM   Result Value Ref Range    Amphetamines Urine Screen Negative Screen Negative    Barbiturates Urine Screen Negative Screen Negative    Benzodiazepines Urine Screen Negative Screen Negative    Cannabinoids Urine Screen Negative Screen Negative    Cocaine Urine Screen Negative Screen Negative    Opiates Urine Screen Negative Screen Negative   Comprehensive metabolic panel    Collection Time: 07/26/21  5:11 PM   Result Value Ref Range    Sodium 135 133 - 144 mmol/L    Potassium 3.4 3.4 - 5.3 mmol/L    Chloride 100 94 - 109 mmol/L    Carbon Dioxide (CO2) 23 20 - 32 mmol/L    Anion Gap 12 3 - 14 mmol/L    Urea Nitrogen 16 7 - 30 mg/dL    Creatinine 0.84 0.66 - 1.25 mg/dL    Calcium 8.7 8.5 - 10.1 mg/dL    Glucose 110 (H) 70 - 99 mg/dL    Alkaline Phosphatase 66 40 - 150 U/L    AST 34 0 - 45 U/L    ALT 49 0 - 70 U/L    Protein Total 7.3 6.8 - 8.8 g/dL    Albumin 4.2 3.4 - 5.0 g/dL    Bilirubin Total 1.0 0.2 - 1.3 mg/dL    GFR Estimate >90 >60 mL/min/1.73m2   CBC with platelets and differential    Collection Time: 07/26/21  5:11 PM   Result Value Ref Range    WBC Count 6.0 4.0 - 11.0 10e3/uL    RBC Count 5.08 4.40 - 5.90  10e6/uL    Hemoglobin 15.2 13.3 - 17.7 g/dL    Hematocrit 43.5 40.0 - 53.0 %    MCV 86 78 - 100 fL    MCH 29.9 26.5 - 33.0 pg    MCHC 34.9 31.5 - 36.5 g/dL    RDW 11.9 10.0 - 15.0 %    Platelet Count 252 150 - 450 10e3/uL    % Neutrophils 53 %    % Lymphocytes 37 %    % Monocytes 9 %    % Eosinophils 0 %    % Basophils 1 %    % Immature Granulocytes 0 %    NRBCs per 100 WBC 0 <1 /100    Absolute Neutrophils 3.2 1.6 - 8.3 10e3/uL    Absolute Lymphocytes 2.3 0.8 - 5.3 10e3/uL    Absolute Monocytes 0.5 0.0 - 1.3 10e3/uL    Absolute Eosinophils 0.0 0.0 - 0.7 10e3/uL    Absolute Basophils 0.1 0.0 - 0.2 10e3/uL    Absolute Immature Granulocytes 0.0 <=0.0 10e3/uL    Absolute NRBCs 0.0 10e3/uL   SARS-COV2 (COVID-19) Virus RT-PCR    Collection Time: 07/26/21  5:47 PM    Specimen: Oropharynx; Swab   Result Value Ref Range    SARS CoV2 PCR Negative Negative   GGT    Collection Time: 07/27/21  7:21 AM   Result Value Ref Range     (H) 0 - 75 U/L   TSH with free T4 reflex and/or T3 as indicated    Collection Time: 07/27/21  7:21 AM   Result Value Ref Range    TSH 3.22 0.40 - 4.00 mU/L   Lipid panel    Collection Time: 07/27/21  7:21 AM   Result Value Ref Range    Cholesterol 121 <200 mg/dL    Triglycerides 204 (H) <150 mg/dL    Direct Measure HDL 60 >=40 mg/dL    LDL Cholesterol Calculated 20 <=100 mg/dL    Non HDL Cholesterol 61 <130 mg/dL    Patient Fasting > 8hrs? No          Recent Results (from the past 240 hour(s))   Alcohol breath test POCT    Collection Time: 07/26/21  3:34 PM   Result Value Ref Range    Alcohol Breath Test 0.128 (A) 0.00 - 0.01   Drug abuse screen 1 urine (ED)    Collection Time: 07/26/21  4:13 PM   Result Value Ref Range    Amphetamines Urine Screen Negative Screen Negative    Barbiturates Urine Screen Negative Screen Negative    Benzodiazepines Urine Screen Negative Screen Negative    Cannabinoids Urine Screen Negative Screen Negative    Cocaine Urine Screen Negative Screen Negative    Opiates  Urine Screen Negative Screen Negative   Comprehensive metabolic panel    Collection Time: 07/26/21  5:11 PM   Result Value Ref Range    Sodium 135 133 - 144 mmol/L    Potassium 3.4 3.4 - 5.3 mmol/L    Chloride 100 94 - 109 mmol/L    Carbon Dioxide (CO2) 23 20 - 32 mmol/L    Anion Gap 12 3 - 14 mmol/L    Urea Nitrogen 16 7 - 30 mg/dL    Creatinine 0.84 0.66 - 1.25 mg/dL    Calcium 8.7 8.5 - 10.1 mg/dL    Glucose 110 (H) 70 - 99 mg/dL    Alkaline Phosphatase 66 40 - 150 U/L    AST 34 0 - 45 U/L    ALT 49 0 - 70 U/L    Protein Total 7.3 6.8 - 8.8 g/dL    Albumin 4.2 3.4 - 5.0 g/dL    Bilirubin Total 1.0 0.2 - 1.3 mg/dL    GFR Estimate >90 >60 mL/min/1.73m2   CBC with platelets and differential    Collection Time: 07/26/21  5:11 PM   Result Value Ref Range    WBC Count 6.0 4.0 - 11.0 10e3/uL    RBC Count 5.08 4.40 - 5.90 10e6/uL    Hemoglobin 15.2 13.3 - 17.7 g/dL    Hematocrit 43.5 40.0 - 53.0 %    MCV 86 78 - 100 fL    MCH 29.9 26.5 - 33.0 pg    MCHC 34.9 31.5 - 36.5 g/dL    RDW 11.9 10.0 - 15.0 %    Platelet Count 252 150 - 450 10e3/uL    % Neutrophils 53 %    % Lymphocytes 37 %    % Monocytes 9 %    % Eosinophils 0 %    % Basophils 1 %    % Immature Granulocytes 0 %    NRBCs per 100 WBC 0 <1 /100    Absolute Neutrophils 3.2 1.6 - 8.3 10e3/uL    Absolute Lymphocytes 2.3 0.8 - 5.3 10e3/uL    Absolute Monocytes 0.5 0.0 - 1.3 10e3/uL    Absolute Eosinophils 0.0 0.0 - 0.7 10e3/uL    Absolute Basophils 0.1 0.0 - 0.2 10e3/uL    Absolute Immature Granulocytes 0.0 <=0.0 10e3/uL    Absolute NRBCs 0.0 10e3/uL   SARS-COV2 (COVID-19) Virus RT-PCR    Collection Time: 07/26/21  5:47 PM    Specimen: Oropharynx; Swab   Result Value Ref Range    SARS CoV2 PCR Negative Negative   GGT    Collection Time: 07/27/21  7:21 AM   Result Value Ref Range     (H) 0 - 75 U/L   TSH with free T4 reflex and/or T3 as indicated    Collection Time: 07/27/21  7:21 AM   Result Value Ref Range    TSH 3.22 0.40 - 4.00 mU/L   Lipid panel     Collection Time: 07/27/21  7:21 AM   Result Value Ref Range    Cholesterol 121 <200 mg/dL    Triglycerides 204 (H) <150 mg/dL    Direct Measure HDL 60 >=40 mg/dL    LDL Cholesterol Calculated 20 <=100 mg/dL    Non HDL Cholesterol 61 <130 mg/dL    Patient Fasting > 8hrs? No             Because this patient meets criteria for an Alcohol Use Disorder, I performed the following brief intervention on the date of this note:              1) Expressed concern that the patient is drinking at unhealthy levels known to increase their risk of alcohol related problems              2) Gave feedback linking alcohol use and health, including personalized feedback explaining how alcohol use can interact with their medical and/or psychiatric problems, and with prescribed medications.              3) Advised patient to abstain.        Discussed with patient many issues of addiction,triggers, relapse, and establishing a solid recovery program.    DISCHARGE MENTAL STATUS EXAMINATION:  The patient is alert, oriented x3.  Good fund of knowledge.  Good use of language.  Recent and remote memory, language, fund of knowledge are all adequate.  Euthymic mood congruent affect  Speech normal rate/rhythm linear tp no loose asso,The patient does not have any active suicidal or homicidal ideation.  Does not have any auditory or visual hallucination.  Fair insight/judgment At this time, the patient was stable to be discharged.        Pt was not determined to not be a danger to himself or others. At the current time of discharge, the patient does not meet criteria for involuntary hospitalization. On the day of discharge, the patient reports that they do not have suicidal or homicidal ideation and would never hurt themselves or others. Steps taken to minimize risk include: assessing patient s behavior and thought process daily during hospital stay, discharging patient with adequate plan for follow up for mental and physical health and discussing  "safety plan of returning to the hospital should the patient ever have thoughts of harming themselves or others. Therefore, based on all available evidence including the factors cited above, the patient does not appear to be at imminent risk for self-harm, and is appropriate for outpatient level of care.     Educated about side effects/risk vs benefits /alternative including non treatment.Pt consented to be on medication.     .Total time spent on discharge summary more than 35 min  More than  20 min  planning, coordination of care, medication reconciliation and performance of physical exam on day of discharge.Care was coordinated with unit RN and unit therapist     Sebastián Nogueira   Home Medication Instructions NADIA:[id number]    Printed on:07/28/21 1130   Medication Information                      disulfiram (ANTABUSE) 250 MG tablet  Take 1 tablet (250 mg) by mouth daily             folic acid (FOLVITE) 1 MG tablet  Take 1 tablet (1 mg) by mouth daily             gabapentin (NEURONTIN) 100 MG capsule  Take 1 capsule (100 mg) by mouth 2 times daily as needed             multivitamin w/minerals (THERA-VIT-M) tablet  Take 1 tablet by mouth daily             thiamine (B-1) 100 MG tablet  Take 1 tablet (100 mg) by mouth daily                  Disposition: lodging plus     Facts about COVID19 at www.cdc.gov/COVID19 and www.[place].gov/covid19     Keeping hands clean is one of the most important steps we can take to avoid getting sick and spreading germs to others.  Please wash your hands frequently and lather with soap for at least 20 seconds!       .        \"Much or all of the text in this note was generated through the use of Dragon Dictate voice to text software. Errors in spelling or words which appear to be out of contact are unintentional, may be present due having escaped editing\"     "

## 2021-07-28 NOTE — PROGRESS NOTES
"MSSA score of 4,pt did not require any valium for alcohol withdrawal and sleeps restlessly, /co \"bad dreams\"  "

## 2021-09-05 ENCOUNTER — HOSPITAL ENCOUNTER (INPATIENT)
Facility: CLINIC | Age: 26
LOS: 2 days | Discharge: HOME | End: 2021-09-08
Attending: EMERGENCY MEDICINE | Admitting: PSYCHIATRY & NEUROLOGY
Payer: COMMERCIAL

## 2021-09-05 ENCOUNTER — TELEPHONE (OUTPATIENT)
Dept: BEHAVIORAL HEALTH | Facility: CLINIC | Age: 26
End: 2021-09-05

## 2021-09-05 DIAGNOSIS — F17.200 NICOTINE DEPENDENCE, UNCOMPLICATED, UNSPECIFIED NICOTINE PRODUCT TYPE: Primary | ICD-10-CM

## 2021-09-05 DIAGNOSIS — F10.220 ACUTE ALCOHOLIC INTOXICATION IN ALCOHOLISM WITHOUT COMPLICATION, WITH CONTINUOUS DRINKING BEHAVIOR (H): ICD-10-CM

## 2021-09-05 DIAGNOSIS — F10.10 ALCOHOL ABUSE: ICD-10-CM

## 2021-09-05 DIAGNOSIS — E53.8 VITAMIN B12 DEFICIENCY (NON ANEMIC): ICD-10-CM

## 2021-09-05 LAB
ALBUMIN SERPL-MCNC: 4.2 G/DL (ref 3.4–5)
ALCOHOL BREATH TEST: 0.27 (ref 0–0.01)
ALP SERPL-CCNC: 69 U/L (ref 40–150)
ALT SERPL W P-5'-P-CCNC: 56 U/L (ref 0–70)
AMPHETAMINES UR QL SCN: ABNORMAL
ANION GAP SERPL CALCULATED.3IONS-SCNC: 6 MMOL/L (ref 3–14)
AST SERPL W P-5'-P-CCNC: 46 U/L (ref 0–45)
BARBITURATES UR QL: ABNORMAL
BASOPHILS # BLD AUTO: 0.1 10E3/UL (ref 0–0.2)
BASOPHILS NFR BLD AUTO: 1 %
BENZODIAZ UR QL: ABNORMAL
BILIRUB SERPL-MCNC: 0.3 MG/DL (ref 0.2–1.3)
BUN SERPL-MCNC: 15 MG/DL (ref 7–30)
CALCIUM SERPL-MCNC: 8.6 MG/DL (ref 8.5–10.1)
CANNABINOIDS UR QL SCN: ABNORMAL
CHLORIDE BLD-SCNC: 106 MMOL/L (ref 94–109)
CO2 SERPL-SCNC: 27 MMOL/L (ref 20–32)
COCAINE UR QL: ABNORMAL
CREAT SERPL-MCNC: 1.11 MG/DL (ref 0.66–1.25)
EOSINOPHIL # BLD AUTO: 0 10E3/UL (ref 0–0.7)
EOSINOPHIL NFR BLD AUTO: 0 %
ERYTHROCYTE [DISTWIDTH] IN BLOOD BY AUTOMATED COUNT: 12.3 % (ref 10–15)
GFR SERPL CREATININE-BSD FRML MDRD: >90 ML/MIN/1.73M2
GLUCOSE BLD-MCNC: 112 MG/DL (ref 70–99)
HCT VFR BLD AUTO: 45.3 % (ref 40–53)
HGB BLD-MCNC: 15.7 G/DL (ref 13.3–17.7)
IMM GRANULOCYTES # BLD: 0 10E3/UL
IMM GRANULOCYTES NFR BLD: 0 %
LYMPHOCYTES # BLD AUTO: 2.1 10E3/UL (ref 0.8–5.3)
LYMPHOCYTES NFR BLD AUTO: 28 %
MCH RBC QN AUTO: 29.8 PG (ref 26.5–33)
MCHC RBC AUTO-ENTMCNC: 34.7 G/DL (ref 31.5–36.5)
MCV RBC AUTO: 86 FL (ref 78–100)
MONOCYTES # BLD AUTO: 0.5 10E3/UL (ref 0–1.3)
MONOCYTES NFR BLD AUTO: 7 %
NEUTROPHILS # BLD AUTO: 4.9 10E3/UL (ref 1.6–8.3)
NEUTROPHILS NFR BLD AUTO: 64 %
NRBC # BLD AUTO: 0 10E3/UL
NRBC BLD AUTO-RTO: 0 /100
OPIATES UR QL SCN: ABNORMAL
PLATELET # BLD AUTO: 222 10E3/UL (ref 150–450)
POTASSIUM BLD-SCNC: 3.6 MMOL/L (ref 3.4–5.3)
PROT SERPL-MCNC: 7.7 G/DL (ref 6.8–8.8)
RBC # BLD AUTO: 5.27 10E6/UL (ref 4.4–5.9)
SARS-COV-2 RNA RESP QL NAA+PROBE: NEGATIVE
SODIUM SERPL-SCNC: 139 MMOL/L (ref 133–144)
WBC # BLD AUTO: 7.6 10E3/UL (ref 4–11)

## 2021-09-05 PROCEDURE — 84443 ASSAY THYROID STIM HORMONE: CPT | Performed by: PSYCHIATRY & NEUROLOGY

## 2021-09-05 PROCEDURE — 80307 DRUG TEST PRSMV CHEM ANLYZR: CPT | Performed by: EMERGENCY MEDICINE

## 2021-09-05 PROCEDURE — 82977 ASSAY OF GGT: CPT | Performed by: PSYCHIATRY & NEUROLOGY

## 2021-09-05 PROCEDURE — 82075 ASSAY OF BREATH ETHANOL: CPT | Performed by: EMERGENCY MEDICINE

## 2021-09-05 PROCEDURE — 80061 LIPID PANEL: CPT | Performed by: PSYCHIATRY & NEUROLOGY

## 2021-09-05 PROCEDURE — 258N000003 HC RX IP 258 OP 636: Performed by: EMERGENCY MEDICINE

## 2021-09-05 PROCEDURE — 36415 COLL VENOUS BLD VENIPUNCTURE: CPT | Performed by: EMERGENCY MEDICINE

## 2021-09-05 PROCEDURE — U0005 INFEC AGEN DETEC AMPLI PROBE: HCPCS | Performed by: EMERGENCY MEDICINE

## 2021-09-05 PROCEDURE — 85025 COMPLETE CBC W/AUTO DIFF WBC: CPT | Performed by: EMERGENCY MEDICINE

## 2021-09-05 PROCEDURE — 99285 EMERGENCY DEPT VISIT HI MDM: CPT | Performed by: EMERGENCY MEDICINE

## 2021-09-05 PROCEDURE — 82607 VITAMIN B-12: CPT | Performed by: PSYCHIATRY & NEUROLOGY

## 2021-09-05 PROCEDURE — 80053 COMPREHEN METABOLIC PANEL: CPT | Performed by: EMERGENCY MEDICINE

## 2021-09-05 PROCEDURE — 99284 EMERGENCY DEPT VISIT MOD MDM: CPT | Performed by: EMERGENCY MEDICINE

## 2021-09-05 RX ORDER — SODIUM CHLORIDE 9 MG/ML
INJECTION, SOLUTION INTRAVENOUS CONTINUOUS
Status: DISCONTINUED | OUTPATIENT
Start: 2021-09-05 | End: 2021-09-06

## 2021-09-05 RX ADMIN — SODIUM CHLORIDE 1000 ML: 9 INJECTION, SOLUTION INTRAVENOUS at 20:58

## 2021-09-05 ASSESSMENT — LIFESTYLE VARIABLES: INTOXICATION: 1

## 2021-09-06 PROBLEM — F10.10 ALCOHOL ABUSE: Status: ACTIVE | Noted: 2021-09-06

## 2021-09-06 LAB
CHOLEST SERPL-MCNC: 180 MG/DL
GGT SERPL-CCNC: 110 U/L (ref 0–75)
HDLC SERPL-MCNC: 68 MG/DL
LDLC SERPL CALC-MCNC: ABNORMAL MG/DL
NONHDLC SERPL-MCNC: 112 MG/DL
TRIGL SERPL-MCNC: 588 MG/DL
TSH SERPL DL<=0.005 MIU/L-ACNC: 0.93 MU/L (ref 0.4–4)
VIT B12 SERPL-MCNC: 158 PG/ML (ref 193–986)

## 2021-09-06 PROCEDURE — 250N000013 HC RX MED GY IP 250 OP 250 PS 637: Performed by: PHYSICIAN ASSISTANT

## 2021-09-06 PROCEDURE — 128N000004 HC R&B CD ADULT

## 2021-09-06 PROCEDURE — HZ2ZZZZ DETOXIFICATION SERVICES FOR SUBSTANCE ABUSE TREATMENT: ICD-10-PCS | Performed by: PSYCHIATRY & NEUROLOGY

## 2021-09-06 PROCEDURE — 99207 PR CONSULT E&M CHANGED TO SUBSEQUENT LEVEL: CPT | Performed by: PHYSICIAN ASSISTANT

## 2021-09-06 PROCEDURE — 99232 SBSQ HOSP IP/OBS MODERATE 35: CPT | Performed by: PHYSICIAN ASSISTANT

## 2021-09-06 PROCEDURE — 99223 1ST HOSP IP/OBS HIGH 75: CPT | Mod: AI | Performed by: PSYCHIATRY & NEUROLOGY

## 2021-09-06 PROCEDURE — 250N000011 HC RX IP 250 OP 636: Performed by: PSYCHIATRY & NEUROLOGY

## 2021-09-06 PROCEDURE — 250N000013 HC RX MED GY IP 250 OP 250 PS 637: Performed by: PSYCHIATRY & NEUROLOGY

## 2021-09-06 RX ORDER — LOPERAMIDE HCL 2 MG
2 CAPSULE ORAL 4 TIMES DAILY PRN
Status: DISCONTINUED | OUTPATIENT
Start: 2021-09-06 | End: 2021-09-08 | Stop reason: HOSPADM

## 2021-09-06 RX ORDER — ACETAMINOPHEN 325 MG/1
650 TABLET ORAL EVERY 4 HOURS PRN
Status: DISCONTINUED | OUTPATIENT
Start: 2021-09-06 | End: 2021-09-08 | Stop reason: HOSPADM

## 2021-09-06 RX ORDER — DIAZEPAM 5 MG
5-20 TABLET ORAL EVERY 30 MIN PRN
Status: DISCONTINUED | OUTPATIENT
Start: 2021-09-06 | End: 2021-09-08 | Stop reason: HOSPADM

## 2021-09-06 RX ORDER — FOLIC ACID 1 MG/1
1 TABLET ORAL DAILY
Status: DISCONTINUED | OUTPATIENT
Start: 2021-09-06 | End: 2021-09-08 | Stop reason: HOSPADM

## 2021-09-06 RX ORDER — TRAZODONE HYDROCHLORIDE 50 MG/1
50 TABLET, FILM COATED ORAL
Status: DISCONTINUED | OUTPATIENT
Start: 2021-09-06 | End: 2021-09-08 | Stop reason: HOSPADM

## 2021-09-06 RX ORDER — PROCHLORPERAZINE MALEATE 5 MG
10 TABLET ORAL EVERY 6 HOURS PRN
Status: DISCONTINUED | OUTPATIENT
Start: 2021-09-06 | End: 2021-09-08 | Stop reason: HOSPADM

## 2021-09-06 RX ORDER — ONDANSETRON 4 MG/1
4 TABLET, ORALLY DISINTEGRATING ORAL EVERY 6 HOURS PRN
Status: DISCONTINUED | OUTPATIENT
Start: 2021-09-06 | End: 2021-09-08 | Stop reason: HOSPADM

## 2021-09-06 RX ORDER — HYDROXYZINE HYDROCHLORIDE 25 MG/1
25 TABLET, FILM COATED ORAL EVERY 4 HOURS PRN
Status: DISCONTINUED | OUTPATIENT
Start: 2021-09-06 | End: 2021-09-08 | Stop reason: HOSPADM

## 2021-09-06 RX ORDER — MAGNESIUM HYDROXIDE/ALUMINUM HYDROXICE/SIMETHICONE 120; 1200; 1200 MG/30ML; MG/30ML; MG/30ML
30 SUSPENSION ORAL EVERY 4 HOURS PRN
Status: DISCONTINUED | OUTPATIENT
Start: 2021-09-06 | End: 2021-09-08 | Stop reason: HOSPADM

## 2021-09-06 RX ORDER — GABAPENTIN 400 MG/1
400 CAPSULE ORAL 3 TIMES DAILY
Status: DISCONTINUED | OUTPATIENT
Start: 2021-09-06 | End: 2021-09-08 | Stop reason: HOSPADM

## 2021-09-06 RX ORDER — MULTIPLE VITAMINS W/ MINERALS TAB 9MG-400MCG
1 TAB ORAL DAILY
Status: DISCONTINUED | OUTPATIENT
Start: 2021-09-06 | End: 2021-09-08 | Stop reason: HOSPADM

## 2021-09-06 RX ORDER — ATENOLOL 50 MG/1
50 TABLET ORAL DAILY PRN
Status: DISCONTINUED | OUTPATIENT
Start: 2021-09-06 | End: 2021-09-08 | Stop reason: HOSPADM

## 2021-09-06 RX ORDER — GABAPENTIN 400 MG/1
400 CAPSULE ORAL DAILY PRN
COMMUNITY

## 2021-09-06 RX ORDER — LANOLIN ALCOHOL/MO/W.PET/CERES
100 CREAM (GRAM) TOPICAL DAILY
Status: DISCONTINUED | OUTPATIENT
Start: 2021-09-06 | End: 2021-09-08 | Stop reason: HOSPADM

## 2021-09-06 RX ORDER — CYANOCOBALAMIN 1000 UG/ML
1000 INJECTION, SOLUTION INTRAMUSCULAR; SUBCUTANEOUS
Status: DISCONTINUED | OUTPATIENT
Start: 2021-09-06 | End: 2021-09-08 | Stop reason: HOSPADM

## 2021-09-06 RX ORDER — AMOXICILLIN 250 MG
1 CAPSULE ORAL 2 TIMES DAILY PRN
Status: DISCONTINUED | OUTPATIENT
Start: 2021-09-06 | End: 2021-09-08 | Stop reason: HOSPADM

## 2021-09-06 RX ADMIN — TRAZODONE HYDROCHLORIDE 50 MG: 50 TABLET ORAL at 22:22

## 2021-09-06 RX ADMIN — GABAPENTIN 400 MG: 400 CAPSULE ORAL at 22:22

## 2021-09-06 RX ADMIN — DIAZEPAM 10 MG: 5 TABLET ORAL at 11:49

## 2021-09-06 RX ADMIN — CYANOCOBALAMIN 1000 MCG: 1000 INJECTION, SOLUTION INTRAMUSCULAR at 11:46

## 2021-09-06 RX ADMIN — ONDANSETRON 4 MG: 4 TABLET, ORALLY DISINTEGRATING ORAL at 07:15

## 2021-09-06 RX ADMIN — GABAPENTIN 400 MG: 400 CAPSULE ORAL at 10:29

## 2021-09-06 RX ADMIN — NICOTINE POLACRILEX 4 MG: 2 GUM, CHEWING BUCCAL at 20:41

## 2021-09-06 RX ADMIN — PROCHLORPERAZINE MALEATE 10 MG: 5 TABLET ORAL at 10:29

## 2021-09-06 RX ADMIN — DIAZEPAM 10 MG: 5 TABLET ORAL at 16:32

## 2021-09-06 RX ADMIN — DIAZEPAM 10 MG: 5 TABLET ORAL at 08:33

## 2021-09-06 RX ADMIN — DIAZEPAM 10 MG: 5 TABLET ORAL at 02:16

## 2021-09-06 RX ADMIN — GABAPENTIN 400 MG: 400 CAPSULE ORAL at 16:32

## 2021-09-06 ASSESSMENT — ACTIVITIES OF DAILY LIVING (ADL)
DIFFICULTY_COMMUNICATING: NO
DOING_ERRANDS_INDEPENDENTLY_DIFFICULTY: NO
WEAR_GLASSES_OR_BLIND: YES
HYGIENE/GROOMING: HANDWASHING;INDEPENDENT
TOILETING_ISSUES: NO
FALL_HISTORY_WITHIN_LAST_SIX_MONTHS: NO
DRESSING/BATHING_DIFFICULTY: NO
ORAL_HYGIENE: INDEPENDENT
WALKING_OR_CLIMBING_STAIRS_DIFFICULTY: NO
CONCENTRATING,_REMEMBERING_OR_MAKING_DECISIONS_DIFFICULTY: NO
LAUNDRY: WITH SUPERVISION
DRESS: INDEPENDENT
DIFFICULTY_EATING/SWALLOWING: NO

## 2021-09-06 ASSESSMENT — MIFFLIN-ST. JEOR: SCORE: 2448.64

## 2021-09-06 NOTE — ED PROVIDER NOTES
[facility] EMERGENCY DEPARTMENT ([facility])    9/05/21     ED 15    History     Chief Complaint   Patient presents with     Alcohol Intoxication     last drink 15 minutes ago,      The history is provided by the patient and medical records.     Sebastián Nogueira is a 25 year old male who presents seeking alcohol detox. He has been drinking 1-2 pints of vodka a day for past year and a half, last drink prior to arrival. His alcohol level was 0.271 in triage. He also smokes marijuana, used LSD 24 hours ago. He has a history of DTs in past, no history of alcohol withdrawals seizures. No suicidal or homicidal ideation. No other symptoms noted.    PAST MEDICAL HISTORY: No past medical history on file.    PAST SURGICAL HISTORY: No past surgical history on file.    Past medical history, past surgical history, medications, and allergies were reviewed with the patient. Additional pertinent items: None    FAMILY HISTORY:   Family History   Problem Relation Age of Onset     Dementia Paternal Grandfather        SOCIAL HISTORY:   Social History     Tobacco Use     Smoking status: Former Smoker     Smokeless tobacco: Never Used   Substance Use Topics     Alcohol use: Yes     Comment: 3 times a week     Social history was reviewed with the patient. Additional pertinent items: None    Patient's Medications   New Prescriptions    No medications on file   Previous Medications    DISULFIRAM (ANTABUSE) 250 MG TABLET    Take 1 tablet (250 mg) by mouth daily    FOLIC ACID (FOLVITE) 1 MG TABLET    Take 1 tablet (1 mg) by mouth daily    GABAPENTIN (NEURONTIN) 100 MG CAPSULE    Take 1 capsule (100 mg) by mouth 2 times daily as needed    MULTIVITAMIN W/MINERALS (THERA-VIT-M) TABLET    Take 1 tablet by mouth daily    THIAMINE (B-1) 100 MG TABLET    Take 1 tablet (100 mg) by mouth daily   Modified Medications    No medications on file   Discontinued Medications    No medications on file        No Known Allergies     ROS: 10 point ROS  neg other than the symptoms noted above in the HPI.    Physical Exam   BP: 138/87  Pulse: (!) 130  Temp: 98.1  F (36.7  C)  Resp: 18  SpO2: 96 %      Physical Exam  Vitals and nursing note reviewed.   Constitutional:       General: He is not in acute distress.     Appearance: He is well-developed. He is not diaphoretic.      Comments: Appears intoxicated, smells strongly of alcohol.   HENT:      Head: Normocephalic and atraumatic.      Mouth/Throat:      Pharynx: No oropharyngeal exudate.   Eyes:      General: No scleral icterus.        Right eye: No discharge.         Left eye: No discharge.      Pupils: Pupils are equal, round, and reactive to light.   Cardiovascular:      Rate and Rhythm: Regular rhythm. Tachycardia present.      Heart sounds: Normal heart sounds. No murmur heard.   No friction rub. No gallop.    Pulmonary:      Effort: Pulmonary effort is normal. No respiratory distress.      Breath sounds: Normal breath sounds. No wheezing.   Chest:      Chest wall: No tenderness.   Abdominal:      General: Bowel sounds are normal. There is no distension.      Palpations: Abdomen is soft.      Tenderness: There is no abdominal tenderness.   Musculoskeletal:         General: No tenderness or deformity. Normal range of motion.      Cervical back: Normal range of motion and neck supple.   Skin:     General: Skin is warm and dry.      Coloration: Skin is not pale.      Findings: No erythema or rash.   Neurological:      Mental Status: He is alert and oriented to person, place, and time.      Cranial Nerves: No cranial nerve deficit.   Psychiatric:         Mood and Affect: Affect is tearful.         Thought Content: Thought content does not include homicidal or suicidal ideation.         ED Course        Procedures                         Labs Ordered and Resulted from Time of ED Arrival Up to the Time of Departure from the ED   ALCOHOL BREATH TEST POCT - Abnormal; Notable for the following components:       Result  Value    Alcohol Breath Test 0.271 (*)     All other components within normal limits   DRUG ABUSE SCREEN 1 URINE (ED)   URINE DRUGS OF ABUSE SCREEN    Narrative:     The following orders were created for panel order Urine Drugs of Abuse Screen.  Procedure                               Abnormality         Status                     ---------                               -----------         ------                     Drug abuse screen 1 urin...[[id number]]                      In process                   Please view results for these tests on the individual orders.            Assessments & Plan (with Medical Decision Making)   This is a 25-year-old male who presents with alcohol intoxication.  Patient is seeking detox.  Last use was just prior to arrival.  On exam patient appears intoxicated, is tearful.  Patient was initially tachycardic however this resolved.  Alcohol level is 271.  We will admit for detox.    I have reviewed the nursing notes.    I have reviewed the findings, diagnosis, plan and need for follow up with the patient.    New Prescriptions    No medications on file       Final diagnoses:   None     Morris Dumas DO  9/5/2021   [facility] EMERGENCY DEPARTMENT         Morris Dumas, DO  09/06/21 0052

## 2021-09-06 NOTE — PLAN OF CARE
Problem: Adult Inpatient Plan of Care  Goal: Plan of Care Review  Outcome: No Change   Pt.was admitted from [facility] adult ED due to alcohol intoxication. He is seeking for detox. Sebastián is voluntary.     He reports drinking about 1-2 pints of vodka daily. Reports he has not been sober for over a year. Complained his drinking was worsening and he did not have any control. Has a history of withdrawal DTs. Denied withdrawal seizure history. Pt.denied SI/SIB/hallucinations. Started on MSSA with valium protocols. He was cooperative with admission search and interview. 15 minutes safety checks initiated. Will continue to monitor.

## 2021-09-06 NOTE — TELEPHONE ENCOUNTER
S: provider calling with clinical on a 24yo male presenting for ETOH detox, [place] ED.     B: PT drinking up to two pints of vodka daily for the past year or more, hx of DT, no withdrawal seizure hx. Pt reports using LSD 24hours ago and last drank prior to arrival, BAL 0.271. Pt denies SI or HI, denies acute medical concerns.     A: VOL  UTOX: pos THC  COVID: neg  CMP: AST 46  CBC: normal ranges    R: Patient cleared and ready for behavioral bed placement: Yes  Pt accepted for 3A/[facility], fermín CD  Pt in queue  Unit and ED informed of disposition

## 2021-09-06 NOTE — PROGRESS NOTES
Met with pt to initiate discharge planning.  Pt states he is unable to discuss plans for discharge at this time.  Writer will check in with Pt later in the day.  Pt has [facility] insurance.  Update:  Attempted to check in with Pt who is sleeping soundly.

## 2021-09-06 NOTE — CONSULTS
[facility]    Internal Medicine Initial Consult       Date of Admission: 9/5/2021  Consult Requested by: Debbie Kemp MD  Reason for Consult: Nausea    Assessment & Recommendations  Sebastián Nogueira is a 25 year old man with a history of alcohol abuse who is admitted to station 3A for detox from alcohol.        Alcohol Abuse & Withdrawal. Management per psychiatry team.     Nausea with episode of Vomiting. Likely related to ETOH w/d. Abdomen benign.   - Added Compazine as 2nd line to Zofran.   - Encourage fluids.     Vitamin B12 Deficiency. Level 158 checked on admit. In setting of ETOH abuse. No hx of gastric bypass. Hgb wnl. Does sound like he's having some mild neuropathy sx.   - Primary team has ordered IM replacement - would review w/ patient on discharge if he prefers IM or PO, and then will need primary care f/u in 4-8 wks.     Hypertriglyceridemia. TGs 204 on 7/27/21 --> 588 currently although this does not appear to have been a fasting lab. Discussed role of ETOH and diet.   - Lifestyle modifications. Primary care f/u.     Medicine will sign off, please page with any additional concerns.     Ayah Good PA-C  Hospitalist Service  Pager: [phone]  7a-6p M-F and 7a-3p weekends/holidays, through 9/12 (off 9/9)  Otherwise page job code [phone] (3B), [phone] (3A), or [phone] ([facility] and )  Text paging via CSDN is appreciated  ______________________________________________________________________    Reason for Admission  Alcohol Abuse    Chief Complaint   Alcohol Withdrawal     History of Present Illness   History is obtained from the patient and medical record.     Sebastián Nogueira is a 25 year old year old man with a history of alcohol abuse admitted to behavioral health for detox from alcohol.    Internal Medicine service was asked to see patient for a general medical evaluation. Currently, patient is feeling nauseous. He threw up this morning. Zofran has  "helped some but nausea persists. Not atypical for him with withdrawal. No abdominal pain. No diarrhea or constipation. No hematemesis. Occasional tingling in the fingers and toes when asked about this. Diet is poor when abusing alcohol.      Review of Systems   10 point ROS performed and negative unless otherwise noted in HPI     Past Medical History    I have reviewed this patient's medical history and updated it with pertinent information if needed.   Past Medical History:   Diagnosis Date     Alcohol abuse      Hypertriglyceridemia         Past Surgical History   I have reviewed this patient's surgical history and updated it with pertinent information if needed.  Past Surgical History:   Procedure Laterality Date     APPENDECTOMY          Social History   Social History     Tobacco Use     Smoking status: Former Smoker     Smokeless tobacco: Never Used   Substance Use Topics     Alcohol use: Yes     Drug use: No       Family History   I have reviewed this patient's family history and updated it with pertinent information if needed.   Family History   Problem Relation Age of Onset     Dementia Paternal Grandfather        Medications   Medications Prior to Admission   Medication Sig Dispense Refill Last Dose     disulfiram (ANTABUSE) 250 MG tablet Take 1 tablet (250 mg) by mouth daily 30 tablet 1 Past Month at Unknown time     folic acid (FOLVITE) 1 MG tablet Take 1 tablet (1 mg) by mouth daily 30 tablet 0 Past Month at Unknown time     gabapentin (NEURONTIN) 400 MG capsule Take 400 mg by mouth 3 times daily        multivitamin w/minerals (THERA-VIT-M) tablet Take 1 tablet by mouth daily 30 tablet 0 More than a month at Unknown time     thiamine (B-1) 100 MG tablet Take 1 tablet (100 mg) by mouth daily 30 tablet 0 Past Month at Unknown time       Allergies    No Known Allergies    Physical Exam   /88 (BP Location: Left arm)   Pulse 95   Temp 97.4  F (36.3  C) (Temporal)   Resp 16   Ht 1.956 m (6' 5\")   " Wt 135.6 kg (299 lb)   SpO2 98%   BMI 35.46 kg/m     GENERAL: Alert and ambulatory on unit.   HEENT: Anicteric sclera. Mucous membranes moist.   CV: RRR. S1, S2. No murmurs appreciated.   RESPIRATORY: Effort normal on room air. Lungs CTAB with no wheezing, rales, rhonchi.   GI: Abdomen soft, non distended, non tender.   NEUROLOGICAL: No focal deficits. Moves all extremities.   EXTREMITIES: No peripheral edema. Warm and well perfused.   SKIN: No jaundice. No rashes.     Data   Data reviewed today: I reviewed all medications, new labs and imaging results over the last 24 hours.

## 2021-09-06 NOTE — PROGRESS NOTES
09/06/21 0210   Patient Belongings   Did you bring any home meds/supplements to the hospital?  No   Patient Belongings other (see comments)   Patient Belongings Remaining with Patient other (see comments)   Patient Belongings Put in Hospital Secure Location (Security or Locker, etc.) other (see comments)   Belongings Search Yes   Clothing Search Yes   Second Staff Mario   Comment See Notes     Large bin:  -Shorts w/string, cig, purse  Small bin:  - 2 cell phones, wallet  Security # [id number]  - $295 Cash  - 4 Visa cards  - 2 [place] D/L  A               Admission:  I am responsible for any personal items that are not sent to the safe or pharmacy.  [facility] is not responsible for loss, theft or damage of any property in my possession.    Signature:  _________________________________ Date: _______  Time: _____                                              Staff Signature:  ____________________________ Date: ________  Time: _____      2nd Staff person, if patient is unable/unwilling to sign:    Signature: ________________________________ Date: ________  Time: _____     Discharge:  [facility] has returned all of my personal belongings:    Signature: _________________________________ Date: ________  Time: _____                                          Staff Signature:  ____________________________ Date: ________  Time: _____

## 2021-09-06 NOTE — PLAN OF CARE
Behavioral Team Discussion: (9/6/2021)    Continued Stay Criteria/Rationale: Patient admitted for alcohol withdrawal, complicated.  Plan: The following services will be provided to the patient; psychiatric assessment, medication management, therapeutic milieu, individual and group support, and skills groups.   Participants: 3A Provider: Dr. Debbie Kemp MD; 3A RN's: Ned Castillo, RN; 3A CM's: Leann Schuster [facility], Yamilet Dykes MA [facility] and Radha Ervin [facility]   Summary/Recommendation: Providers will assess today for treatment recommendations, discharge planning, and aftercare plans. CM will meet with pt for discharge planning.   Medical/Physical: Internal medicine consult to be completed 9/6/2021.  Precautions:   Behavioral Orders   Procedures     Code 1 - Restrict to Unit     Routine Programming     As clinically indicated     Status 15     Every 15 minutes.     Withdrawal precautions     Rationale for change in precautions or plan: N/A  Progress: No Change.

## 2021-09-06 NOTE — H&P
Sebastián Nogueira is a 25 year old male who was referred by self History was provided by PATEINT who was  A fairhistorian.    CHIEF COMPLAINT:    9-day relapse  HISTORY OF PRESENT ILLNESS:    Patient is a 25-year-old  male who came to the emergency room wanting help.     Patient reports this is a 9-day relapse he has been drinking a liter of vodka  Patient has been using the following substances: Alcohol  Started at age 12, became a problem at 17     Patient has tolerance, withdrawal, progressive use, loss of control, spending more time and more amount than intended. Patient has made attempts to quit, is experiencing cravings, and reports negative consequences.                 Patient does not have a history of seizures.  Patient does have a history of delirium tremens.  Patient experiences hallucinations during withdrawal in the past                      Denies thoughts of suicide or harming others.       Denies auditory or visual hallucinations.      Patient smokes 0 cigarettes  Denies using any illicit drugs except occasional marijuana he however did use LSD 24 hours ago     Patient denied any gambling       PSYCHIATRIC REVIEW OF SYSTEMS:          Psychiatric Review of Systems:   Depression:     Denies: depressed mood, suicidal ideation, decreased interest, changes in sleep, changes in appetite, guilt, hopelessness, helplessness, impaired concentration, decreased energy, irritability.  Juana:   denied: sleeplessness, impulsiveness, racing thoughts, increased goal-directed activities, pressured speech, increase in energy  Juana Feeling euphoric,Distractible,Impulsive,Grandiose,Talking excessively,Have energy without sleeping,Mood swings,Irritability  Denies: sleeplessness, increased goal-directed activities, abrupt increase in energy, pressured speech  Psychosis:   .  Denied any auditory visual hallucinations no paranoia    Anxiety:   denied excessive worries that are difficult to control for the past 6  "months,   Chronic anxiety , not able to stop worrying impacting sleep, poor conc, irritable , muscle tension        Denied any Panic attacks     PTSD:     Denies: re-experiencing past trauma, nightmares, increased arousal, avoidance of traumatic stimuli, impaired function.  OCD:     Denies: obsessions, checking, symmetry, cleaning, skin picking.  ED:     Denies: restriction, binging, purging.     Denied any Symptoms of attention deficit disorder include a failure to pay attention to detail, a pattern of careless mistakes, a pattern of inattentive listening, a failure to follow through with projects, poor personal organization, losing necessary objects, distractibility, forgetfulness.     Denied any Symptoms of borderline personality disorder include a fear of abandonment, unstable self-image, impulsive behavior, dissociative feeling, intense anger, unstable personal relationships, chronic feelings of boredom, periods of intense depressed mood.                  PSYCHIATRIC HISTORY            Past court commitments: none  SIB /SUICIDE ATTEMPTS NONE  Psych Hosp :none  Outpatient Programs none  Inpatient cd trt 2  Out pt cd trt a few did not complete them               SOCIAL HISTORY                                                                         Pt lives with his parents, who are supportive. He also has a sister who is supportive. 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          Family History:   FAMILY HISTORY:   Family History         Family History   Problem Relation Age of Onset     Dementia Paternal Grandfather           Family Mental Health History-  none     Substance Use Problems - none       Medical h/o   A 10-point review of systems is reviewed and is negative except for psychiatric symptoms above.       Allergies reviewed  Blood pressure 127/88, pulse 95, temperature 97.4  F (36.3  C), temperature source Temporal, resp. rate 16, height 1.956 m (6' 5\"), weight " 135.6 kg (299 lb), SpO2 98 %.      vitals  Appearance:  awake, alert, appeared as age stated, adequate groomed and slightly unkempt  Attitude:  cooperative  Eye Contact:  good  Mood:   Tired  Affect:  congruent   Speech:  clear, coherent normal rate   Psychomotor Behavior:  no evidence of tardive dyskinesia, dystonia, or tics  Thought Process:  logical, linear and goal oriented  Associations:  no loose associations  Thought Content:  no evidence of psychotic thought and active suicidal ideation present  Denied any active suicidal /homicidation ideation plan intent   Insight:  fair  Judgment:  fair  Oriented to:  time, person, and place  Attention Span and Concentration:  intact  Recent and Remote Memory:  intact  Language:  english with appropriate syntax and vocabulary  Fund of Knowledge: appropriate  Muscle Strength and Tone: normal  Gait and Station: Normal          Patient has severe exacerbation of chronic alcoholism  ,  been unable to stop using  in the community due to both physical and psychological symptoms.  Continued use will put the patient at risk for medical and/or psychiatric complications.     Diagnosis  Alcohol use disorder severe  Alcohol withdrawal severe  Nicotine use disorder moderate  Marijuana abuse  LSD usage    Plan  Patient will detox of alcohol using MSSA protocol and Valium patient has a pulse of 97 blood pressure 127/88 eating disturbance tremor agitation proximal sweats  He scored a 9 and received 10 mg of diazepam since admission patient required 20 mg of diazepam    AST mildly elevated 46 most likely from alcoholism  Elevated     Patient has low B12 we will order a B12 shot for patient 158  Patient has triglycerides elevated 588 we will put internal medicine consult  I HAVE REVIEWED LABS WITH PT AND TALKED ABOUT RESULTS WITH PT  I HAVE REVIEWED AND SUMMARIZED OLD RECORDS including his medication reconcilation of his home medications  and PDMP   I HAVE SPOKEN WITH RN ABOUT  MEDICATIONS AND withdrawl SCORES  I HAVE SPOKEN WITH CM ABOUT PTS TREATMETN OPTIONS     Discussed in detail about patient's smoking patient was advised to quit patient was told about the impact of smoking.  Patient's willingness to quit was assessed.  I provided methods and skills for cessation including medication management nicotine gum patch.  Patient did not set a quit date.  Patient is interested in quitting .we discussed pharmacotherapy options .patient agreed to take nicotine gum patch lozenge.  We discussed behavioral change techniques when craving nicotine including deep breathing drinking glass of water, taking a walk.        Length of stay 3 to 5 days

## 2021-09-06 NOTE — PHARMACY-ADMISSION MEDICATION HISTORY
Admission Medication History Completed by Pharmacy    See [facility] Admission Navigator for allergy information, preferred outpatient pharmacy and prior to admission medications.     Medication History Sources:     Prescription fill history ([facility] in [place]) via Epic Surescripts data    Additional Information:  PTA medication list updated based on fill history only. All medication directions consistent with fill records and all medications refilled within the past month.  Pharmacist did not interview patient due to current mental health status.  Last doses in table below were marked by admission RN  Any over the counter products, vitamins or supplements may not be accurately reflected in the PTA medication list.    Prior to Admission medications    Medication Sig Last Dose  Auth Provider   disulfiram (ANTABUSE) 250 MG tablet Take 1 tablet (250 mg) by mouth daily Past Month      Debbie Kemp MD   folic acid (FOLVITE) 1 MG tablet Take 1 tablet (1 mg) by mouth daily More than a month      Debbie Kemp MD   gabapentin (NEURONTIN) 400 MG capsule     Take 400 mg by mouth 3 times daily Past Week  Reported, Patient   multivitamin w/minerals (THERA-VIT-M) tablet Take 1 tablet by mouth daily More than a month      Debbie Kemp MD   thiamine (B-1) 100 MG tablet Take 1 tablet (100 mg) by mouth daily More than a month      Debbie Kemp MD     Date completed: 09/06/21    Medication history completed by:   Mara Bullock, Pharm.D., [facility]  Behavioral Health Inpatient Pharmacist  [facility] ([facility] Emergency Department  Phone: *[phone] ([facility]) or [phone]

## 2021-09-06 NOTE — ED NOTES
Behavioral Health Transfer Note    Background:  Sebastián Nogueira is a 25 year old male Preferred Pronouns: He/Him whose primary language is  English who does not need an , and lives in a home with alone    No Known Allergies,   Prior to Admission medications    Medication Sig Start Date End Date Taking? Authorizing Provider   disulfiram (ANTABUSE) 250 MG tablet Take 1 tablet (250 mg) by mouth daily 7/28/21  Yes Debbie Kemp MD   folic acid (FOLVITE) 1 MG tablet Take 1 tablet (1 mg) by mouth daily 7/29/21  Yes Debbie Kemp MD   gabapentin (NEURONTIN) 100 MG capsule Take 1 capsule (100 mg) by mouth 2 times daily as needed 7/28/21  Yes Debbie Kemp MD   multivitamin w/minerals (THERA-VIT-M) tablet Take 1 tablet by mouth daily 7/29/21  Yes Debbie Kemp MD   thiamine (B-1) 100 MG tablet Take 1 tablet (100 mg) by mouth daily 7/29/21  Yes Debbie Kemp MD         Situation:  The patient arrived in the ED by CAR from emergency room with a complaint of Alcohol Intoxication (last drink 15 minutes ago, )  The patient's current symptoms are new and during this time the symptoms have increased. Pertinent assessment findings in the ED are   .  In the ED, patient was diagnosed with   Final diagnoses:   Alcohol abuse        Initial vitals were: BP: 138/87  Pulse: (!) 130  Temp: 98.1  F (36.7  C)  Resp: 18  SpO2: 96 %   Recent vital Signs: /87   Pulse (!) 130   Temp 98.1  F (36.7  C) (Oral)   Resp 18   SpO2 96%   ED visit vitals are:   Patient Vitals for the past 24 hrs:   BP Temp Temp src Pulse Resp SpO2   09/05/21 1914 138/87 98.1  F (36.7  C) Oral (!) 130 18 96 %    (add initial, and last 3)  Pain control: good  Nausea control: good    Behavior upon arrival): cooperative   Behavioral or violence concerns No  Was patient aggressive in the ED? No  Was a code called No  Is the patient now cooperative? N/A  PSS-3:Over the past 2 weeks have you felt down, depressed, or hopeless?:  yes (9/5/2021  7:14 PM)  Over the past 2 weeks have you had thoughts of killing yourself?: no (9/5/2021  7:14 PM)  Have you ever attempted to kill yourself?: no (9/5/2021  7:14 PM)   C-SSRS (Recent):   Homicidal ideation? denies current or recent homicidal ideation or behaviors.    Self-injurious behavior/urges? denies current or recent self injurious behavior or ideation.  Is the patient inebriated (ETOH) Yes.  Impaired on other substances? No  UTOX completed? Yes   Amphetamine Qual Urine   Date Value Ref Range Status   04/26/2021 Negative NEG^Negative Final     Comment:     Cutoff for a negative amphetamine is 500 ng/mL or less.     Amphetamines Urine   Date Value Ref Range Status   09/05/2021 Screen Negative Screen Negative Final     Comment:     Cutoff for a negative amphetamine is 500 ng/mL or less.     Barbiturates Qual Urine   Date Value Ref Range Status   04/26/2021 Negative NEG^Negative Final     Comment:     Cutoff for a negative barbiturate is 200 ng/mL or less.     Barbiturates Urine   Date Value Ref Range Status   09/05/2021 Screen Negative Screen Negative Final     Comment:     Cutoff for a negative barbiturate is 200 ng/mL or less.     Benzodiazepines Urine   Date Value Ref Range Status   09/05/2021 Screen Negative Screen Negative Final     Comment:     Cutoff for a negative benzodiazepine is 200 ng/mL or less.     Cannabinoids Qual Urine   Date Value Ref Range Status   04/26/2021 Positive (A) NEG^Negative Final     Comment:     Cutoff for a positive cannabinoid is greater than 50 ng/mL. This is an   unconfirmed screening result to be used for medical purposes only.       Cannabinoids Urine   Date Value Ref Range Status   09/05/2021 Screen Positive (A) Screen Negative Final     Comment:     Cutoff for a positive cannabinoid is greater than 50 ng/mL.   This is an unconfirmed screening result to be used for medical purposes only.     Cocaine Qual Urine   Date Value Ref Range Status   04/26/2021 Negative  NEG^Negative Final     Comment:     Cutoff for a negative cocaine is 300 ng/mL or less.     Cocaine Urine   Date Value Ref Range Status   09/05/2021 Screen Negative Screen Negative Final     Comment:     Cutoff for a negative cocaine is 300 ng/mL or less.     Ethanol Qual Urine   Date Value Ref Range Status   04/26/2021 Positive (A) NEG^Negative Final     Comment:     Cutoff for a positive urine ethanol is greater than 0.05 g/dL.  This is an   unconfirmed screening result to be used for medical purposes only.       Opiates Qualitative Urine   Date Value Ref Range Status   04/26/2021 Negative NEG^Negative Final     Comment:     Cutoff for a negative opiate is 300 ng/mL or less.     Opiates Urine   Date Value Ref Range Status   09/05/2021 Screen Negative Screen Negative Final     Comment:     Cutoff for a negative opiate is 300 ng/mL or less.     Benzodiazepine Qual Urine   Date Value Ref Range Status   04/26/2021 Negative NEG^Negative Final     Comment:     Cutoff for a negative benzodiazepine is 200 ng/mL or less.     MSSA done? Yes Last MSSA score: 13   Were withdrawal symptoms treated? N/A      Meds given in ED:   Medications   0.9% sodium chloride BOLUS (1,000 mLs Intravenous New Bag 9/5/21 2058)     Followed by   sodium chloride 0.9% infusion (has no administration in time range)      Labs results   Labs Ordered and Resulted from Time of ED Arrival Up to the Time of Departure from the ED   DRUG ABUSE SCREEN 1 URINE (ED) - Abnormal; Notable for the following components:       Result Value    Cannabinoids Urine Screen Positive (*)     All other components within normal limits   ALCOHOL BREATH TEST POCT - Abnormal; Notable for the following components:    Alcohol Breath Test 0.271 (*)     All other components within normal limits   CBC WITH PLATELETS & DIFFERENTIAL    Narrative:     The following orders were created for panel order CBC with platelets differential.  Procedure                                "Abnormality         Status                     ---------                               -----------         ------                     CBC with platelets and d...[[id number]]                      Final result                 Please view results for these tests on the individual orders.   CBC WITH PLATELETS AND DIFFERENTIAL   COMPREHENSIVE METABOLIC PANEL   COVID-19 VIRUS (CORONAVIRUS) BY PCR   URINE DRUGS OF ABUSE SCREEN    Narrative:     The following orders were created for panel order Urine Drugs of Abuse Screen.  Procedure                               Abnormality         Status                     ---------                               -----------         ------                     Drug abuse screen 1 urin...[[id number]]  Abnormal            Final result                 Please view results for these tests on the individual orders.      Imaging Studies: No results found for this or any previous visit (from the past 24 hour(s)).     Is the patient on 72-hour hold or commitment? No  1:1 attendant needed? No  Is the patient a minor or have a guardian: No   Is guardian present or available to consent for treatment? Yes Best phone number is    Are there visiting restrictions? No  Has family been present during ED course? No      To know for admission:  Was \"What to Expect  Video was shown and/or admission discussed? No: N/A patient declined, patient aware.  Does the patient have a seizure history? No. If yes, date of most recent seizure?    COVID-19 PCR Results    COVID-19 PCR Results 2/7/21 2/18/21 3/14/21 4/26/21 7/26/21   COVID-19 Virus by PCR (External Result) Not Detected Not Detected Not Detected     SARS-CoV-2 Virus Specimen Source    Nasopharyngeal    SARS-CoV-2 PCR Result    NEGATIVE    SARS CoV2 PCR     Negative      Comments are available for some flowsheets but are not being displayed.         COVID-19 Antibody Results, Testing for Immunity    COVID-19 Antibody Results, Testing for Immunity   No data to " display.           Home medical equipment needed (I.e.bipap, insulin pump) No  Is the patient diabetic? No   last blood glucose: No flowsheet data found.   If abnormal, was this treated in the ED? N/A  Bariatric equipment required?  No pt weight 0 lbs 0 oz Data Unavailable  Skin/wound Issues: None  Infection present or suspected this encounter: no  Isolation type: No active isolations  Baseline Mental status: WDL  Current Mental Status changes: at basesline  Baseline activity level: Independent  Current activity level Stand with Assist  Code Status:Prior  Family Comments/Social Situation comments:    Fall risk: Yes  Valuables  Patient Belongings: remains with patient, locker  Patient Belongings Remaining with Patient: clothing, shoes, cell phone/electronics, other (see comments) (ciggarettes)  Patient Belongings Put in Hospital Secure Location (Security or Locker, etc.): wallet  Number of Belongings Bags: 1    Simba Mota RN  September 5, 2021 9:06 PM

## 2021-09-07 PROCEDURE — 99232 SBSQ HOSP IP/OBS MODERATE 35: CPT | Performed by: PSYCHIATRY & NEUROLOGY

## 2021-09-07 PROCEDURE — 99207 PR CDG-MDM COMPONENT: MEETS MODERATE - UP CODED: CPT | Performed by: PSYCHIATRY & NEUROLOGY

## 2021-09-07 PROCEDURE — 128N000004 HC R&B CD ADULT

## 2021-09-07 PROCEDURE — 250N000013 HC RX MED GY IP 250 OP 250 PS 637: Performed by: PSYCHIATRY & NEUROLOGY

## 2021-09-07 RX ADMIN — DIAZEPAM 10 MG: 5 TABLET ORAL at 00:18

## 2021-09-07 RX ADMIN — GABAPENTIN 400 MG: 400 CAPSULE ORAL at 20:12

## 2021-09-07 RX ADMIN — GABAPENTIN 400 MG: 400 CAPSULE ORAL at 12:40

## 2021-09-07 RX ADMIN — THIAMINE HCL TAB 100 MG 100 MG: 100 TAB at 08:12

## 2021-09-07 RX ADMIN — FOLIC ACID 1 MG: 1 TABLET ORAL at 08:12

## 2021-09-07 RX ADMIN — GABAPENTIN 400 MG: 400 CAPSULE ORAL at 08:12

## 2021-09-07 RX ADMIN — MULTIPLE VITAMINS W/ MINERALS TAB 1 TABLET: TAB at 08:12

## 2021-09-07 RX ADMIN — DIAZEPAM 10 MG: 5 TABLET ORAL at 08:12

## 2021-09-07 RX ADMIN — TRAZODONE HYDROCHLORIDE 50 MG: 50 TABLET ORAL at 20:44

## 2021-09-07 ASSESSMENT — ACTIVITIES OF DAILY LIVING (ADL)
LAUNDRY: WITH SUPERVISION
HYGIENE/GROOMING: INDEPENDENT
ORAL_HYGIENE: INDEPENDENT
DRESS: INDEPENDENT

## 2021-09-07 NOTE — PLAN OF CARE
Pt was mainly isolative to himself during shift. Affect is flat but brightens upon approach. Pt endorsed anxiety 7 and depression 6  on 0-10 scale. MSSA scores of 9 and 4 given 10 mg of Valium. Pt denied SI/HI/SIB      Plan: pt is open to exploring treatment options.

## 2021-09-07 NOTE — PROGRESS NOTES
"[facility]  UNIVERSAL ADULT DIAGNOSTIC ASSESSMENT - Substance Use Disorder    Provider Name and Credentials: Leann Schuster, [facility], LADC     PATIENT'S NAME: Sebastián Nogueira  PREFERRED NAME: \"Jamar\"  PRONOUNS: he/him/his     MRN: [id number]  : 1995   Last 4 SSN: [id number]  ACCT. NUMBER:  [id number]  DATE OF SERVICE: 21   START TIME: 9:30 AM  END TIME: 10:45 AM  PREFERRED PHONE: [phone]   May we leave a program related message: Yes  SERVICE MODALITY:  In-person    Identifying Information:  Patient is a 25 year old,  male who was referred for an assessment by self. The pronoun use throughout this assessment reflects the patient's chosen pronoun. Patient attended the session alone.     Chief Complaint:   The reason for seeking services at this time is: \"alcohol.\"  The problem(s) began around . Patient has attempted to resolve these concerns in the past through treatment.  Patient is in active withdrawal, but is currently admitted to [facility] for medical detoxification and withdrawal monitoring and is not an imminent safety risk to self or others, and may proceed with the assessment interview    Social/Family History:  Patient reported he grew up in [place], [place]. Patient was raised by both parents. Patient reported that his childhood was \"normal\".  Patient describes current relationships with family of origin as positive.      The patient describes his cultural background as \"White\".  Cultural influences and impact on patient's life structure, values, norms, and healthcare: none identified.  Contextual influences on patient's health include: Individual Factors MH/CD issues.  Patient identified his preferred language to be English. Patient reported he does not need the assistance of an  or other support involved in therapy.     Patient reports he is not involved in community of brie activities. Patients reports spirituality impacts his recovery in " "the following ways: \"it doesn't.\"     Patient reported had no significant delays in developmental tasks.  Patient's highest education level was high school graduate. Patient identified the following learning problems: none reported.  Patient reports he is able to understand written materials.    Patient reported the following relationship history.  Patient's current relationship status is single. Patient identified his sexual orientation as straight.  Patient reported having zero child(pretty).     Patient's current living/housing situation involves staying with his parents.  Patient lives with his parents in their home and he reports that housing is stable. Patient identified his family as part of his support system.  Patient identified the quality of these relationships as stable and meaningful.      Patient reports engaging in the following recreational/leisure activities: \"relaxing\". Patient reports engaging in the following recreation/leisure activities while using: using alcohol is relaxing for him, \"it's relaxing to use.\" Patient is currently unemployed. 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 Patient reports his income is obtained through employment.  Patient does identify finances as a current stressor.      Patient reports the following substance related arrests or legal issues: 2nd degree DUI 2-3 years; pt has had legal trouble going back to when he was 16 related to his substance use: disorderly conduct, resisting arrest, \"bullshit from drinking, it just gets escalated.\" No history of assault, no history of sex-related offenses. Patient does report being on probation / parole / under the jurisdiction of the court: : Does not recall their name. County: [place].    Patient's Strengths and Limitations:  Patient identified the following strengths or resources that will help him succeed in treatment: \"willingness.\" Things that may interfere with the patient's success in " treatment include: pt reports struggling at times with confrontation.     Personal and Family Medical History:   Patient did not report a family history of mental health concerns.  Patient reports the following family history:   Family History   Problem Relation Age of Onset     Dementia Paternal Grandfather       Patient reported the following previous mental health diagnoses: none.  Patient reports their primary mental health symptoms include: anxiety, anhedonia and these do not impact his ability to function.   Patient has received mental health services in the past: psychiatry.  Psychiatric Hospitalizations: None.  Patient denies a history of civil commitment.  Current mental health services/providers include:  Psychiatry, doesn't recall the person's name as it is a new provider.    GAIN-SS:  GAIN-SS Tool:    When was the last time that you had significant problems... 4/28/2021   with feeling very trapped, lonely, sad, blue, depressed or hopeless about the future? Past month   with sleep trouble, such as bad dreams, sleeping restlessly, or falling asleep during the day? Past Month   with feeling very anxious, nervous, tense, scared, panicked or like something bad was going to happen? Past month   with becoming very distressed & upset when something reminded you of the past? Past month   with thinking about ending your life or committing suicide? Never     When was the last time that you did the following things 2 or more times? 4/28/2021   Lied or conned to get things you wanted or to avoid having to do something? Past month   Had a hard time paying attention at school, work or home? Past month   Had a hard time listening to instructions at school, work or home? Past month   Were a bully or threatened other people? Never   Started physical fights with other people? Never     Patient has not had a physical exam to rule out medical causes for current symptoms.  Date of last physical exam was greater than a year  ago and client was encouraged to schedule an exam with PCP. The patient has a PCP through [facility], cannot recall name. Patient reports no current medical concerns.  Patient denies any issues with pain..   Patient denies pregnancy.. There are significant appetite / nutritional concerns / weight changes. Patient does not report a history of an eating disorder. Patient does not report a history of head injury / trauma / cognitive impairment.     Patient reports current meds as:   Outpatient Medications Marked as Taking for the 9/5/21 encounter (Hospital Encounter)   Medication Sig     disulfiram (ANTABUSE) 250 MG tablet Take 1 tablet (250 mg) by mouth daily     folic acid (FOLVITE) 1 MG tablet Take 1 tablet (1 mg) by mouth daily     gabapentin (NEURONTIN) 400 MG capsule Take 400 mg by mouth 3 times daily     multivitamin w/minerals (THERA-VIT-M) tablet Take 1 tablet by mouth daily     thiamine (B-1) 100 MG tablet Take 1 tablet (100 mg) by mouth daily     Medication Adherence:  Patient reports taking prescribed medications as prescribed.    Patient Allergies:  No Known Allergies    Medical History:    Past Medical History:   Diagnosis Date     Alcohol abuse      Hypertriglyceridemia      Rating Scales:    PHQ9:    PHQ-9 SCORE 12/10/2018   PHQ-9 Total Score 0     Substance Use:  Patient reported the following biological family members or relatives with chemical health issues: none.  Patient has received substance use disorder and/or gambling treatment in the past.  Patient reports the following dates and locations of treatment services:  April 2021 [facility].  Patient has been to detox.  Patient is not currently receiving any chemical dependency treatment. Patient reports they have attended the following support groups: AA in the past but never have attended long-term.       Substance Age of first use Pattern and duration of use (include amounts and frequency) Date of last use      "Withdrawal potential Route of administration   Has used Alcohol 12 Current: Daily 1-2 pints a day for the past few months, sometimes more, pt reports \"as much as I can\"; pt discharged from  with staff approval 5/27/21, reports he relapsed about 45 days later, has been binging on/off a few weeks at a time since. Use has been a problem since his 20s.   Heaviest: April 2021 reported 1L/day, typically a binge pattern. Pt reports he has used up to 1L a time. Typically can stay sober max 30 days.  9/5/21 Yes oral   Has used Marijuana   12 Current: uses \"a little bit\" when going through withdrawals from alcohol to get to sleep, calm down. When drinking, doesn't really smoke.   Heaviest: as a teenager, reports it was his DOC. Used all day every day, \"a lot.\"  9/4/21 Yes Smoke   Has not used Amphetamines          Has not used Cocaine/ crack           Has not used Hallucinogens        Has not used Inhalants        Has not used Heroin        Has not used Other Opiates        Has not used Benzodiazepine          Has not used Barbiturates        Has not used Over the counter meds.        Has not used Caffeine        Has not used Nicotine         Has not used other substances not listed above:  Identify:            Patient reported the following problems as a result of their substance use: family problems, financial problems, legal issues, occupational / vocational problems and relationship problems.  Patient is concerned about substance use.     Patient reports experiencing the following withdrawal symptoms within the past 12 months: sweating, shaky/jittery/tremors, unable to sleep, agitation, headache, fatigue, sad/depressed feeling, muscle aches, vivid/unpleasant dreams, irritability, sensitivity to noise, nausea/vomiting, dizziness, diarrhea, diminished appetite, unable to eat, confused/disrupted speech and anxiety/worry and the following within the past 30 days: sweating, shaky/jittery/tremors, unable to sleep, agitation, " "headache, fatigue, sad/depressed feeling, muscle aches, vivid/unpleasant dreams, irritability, sensitivity to noise, nausea/vomiting, dizziness, diarrhea, diminished appetite, unable to eat, confused/disrupted speech and anxiety/worry.   Patients reports urges to use Alcohol.  Patient reports he has used more Alcohol than intended and over a longer period of time than intended. Patient reports he has had unsuccessful attempts to cut down or control use of Alcohol.  Patient reports longest period of abstinence was 4 months about 2-3 years ago (from alcohol only, pt was still smoking marijuana), did this by staying busy, and return to use was due to \"life\". Patient reports he has needed to use more Alcohol to achieve the same effect.  Patient does  report diminished effect with use of same amount of Alcohol.     Patient does  report a great deal of time is spent in activities necessary to obtain, use, or recover from Alcohol effects.  Patient does  report important social, occupational, or recreational activities are given up or reduced because of Alcohol use.  Alcohol use is continued despite knowledge of having a persistent or recurrent physical or psychological problem that is likely to have caused or exacerbated by use.  Patient reports the following problem behaviors while under the influence of substances: \"drunk driving, dangerous activity\". Patient reports his recovery goals are \"quit drinking.\"     Patient reports substance use has not impacted his ability to function in a school setting. Patient reports substance use has impacted his ability to function in a work setting.  Patients demographics and history impact his recovery in the following ways: lack of positive structure, routine and activities. Patient reports the following people are supportive of recovery: family.     Patient does not have a history of gambling concerns and/or treatment. Patient does not have other addictive behaviors he is concerned " about.       Dimension Scale Ratings:    Dimension 1 -  Acute Intoxication/Withdrawal: 0 - No Problem  Dimension 2 - Biomedical: 0 - No Problem  Dimension 3 - Emotional/Behavioral/Cognitive Conditions: 2 - Moderate Problem  Dimension 4 - Readiness to Change:  2 - Moderate Problem  Dimension 5 - Relapse/Continued Use/ Continued Problem Potential: 3 - Severe Problem  Dimension 6 - Recovery Environment:  2 - Moderate Problem    Significant Losses / Trauma / Abuse / Neglect Issues:   Patient did not serve in the .  There are indications or report of significant loss, trauma, abuse or neglect issues related to: are no indications and client denies any losses, trauma, abuse, or neglect concerns.  Concerns for possible neglect are not present.     Safety Assessment:   Current Safety Concerns:  [place] Suicide Severity Rating Scale (Short Version)  [place] Suicide Severity Rating (Short Version) 4/26/2021 7/26/2021 9/5/2021 9/6/2021   Over the past 2 weeks have you felt down, depressed, or hopeless? yes yes yes -   Over the past 2 weeks have you had thoughts of killing yourself? yes no no -   Have you ever attempted to kill yourself? no no no -   Q1 Wished to be Dead (Past Month) yes no - no   Q2 Suicidal Thoughts (Past Month) yes no - no   Q3 Suicidal Thought Method no - - -   Q4 Suicidal Intent without Specific Plan no - - -   Q5 Suicide Intent with Specific Plan no - - -   Q6 Suicide Behavior (Lifetime) no - - -   High Risk Required Interventions On continuous in person observation - - -   Required Interventions Provider notified - - -   Interventions DEC consulted;Monitored via video - - -     Patient denies current homicidal ideation and behaviors.  Patient denies current self-injurious ideation and behaviors.    Patient reported unsafe motor vehicle operation reported placing themselves in unsafe environment(s) associated with substance use.  Patient denies any high risk behaviors associated with mental  health symptoms.  Patient reports the following current concerns for their personal safety: None.  Patient reports there are not  firearms in the house.      History of Safety Concerns:  Patient denied a history of homicidal ideation.     Patient denied a history of personal safety concerns.    Patient denied a history of assaultive behaviors.    Patient denied a history of sexual assault behaviors.     Patient reported a history unsafe motor vehicle operation reported a history of placing themselves in unsafe environment(s) associated with substance use.  Patient denies any history of high risk behaviors associated with mental health symptoms.  Patient reports the following protective factors: positive relationships positive social network, safe and stable environment, sense of belonging, adherence with prescribed medication, agreement to use safety plan, living with other people, sense of personal control or determination and pets    Risk Plan:  See Recommendations for Safety and Risk Management Plan    Review of Symptoms per patient report:  Substance Use:  blackouts, passing out, vomiting, hangovers, other substance related medical issue, daily use, substance related legal problems, work absence due to substance use, family relationship problems due to substance use, social problems related to substance use, driving under the influence, riding with someone under the influence and cravings/urges to use     Diagnostic Criteria:  OP BEH DELGADO CRITERIA: Substance is often taken in larger amounts or over a longer period than was intended.  Met for:  Alcohol, There is persistent desire or unsuccessful efforts to cut down or control use of the substance.  Met for:  Alcohol,  A great deal of time is spent in activities necessary to obtain the substance, use the substance, or recover from its effects.  Met for:  Alcohol, Craving, or a strong desire or urge to use the substance.  Met for:  Alcohol, Recurrent use of the  substance resulting in a failure to fulfill major role obligations at work, school, or home.  Met for:  Alcohol, Continued use of the substance despite having persistent or recurrent social or interpersonal problems caused or exacerbated by the effects of its use.  Met for:  Alcohol, Important social, occupational, or recreational activities are given up or reduced because of the substance.  Met for:  Alcohol, Recurrent use of the substance in which it is physically hazardous.  Met for:  Alcohol, Use of the substance is continued despite knowledge of having a persistent or recurrent physical or psychological problem that is likely to have been cause or exacerbated by the substance.  Met for:  Alcohol, Tolerance:  either a need for markedly increased amounts of the substance to achieve the desired effect or a markedly diminished effect with continued use of the dame amount of the substance.  Met for:  Alcohol, Withdrawal:  either patient endorses characteristic withdrawal syndrome for the substance or the substance (or closely related substance) is taken to relieve or avoid withdrawal symptoms.  Met for:  Alcohol       As evidenced by self report and criteria, client meets the following DSM5 Diagnoses:   (Sustained by DSM5 Criteria Listed Above)  Alcohol Use Disorder   303.90 (F10.20) Severe In a controlled environment.    Recommendations:     1. Plan for Safety and Risk Management:   Recommended that patient call 911 or go to the local ED should there be a change in any of these risk factors..            Report to child / adult protection services was NA.     2. Patient's identified no cultural concerns that need to be addressed in treatment.     3. Recommendations for treatment focus:    Depressed Mood - pt presents as depressed, unmotivated. Anhedonia. Lack of coping skills to manage  Alcohol / Substance Use - Tolerance, withdrawal, cravings, progressive use, loss of control, use despite consequences. Lack of  coping skills to manage.      4.  Mental Health Referrals:   The following referral(s) will be initiated: Intensive Outpatient Chemical Health Treatment . Pt would also benefit from establishing care with an individual therapist. Next Scheduled Appointment: TBD.    5. DELGADO Referrals:    Recommendations:  IOP co-occurring CD treatment.  Patient reports they are willing to follow these recommendations. Patient does not have a history of opiate use.    6. Records:   These were reviewed at time of assessment.  Information in this assessment was obtained from the medical record and provided by patient who is a good historian.   Patient will have open access to their mental health medical record.    [facility] Assessment ID: [id number]  Provider Name/ Credentials:  Leann Schuster, [facility], LADC  September 7, 2021

## 2021-09-07 NOTE — PROGRESS NOTES
Case Management Note  9/7/2021    Met with pt in AM to finalize discharge plan. Pt was in bed sleeping but did agree to talk with writer. Pt reported he wanted to go home first, sit down with his family to make a plan before doing any treatment. Pt reported most likely he planned to do OP. Suggested pt call his family while here and get their ideas so writer can still help pt, pt declined to do this. Explained to writer that CM can help pt only while he is admitted, and we can make the process easier. Pt continued to decline. Pt reluctantly agreed to have writer make an OP assessment appointment so that pt has something to follow up with once he has talked with his family.     After a second conversation with the pt and MD, pt agreed to complete assessment and be referred to OP. After leaving MD's office, pt immediately expressed frustration, stating he wants to look and research places on his own, does not want to rush decision. Offered to pt to sit down with him in writer's office and look at programs. Pt agreed to this. Pt and writer looked at various programs and called programs with questions. Ultimately, pt agreed to be referred to [facility] in [place]. Pt shown paperwork to complete for CD assessment.     Leann Schuster, [facility], LADC

## 2021-09-07 NOTE — PLAN OF CARE
Patient has experienced a largely positive day on Alex 3A.  He reports absence of all suicidal ideation while on alex.  Patient reports very manageable withdrawal-related symptoms, and is looking forward to a possible discharge on Wednesday, September 8, 2021.  Until such time, will continue to monitor as prudent.

## 2021-09-07 NOTE — PLAN OF CARE
Problem: Sleep Disturbance  Goal: Adequate Sleep/Rest  Outcome: Improving   Pt appeared to have slept well. Breathing pattern WNL. The 15 mins safety checks cont. No sign of pain or discomfort. MSSA score 8 and 1. Valium 10mg po for score of 8.

## 2021-09-07 NOTE — PROGRESS NOTES
"[facility], [place]   Psychiatric Progress Note        Interim history   This is a 25 year old male with Alcohol use disorder severe  Alcohol withdrawal severe  Nicotine use disorder moderate  Marijuana abuse  LSD usage.Pt seen in rounds.   The patient's care was discussed with the treatment team during the daily team meeting and/or staff's chart notes were reviewed.  Staff report patient has been visible in the milieu,  no acute eventsovernight.     Patient's mood is good  Energy Level:MODERATE  Sleep:No concerns, sleeps well through night  Appetite:fair motivation interest   Suicidal/homicidal ideation/plan intent.psychosis  No prior suicde attempts  No access to gun  Pt is in alcohol withdrawal still being monitered every 4 hrs for it,   Pt mssa score are monitered  Tolerating meds and has no side effects.              Medications:     Current Facility-Administered Medications   Medication     acetaminophen (TYLENOL) tablet 650 mg     alum & mag hydroxide-simethicone (MAALOX) suspension 30 mL     atenolol (TENORMIN) tablet 50 mg     cyanocobalamin injection 1,000 mcg     diazepam (VALIUM) tablet 5-20 mg     folic acid (FOLVITE) tablet 1 mg     gabapentin (NEURONTIN) capsule 400 mg     hydrOXYzine (ATARAX) tablet 25 mg     loperamide (IMODIUM) capsule 2 mg     multivitamin w/minerals (THERA-VIT-M) tablet 1 tablet     nicotine (NICORETTE) gum 4 mg     ondansetron (ZOFRAN-ODT) ODT tab 4 mg     prochlorperazine (COMPAZINE) tablet 10 mg     senna-docusate (SENOKOT-S/PERICOLACE) 8.6-50 MG per tablet 1 tablet     thiamine (B-1) tablet 100 mg     traZODone (DESYREL) tablet 50 mg             Allergies:   No Known Allergies         Psychiatric Examination:   Blood pressure (!) 144/93, pulse 71, temperature 97.5  F (36.4  C), temperature source Temporal, resp. rate 15, height 1.956 m (6' 5\"), weight 135.6 kg (299 lb), SpO2 99 %.  Weight is 299 lbs 0 oz  Body mass index is 35.46 " kg/m .    Appearance:  awake, alert and adequately groomed  Attitude:  cooperative  Eye Contact:  good  Mood:  better  Affect:  appropriate and in normal range and mood congruent  Speech:  clear, coherent rate /rhythm are good  Psychomotor Behavior:  no evidence of tardive dyskinesia, dystonia, or tics and intact station, gait and muscle tone  Throught Process:  logical  Associations:  no loose associations  Thought Content:  no evidence of suicidal ideation or homicidal ideation and no evidence of psychotic thought  Insight:  good  Judgement:  intact  Oriented to:  time, person, and place  Attention Span and Concentration:  intact  Recent and Remote Memory:  intact  Language fund of knowledge are adequate         Labs:   No results found for this or any previous visit (from the past 24 hour(s)).      DX Alcohol use disorder severe  Alcohol withdrawal severe  Nicotine use disorder moderate  Marijuana abuse  LSD usage     PLAN   Alcohol intoxication/withdrawal, presently is on MSSA protocol with Valium. Continue the same MSSA protocol as ordered. Continue thiamine 100 mg p.o. daily, M.V.I. one p.o. daily and folate 1 mg p.o. Daily  Will continue mssa protocal to detox off alcohol on valium,  Pt is c/o of termor , agitation poor sleep and poor appetite, he has sweats, feels shakey  On mssa client scored scored 8today and  needed needed 10 mg po as of yet , total dose since admission was 60MG    MSSA    Eating Disturbances: 3  Tremor: 1 - not visibly apparent but can be felt by the examiner placing his fingertip slightly against the patient's fingertips  Sleep Disturbance: slept through the night or not applicable  Clouding of Sensorium: no evidence  Hallucinations: 0 - none  Quality of Contact: 0 - awareness of examiner and people around him/her  Agitation: 1 - somewhat more than normal activity  Paroxysmal Sweats: 1 - barely perceptible sweating  Temperature: 99.5 or below  Pulse: 1 - 70 to 79  Total MSSA Score:  8    Patient has poor insight into his drinking, he will be seen by Dr. Rodríguez who is covering me tomorrow  Laboratory/Imaging: reviewed with patient   Consults: internal medicine consult reviewed  Patient will be treated in therapeutic milieu with appropriate individual and group therapies as described.  PDMP CHECKED     Supportive psychotheraoy provided, tanya talked about recovery enviroment, relapse prevention, triggers to use.  Discussed with patient many issues of addiction,triggers, relapse, and establishing a solid recovery program.  Asked pt to be med complinat   Medical diagnoses to be addressed this admission:    Plan:    Assessment & Recommendations  Sebastián Nogueira is a 25 year old man with a history of alcohol abuse who is admitted to station 3A for detox from alcohol.        Alcohol Abuse & Withdrawal. Management per psychiatry team.      Nausea with episode of Vomiting. Likely related to ETOH w/d. Abdomen benign.   - Added Compazine as 2nd line to Zofran.   - Encourage fluids.      Vitamin B12 Deficiency. Level 158 checked on admit. In setting of ETOH abuse. No hx of gastric bypass. Hgb wnl. Does sound like he's having some mild neuropathy sx.   - Primary team has ordered IM replacement - would review w/ patient on discharge if he prefers IM or PO, and then will need primary care f/u in 4-8 wks.      Hypertriglyceridemia. TGs 204 on 7/27/21 --> 588 currently although this does not appear to have been a fasting lab. Discussed role of ETOH and diet.   - Lifestyle modifications. Primary care f/u  Legal Status: voluntary    Safety Assessment:   Checks:  15 min  Precautions: withdrawal precautions  Pt has not required locked seclusion or restraints in the past 24 hours to maintain safety, please refer to RN documentation for further details.  Discussed with patient many issues of addiction,triggers, relapse, and establishing a solid recovery program.  Able to give informed consent:  YES   Discussed  Risks/Benefits/Side Effects/Alternatives: YES    After discussion of the indications, risks, benefits, alternatives and consequences of no treatment, the patient elects to ambivalent about treatment.  Patient has poor insight

## 2021-09-08 VITALS
HEIGHT: 77 IN | BODY MASS INDEX: 35.3 KG/M2 | HEART RATE: 79 BPM | OXYGEN SATURATION: 98 % | SYSTOLIC BLOOD PRESSURE: 133 MMHG | TEMPERATURE: 97.8 F | RESPIRATION RATE: 16 BRPM | DIASTOLIC BLOOD PRESSURE: 91 MMHG | WEIGHT: 299 LBS

## 2021-09-08 PROCEDURE — H0001 ALCOHOL AND/OR DRUG ASSESS: HCPCS

## 2021-09-08 PROCEDURE — 250N000013 HC RX MED GY IP 250 OP 250 PS 637: Performed by: PSYCHIATRY & NEUROLOGY

## 2021-09-08 RX ORDER — LANOLIN ALCOHOL/MO/W.PET/CERES
1000 CREAM (GRAM) TOPICAL DAILY
Qty: 90 TABLET | Refills: 1 | Status: ON HOLD | OUTPATIENT
Start: 2021-09-08 | End: 2021-11-01

## 2021-09-08 RX ADMIN — GABAPENTIN 400 MG: 400 CAPSULE ORAL at 08:27

## 2021-09-08 RX ADMIN — THIAMINE HCL TAB 100 MG 100 MG: 100 TAB at 08:27

## 2021-09-08 RX ADMIN — MULTIPLE VITAMINS W/ MINERALS TAB 1 TABLET: TAB at 08:27

## 2021-09-08 RX ADMIN — FOLIC ACID 1 MG: 1 TABLET ORAL at 08:27

## 2021-09-08 NOTE — PROGRESS NOTES
Patient discharged to home.  Reports being picked up by [employer]. Patient escorted off the unit by psych associate, Chandan.       All patient belongings from room, locked bin and security were sent with patient. This RN went over discharge instructions, teachings, patient's labs, and discharge  recommendations with patient. This RN went over patient's prescribed medications being sent to his home pharmacy. Patient verbalizes and demonstrates understanding of all teachings. Patient denies thoughts of harm towards self or others.  Pt denies any current medical issues at this time. Patient denies any further questions and is now discharged at this time

## 2021-09-08 NOTE — PLAN OF CARE
"Problem: Substance Withdrawal  Goal: Social and Therapeutic (Substance Withdrawal)  Description: Signs and symptoms of listed problems will be absent or manageable.  Outcome: Completed     S: Patient scored less than 8 for greater than 24 hours. He has required no medication for withdrawal for > 24 hours.  B: Patient admitted for alcohol withdrawal and detoxification.  A: Patient stable in the alcohol withdrawal process MSSA scores < 8 for > 24 hours.  Presents with full range affect, in a calm mood. Pt denies SI/SIB/HI. Denies experiencing any type of hallucinations. Reports \"minimal\" anxiety, rated it as 2/10. Denies having depression. Reports he did not sleep well related to roommate snoring. Ate 100% of breakfast.  Pt denies any needs or concerns and is hopeful to discharge to home today via [employer] or family.  He reports that he has all his medications at home including naltrexone and Antabuse.    R: Patient removed from detox status per unit Protocol.           "

## 2021-09-08 NOTE — DISCHARGE INSTRUCTIONS
Behavioral Discharge Planning and Instructions  THANK YOU FOR CHOOSING THE [facility]  3A  [phone]    Summary: You were admitted to Station 3A on 9/6/21 for detoxification from alcohol.  A medical exam was performed that included lab work. You have met with a  and opted to attend IOP at [facility].  Please take care and make your recovery a priority, Sebastián!    Recommendation:  Enter and complete IOP treatment at [facility] and follow all recommendations of your treatment providers.      Main Diagnosis: Per Dr. Debbie Kemp MD;  303.90 (F10.20) Alcohol Use Disorder Severe      Major Treatments, Procedures and Findings:  You were detoxed from alcohol with the Modified Selective Severity Protocol using Valium. You have met with a  to develop a treatment plan for discharge.  You have had labs drawn and a copy of those labs will be sent home with you.  Please bring your lab results with to your follow up doctor appointment.    Symptoms to Report:  If you experience more anxiety, confusion, sleeplessness, deep sadness or thoughts of suicide, notify your treatment team or notify your primary care physician. IF ANY OF THE SYMPTOMS YOU ARE EXPERIENCING ARE A MEDICAL EMERGENCY CALL 911 IMMEDIATELY.     Lifestyle Adjustment: Adjust your lifestyle to get enough sleep, relaxation, exercise and  good nutrition. Continue to develop healthy coping skills to decrease stress and promote a sober living environment. Do not use alcohol, illegal drugs or addictive medications other than what is currently prescribed. AA, NA, and  Sponsor are excellent resources for support.     Primary Provider:  Please call to schedule a follow up appointment to hve your liver function tests repeated in 4-6 weeks.     Ton López, DO    [address]    [place] [address]    Phone: [phone]    Fax: [phone]            Disposition:  "Home    Treatment Follow-Up:  [facility] location  Start date/time: TBD. You have been referred to this program. Expect a call. If you do not hear from them within the next couple of days, call them at [phone] to get your start date set up.  Address: [address] [address] [place] [address]  Phone: [phone]  Fax: [phone]  About the program: \"Our programs are designed for individuals experiencing personal impact and negative consequences of alcohol or drug use. Consequences often result in personal dissatisfaction, negative impact on relationships with family and friends, poor performance in school or on the job, and legal consequences. Additionally, people may suffer from specific emotional problems such as depression, anxiety, mood disorders, personality disorders, or other mental diagnoses. We understand the relationship between substance dependence and emotional and mental health issues and offer integrated treatment in order to address both dimensions. Licensed Alcohol & Drug Counselors, Mental Health Professionals, and Mental Health Practitioners work as a team to help patients work through complex problems and develop a strong recovery plan.\"    Facts about COVID19 at www.cdc.gov/COVID19 and www.[place].gov/covid19    Keeping hands clean is one of the most important steps we can take to avoid getting sick and spreading germs to others.  Please wash your hands frequently and lather with soap for at least 20 seconds!    Follow-up Appointment:   Oliverio Almaraz  [facility]   Phone:  [phone]    Appointment:  9/15/2021  (telemedicine)       Resources:     Resources for on line recovery meetings:      *due to covid-19 AA/NA meetings are being held online*      AA meetings can be found online; search for them at: http://aa-intergroup.org/directory.php  AA meetings via [place] for [place] area can be found online at: " "https://a[place].org/find-a-meeting/holiday-closings/  NA meetings via [place] for [place] area can be found online at: https://sites.google.com/view/[place]regionofnarcoticsanonymous/home?authuser=2    Www.[employer]  has online resources for meeting and recovery care including Podcast \"Let's Talk:Addiction & Recovery Podcasts    Www.[place]recovery.org     DISCHARGE RESOURCES:  -SMART Recovery - self management for addiction recovery:  www.smartrecovery.org    -Pathways ~ A Health Crisis Resource & Support Center: 942.499.8413.  -[facility] [phone]   -[facility] Behavioral Intake 535-220-7723 or 003-146-7233.  -Suicide Awareness Voices of Education (SAVE) (www.save.org): 102-255-OZYT (5798)  -National Suicide Prevention Line (www.mentalhealthmn.org): 432-312-DOGD (0449)  -National [place] on Mental Illness (www.[place].myla.org): 491.341.2391 or 604-577-6731.  -Ik[employer]: text the word LIFE to 83917 for immediate support and crisis intervention  -Mental Health Consumer/Survivor Network of [place] (www.mhcsn.net): 788.485.4070 or 440-400-3712  -Mental Health Association of [place] (www.mentalhealth.org): 434.872.1063 or 207-730-7039     -Substance Abuse and Mental Health Services (www.samhsa.gov)  -Harm Reduction Coalition (www. Harmreduction.org)  -www.prescribetoprevent.org or http://prescribetoprevent.org/video  -Poison control 1-164.162.4640   **[place] Opioid Prevention Coalition: www.opioidcoalition.org    Sober Support Group Information:  AA/NA & Sponsor/Support  -Alcoholics Anonymous (www.alcoholics-anonymous.org): for local information 24 hours/day  -AA Intergroup service office in [place] (http://www.aastpaul.org/) 657.210.3935  -AA Intergroup service office in [facility]: 877.142.8151. (http://www.a[place].org/)  -Narcotics Anonymous (www.nam[place].org) (462) 180-4226   **Sober Fun Activities: www.sober-activities.eEvent.[employer]/cities//[facility] " Connection ([place])  [place] connects people seeking recovery to resources that help foster and sustain long-term recovery.  Whether you are seeking resources for treatment, transportation, housing, job training, education, health care or other pathways to recovery, [place] is a great place to start.    Phone: 352.557.5250.  www.[employer] (Great listing of all types of recovery and non-recovery related resources)      General Medication Instructions:   See your medication sheet(s) for instructions.   Take all medicines as directed.  Make no changes unless your doctor suggests them.   Go to all your doctor visits.  Be sure to have all your required lab tests. This way, your medicines can be refilled on time.  Do not use any drugs not prescribed by your provider.  AA/NA and Sponsors are excellent resources for support  Avoid alcohol.    Any follow up concerns:  Nursing questions call the Unit -[facility] [phone]  Medical Record call [phone]  Outpatient Behavioral Intake call [phone]  LP+ Wait List/Bed Availability call [phone]    The entire treatment team has appreciated the opportunity to work with you Sebastián.  We wish you the best in the future and with your lifelong recovery goals. Please bring this discharge folder with you to all follow up appointments.  It contains your lab results, diagnosis, medication list and discharge recommendations.    THANK YOU FOR CHOOSING THE [facility]

## 2021-09-08 NOTE — PLAN OF CARE
Problem: Adult Inpatient Plan of Care  Goal: Optimal Comfort and Wellbeing  Outcome: No Change     Behavioral  Pt appeared sleeping comfortably overnight; breathing was even and unlabored.      Medical  Pt continues in alcohol withdrawal; MSSA  2; no valium given this shift; Pt last valium on 9/7/21 @ 0812.     No new medical concerns noted.

## 2021-09-08 NOTE — DISCHARGE SUMMARY
25 year old man admitted for alcohol detox.  From the H&P:  Sebastián Nogueira is a 25 year old male who was referred by self History was provided by PATEINT who was  A fairhistorian.    CHIEF COMPLAINT:    9-day relapse  HISTORY OF PRESENT ILLNESS:    Patient is a 25-year-old  male who came to the emergency room wanting help.     Patient reports this is a 9-day relapse he has been drinking a liter of vodka  Patient has been using the following substances: Alcohol  Started at age 12, became a problem at 17     Patient has tolerance, withdrawal, progressive use, loss of control, spending more time and more amount than intended. Patient has made attempts to quit, is experiencing cravings, and reports negative consequences.                 Patient does not have a history of seizures.  Patient does have a history of delirium tremens.  Patient experiences hallucinations during withdrawal in the past                      Denies thoughts of suicide or harming others.       Denies auditory or visual hallucinations.      Patient smokes 0 cigarettes  Denies using any illicit drugs except occasional marijuana he however did use LSD 24 hours ago     Patient denied any gambling        PSYCHIATRIC REVIEW OF SYSTEMS:          Psychiatric Review of Systems:   Depression:     Denies: depressed mood, suicidal ideation, decreased interest, changes in sleep, changes in appetite, guilt, hopelessness, helplessness, impaired concentration, decreased energy, irritability.  Juana:   denied: sleeplessness, impulsiveness, racing thoughts, increased goal-directed activities, pressured speech, increase in energy  Juana Feeling euphoric,Distractible,Impulsive,Grandiose,Talking excessively,Have energy without sleeping,Mood swings,Irritability  Denies: sleeplessness, increased goal-directed activities, abrupt increase in energy, pressured speech  Psychosis:   .  Denied any auditory visual hallucinations no  paranoia     Anxiety:   denied excessive worries that are difficult to control for the past 6 months,   Chronic anxiety , not able to stop worrying impacting sleep, poor conc, irritable , muscle tension        Denied any Panic attacks     PTSD:     Denies: re-experiencing past trauma, nightmares, increased arousal, avoidance of traumatic stimuli, impaired function.  OCD:     Denies: obsessions, checking, symmetry, cleaning, skin picking.  ED:     Denies: restriction, binging, purging.     Denied any Symptoms of attention deficit disorder include a failure to pay attention to detail, a pattern of careless mistakes, a pattern of inattentive listening, a failure to follow through with projects, poor personal organization, losing necessary objects, distractibility, forgetfulness.     Denied any Symptoms of borderline personality disorder include a fear of abandonment, unstable self-image, impulsive behavior, dissociative feeling, intense anger, unstable personal relationships, chronic feelings of boredom, periods of intense depressed mood.                  PSYCHIATRIC HISTORY            Past court commitments: none  SIB /SUICIDE ATTEMPTS NONE  Psych Hosp :none  Outpatient Programs none  Inpatient cd trt 2  Out pt cd trt a few did not complete them               SOCIAL HISTORY                                                                         Pt lives with his parents, who are supportive. He also has a sister who is supportive. 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          Family History:   FAMILY HISTORY:   Family History             Family History   Problem Relation Age of Onset     Dementia Paternal Grandfather           Family Mental Health History-  none     Substance Use Problems - none  Hospital course: B12 returned as low and a B12 shot was given.  He completed detox and was discharged to OP care    Current Facility-Administered Medications   Medication      acetaminophen (TYLENOL) tablet 650 mg     alum & mag hydroxide-simethicone (MAALOX) suspension 30 mL     atenolol (TENORMIN) tablet 50 mg     cyanocobalamin injection 1,000 mcg     diazepam (VALIUM) tablet 5-20 mg     folic acid (FOLVITE) tablet 1 mg     gabapentin (NEURONTIN) capsule 400 mg     hydrOXYzine (ATARAX) tablet 25 mg     loperamide (IMODIUM) capsule 2 mg     multivitamin w/minerals (THERA-VIT-M) tablet 1 tablet     nicotine (NICORETTE) gum 4 mg     ondansetron (ZOFRAN-ODT) ODT tab 4 mg     prochlorperazine (COMPAZINE) tablet 10 mg     senna-docusate (SENOKOT-S/PERICOLACE) 8.6-50 MG per tablet 1 tablet     thiamine (B-1) tablet 100 mg     traZODone (DESYREL) tablet 50 mg     Recent Results (from the past 168 hour(s))   Alcohol breath test POCT    Collection Time: 09/05/21  7:17 PM   Result Value Ref Range    Alcohol Breath Test 0.271 (A) 0.00 - 0.01   Drug abuse screen 1 urine (ED)    Collection Time: 09/05/21  7:37 PM   Result Value Ref Range    Amphetamines Urine Screen Negative Screen Negative    Barbiturates Urine Screen Negative Screen Negative    Benzodiazepines Urine Screen Negative Screen Negative    Cannabinoids Urine Screen Positive (A) Screen Negative    Cocaine Urine Screen Negative Screen Negative    Opiates Urine Screen Negative Screen Negative   Comprehensive metabolic panel    Collection Time: 09/05/21  8:37 PM   Result Value Ref Range    Sodium 139 133 - 144 mmol/L    Potassium 3.6 3.4 - 5.3 mmol/L    Chloride 106 94 - 109 mmol/L    Carbon Dioxide (CO2) 27 20 - 32 mmol/L    Anion Gap 6 3 - 14 mmol/L    Urea Nitrogen 15 7 - 30 mg/dL    Creatinine 1.11 0.66 - 1.25 mg/dL    Calcium 8.6 8.5 - 10.1 mg/dL    Glucose 112 (H) 70 - 99 mg/dL    Alkaline Phosphatase 69 40 - 150 U/L    AST 46 (H) 0 - 45 U/L    ALT 56 0 - 70 U/L    Protein Total 7.7 6.8 - 8.8 g/dL    Albumin 4.2 3.4 - 5.0 g/dL    Bilirubin Total 0.3 0.2 - 1.3 mg/dL    GFR Estimate >90 >60 mL/min/1.73m2   CBC with platelets and  differential    Collection Time: 09/05/21  8:37 PM   Result Value Ref Range    WBC Count 7.6 4.0 - 11.0 10e3/uL    RBC Count 5.27 4.40 - 5.90 10e6/uL    Hemoglobin 15.7 13.3 - 17.7 g/dL    Hematocrit 45.3 40.0 - 53.0 %    MCV 86 78 - 100 fL    MCH 29.8 26.5 - 33.0 pg    MCHC 34.7 31.5 - 36.5 g/dL    RDW 12.3 10.0 - 15.0 %    Platelet Count 222 150 - 450 10e3/uL    % Neutrophils 64 %    % Lymphocytes 28 %    % Monocytes 7 %    % Eosinophils 0 %    % Basophils 1 %    % Immature Granulocytes 0 %    NRBCs per 100 WBC 0 <1 /100    Absolute Neutrophils 4.9 1.6 - 8.3 10e3/uL    Absolute Lymphocytes 2.1 0.8 - 5.3 10e3/uL    Absolute Monocytes 0.5 0.0 - 1.3 10e3/uL    Absolute Eosinophils 0.0 0.0 - 0.7 10e3/uL    Absolute Basophils 0.1 0.0 - 0.2 10e3/uL    Absolute Immature Granulocytes 0.0 <=0.0 10e3/uL    Absolute NRBCs 0.0 10e3/uL   GGT    Collection Time: 09/05/21  8:37 PM   Result Value Ref Range     (H) 0 - 75 U/L   TSH with free T4 reflex    Collection Time: 09/05/21  8:37 PM   Result Value Ref Range    TSH 0.93 0.40 - 4.00 mU/L   Vitamin B12    Collection Time: 09/05/21  8:37 PM   Result Value Ref Range    Vitamin B12 158 (L) 193 - 986 pg/mL   Lipid panel    Collection Time: 09/05/21  8:37 PM   Result Value Ref Range    Cholesterol 180 <200 mg/dL    Triglycerides 588 (H) <150 mg/dL    Direct Measure HDL 68 >=40 mg/dL    LDL Cholesterol Calculated      Non HDL Cholesterol 112 <130 mg/dL   Asymptomatic COVID-19 Virus (Coronavirus) by PCR Nasopharyngeal    Collection Time: 09/05/21  9:16 PM    Specimen: Nasopharyngeal; Swab   Result Value Ref Range    SARS CoV2 PCR Negative Negative     Patient Active Problem List   Diagnosis     Alcohol withdrawal, uncomplicated (H)     Chemical dependency (H)     Alcohol dependence with intoxication with complication (H)     Alcohol abuse   see below, for discharge diagnosis  9/8:  General appearance: good  Alert.   Affect: fair  Mood: fair    Speech:  normal.   Eye contact:   "good.    Psychomotor behavior: normal  Gait: normal.    Abnormal movements: none  Delusions: none  Hallucinations:  none  Thoughts: logical  Associations: intact  Judgement: good  Insight: good  Cognitions: intact in conversation  Memory:  intact in conversation  Orientation: normal    Not suicidal.  9/8: Blood pressure (!) 133/91, pulse 79, temperature 97.8  F (36.6  C), temperature source Temporal, resp. rate 16, height 1.956 m (6' 5\"), weight 135.6 kg (299 lb), SpO2 98 %.    Diagnosis: alcohol use disorder, recurrent, severe, complicated by high blood pressure  2.  Nicotine dependence.  Counseling occurred  "

## 2021-09-08 NOTE — PLAN OF CARE
"Pt observed out in the milieu, social to peers and staff. Presents with full range affect, in a calm mood.   Pt denies SI/SIB/HI. Denies experiencing any type of hallucinations. Claimed having \"minimal\" anxiety, rated it as 4/10. Denies having depression.   Consumed 100% of share of dinner and took approximately 1,000 ml of fluids.   Due medications given. Denies experiencing side effects.  PRN Trazodone to aid for sleep given per request.   MSSA scores: 5 and 4. No Valium given this shift.   Needs attended to. On withdrawal precautions. Staff will continue to monitor. No further concerns noted as of this time  Problem: Substance Withdrawal  Goal: Substance Withdrawal  Description: Signs and symptoms of listed problems will be absent or manageable.  Outcome: Improving     "

## 2021-09-08 NOTE — PROGRESS NOTES
Case Management Note  9/8/2021    Met with pt, completed CD assessment. TANVIR signed, referral sent to [facility]. CPA for [facility] sent to [facility] for funding. AVS updated.     Leann Schuster, [facility], [facility]

## 2021-10-31 ENCOUNTER — APPOINTMENT (OUTPATIENT)
Dept: ULTRASOUND IMAGING | Facility: CLINIC | Age: 26
End: 2021-10-31
Attending: EMERGENCY MEDICINE
Payer: COMMERCIAL

## 2021-10-31 ENCOUNTER — APPOINTMENT (OUTPATIENT)
Dept: CT IMAGING | Facility: CLINIC | Age: 26
End: 2021-10-31
Attending: EMERGENCY MEDICINE
Payer: COMMERCIAL

## 2021-10-31 ENCOUNTER — HOSPITAL ENCOUNTER (INPATIENT)
Facility: CLINIC | Age: 26
LOS: 3 days | Discharge: HOME | End: 2021-11-03
Attending: INTERNAL MEDICINE | Admitting: INTERNAL MEDICINE
Payer: COMMERCIAL

## 2021-10-31 ENCOUNTER — HOSPITAL ENCOUNTER (EMERGENCY)
Facility: CLINIC | Age: 26
Discharge: ANOTHER HOSPITAL | End: 2021-10-31
Attending: EMERGENCY MEDICINE | Admitting: EMERGENCY MEDICINE
Payer: COMMERCIAL

## 2021-10-31 VITALS
BODY MASS INDEX: 34.15 KG/M2 | SYSTOLIC BLOOD PRESSURE: 122 MMHG | TEMPERATURE: 98.6 F | HEART RATE: 125 BPM | RESPIRATION RATE: 18 BRPM | OXYGEN SATURATION: 96 % | WEIGHT: 288 LBS | DIASTOLIC BLOOD PRESSURE: 81 MMHG

## 2021-10-31 DIAGNOSIS — I82.411 ACUTE DEEP VEIN THROMBOSIS (DVT) OF FEMORAL VEIN OF RIGHT LOWER EXTREMITY (H): ICD-10-CM

## 2021-10-31 DIAGNOSIS — R00.0 TACHYCARDIA, UNSPECIFIED: ICD-10-CM

## 2021-10-31 DIAGNOSIS — Z11.52 ENCOUNTER FOR SCREENING LABORATORY TESTING FOR SEVERE ACUTE RESPIRATORY SYNDROME CORONAVIRUS 2 (SARS-COV-2): ICD-10-CM

## 2021-10-31 DIAGNOSIS — F10.10 ALCOHOL ABUSE: ICD-10-CM

## 2021-10-31 DIAGNOSIS — I51.9 LV DYSFUNCTION: ICD-10-CM

## 2021-10-31 DIAGNOSIS — I26.94 MULTIPLE SUBSEGMENTAL PULMONARY EMBOLI WITHOUT ACUTE COR PULMONALE (H): Primary | ICD-10-CM

## 2021-10-31 DIAGNOSIS — I26.99 BILATERAL PULMONARY EMBOLISM (H): ICD-10-CM

## 2021-10-31 LAB
ANION GAP SERPL CALCULATED.3IONS-SCNC: 6 MMOL/L (ref 3–14)
APTT PPP: 29 SECONDS (ref 22–38)
BASOPHILS # BLD AUTO: 0 10E3/UL (ref 0–0.2)
BASOPHILS NFR BLD AUTO: 0 %
BUN SERPL-MCNC: 10 MG/DL (ref 7–30)
CALCIUM SERPL-MCNC: 8.5 MG/DL (ref 8.5–10.1)
CHLORIDE BLD-SCNC: 107 MMOL/L (ref 94–109)
CO2 SERPL-SCNC: 22 MMOL/L (ref 20–32)
CREAT SERPL-MCNC: 0.78 MG/DL (ref 0.66–1.25)
CRP SERPL-MCNC: 71 MG/L (ref 0–8)
EOSINOPHIL # BLD AUTO: 0.2 10E3/UL (ref 0–0.7)
EOSINOPHIL NFR BLD AUTO: 2 %
ERYTHROCYTE [DISTWIDTH] IN BLOOD BY AUTOMATED COUNT: 13.9 % (ref 10–15)
ERYTHROCYTE [DISTWIDTH] IN BLOOD BY AUTOMATED COUNT: 14 % (ref 10–15)
GFR SERPL CREATININE-BSD FRML MDRD: >90 ML/MIN/1.73M2
GLUCOSE BLD-MCNC: 99 MG/DL (ref 70–99)
GLUCOSE BLDC GLUCOMTR-MCNC: 140 MG/DL (ref 70–99)
HCT VFR BLD AUTO: 37.1 % (ref 40–53)
HCT VFR BLD AUTO: 38.4 % (ref 40–53)
HGB BLD-MCNC: 12.7 G/DL (ref 13.3–17.7)
HGB BLD-MCNC: 13.3 G/DL (ref 13.3–17.7)
IMM GRANULOCYTES # BLD: 0 10E3/UL
IMM GRANULOCYTES NFR BLD: 0 %
INR PPP: 1.04 (ref 0.86–1.14)
LYMPHOCYTES # BLD AUTO: 1.7 10E3/UL (ref 0.8–5.3)
LYMPHOCYTES NFR BLD AUTO: 17 %
MCH RBC QN AUTO: 30.8 PG (ref 26.5–33)
MCH RBC QN AUTO: 30.9 PG (ref 26.5–33)
MCHC RBC AUTO-ENTMCNC: 34.2 G/DL (ref 31.5–36.5)
MCHC RBC AUTO-ENTMCNC: 34.6 G/DL (ref 31.5–36.5)
MCV RBC AUTO: 89 FL (ref 78–100)
MCV RBC AUTO: 90 FL (ref 78–100)
MONOCYTES # BLD AUTO: 0.4 10E3/UL (ref 0–1.3)
MONOCYTES NFR BLD AUTO: 4 %
NEUTROPHILS # BLD AUTO: 7.3 10E3/UL (ref 1.6–8.3)
NEUTROPHILS NFR BLD AUTO: 77 %
NRBC # BLD AUTO: 0 10E3/UL
NRBC BLD AUTO-RTO: 0 /100
NT-PROBNP SERPL-MCNC: 122 PG/ML (ref 0–450)
PLATELET # BLD AUTO: 240 10E3/UL (ref 150–450)
PLATELET # BLD AUTO: 244 10E3/UL (ref 150–450)
POTASSIUM BLD-SCNC: 3.8 MMOL/L (ref 3.4–5.3)
RBC # BLD AUTO: 4.13 10E6/UL (ref 4.4–5.9)
RBC # BLD AUTO: 4.3 10E6/UL (ref 4.4–5.9)
SARS-COV-2 RNA RESP QL NAA+PROBE: NEGATIVE
SODIUM SERPL-SCNC: 135 MMOL/L (ref 133–144)
TROPONIN I SERPL-MCNC: <0.015 UG/L (ref 0–0.04)
WBC # BLD AUTO: 10.3 10E3/UL (ref 4–11)
WBC # BLD AUTO: 9.7 10E3/UL (ref 4–11)

## 2021-10-31 PROCEDURE — 85025 COMPLETE CBC W/AUTO DIFF WBC: CPT | Performed by: EMERGENCY MEDICINE

## 2021-10-31 PROCEDURE — 84484 ASSAY OF TROPONIN QUANT: CPT | Performed by: EMERGENCY MEDICINE

## 2021-10-31 PROCEDURE — 99222 1ST HOSP IP/OBS MODERATE 55: CPT | Mod: AI | Performed by: INTERNAL MEDICINE

## 2021-10-31 PROCEDURE — U0005 INFEC AGEN DETEC AMPLI PROBE: HCPCS | Performed by: EMERGENCY MEDICINE

## 2021-10-31 PROCEDURE — 96365 THER/PROPH/DIAG IV INF INIT: CPT | Mod: 59

## 2021-10-31 PROCEDURE — 85027 COMPLETE CBC AUTOMATED: CPT | Mod: 59 | Performed by: EMERGENCY MEDICINE

## 2021-10-31 PROCEDURE — 99291 CRITICAL CARE FIRST HOUR: CPT | Mod: 25 | Performed by: EMERGENCY MEDICINE

## 2021-10-31 PROCEDURE — 250N000011 HC RX IP 250 OP 636: Performed by: EMERGENCY MEDICINE

## 2021-10-31 PROCEDURE — 85610 PROTHROMBIN TIME: CPT | Performed by: EMERGENCY MEDICINE

## 2021-10-31 PROCEDURE — 71275 CT ANGIOGRAPHY CHEST: CPT

## 2021-10-31 PROCEDURE — 200N000001 HC R&B ICU

## 2021-10-31 PROCEDURE — 96376 TX/PRO/DX INJ SAME DRUG ADON: CPT

## 2021-10-31 PROCEDURE — 83880 ASSAY OF NATRIURETIC PEPTIDE: CPT | Performed by: EMERGENCY MEDICINE

## 2021-10-31 PROCEDURE — 36415 COLL VENOUS BLD VENIPUNCTURE: CPT | Performed by: EMERGENCY MEDICINE

## 2021-10-31 PROCEDURE — C9803 HOPD COVID-19 SPEC COLLECT: HCPCS

## 2021-10-31 PROCEDURE — 250N000009 HC RX 250: Performed by: EMERGENCY MEDICINE

## 2021-10-31 PROCEDURE — 93971 EXTREMITY STUDY: CPT | Mod: RT

## 2021-10-31 PROCEDURE — 93005 ELECTROCARDIOGRAM TRACING: CPT

## 2021-10-31 PROCEDURE — 99292 CRITICAL CARE ADDL 30 MIN: CPT | Mod: 25 | Performed by: EMERGENCY MEDICINE

## 2021-10-31 PROCEDURE — 99285 EMERGENCY DEPT VISIT HI MDM: CPT | Mod: 25

## 2021-10-31 PROCEDURE — 82962 GLUCOSE BLOOD TEST: CPT

## 2021-10-31 PROCEDURE — 93010 ELECTROCARDIOGRAM REPORT: CPT | Performed by: EMERGENCY MEDICINE

## 2021-10-31 PROCEDURE — 85730 THROMBOPLASTIN TIME PARTIAL: CPT | Performed by: EMERGENCY MEDICINE

## 2021-10-31 PROCEDURE — 86140 C-REACTIVE PROTEIN: CPT | Performed by: EMERGENCY MEDICINE

## 2021-10-31 PROCEDURE — 82565 ASSAY OF CREATININE: CPT | Performed by: EMERGENCY MEDICINE

## 2021-10-31 RX ORDER — NALOXONE HYDROCHLORIDE 0.4 MG/ML
0.4 INJECTION, SOLUTION INTRAMUSCULAR; INTRAVENOUS; SUBCUTANEOUS
Status: DISCONTINUED | OUTPATIENT
Start: 2021-10-31 | End: 2021-11-03 | Stop reason: HOSPADM

## 2021-10-31 RX ORDER — NALOXONE HYDROCHLORIDE 0.4 MG/ML
0.2 INJECTION, SOLUTION INTRAMUSCULAR; INTRAVENOUS; SUBCUTANEOUS
Status: DISCONTINUED | OUTPATIENT
Start: 2021-10-31 | End: 2021-11-03 | Stop reason: HOSPADM

## 2021-10-31 RX ORDER — AMPICILLIN AND SULBACTAM 2; 1 G/1; G/1
3 INJECTION, POWDER, FOR SOLUTION INTRAMUSCULAR; INTRAVENOUS EVERY 6 HOURS
Status: DISCONTINUED | OUTPATIENT
Start: 2021-10-31 | End: 2021-11-03 | Stop reason: HOSPADM

## 2021-10-31 RX ORDER — FOLIC ACID 1 MG/1
1 TABLET ORAL DAILY
Status: DISCONTINUED | OUTPATIENT
Start: 2021-11-01 | End: 2021-11-03 | Stop reason: HOSPADM

## 2021-10-31 RX ORDER — LIDOCAINE 40 MG/G
CREAM TOPICAL
Status: DISCONTINUED | OUTPATIENT
Start: 2021-10-31 | End: 2021-10-31

## 2021-10-31 RX ORDER — POLYETHYLENE GLYCOL 3350 17 G/17G
17 POWDER, FOR SOLUTION ORAL DAILY PRN
Status: DISCONTINUED | OUTPATIENT
Start: 2021-10-31 | End: 2021-11-03 | Stop reason: HOSPADM

## 2021-10-31 RX ORDER — ONDANSETRON 2 MG/ML
4 INJECTION INTRAMUSCULAR; INTRAVENOUS EVERY 6 HOURS PRN
Status: DISCONTINUED | OUTPATIENT
Start: 2021-10-31 | End: 2021-11-03 | Stop reason: HOSPADM

## 2021-10-31 RX ORDER — APIXABAN 5 MG (74)
KIT ORAL
Qty: 74 EACH | Refills: 0 | Status: SHIPPED | OUTPATIENT
Start: 2021-10-31 | End: 2021-10-31

## 2021-10-31 RX ORDER — HEPARIN SODIUM 10000 [USP'U]/100ML
0-5000 INJECTION, SOLUTION INTRAVENOUS CONTINUOUS
Status: DISCONTINUED | OUTPATIENT
Start: 2021-10-31 | End: 2021-10-31 | Stop reason: HOSPADM

## 2021-10-31 RX ORDER — DIAZEPAM 5 MG
10 TABLET ORAL EVERY 30 MIN PRN
Status: DISCONTINUED | OUTPATIENT
Start: 2021-10-31 | End: 2021-11-03 | Stop reason: HOSPADM

## 2021-10-31 RX ORDER — MULTIPLE VITAMINS W/ MINERALS TAB 9MG-400MCG
1 TAB ORAL DAILY
Status: DISCONTINUED | OUTPATIENT
Start: 2021-11-01 | End: 2021-11-03 | Stop reason: HOSPADM

## 2021-10-31 RX ORDER — HEPARIN SODIUM 10000 [USP'U]/100ML
0-5000 INJECTION, SOLUTION INTRAVENOUS CONTINUOUS
Status: DISCONTINUED | OUTPATIENT
Start: 2021-10-31 | End: 2021-11-02

## 2021-10-31 RX ORDER — HALOPERIDOL 5 MG/ML
2.5-5 INJECTION INTRAMUSCULAR EVERY 6 HOURS PRN
Status: DISCONTINUED | OUTPATIENT
Start: 2021-10-31 | End: 2021-11-03 | Stop reason: HOSPADM

## 2021-10-31 RX ORDER — LANOLIN ALCOHOL/MO/W.PET/CERES
100 CREAM (GRAM) TOPICAL DAILY
Status: DISCONTINUED | OUTPATIENT
Start: 2021-11-01 | End: 2021-11-03 | Stop reason: HOSPADM

## 2021-10-31 RX ORDER — PROCHLORPERAZINE MALEATE 5 MG
10 TABLET ORAL EVERY 6 HOURS PRN
Status: DISCONTINUED | OUTPATIENT
Start: 2021-10-31 | End: 2021-11-03 | Stop reason: HOSPADM

## 2021-10-31 RX ORDER — OLANZAPINE 5 MG/1
5-10 TABLET, ORALLY DISINTEGRATING ORAL EVERY 6 HOURS PRN
Status: DISCONTINUED | OUTPATIENT
Start: 2021-10-31 | End: 2021-11-03 | Stop reason: HOSPADM

## 2021-10-31 RX ORDER — ONDANSETRON 4 MG/1
4 TABLET, ORALLY DISINTEGRATING ORAL EVERY 6 HOURS PRN
Status: DISCONTINUED | OUTPATIENT
Start: 2021-10-31 | End: 2021-11-03 | Stop reason: HOSPADM

## 2021-10-31 RX ORDER — ACETAMINOPHEN 650 MG/1
650 SUPPOSITORY RECTAL EVERY 6 HOURS PRN
Status: DISCONTINUED | OUTPATIENT
Start: 2021-10-31 | End: 2021-11-03 | Stop reason: HOSPADM

## 2021-10-31 RX ORDER — LIDOCAINE 40 MG/G
CREAM TOPICAL
Status: DISCONTINUED | OUTPATIENT
Start: 2021-10-31 | End: 2021-11-03 | Stop reason: HOSPADM

## 2021-10-31 RX ORDER — PROCHLORPERAZINE 25 MG
25 SUPPOSITORY, RECTAL RECTAL EVERY 12 HOURS PRN
Status: DISCONTINUED | OUTPATIENT
Start: 2021-10-31 | End: 2021-11-03 | Stop reason: HOSPADM

## 2021-10-31 RX ORDER — DIAZEPAM 10 MG/2ML
5-10 INJECTION, SOLUTION INTRAMUSCULAR; INTRAVENOUS EVERY 30 MIN PRN
Status: DISCONTINUED | OUTPATIENT
Start: 2021-10-31 | End: 2021-11-03 | Stop reason: HOSPADM

## 2021-10-31 RX ORDER — FLUMAZENIL 0.1 MG/ML
0.2 INJECTION, SOLUTION INTRAVENOUS
Status: DISCONTINUED | OUTPATIENT
Start: 2021-10-31 | End: 2021-11-03 | Stop reason: HOSPADM

## 2021-10-31 RX ORDER — ACETAMINOPHEN 325 MG/1
650 TABLET ORAL EVERY 6 HOURS PRN
Status: DISCONTINUED | OUTPATIENT
Start: 2021-10-31 | End: 2021-11-03 | Stop reason: HOSPADM

## 2021-10-31 RX ORDER — OXYCODONE HYDROCHLORIDE 5 MG/1
5 TABLET ORAL EVERY 4 HOURS PRN
Status: DISCONTINUED | OUTPATIENT
Start: 2021-10-31 | End: 2021-11-01

## 2021-10-31 RX ORDER — IOPAMIDOL 755 MG/ML
100 INJECTION, SOLUTION INTRAVASCULAR ONCE
Status: COMPLETED | OUTPATIENT
Start: 2021-10-31 | End: 2021-10-31

## 2021-10-31 RX ADMIN — IOPAMIDOL 77 ML: 755 INJECTION, SOLUTION INTRAVENOUS at 17:53

## 2021-10-31 RX ADMIN — HEPARIN SODIUM 1800 UNITS/HR: 10000 INJECTION, SOLUTION INTRAVENOUS at 19:13

## 2021-10-31 RX ADMIN — SODIUM CHLORIDE 90 ML: 9 INJECTION, SOLUTION INTRAVENOUS at 17:54

## 2021-10-31 ASSESSMENT — MIFFLIN-ST. JEOR: SCORE: 2412.25

## 2021-10-31 ASSESSMENT — ACTIVITIES OF DAILY LIVING (ADL): ADLS_ACUITY_SCORE: 7

## 2021-10-31 NOTE — ED TRIAGE NOTES
Pt was in [place] and developed an infection in his right leg and was started on antibiotic.  Pt states the infection is not getting better and the pain is getting worse.

## 2021-11-01 ENCOUNTER — APPOINTMENT (OUTPATIENT)
Dept: CARDIOLOGY | Facility: CLINIC | Age: 26
End: 2021-11-01
Attending: PHYSICIAN ASSISTANT
Payer: COMMERCIAL

## 2021-11-01 LAB
ALBUMIN SERPL-MCNC: 3 G/DL (ref 3.4–5)
ALP SERPL-CCNC: 80 U/L (ref 40–150)
ALT SERPL W P-5'-P-CCNC: 47 U/L (ref 0–70)
ANION GAP SERPL CALCULATED.3IONS-SCNC: 7 MMOL/L (ref 3–14)
AST SERPL W P-5'-P-CCNC: 37 U/L (ref 0–45)
ATRIAL RATE - MUSE: 109 BPM
BI-PLANE LVEF ECHO: NORMAL
BILIRUB SERPL-MCNC: 0.5 MG/DL (ref 0.2–1.3)
BUN SERPL-MCNC: 14 MG/DL (ref 7–30)
CALCIUM SERPL-MCNC: 8.1 MG/DL (ref 8.5–10.1)
CHLORIDE BLD-SCNC: 105 MMOL/L (ref 94–109)
CO2 SERPL-SCNC: 24 MMOL/L (ref 20–32)
CREAT SERPL-MCNC: 0.84 MG/DL (ref 0.66–1.25)
D DIMER PPP FEU-MCNC: 2.65 UG/ML FEU (ref 0–0.5)
DIASTOLIC BLOOD PRESSURE - MUSE: NORMAL MMHG
ERYTHROCYTE [DISTWIDTH] IN BLOOD BY AUTOMATED COUNT: 14.2 % (ref 10–15)
GFR SERPL CREATININE-BSD FRML MDRD: >90 ML/MIN/1.73M2
GLUCOSE BLD-MCNC: 114 MG/DL (ref 70–99)
GLUCOSE BLDC GLUCOMTR-MCNC: 105 MG/DL (ref 70–99)
GLUCOSE BLDC GLUCOMTR-MCNC: 88 MG/DL (ref 70–99)
HBA1C MFR BLD: 5.6 % (ref 0–5.6)
HCT VFR BLD AUTO: 34.7 % (ref 40–53)
HGB BLD-MCNC: 11.9 G/DL (ref 13.3–17.7)
INTERPRETATION ECG - MUSE: NORMAL
LACTATE SERPL-SCNC: 0.5 MMOL/L (ref 0.7–2)
LVEF ECHO: NORMAL
MAGNESIUM SERPL-MCNC: 2.6 MG/DL (ref 1.6–2.3)
MCH RBC QN AUTO: 30.9 PG (ref 26.5–33)
MCHC RBC AUTO-ENTMCNC: 34.3 G/DL (ref 31.5–36.5)
MCV RBC AUTO: 90 FL (ref 78–100)
P AXIS - MUSE: 55 DEGREES
PHOSPHATE SERPL-MCNC: 3.7 MG/DL (ref 2.5–4.5)
PLATELET # BLD AUTO: 264 10E3/UL (ref 150–450)
POTASSIUM BLD-SCNC: 3.7 MMOL/L (ref 3.4–5.3)
PR INTERVAL - MUSE: 136 MS
PROT SERPL-MCNC: 6.8 G/DL (ref 6.8–8.8)
QRS DURATION - MUSE: 82 MS
QT - MUSE: 352 MS
QTC - MUSE: 474 MS
R AXIS - MUSE: 49 DEGREES
RBC # BLD AUTO: 3.85 10E6/UL (ref 4.4–5.9)
SODIUM SERPL-SCNC: 136 MMOL/L (ref 133–144)
SYSTOLIC BLOOD PRESSURE - MUSE: NORMAL MMHG
T AXIS - MUSE: 40 DEGREES
UFH PPP CHRO-ACNC: 0.28 IU/ML
UFH PPP CHRO-ACNC: 0.29 IU/ML
UFH PPP CHRO-ACNC: 0.34 IU/ML
UFH PPP CHRO-ACNC: 0.45 IU/ML
VENTRICULAR RATE- MUSE: 109 BPM
WBC # BLD AUTO: 8.9 10E3/UL (ref 4–11)

## 2021-11-01 PROCEDURE — 36415 COLL VENOUS BLD VENIPUNCTURE: CPT | Performed by: INTERNAL MEDICINE

## 2021-11-01 PROCEDURE — 99222 1ST HOSP IP/OBS MODERATE 55: CPT | Mod: 25 | Performed by: INTERNAL MEDICINE

## 2021-11-01 PROCEDURE — 85379 FIBRIN DEGRADATION QUANT: CPT | Performed by: STUDENT IN AN ORGANIZED HEALTH CARE EDUCATION/TRAINING PROGRAM

## 2021-11-01 PROCEDURE — 83735 ASSAY OF MAGNESIUM: CPT | Performed by: INTERNAL MEDICINE

## 2021-11-01 PROCEDURE — 250N000009 HC RX 250: Performed by: INTERNAL MEDICINE

## 2021-11-01 PROCEDURE — 250N000011 HC RX IP 250 OP 636: Performed by: INTERNAL MEDICINE

## 2021-11-01 PROCEDURE — 99233 SBSQ HOSP IP/OBS HIGH 50: CPT | Performed by: INTERNAL MEDICINE

## 2021-11-01 PROCEDURE — 85520 HEPARIN ASSAY: CPT | Performed by: INTERNAL MEDICINE

## 2021-11-01 PROCEDURE — 999N000208 ECHOCARDIOGRAM COMPLETE

## 2021-11-01 PROCEDURE — 84100 ASSAY OF PHOSPHORUS: CPT | Performed by: INTERNAL MEDICINE

## 2021-11-01 PROCEDURE — 250N000013 HC RX MED GY IP 250 OP 250 PS 637: Performed by: INTERNAL MEDICINE

## 2021-11-01 PROCEDURE — C9113 INJ PANTOPRAZOLE SODIUM, VIA: HCPCS | Performed by: INTERNAL MEDICINE

## 2021-11-01 PROCEDURE — 83605 ASSAY OF LACTIC ACID: CPT | Performed by: STUDENT IN AN ORGANIZED HEALTH CARE EDUCATION/TRAINING PROGRAM

## 2021-11-01 PROCEDURE — 80053 COMPREHEN METABOLIC PANEL: CPT | Performed by: INTERNAL MEDICINE

## 2021-11-01 PROCEDURE — 83036 HEMOGLOBIN GLYCOSYLATED A1C: CPT | Performed by: INTERNAL MEDICINE

## 2021-11-01 PROCEDURE — 210N000002 HC R&B HEART CARE

## 2021-11-01 PROCEDURE — 36415 COLL VENOUS BLD VENIPUNCTURE: CPT | Performed by: STUDENT IN AN ORGANIZED HEALTH CARE EDUCATION/TRAINING PROGRAM

## 2021-11-01 PROCEDURE — 258N000003 HC RX IP 258 OP 636: Performed by: INTERNAL MEDICINE

## 2021-11-01 PROCEDURE — 93306 TTE W/DOPPLER COMPLETE: CPT | Mod: 26 | Performed by: INTERNAL MEDICINE

## 2021-11-01 PROCEDURE — 255N000002 HC RX 255 OP 636: Performed by: INTERNAL MEDICINE

## 2021-11-01 PROCEDURE — 85027 COMPLETE CBC AUTOMATED: CPT | Performed by: INTERNAL MEDICINE

## 2021-11-01 RX ORDER — HYDROMORPHONE HYDROCHLORIDE 2 MG/1
2 TABLET ORAL
Status: DISCONTINUED | OUTPATIENT
Start: 2021-11-01 | End: 2021-11-03 | Stop reason: HOSPADM

## 2021-11-01 RX ORDER — DIPHENHYDRAMINE HCL 25 MG
25 CAPSULE ORAL EVERY 6 HOURS PRN
Status: DISCONTINUED | OUTPATIENT
Start: 2021-11-01 | End: 2021-11-03 | Stop reason: HOSPADM

## 2021-11-01 RX ORDER — DIPHENHYDRAMINE HYDROCHLORIDE 50 MG/ML
25 INJECTION INTRAMUSCULAR; INTRAVENOUS EVERY 6 HOURS PRN
Status: DISCONTINUED | OUTPATIENT
Start: 2021-11-01 | End: 2021-11-03 | Stop reason: HOSPADM

## 2021-11-01 RX ADMIN — AMPICILLIN SODIUM AND SULBACTAM SODIUM 3 G: 2; 1 INJECTION, POWDER, FOR SOLUTION INTRAMUSCULAR; INTRAVENOUS at 02:47

## 2021-11-01 RX ADMIN — GABAPENTIN 400 MG: 300 CAPSULE ORAL at 21:34

## 2021-11-01 RX ADMIN — FOLIC ACID: 5 INJECTION, SOLUTION INTRAMUSCULAR; INTRAVENOUS; SUBCUTANEOUS at 00:46

## 2021-11-01 RX ADMIN — THIAMINE HCL TAB 100 MG 100 MG: 100 TAB at 09:00

## 2021-11-01 RX ADMIN — PANTOPRAZOLE SODIUM 40 MG: 40 INJECTION, POWDER, FOR SOLUTION INTRAVENOUS at 08:59

## 2021-11-01 RX ADMIN — DIPHENHYDRAMINE HYDROCHLORIDE 25 MG: 25 CAPSULE ORAL at 15:11

## 2021-11-01 RX ADMIN — HEPARIN SODIUM 1950 UNITS/HR: 10000 INJECTION, SOLUTION INTRAVENOUS at 03:46

## 2021-11-01 RX ADMIN — OXYCODONE HYDROCHLORIDE 5 MG: 5 TABLET ORAL at 15:11

## 2021-11-01 RX ADMIN — HEPARIN SODIUM 1950 UNITS/HR: 10000 INJECTION, SOLUTION INTRAVENOUS at 16:10

## 2021-11-01 RX ADMIN — GABAPENTIN 400 MG: 300 CAPSULE ORAL at 00:51

## 2021-11-01 RX ADMIN — ONDANSETRON 4 MG: 4 TABLET, ORALLY DISINTEGRATING ORAL at 09:00

## 2021-11-01 RX ADMIN — DIAZEPAM 10 MG: 5 TABLET ORAL at 21:34

## 2021-11-01 RX ADMIN — ACETAMINOPHEN 650 MG: 325 TABLET, FILM COATED ORAL at 21:34

## 2021-11-01 RX ADMIN — OXYCODONE HYDROCHLORIDE 5 MG: 5 TABLET ORAL at 07:49

## 2021-11-01 RX ADMIN — MULTIPLE VITAMINS W/ MINERALS TAB 1 TABLET: TAB at 08:59

## 2021-11-01 RX ADMIN — AMPICILLIN SODIUM AND SULBACTAM SODIUM 3 G: 2; 1 INJECTION, POWDER, FOR SOLUTION INTRAMUSCULAR; INTRAVENOUS at 15:10

## 2021-11-01 RX ADMIN — OXYCODONE HYDROCHLORIDE 5 MG: 5 TABLET ORAL at 00:51

## 2021-11-01 RX ADMIN — FOLIC ACID 1 MG: 1 TABLET ORAL at 09:00

## 2021-11-01 RX ADMIN — ACETAMINOPHEN 650 MG: 325 TABLET, FILM COATED ORAL at 13:19

## 2021-11-01 RX ADMIN — ACETAMINOPHEN 650 MG: 325 TABLET, FILM COATED ORAL at 07:49

## 2021-11-01 RX ADMIN — AMPICILLIN SODIUM AND SULBACTAM SODIUM 3 G: 2; 1 INJECTION, POWDER, FOR SOLUTION INTRAMUSCULAR; INTRAVENOUS at 21:34

## 2021-11-01 RX ADMIN — GABAPENTIN 400 MG: 300 CAPSULE ORAL at 09:00

## 2021-11-01 RX ADMIN — GABAPENTIN 400 MG: 300 CAPSULE ORAL at 15:11

## 2021-11-01 RX ADMIN — AMPICILLIN SODIUM AND SULBACTAM SODIUM 3 G: 2; 1 INJECTION, POWDER, FOR SOLUTION INTRAMUSCULAR; INTRAVENOUS at 08:59

## 2021-11-01 RX ADMIN — HUMAN ALBUMIN MICROSPHERES AND PERFLUTREN 9 ML: 10; .22 INJECTION, SOLUTION INTRAVENOUS at 08:25

## 2021-11-01 ASSESSMENT — ACTIVITIES OF DAILY LIVING (ADL)
ADLS_ACUITY_SCORE: 7

## 2021-11-01 ASSESSMENT — MIFFLIN-ST. JEOR: SCORE: 2412.25

## 2021-11-01 NOTE — PROGRESS NOTES
[facility]    Hospitalist Progress Note    Assessment & Plan   Sebastián Nogueira is a 26 year old male admitted on 10/31/2021. He presents as a direct admission from [facility] with complaints of right lower extremity pain and discomfort found to have DVT as well as submassive pulmonary embolism.  Transferred for close monitoring and possible embolectomy     Right lower extremity DVT: Suspect provoked with recent flight to and from [place] as well as a describes cellulitis of right lower extremity.  Patient apparently has been having right lower extremity pain for slightly more than 1 week, preceded flying to [place] on Saturday of last week.  Negative ultrasound of right lower extremity reportedly at [facility] 10/25 and was prescribed 7 days of Keflex for a cellulitis at that time.  Submassive pulmonary embolism: No hypoxia, though tachycardia to the 115 range.  Chest CT with largely right-sided pulmonary embolism, though bilateral emboli.  Some right-sided pulmonary infarct versus aspiration pneumonia.  CT imaging also concerning for right heart strain.  -Echocardiogram results 11/1 shows reduced left ventricular systolic function, EF is 45-50% H, mild global hypokinesia of the left ventricle seen, mildly decreased right ventricular systolic function and dilatation noted.?  Alcohol related LV dysfunction/cardiomyopathy  -Continue on cardiac telemetry now, will request cardiology input regarding the reduced EF .  -Currently on 2 L of oxygen, titrate O2 down to keep sats above 92 percentage, currently on heparin drip, will request pharmacy liaison consult to transition to DOAC's.  -Appreciate IR input, no plan for any procedures noted.       Alcohol dependence: Not currently in withdrawal, though last drink was today prior to admission.  History of alcohol withdrawal.  No longer on Antabuse.   -CIWA protocol in place with Valium protocol as needed  -IV followed by oral  multivitamins  -Continuing prior prescription of gabapentin 400 mg 3 times daily; can adjust this dose if patient develops florid alcohol withdrawal  -IV Protonix daily given history of alcohol abuse with reported melenic stool during February 2021 hospitalization at [facility] secondary to alcoholic gastritis.  -We will monitor here for any active bleeding, hemoglobin did drop from 12.7-11.9 but no active bleeding seen.  If no active bleeding follow-up outpatient with PCP/GI     Pulmonary infarcts versus aspiration pneumonia: Patient with several areas of groundglass opacity involving right upper, lower, and midlung fields.  History of nausea with emesis as well as alcohol use to the point of blacking out several times over the past week.    -Unasyn every 6 hours to treat for aspiration pneumonia, will transition to Augmentin to finish 5-day course.     Hyperglycemia: Blood glucose of 140.  No history of diabetes, though is obese.  -Hemo-globin A1c is 5.6    DVT Prophylaxis: Heparin drip  Code Status: Full Code     Disposition: Expected discharge in 1 to 2 days depending on his oxygen needs, symptoms etc.    Namita Ziegler MD  Text Page   (7am to 6pm)    Interval History   Patient is resting, he is n.p.o. for IR evaluation later today, on 2 L of oxygen, sats are in the 90s, no early signs of alcohol withdrawal yet, monitoring ongoing, discussed about possibility of transfer out to cardiac telemetry if no procedure planned.  On heparin drip.    -Data reviewed today: I reviewed all new labs and imaging results over the last 24 hours. I personally reviewed Labs and imaging below.  Physical Exam   Temp: 98.6  F (37  C) Temp src: Oral BP: 112/71 Pulse: 92   Resp: 12 SpO2: 93 % O2 Device: Nasal cannula Oxygen Delivery: 2 LPM  Vitals:    10/31/21 2200 11/01/21 0000   Weight: 130.9 kg (288 lb 9.3 oz) 130.9 kg (288 lb 9.3 oz)     Vital Signs with Ranges  Temp:  [98.3  F (36.8  C)-98.6  F (37  C)] 98.6  F (37   C)  Pulse:  [] 92  Resp:  [11-28] 12  BP: (105-148)/(70-95) 112/71  SpO2:  [90 %-100 %] 93 %  I/O last 3 completed shifts:  In: 823.33 [I.V.:823.33]  Out: -     Constitutional: Awake, alert, cooperative, no apparent distress  Respiratory: Clear to auscultation bilaterally, no crackles or wheezing  Cardiovascular: Regular rate and rhythm, normal S1 and S2, and no murmur noted  GI: Normal bowel sounds, soft, non-distended, non-tender  Skin/Integumen: Right lower extremity has 1+ edema  Neuro : moving all 4 extremities, no focal deficit noted     Medications     heparin 1,950 Units/hr (11/01/21 0800)     - MEDICATION INSTRUCTIONS -         ampicillin-sulbactam (UNASYN) IV  3 g Intravenous Q6H     folic acid  1 mg Oral Daily     gabapentin  400 mg Oral TID     multivitamin w/minerals  1 tablet Oral Daily     pantoprazole (PROTONIX) IV  40 mg Intravenous Daily with breakfast     sodium chloride (PF)  3 mL Intracatheter Q8H     sodium chloride (PF)  3 mL Intracatheter Q8H     thiamine  100 mg Oral Daily       Data   Recent Labs   Lab 11/01/21  0739 11/01/21  0143 10/31/21  2143 10/31/21  1851 10/31/21  1601   WBC  --  8.9  --  10.3 9.7   HGB  --  11.9*  --  12.7* 13.3   MCV  --  90  --  90 89   PLT  --  264  --  244 240   INR  --   --   --  1.04  --    NA  --  136  --   --  135   POTASSIUM  --  3.7  --   --  3.8   CHLORIDE  --  105  --   --  107   CO2  --  24  --   --  22   BUN  --  14  --   --  10   CR  --  0.84  --   --  0.78   ANIONGAP  --  7  --   --  6   MARILYN  --  8.1*  --   --  8.5   GLC 88 114* 140*  --  99   ALBUMIN  --  3.0*  --   --   --    PROTTOTAL  --  6.8  --   --   --    BILITOTAL  --  0.5  --   --   --    ALKPHOS  --  80  --   --   --    ALT  --  47  --   --   --    AST  --  37  --   --   --    TROPONIN  --   --   --   --  <0.015     Recent Labs   Lab 11/01/21  0739 11/01/21  0143 10/31/21  2143 10/31/21  1601   GLC 88 114* 140* 99       Imaging:   Recent Results (from the past 24 hour(s))   US Lower  Extremity Venous Duplex Right    Narrative    EXAM: US LOWER EXTREMITY VENOUS DUPLEX RIGHT  LOCATION: [facility]  DATE/TIME: 10/31/2021 4:10 PM    INDICATION: Right leg/calf pain and swelling.  COMPARISON: None.  TECHNIQUE: Venous Duplex ultrasound of the right lower extremity with and without compression, augmentation and duplex. Color flow and spectral Doppler with waveform analysis performed.    FINDINGS: Exam includes the common femoral, femoral, popliteal, and contralateral common femoral veins as well as segmentally visualized deep calf veins and greater saphenous vein.     RIGHT: Occlusive DVT extending from posterior tibial and peroneal veins in the calf throughout the popliteal vein, distal and mid femoral vein. Occlusive superficial thrombus noted in the right greater saphenous vein in the mid calf and in lesser   saphenous vein at the popliteal junction. No popliteal cyst.      Impression    IMPRESSION:  1.  Occlusive DVT and superficial thrombus as described.    Results called to Hailey Manzo at 4:57 PM.   CT Chest Pulmonary Embolism w Contrast    Narrative    EXAM: CT CHEST PULMONARY EMBOLISM W CONTRAST  LOCATION: [facility]  DATE/TIME: 10/31/2021 5:18 PM    INDICATION: PE suspected, high prob. Leg swelling.  COMPARISON: None.  TECHNIQUE: CT chest pulmonary angiogram during arterial phase injection of IV contrast. Multiplanar reformats and MIP reconstructions were performed. Dose reduction techniques were used.   CONTRAST: 77 ml Isovue 370    FINDINGS:  ANGIOGRAM CHEST: Extensive acute emboli within the distal right main pulmonary artery and extending into the segmental and subsegmental vessels of the right upper and lower lobes. Additional emboli within the proximal segmental vessels to the left upper   and lower lobes. The right ventricle/left ventricle ratio is slightly greater than 1.0 suggesting right heart  strain. Thoracic aorta is negative for dissection.    LUNGS AND PLEURA: Groundglass opacities within the right upper lobe and right middle lobe. Airspace/groundglass opacity within the right lower lobe posteriorly    MEDIASTINUM/AXILLAE: Prominent mediastinal fat with small scattered lymph nodes.    CORONARY ARTERY CALCIFICATION: None.    UPPER ABDOMEN: Normal.    MUSCULOSKELETAL: Normal.      Impression    IMPRESSION:  1.  Extensive, acute bilateral pulmonary emboli with right heart strain. IR consultation for possible embolectomy recommended.  2.  Airspace opacity right base posteriorly could represent a pulmonary infarct versus pneumonia.  3.  Groundglass opacity right upper lobe concerning for pneumonia. Covid 19 possible.  4.  Results discussed with Dr. Hailey Manzo on 10/31/2021 at 6:20 PM.   Echocardiogram Complete   Result Value    LVEF  45-50%    Biplane LVEF 45%    Narrative    [id number]  [id number]  [id number]  [id number]^LAURI^NIKKY^MARCIAL     [facility]  Echocardiography Laboratory  [address] [place] [address]     Name: STACEY CHAMPION  MRN: [id number]  : 1995  Study Date: 2021 07:56 AM  Age: 26 yrs  Gender: Male  Patient Location: [facility]  Reason For Study: Pulmonary Emboli  Ordering Physician: NIKKY CARREON  Referring Physician: Ton López  Performed By: Mariana Diana     BSA: 2.6 m2  Height: 78 in  Weight: 288 lb  HR: 92  BP: 108/86 mmHg  ______________________________________________________________________________  Procedure  Complete Portable Echo Adult. Optison (NDC #4914-1895) given intravenously.  ______________________________________________________________________________  Interpretation Summary     Left ventricular systolic function is mildly reduced.  The visual ejection fraction is 45-50%. Traced EF 45%.  There is mild global hypokinesia of the left ventricle.  Mildly decreased right ventricular systolic function  The right ventricle is  mildly dilated.  No significant valve dysfunction.  Unable to estimate PA systolic pressure.  The inferior vena cava was normal in size with preserved respiratory  variability.  There is no pericardial effusion.     No prior for comparison.  ______________________________________________________________________________  Left Ventricle  The left ventricle is normal in size. There is mild concentric left  ventricular hypertrophy. The visual ejection fraction is 45-50%. Left  ventricular systolic function is mildly reduced. Left ventricular diastolic  function is normal. Biplane LVEF is 45%. There is mild global hypokinesia of  the left ventricle.     Right Ventricle  The right ventricle is mildly dilated. Mildly decreased right ventricular  systolic function.     Atria  Normal left atrial size. Right atrial size is normal.     Mitral Valve  There is mild mitral annular calcification.     Tricuspid Valve  The tricuspid valve is not well visualized, but is grossly normal. Right  ventricular systolic pressure could not be approximated due to inadequate  tricuspid regurgitation. There is mild (1+) tricuspid regurgitation.     Aortic Valve  The aortic valve is normal in structure and function.     Pulmonic Valve  The pulmonic valve is normal in structure and function. There is mild (1+)  pulmonic valvular regurgitation.     Vessels  The aortic root is normal size. Normal size ascending aorta. The inferior vena  cava was normal in size with preserved respiratory variability.     Pericardium  There is no pericardial effusion.     ______________________________________________________________________________  MMode/2D Measurements & Calculations  IVSd: 1.0 cm  LVIDd: 5.4 cm  LVIDs: 4.2 cm  LVPWd: 1.0 cm  FS: 21.9 %  LV mass(C)d: 208.6 grams  LV mass(C)dI: 79.1 grams/m2     Ao root diam: 3.7 cm  LA dimension: 3.6 cm  asc Aorta Diam: 3.2 cm  LA/Ao: 0.98  LA Volume (BP): 71.5 ml  LA Volume Index (BP): 27.1 ml/m2  RWT:  0.38  TAPSE: 1.7 cm     Doppler Measurements & Calculations  MV E max montrell: 59.5 cm/sec  MV A max montrell: 46.0 cm/sec  MV E/A: 1.3     MV dec time: 0.19 sec  E/E' av.5  Lateral E/e': 4.3  Medial E/e': 6.8     ______________________________________________________________________________  Report approved by: Maryjo Freeman 2021 10:28 AM

## 2021-11-01 NOTE — CONSULTS
INTERVENTIONAL RADIOLOGY CONSULT NOTE    Reason for referral:   Bilateral pulmonary emboli    History:   Sebastián Nogueira is a 26 year old male who presents with leg pain and swelling. Patient recently traveled to [place] and reports one week of leg swelling. He was found to have extensive DVT of his right lower extremity and bilateral pulmonary emboli. IR is consulted for PE thrombectomy. Patient to be started on heparin overnight.     Objective  BP (!) 142/95   Pulse (!) 127   Temp 98.6  F (37  C) (Oral)   Resp 27   Wt 130.6 kg (288 lb)   SpO2 98%   BMI 34.15 kg/m      Labs:  Lab Results   Component Value Date    HGB 12.7 10/31/2021    HGB 14.8 04/26/2021     Lab Results   Component Value Date     10/31/2021     04/26/2021     Lab Results   Component Value Date    WBC 10.3 10/31/2021    WBC 4.7 04/26/2021       Lab Results   Component Value Date    INR 1.04 10/31/2021    INR 0.99 04/26/2021       Lab Results   Component Value Date    PROTTOTAL 7.7 09/05/2021    PROTTOTAL 7.3 04/26/2021      Lab Results   Component Value Date    ALBUMIN 4.2 09/05/2021    ALBUMIN 4.0 04/26/2021     Lab Results   Component Value Date    BILITOTAL 0.3 09/05/2021    BILITOTAL 0.4 04/26/2021     No results found for: BILICONJ   Lab Results   Component Value Date    ALKPHOS 69 09/05/2021    ALKPHOS 68 04/26/2021     Lab Results   Component Value Date    AST 46 09/05/2021    AST 32 04/26/2021     Lab Results   Component Value Date    ALT 56 09/05/2021    ALT 38 04/26/2021       Lab Results   Component Value Date    CR 0.78 10/31/2021    CR 0.77 04/26/2021     Lab Results   Component Value Date    BUN 10 10/31/2021    BUN 7 04/26/2021     COVID negative    Imaging:   US 10/31/21   Occlusive DVT extending from posterior tibial and peroneal veins in the calf throughout the popliteal vein, distal and mid femoral vein. Occlusive superficial thrombus noted in the right greater saphenous vein in the mid calf and in lesser   saphenous vein at the popliteal junction.    CT PE 10/31/21  Extensive, acute bilateral pulmonary emboli with dilated right ventricle suggesting right heart strain.    Assessment:   Sebastián Nogueira is a 26 year old male with extensive DVT of his right lower extremity and bilateral pulmonary emboli. IR is consulted for PE thrombectomy. Patient to be started on heparin overnight. Mild RV dilation on CT but no troponin elevation and patient is hemodynamically stable. Patient appears to have a submassive PE based on CT findings of right heart strain but needs an echocardiogram to confirm.  Assuming there is confirmed right ventricular dilatation on echo, he would be an appropriate candidate for PE thrombectomy. He should remain NPO at midnight and continue to receive heparin.     Plan:   -Pending echocardiogram results, PE thrombectomy will be scheduled for Monday.  -NPO at midnight  -Continue on anticoagulation  -Echocardiogram      Physician Attestation     I, Daphnie Griggs, agree with the findings and plan of care as documented in the note.

## 2021-11-01 NOTE — ED PROVIDER NOTES
[facility] EMERGENCY DEPARTMENT ([facility])     October 31, 2021      ED Provider Note  [facility]      History     Chief Complaint   Patient presents with     Leg Swelling     DIMITRIOS Nogueira is a 26 year old male who presents with leg pain and swelling. Patient reports one week of right lower extremity pain and swelling. He was seen in an ED in [place] (where he was visiting) and had a negative DVT ultrasound. He was discharged home with antibiotics which he states has not helped with his pain. He returned home 2 days ago and had continued deep pain in his right calf. No overlying redness. No fevers or chills. He presented today for reevaluation. He denies any family history of clotting disorders. No recent surgeries.     Past Medical History  Past Medical History:   Diagnosis Date     Alcohol abuse      Hypertriglyceridemia      Past Surgical History:   Procedure Laterality Date     APPENDECTOMY       cyanocobalamin (VITAMIN B-12) 1000 MCG tablet  disulfiram (ANTABUSE) 250 MG tablet  folic acid (FOLVITE) 1 MG tablet  gabapentin (NEURONTIN) 400 MG capsule  multivitamin w/minerals (THERA-VIT-M) tablet  nicotine (NICORETTE) 4 MG gum  thiamine (B-1) 100 MG tablet      No Known Allergies  Family History  Family History   Problem Relation Age of Onset     Dementia Paternal Grandfather      Social History   Social History     Tobacco Use     Smoking status: Former Smoker     Smokeless tobacco: Never Used   Substance Use Topics     Alcohol use: Yes     Comment: 3 times a week     Drug use: No     Comment: vague on the answer      Past medical history, past surgical history, medications, allergies, family history, and social history were reviewed with the patient. No additional pertinent items.       Review of Systems  A complete review of systems was performed with pertinent positives and negatives noted in the HPI, and all other systems negative.    Physical Exam   BP: (!)  140/84  Pulse: (!) 144  Temp: 98.6  F (37  C)  Resp: 20  Weight: 130.6 kg (288 lb)  SpO2: 99 %  Physical Exam  Vitals and nursing note reviewed.   Constitutional:       General: He is not in acute distress.     Appearance: Normal appearance. He is not diaphoretic.   HENT:      Head: Normocephalic and atraumatic.   Eyes:      General: No scleral icterus.     Pupils: Pupils are equal, round, and reactive to light.   Cardiovascular:      Rate and Rhythm: Regular rhythm. Tachycardia present.      Heart sounds: Normal heart sounds.   Pulmonary:      Effort: Tachypnea present. No respiratory distress.      Breath sounds: Normal breath sounds. No wheezing or rhonchi.      Comments: Dyspneic within short sentences.  Abdominal:      General: Bowel sounds are normal.      Palpations: Abdomen is soft.      Tenderness: There is no abdominal tenderness.   Musculoskeletal:         General: Tenderness (right calf) present.      Cervical back: Neck supple.      Right lower leg: Edema present.      Left lower leg: No edema.   Skin:     General: Skin is warm.      Findings: No erythema or rash.   Neurological:      General: No focal deficit present.      Mental Status: He is alert and oriented to person, place, and time.   Psychiatric:         Mood and Affect: Mood normal.         Behavior: Behavior normal.         ED Course      Procedures            EKG Interpretation:      Interpreted by Hailey Manzo DO  Time reviewed: 1900  Symptoms at time of EKG: SOB   Rhythm: sinus tachycardia   Rate: 109 bpm  Axis: normal  Ectopy: none  Conduction: normal  ST Segments/ T Waves: No ST-T wave changes  Q Waves: none  Comparison to prior: No old EKG available    Clinical Impression: sinus tachycardia      Critical Care Addendum    My initial assessment, based on my review of prehospital provider report, review of nursing observations, review of vital signs, focused history, physical exam, review of cardiac rhythm monitor and interpretation of  CT imaging , established that Sebastián Nogueira has submassive pulmonary embolism, which requires immediate intervention, and therefore he is critically ill.     After the initial assessment, the care team initiated multiple lab tests, initiated IV fluid administration, initiated medication therapy with heparin and consulted with PE response team including cardiology, pulmonary medicine, CT surgery, and interventional radiology to provide stabilization care. Due to the critical nature of this patient, I reassessed nursing observations, vital signs, physical exam, review of cardiac rhythm monitor, 12 lead ECG analysis, mental status, neurologic status and respiratory status multiple times prior to his disposition.     Time also spent performing documentation, discussion with family to obtain medical information for decision making, reviewing test results, discussion with consultants and coordination of care.     Critical care time (excluding teaching time and procedures): 75 minutes.        Results for orders placed or performed during the hospital encounter of 10/31/21   US Lower Extremity Venous Duplex Right     Status: None    Narrative    EXAM: US LOWER EXTREMITY VENOUS DUPLEX RIGHT  LOCATION: [facility]  DATE/TIME: 10/31/2021 4:10 PM    INDICATION: Right leg/calf pain and swelling.  COMPARISON: None.  TECHNIQUE: Venous Duplex ultrasound of the right lower extremity with and without compression, augmentation and duplex. Color flow and spectral Doppler with waveform analysis performed.    FINDINGS: Exam includes the common femoral, femoral, popliteal, and contralateral common femoral veins as well as segmentally visualized deep calf veins and greater saphenous vein.     RIGHT: Occlusive DVT extending from posterior tibial and peroneal veins in the calf throughout the popliteal vein, distal and mid femoral vein. Occlusive superficial thrombus noted in the right greater  saphenous vein in the mid calf and in lesser   saphenous vein at the popliteal junction. No popliteal cyst.      Impression    IMPRESSION:  1.  Occlusive DVT and superficial thrombus as described.    Results called to Hailey Manzo at 4:57 PM.   CT Chest Pulmonary Embolism w Contrast     Status: None    Narrative    EXAM: CT CHEST PULMONARY EMBOLISM W CONTRAST  LOCATION: [facility]  DATE/TIME: 10/31/2021 5:18 PM    INDICATION: PE suspected, high prob. Leg swelling.  COMPARISON: None.  TECHNIQUE: CT chest pulmonary angiogram during arterial phase injection of IV contrast. Multiplanar reformats and MIP reconstructions were performed. Dose reduction techniques were used.   CONTRAST: 77 ml Isovue 370    FINDINGS:  ANGIOGRAM CHEST: Extensive acute emboli within the distal right main pulmonary artery and extending into the segmental and subsegmental vessels of the right upper and lower lobes. Additional emboli within the proximal segmental vessels to the left upper   and lower lobes. The right ventricle/left ventricle ratio is slightly greater than 1.0 suggesting right heart strain. Thoracic aorta is negative for dissection.    LUNGS AND PLEURA: Groundglass opacities within the right upper lobe and right middle lobe. Airspace/groundglass opacity within the right lower lobe posteriorly    MEDIASTINUM/AXILLAE: Prominent mediastinal fat with small scattered lymph nodes.    CORONARY ARTERY CALCIFICATION: None.    UPPER ABDOMEN: Normal.    MUSCULOSKELETAL: Normal.      Impression    IMPRESSION:  1.  Extensive, acute bilateral pulmonary emboli with right heart strain. IR consultation for possible embolectomy recommended.  2.  Airspace opacity right base posteriorly could represent a pulmonary infarct versus pneumonia.  3.  Groundglass opacity right upper lobe concerning for pneumonia. Covid 19 possible.  4.  Results discussed with Dr. Hailey Manzo on 10/31/2021 at 6:20 PM.   Basic  metabolic panel     Status: Normal   Result Value Ref Range    Sodium 135 133 - 144 mmol/L    Potassium 3.8 3.4 - 5.3 mmol/L    Chloride 107 94 - 109 mmol/L    Carbon Dioxide (CO2) 22 20 - 32 mmol/L    Anion Gap 6 3 - 14 mmol/L    Urea Nitrogen 10 7 - 30 mg/dL    Creatinine 0.78 0.66 - 1.25 mg/dL    Calcium 8.5 8.5 - 10.1 mg/dL    Glucose 99 70 - 99 mg/dL    GFR Estimate >90 >60 mL/min/1.73m2   CRP inflammation     Status: Abnormal   Result Value Ref Range    CRP Inflammation 71.0 (H) 0.0 - 8.0 mg/L   CBC with platelets and differential     Status: Abnormal   Result Value Ref Range    WBC Count 9.7 4.0 - 11.0 10e3/uL    RBC Count 4.30 (L) 4.40 - 5.90 10e6/uL    Hemoglobin 13.3 13.3 - 17.7 g/dL    Hematocrit 38.4 (L) 40.0 - 53.0 %    MCV 89 78 - 100 fL    MCH 30.9 26.5 - 33.0 pg    MCHC 34.6 31.5 - 36.5 g/dL    RDW 13.9 10.0 - 15.0 %    Platelet Count 240 150 - 450 10e3/uL    % Neutrophils 77 %    % Lymphocytes 17 %    % Monocytes 4 %    % Eosinophils 2 %    % Basophils 0 %    % Immature Granulocytes 0 %    NRBCs per 100 WBC 0 <1 /100    Absolute Neutrophils 7.3 1.6 - 8.3 10e3/uL    Absolute Lymphocytes 1.7 0.8 - 5.3 10e3/uL    Absolute Monocytes 0.4 0.0 - 1.3 10e3/uL    Absolute Eosinophils 0.2 0.0 - 0.7 10e3/uL    Absolute Basophils 0.0 0.0 - 0.2 10e3/uL    Absolute Immature Granulocytes 0.0 <=0.0 10e3/uL    Absolute NRBCs 0.0 10e3/uL   Troponin I     Status: Normal   Result Value Ref Range    Troponin I <0.015 0.000 - 0.045 ug/L   Nt probnp inpatient (BNP)     Status: Normal   Result Value Ref Range    N terminal Pro BNP Inpatient 122 0 - 450 pg/mL   Asymptomatic COVID-19 Virus (Coronavirus) by PCR Nasopharyngeal     Status: Normal    Specimen: Nasopharyngeal; Swab   Result Value Ref Range    SARS CoV2 PCR Negative Negative    Narrative    Testing was performed using the ivana  SARS-CoV-2 & Influenza A/B Assay on the ivana  Mari  System.  This test should be ordered for the detection of SARS-COV-2 in individuals  who meet SARS-CoV-2 clinical and/or epidemiological criteria. Test performance is unknown in asymptomatic patients.  This test is for in vitro diagnostic use under the FDA EUA for laboratories certified under CLIA to perform moderate and/or high complexity testing. This test has not been FDA cleared or approved.  A negative test does not rule out the presence of PCR inhibitors in the specimen or target RNA in concentration below the limit of detection for the assay. The possibility of a false negative should be considered if the patient's recent exposure or clinical presentation suggests COVID-19.  [facility] are certified under the Clinical Laboratory Improvement Amendments of 1988 (CLIA-88) as qualified to perform moderate and/or high complexity laboratory testing.   CBC with platelets     Status: Abnormal   Result Value Ref Range    WBC Count 10.3 4.0 - 11.0 10e3/uL    RBC Count 4.13 (L) 4.40 - 5.90 10e6/uL    Hemoglobin 12.7 (L) 13.3 - 17.7 g/dL    Hematocrit 37.1 (L) 40.0 - 53.0 %    MCV 90 78 - 100 fL    MCH 30.8 26.5 - 33.0 pg    MCHC 34.2 31.5 - 36.5 g/dL    RDW 14.0 10.0 - 15.0 %    Platelet Count 244 150 - 450 10e3/uL   INR     Status: Normal   Result Value Ref Range    INR 1.04 0.86 - 1.14   Partial thromboplastin time     Status: Normal   Result Value Ref Range    aPTT 29 22 - 38 Seconds   EKG 12-lead, tracing only     Status: None (Preliminary result)   Result Value Ref Range    Systolic Blood Pressure  mmHg    Diastolic Blood Pressure  mmHg    Ventricular Rate 109 BPM    Atrial Rate 109 BPM    MI Interval 136 ms    QRS Duration 82 ms     ms    QTc 474 ms    P Axis 55 degrees    R AXIS 49 degrees    T Axis 40 degrees    Interpretation ECG Sinus tachycardia  Otherwise normal ECG      Interventional Radiology Adult/Peds IP Consult: Patient to be seen: Routine within 24 hours; Call back #: Already contacted; Bilateral pulmonary emboli; Requesting provider? Attending physician;  Name: Hailey Manzo     Status: None ()    Bentley Emerson MD     10/31/2021  8:46 PM    INTERVENTIONAL RADIOLOGY CONSULT NOTE    Reason for referral:   Bilateral pulmonary emboli    History:   Sebastián Nogueira is a 26 year old male who presents with leg pain   and swelling. Patient recently traveled to [place] and reports one   week of leg swelling. He was found to have extensive DVT of his   right lower extremity and bilateral pulmonary emboli. IR is   consulted for PE thrombectomy. Patient to be started on heparin   overnight.     Objective  BP (!) 142/95   Pulse (!) 127   Temp 98.6  F (37  C) (Oral)     Resp 27   Wt 130.6 kg (288 lb)   SpO2 98%   BMI 34.15 kg/m      Labs:  Lab Results   Component Value Date    HGB 12.7 10/31/2021    HGB 14.8 04/26/2021     Lab Results   Component Value Date     10/31/2021     04/26/2021     Lab Results   Component Value Date    WBC 10.3 10/31/2021    WBC 4.7 04/26/2021       Lab Results   Component Value Date    INR 1.04 10/31/2021    INR 0.99 04/26/2021       Lab Results   Component Value Date    PROTTOTAL 7.7 09/05/2021    PROTTOTAL 7.3 04/26/2021      Lab Results   Component Value Date    ALBUMIN 4.2 09/05/2021    ALBUMIN 4.0 04/26/2021     Lab Results   Component Value Date    BILITOTAL 0.3 09/05/2021    BILITOTAL 0.4 04/26/2021     No results found for: BILICONJ   Lab Results   Component Value Date    ALKPHOS 69 09/05/2021    ALKPHOS 68 04/26/2021     Lab Results   Component Value Date    AST 46 09/05/2021    AST 32 04/26/2021     Lab Results   Component Value Date    ALT 56 09/05/2021    ALT 38 04/26/2021       Lab Results   Component Value Date    CR 0.78 10/31/2021    CR 0.77 04/26/2021     Lab Results   Component Value Date    BUN 10 10/31/2021    BUN 7 04/26/2021     COVID negative    Imaging:   US 10/31/21   Occlusive DVT extending from posterior tibial and peroneal veins   in the calf throughout the popliteal vein, distal and mid femoral    vein. Occlusive superficial thrombus noted in the right greater   saphenous vein in the mid calf and in lesser  saphenous vein at   the popliteal junction.    CT PE 10/31/21  Extensive, acute bilateral pulmonary emboli with right heart   strain    Assessment:   Sebastián Nogueira is a 26 year old male who presents with leg pain   and swelling. Patient recently traveled to [place] and reports one   week of leg swelling. He was found to have extensive DVT of his   right lower extremity and bilateral pulmonary emboli. IR is   consulted for PE thrombectomy. Patient to be started on heparin   overnight. Mild RV dilation on CT but no troponin elevation and   patient is hemodynamically stable. Patient appears to have a   submassive PE based on CT findings of right heart strain. He is   an appropriate candidate for PE thrombectomy and should receive   an echocardiogram before his procedure. He should remain NPO at   midnight and continue to receive heparin.     Plan:   -Patient to be placed on consult list for reevaluation on Monday   and consideration of PE thrombectomy scheduling.  -NPO at midnight  -Continue on anticoagulation  -Echocardiogram       CBC with platelets differential     Status: Abnormal    Narrative    The following orders were created for panel order CBC with platelets differential.  Procedure                               Abnormality         Status                     ---------                               -----------         ------                     CBC with platelets and d...[[id number]]  Abnormal            Final result                 Please view results for these tests on the individual orders.     Medications   heparin 25,000 units in 0.45% NaCl 250 mL ANTICOAGULANT infusion (1,800 Units/hr Intravenous Rate/Dose Verify 10/31/21 1944)   iopamidol (ISOVUE-370) solution 100 mL (77 mLs Intravenous Given 10/31/21 1753)   sodium chloride 0.9 % bag 500mL for CT scan flush use (90 mLs Intravenous Given  10/31/21 1754)   heparin ANTICOAGULANT loading dose for  HIGH INTENSITY TREATMENT* Give BEFORE starting heparin infusion (8,000 Units Intravenous Given 10/31/21 1908)        Assessments & Plan (with Medical Decision Making)   Patient was seen and examined.  Labs were drawn including a CRP.  Right lower extremity ultrasound was ordered.  Labs show a normal white blood cell count, CRP of 71.  Electrolytes and creatinine within normal limits.  Right lower extremity ultrasound showed extensive clot burden from the femoral vein extending all the way down through the superficial lower vessels.  My initial plan was to discharge the patient on a novel oral anticoagulant.  However upon reevaluation, the patient was noted to be quite dyspneic with even brief conversation.  He was tachypneic and tachycardic.  He denies any chest pain.  He has normal oxygen saturations.  Given the extensive clot burden in the right lower extremity, I did order a CT PE study which shows submassive pulmonary embolism with bilateral clot burden, worse on the right side extending into the right main pulmonary artery.  There was evidence of right heart strain on CT scan.  Additional labs show a normal troponin and BNP.  Covid swab was negative.  I discussed the case with the PE response team who recommended ICU admission overnight with IV heparin and echocardiogram and possible IR intervention tomorrow.  We have no ICU beds on the [facility] which is where IR recommended the patient be admitted.  There are beds available at [facility] with the same specialist available.  Patient will be transferred to the [facility] ICU for further care.  Patient is agreeable with this plan.  Heparin drip started in the emergency department.  He remained hemodynamically stable while in the emergency department.    I have reviewed the nursing notes. I have reviewed the findings, diagnosis, plan and need for follow up with the patient.    Current Discharge  Medication List          Final diagnoses:   Acute deep vein thrombosis (DVT) of femoral vein of right lower extremity (H)   Bilateral pulmonary embolism (H)       --  Hailey FRIEDMAN [facility] EMERGENCY DEPARTMENT  10/31/2021     Hailey Manzo DO  10/31/21 2101

## 2021-11-01 NOTE — CONSULTS
"[facility]    Cardiology Consultation     Date of Admission:  10/31/2021    Assessment  Sebastián Nogueira is a 26 year old male who was admitted on 10/31/2021. I was asked to see the patient for cardiomyopathy.    1. Mild LV dysfunction, likely tachy-mediated. 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 HR is now normal.   2. Submassive PE with mild RV dysfunction. On heparin.  3. RLE DVT, considered provoked due to flight    Plan   1.  Repeat echo in one month and follow up in cardiology clinic after the echo: this has been arranged.   2.  Advised abstaining from alcohol completely, but especially in the next month.  3.  Consider hypercoagulable evaluation or vascular medicine follow up to determine duration of anticoagulation.    Thank you for the consultation. Please call if there are any additional questions.     Darlene Cano MD    Code Status    Full Code    Reason for Consult   Reason for consult: cardiomyopathy      Chief Complaint   Leg pain      History of Present Illness   Sebastián Nogueira is a 26 year old male who presents with a week of RLE pain after travel to [place]. He was treated for cellulitis and leg pain continued to worsen. He was evaluated again when he returned home and found to have DVT and submassive bilat PE. In retrospect he has been a little short of breath. He has not had chest pain, dizziness, syncope.     He had a sinus tachycardia on admission and HR was up to 140s. He is on heparin and stable, does not need IR embolectomy. He is not short of breath but still has calf pain.     Past Medical History   Reported history of alcohol dependence. Patient states he does not drink \"that much anymore\"      Prior to Admission Medications   Prior to Admission Medications   Prescriptions Last Dose Informant Patient Reported? Taking?   gabapentin (NEURONTIN) 400 MG capsule   Yes No   Sig: Take 400 mg by mouth 2 times daily as needed       Facility-Administered Medications: None "     Allergies   No Known Allergies    Social History   I have reviewed this patient's social history and updated it with pertinent information if needed. Sebastián Nogueira  reports that he has quit smoking. He has never used smokeless tobacco. He reports current alcohol use. He reports that he does not use drugs. notes indicate history of alcohol dependence and antabuse.     Family History   No family h/o MI, CVA, clotting disorder, rhythm disorder    Review of Systems   The 10 point Review of Systems is negative other than noted in the HPI or here.     Physical Exam   Temp: 98.6  F (37  C) Temp src: Oral BP: 112/71 Pulse: 92   Resp: 12 SpO2: 93 % O2 Device: Nasal cannula Oxygen Delivery: 2 LPM  Vital Signs with Ranges  Temp:  [98.3  F (36.8  C)-98.6  F (37  C)] 98.6  F (37  C)  Pulse:  [] 92  Resp:  [11-28] 12  BP: (105-148)/(70-95) 112/71  SpO2:  [90 %-100 %] 93 %  288 lbs 9.31 oz    Constitutional: NAD  Eyes: clear sclera  ENT: Mucous membranes are moist.   Respiratory: clear bilaterally  Cardiovascular: regular, no  murmur  GI: soft, nontender bowel sounds   Skin: no edema  Musculoskeletal: right calf normal to palpation, patient reports mild tenderness  Neurologic: alert, moves all ext, grossly nonfocal  Psychiatric: normal affect    Data   I personally reviewed the following studies  Echo: mild LV/RV dysfunctoin    EKG 10/31/21 sinus tachycardia    Other pertinent results CT, labs, meds reviewed in epic.

## 2021-11-01 NOTE — PHARMACY-ADMISSION MEDICATION HISTORY
Pharmacy Medication History  Admission medication history interview status for the 10/31/2021  admission is complete. See EPIC admission navigator for prior to admission medications     Location of Interview: Patient room  Medication history sources: Patient and Surescripts    Significant changes made to the medication list:  Deleted: MTV, folic acid, thiamine, nicotine gum    In the past week, patient estimated taking medication this percent of the time: less than 50% due to other    Additional medication history information:   Patient reports not taking gabapentin lately, but requested to keep it on list for now as PRN medication    Medication reconciliation completed by provider prior to medication history? No    Time spent in this activity: 15 minutes    Prior to Admission medications    Medication Sig Last Dose Taking? Auth Provider   gabapentin (NEURONTIN) 400 MG capsule Take 400 mg by mouth 2 times daily as needed    Reported, Patient       The information provided in this note is only as accurate as the sources available at the time of update(s)     Deana Austin, PharmD  Inpatient Clinical Pharmacist  [phone]

## 2021-11-01 NOTE — H&P
[facility]    History and Physical - Hospitalist Service       Date of Admission:  10/31/2021    Assessment & Plan      Sebastián Nogueira is a 26 year old male admitted on 10/31/2021. He presents as a direct admission from [facility] with complaints of right lower extremity pain and discomfort found to have DVT as well as submassive pulmonary embolism.  Transferred for close monitoring and possible embolectomy    Right lower extremity DVT: Suspect provoked with recent flight to and from [place] as well as a describes cellulitis of right lower extremity.  Patient apparently has been having right lower extremity pain for slightly more than 1 week, preceded flying to [place] on Saturday of last week.  Negative ultrasound of right lower extremity reportedly at [facility] 10/25 and was prescribed 7 days of Keflex for a cellulitis at that time.  Submassive pulmonary embolism: No hypoxia, though tachycardia to the 115 range.  Chest CT with largely right-sided pulmonary embolism, though bilateral emboli.  Some right-sided pulmonary infarct versus aspiration pneumonia.  CT imaging also concerning for right heart strain.  -TTE  -Cardiac telemetry  -[facility] status for now; patient has been hemodynamically stable aside from some mild tachycardia since outside emergency department  -1 L volume administration with IV vitamins as below  -Oximetry, oxygen as needed  -Heparin drip; initiated at outside emergency department  -Interventional radiology consulted for possible embolectomy given submassive pulmonary embolism  -Compression wrap to right lower extremity approximately 12 hours after initiation of anticoagulation  -Monitor for bleeding; discussed with patient on admission    Alcohol dependence: Not currently in withdrawal, though last drink was today prior to admission.  History of alcohol withdrawal.  No longer on Antabuse.   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  -Consider outpatient gastroenterology follow-up for history of melenic stool and dark emesis described during February [facility] hospitalizations.  If patient does not demonstrate signs of active bleeding during hospitalization, this can likely be pursued through his primary care provider, also through [facility], potentially when patient has completed anticoagulation therapy    Pulmonary infarcts versus aspiration pneumonia: Patient with several areas of groundglass opacity involving right upper, lower, and midlung fields.  History of nausea with emesis as well as alcohol use to the point of blacking out several times over the past week.  Even if these opacities do not represent current pneumonia, higher risk for developing pneumonia if these are pulmonary infarcts.  Had been on Keflex for the past 7 days for possible cellulitis of right lower extremity.  -Unasyn every 6 hours to treat for aspiration pneumonia    Hyperglycemia: Blood glucose of 140.  No history of diabetes, though is obese.  -Hemoglobin A1c added on         Diet:  Regular.diet, n.p.o. at midnight for possible thrombectomy  DVT Prophylaxis: heparin ggt  Mariano Catheter: Not present  Central Lines: None  Code Status:   Full code.  Confirmed with patient on admission    Clinically Significant Risk Factors Present on Admission                   Disposition Plan   Expected discharge:  likely 11/2/2021 recommended to prior living arrangement once Anticoagulation plan established, possible embolectomy.  If patient goes into alcohol withdrawal, hospitalization might be more prolonged while waiting for withdrawal  "resolution..  If patient is vitally stable overnight, can likely discontinue IMC status late a.m.     The patient's care was discussed with the Patient and Bedside nurse, accepting physician, Dr. Askew.    Jony Alex MD  [facility]  Securely message with the Vocera Web Console (learn more here)  Text page via [employer] Paging/Directory        ______________________________________________________________________    Chief Complaint   Right leg pain    History is obtained from the patient, chart review, review of outside records including concern for Zayra-Russell tear or alcoholic gastritis during February admissions at [facility].  I do not see that he had an EGD performed    History of Present Illness   Sebastián UBALDO Noguiera is a 26 year old male who presents as a direct admission from [facility] after he was found to have right lower extremity DVT and submassive pulmonary emboli largely involving right lung.  Transferred to [facility] for level of monitoring and possible interventional radiology embolectomy.    Patient has a history of alcohol abuse and alcohol dependence.  Multiple hospitalizations through [facility] system earlier this year.  Had been on gabapentin, naltrexone, Antabuse, though tells me that he is no longer taking any of his medications.  He is still drinking, though does not delineate how much alcohol he is using at this time other than to describe that it is \"more than I should.\"  He reports that he does drink to the point about blacking out, has done so several times this week as well.  Last alcohol use was earlier today.    No prior history of blood clots.  Patient tells me that for the past 10 days or so, he has been having some right lower extremity discomfort.  No significant swelling associated with this.  Patient describes having pain in his leg sometime around Thursday, 28 October, even prior to flying to [place] Friday-Saturday to " "visit a friend.  In [place], he continued to have leg pain on the right side which led him to seek evaluation at [facility] 10/25.  He reportedly had an ultrasound completed that was negative for DVT at that time, thought to have a cellulitis and was prescribed 7 days of Keflex.  Patient has been taking his Keflex, tells me that he has 2 tablets left of his 1 week course.  Returned from [place] on Thursday.  Presented to the emergency department tonight as he continued to have right leg pain.    At outside emergency department, as above, found to have occlusive DVT involving posterior of the calf through the distal and mid femoral vein.  Tachycardic initially to the 140s range.  CT of the chest demonstrates submassive pulmonary embolism largely involving right side with suggestion of right heart strain by CT imaging.  Interventional radiology was contacted, recommended likely embolectomy pending TTE.  Transfer to [facility] given bed status as above.    Patient has had some cough for the past 5 days or so.  He also describes drinking to the point of blacking out over the last week and intermittent episodes of nausea and emesis.  He describes waking up coughing and vomiting at least one time while in [place] raising concern for possible aspiration events.  He does not have any significant shortness of breath, no fevers or chills.  He believes that his breathing has felt slightly \"heavier\" but is unable to describe identifying this to any significant degree.  No hypoxia.  No pleuritic chest discomfort.  When inquiring about his lower extremity cellulitis or infection for which he was prescribed Keflex, patient is unable to tell me that he had any significant redness or discrete cellulitis.  He notes no change in the appearance of his lower extremity and does not appear to have an active cellulitis at this time.  It is possible that he had a superficial thrombophlebitis which progressed to DVT, though again, I " "do not have access to his records other than his discharge paperwork from hospital with prescription of Keflex described.    No family history of clotting disorder    No syncopal events, though at times has felt lightheaded    Feels as though he might be dehydrated as he has not been drinking much in terms of water      Review of Systems    The 10 point Review of Systems is negative other than noted in the HPI or here.  No fevers or chills  Has had an intermittent cough for the past 5 days or so, occasionally will have posttussive emesis, though also has belching and nausea associated with his alcohol use  No shortness of breath, though feels that his breathing is slightly more \"heavy\" over the past several days.    Past Medical History    I have reviewed this patient's medical history and updated it with pertinent information if needed.   Past Medical History:   Diagnosis Date     Alcohol abuse      Hypertriglyceridemia        Past Surgical History   I have reviewed this patient's surgical history and updated it with pertinent information if needed.  Past Surgical History:   Procedure Laterality Date     APPENDECTOMY   greater than 10 years ago per patient       Social History   I have reviewed this patient's social history and updated it with pertinent information if needed.  Social History     Tobacco Use     Smoking status: Former Smoker     Smokeless tobacco: Never Used   Substance Use Topics     Alcohol use: Yes; occasionally drinks to the point of blackout and this has happened in the past week, vague when asked to quantify how much he drinks, tells me \"more than I should.\"  Tells me that he is drinking less than he used to when he has needed to go to detox in the past          Drug use:  Occasional marijuana use            Family History   I have reviewed this patient's family history and updated it with pertinent information if needed.  Family History   Problem Relation Age of Onset     Dementia Paternal " Grandfather    Mother with obesity  No family history of clotting disorder; patient is 1 of 4 children over 5 years, and he is unaware of mother having any history of frequent miscarriages    Prior to Admission Medications   Prior to Admission Medications   Patient has been on Keflex for possible cellulitis since 10/25 as prescribed in [place]; he has 2 tablets left of this prescription.  He tells me he is taking no other medications despite prior prescriptions including gabapentin    Allergies   No Known Allergies    Physical Exam   Vital Signs: Temp: 98.3  F (36.8  C) Temp src: Oral BP: 121/70 Pulse: 105   Resp: 20 SpO2: 90 % O2 Device: None (Room air)    Weight: 288 lbs 9.31 oz    General Appearance: Comfortable appearing 26-year-old male lying on hospital bed.  He does not appear to be in any acute distress.  Somewhat sedate  Eyes:  No scleral icterus or injection  HEENT: Normocephalic, atraumatic  Respiratory: Decreased breath sounds in right lower lung field.  No crackles or rhonchi, no wheezing  Cardiovascular: Tachycardia to the 115 range.  No appreciable murmur.  Regular rhythm  GI: Joey obese, soft, nontender to palpation.  No palpable mass.  Lymph/Hematologic: No lower extremity edema appreciable  Genitourinary: Not examined  Skin: Tattoos, though no overt cellulitis or erythema asymmetric on 1 lower extremity as compared to the other  Musculoskeletal: Patient with some tenderness to palpation of right lower extremity as compared to left.  Muscular tone and bulk intact in all extremities.  No appreciable asymmetry right versus left lower extremity  Neurologic: Alert, conversant, generally appropriate in conversation.  Somewhat sedate  Psychiatric: Normal affect, very pleasant    Data   Data reviewed today: I reviewed all medications, new labs and imaging results over the last 24 hours. I personally reviewed the chest CT image(s) showing Pulmonary infarct versus peripheral groundglass opacities potentially  representing aspiration pneumonia in right upper, mid, and lower lung fields.    Recent Labs   Lab 11/01/21  0143 10/31/21  2143 10/31/21  1851 10/31/21  1601   WBC 8.9  --  10.3 9.7   HGB 11.9*  --  12.7* 13.3   MCV 90  --  90 89     --  244 240   INR  --   --  1.04  --      --   --  135   POTASSIUM 3.7  --   --  3.8   CHLORIDE 105  --   --  107   CO2 24  --   --  22   BUN 14  --   --  10   CR 0.84  --   --  0.78   ANIONGAP 7  --   --  6   MARILYN 8.1*  --   --  8.5   * 140*  --  99   ALBUMIN 3.0*  --   --   --    PROTTOTAL 6.8  --   --   --    BILITOTAL 0.5  --   --   --    ALKPHOS 80  --   --   --    ALT 47  --   --   --    AST 37  --   --   --    TROPONIN  --   --   --  <0.015

## 2021-11-01 NOTE — PLAN OF CARE
VSS. Alert and oriented. 8/10 RLE pain. Mild shortness of breath at rest. 2L NC while asleep. Heparin infusing. NPO at midnight for possible IR treatment.

## 2021-11-01 NOTE — PLAN OF CARE
Patient stable throughout the day, no acute change, vitals WDL. Pain treated with oxycodone, however patient reported the feeling of itching, benadryl given. Transferred to heart center via [facility].

## 2021-11-01 NOTE — CONSULTS
Interventional Radiology - Consultation Note:  Inpatient - [facility]  11/1/2021    Reason for Consult:  submassive PE, possible thrombectomy; TTE pending   Requesting Provider:  Jony Alex of the hospitalist service    HPI:  Sebastián Nogueira is a 26 year old male with a history of alcohol dependence and newly suspected TYRELL and hyperglycemia who presented 10/31 as a direct admission from [facility] with complaints of right lower extremity pain and discomfort found to have DVT as well as submassive pulmonary embolism.  Transferred for close monitoring and possible embolectomy. IR is consulted for possible thrombectomy.    Pt is found resting comfortably in bed. Pt's main complaints are thirst from being NPO and right calf throbbing. He has no other complaints. He does not note any calf swelling and calf throbbing is not worse with palpation, activity, or positional changes- seems to be constant and unchanged. No chest pain or difficulty breathing. Per bedside RN, desats into the 80s when sleeping (likely previously undiagnosed TYRELL), but no trouble with this when awake. He does complain of mild chest pain with deep coughing from the coughing itself, but otherwise no chest pain including with inspiration. No leg discoloration.       IMAGING:    EXAM: CT CHEST PULMONARY EMBOLISM W CONTRAST  LOCATION: [facility]  DATE/TIME: 10/31/2021 5:18 PM     INDICATION: PE suspected, high prob. Leg swelling.  COMPARISON: None.  TECHNIQUE: CT chest pulmonary angiogram during arterial phase injection of IV contrast. Multiplanar reformats and MIP reconstructions were performed. Dose reduction techniques were used.   CONTRAST: 77 ml Isovue 370     FINDINGS:  ANGIOGRAM CHEST: Extensive acute emboli within the distal right main pulmonary artery and extending into the segmental and subsegmental vessels of the right upper and lower lobes. Additional emboli within the proximal  segmental vessels to the left upper   and lower lobes. The right ventricle/left ventricle ratio is slightly greater than 1.0 suggesting right heart strain. Thoracic aorta is negative for dissection.     LUNGS AND PLEURA: Groundglass opacities within the right upper lobe and right middle lobe. Airspace/groundglass opacity within the right lower lobe posteriorly     MEDIASTINUM/AXILLAE: Prominent mediastinal fat with small scattered lymph nodes.     CORONARY ARTERY CALCIFICATION: None.     UPPER ABDOMEN: Normal.     MUSCULOSKELETAL: Normal.                                                                      IMPRESSION:  1.  Extensive, acute bilateral pulmonary emboli with right heart strain. IR consultation for possible embolectomy recommended.  2.  Airspace opacity right base posteriorly could represent a pulmonary infarct versus pneumonia.  3.  Groundglass opacity right upper lobe concerning for pneumonia. Covid 19 possible.  4.  Results discussed with Dr. Hailey Manzo on 10/31/2021 at 6:20 PM.      [facility]  Echocardiography Laboratory  [address] [place] [address]     Name: STACEY CHAMPION  MRN: [id number]  : 1995  Study Date: 2021 07:56 AM  Age: 26 yrs  Gender: Male  Patient [facility]: [facility]  Reason For Study: Pulmonary Emboli  Ordering Physician: NIKKY CARREON  Referring Physician: Ton López  Performed By: Mariana Diana     BSA: 2.6 m2  Height: 78 in  Weight: 288 lb  HR: 92  BP: 108/86 mmHg  ______________________________________________________________________________  Procedure  Complete Portable Echo Adult. Optison (NDC #[id number]) given intravenously.  ______________________________________________________________________________  Interpretation Summary     Left ventricular systolic function is mildly reduced.  The visual ejection fraction is 45-50%. Traced EF 45%.  There is mild global hypokinesia of the left ventricle.  Mildly decreased right  ventricular systolic function  The right ventricle is mildly dilated.  No significant valve dysfunction.  Unable to estimate PA systolic pressure.  The inferior vena cava was normal in size with preserved respiratory  variability.  There is no pericardial effusion.     No prior for comparison.  ______________________________________________________________________________       NPO Status:  Confirmed NPO  Anticoagulation/Antiplatelets/Bleeding tendencies:  Heparin drip  Antibiotics:  Unasyn    REVIEW OF SYSTEMS:  A comprehensive 10-point review of systems was performed. All systems were reviewed and negative with exception to those reported in the HPI.     PAST MEDICAL HISTORY:  Past Medical History:   Diagnosis Date     Alcohol abuse      Hypertriglyceridemia      PAST SURGICAL HISTORY:  Past Surgical History:   Procedure Laterality Date     APPENDECTOMY       ALLERGIES:  No Known Allergies     MEDICATIONS:  Current Facility-Administered Medications   Medication     acetaminophen (TYLENOL) tablet 650 mg    Or     acetaminophen (TYLENOL) Suppository 650 mg     ampicillin-sulbactam (UNASYN) 3 g vial to attach to  mL bag     diazepam (VALIUM) tablet 10 mg    Or     diazepam (VALIUM) injection 5-10 mg     flumazenil (ROMAZICON) injection 0.2 mg     folic acid (FOLVITE) tablet 1 mg     gabapentin (NEURONTIN) capsule 400 mg     OLANZapine zydis (zyPREXA) ODT tab 5-10 mg    Or     haloperidol lactate (HALDOL) injection 2.5-5 mg     heparin 25,000 units in 0.45% NaCl 250 mL ANTICOAGULANT infusion     lidocaine (LMX4) cream     lidocaine 1 % 0.1-1 mL     melatonin tablet 5 mg     multivitamin w/minerals (THERA-VIT-M) tablet 1 tablet     naloxone (NARCAN) injection 0.2 mg    Or     naloxone (NARCAN) injection 0.4 mg    Or     naloxone (NARCAN) injection 0.2 mg    Or     naloxone (NARCAN) injection 0.4 mg     ondansetron (ZOFRAN-ODT) ODT tab 4 mg    Or     ondansetron (ZOFRAN) injection 4 mg     oxyCODONE (ROXICODONE)  "tablet 5 mg     pantoprazole (PROTONIX) IV push injection 40 mg     Patient is already receiving anticoagulation with heparin, enoxaparin (LOVENOX), warfarin (COUMADIN)  or other anticoagulant medication     polyethylene glycol (MIRALAX) Packet 17 g     prochlorperazine (COMPAZINE) injection 10 mg    Or     prochlorperazine (COMPAZINE) tablet 10 mg    Or     prochlorperazine (COMPAZINE) suppository 25 mg     sodium chloride (PF) 0.9% PF flush 3 mL     sodium chloride (PF) 0.9% PF flush 3 mL     sodium chloride (PF) 0.9% PF flush 3 mL     sodium chloride (PF) 0.9% PF flush 3 mL     thiamine (B-1) tablet 100 mg      LABS:  INR   Date Value Ref Range Status   10/31/2021 1.04 0.86 - 1.14 Final   04/26/2021 0.99 0.86 - 1.14 Final      Hemoglobin   Date Value Ref Range Status   11/01/2021 11.9 (L) 13.3 - 17.7 g/dL Final   04/26/2021 14.8 13.3 - 17.7 g/dL Final   ]  Platelet Count   Date Value Ref Range Status   11/01/2021 264 150 - 450 10e3/uL Final   04/26/2021 232 150 - 450 10e9/L Final     Creatinine   Date Value Ref Range Status   11/01/2021 0.84 0.66 - 1.25 mg/dL Final   04/26/2021 0.77 0.66 - 1.25 mg/dL Final     Potassium   Date Value Ref Range Status   11/01/2021 3.7 3.4 - 5.3 mmol/L Final   04/26/2021 3.4 3.4 - 5.3 mmol/L Final     EXAM:  /71   Pulse 92   Temp 98.6  F (37  C) (Oral)   Resp 12   Ht 1.981 m (6' 6\")   Wt 130.9 kg (288 lb 9.3 oz)   SpO2 93%   BMI 33.35 kg/m    General:  Stable.  In no acute distress.    Neuro:  A&O x 3. Moves all extremities equally.  Resp:  Lungs clear to auscultation bilaterally.  Cardio:  S1S2 and reg, without murmur, clicks or rubs  Abdomen:  Soft, non-distended, non-tender, positive bowel sounds.  Vascular: +2/4 bilateral dorsalis pedis pulses confirmed with doppler, +2/4 posterior tibial pulses by doppler, minimal LE edema, no calf tenderness bilaterally.   Skin:  Without excoriations, ecchymosis, erythema, lesions or open sores on lower extremities.  MSK:  No " gross motor weakness.  Sensation intact.  Proprioception intact.    Bilateral lower extremities 11/1/21:          ASSESSMENT:  26 year old male with a history of alcohol dependence and newly suspected TYRELL and hyperglycemia who presented 10/31 as a direct admission from [facility] with complaints of right lower extremity pain and discomfort found to have DVT as well as submassive pulmonary embolism. IR consulted for recommendations. Pt has remained stable on a Heparin drip, only requiring mild O2 via nasal cannula for TYRELL, minimal edema, no shortness of breath, no chest pain at rest, and no heart strain on echo    PLAN:    -IR not planning on a procedure given hemodynamic stability and minimally bothersome symptoms  -Continue medical management  -Anticoagulation per medicine  -IR will sign off. Please call if Qs or concerns      The patient's care was discussed with the Attending Physician, Dr. Pineda, who is in agreement with the above assessment and plan      Thank you for kindly for this consultation.     Total time spent on the date of the encounter is 15 minutes, including time spent counseling the patient, performing a medically appropriate evaluation, reviewing prior medical history, ordering medications and tests, documenting clinical information in the medical record, and communication of results.    Flori Gomez PA-C  Interventional Radiology

## 2021-11-02 LAB
MAGNESIUM SERPL-MCNC: 2.5 MG/DL (ref 1.6–2.3)
POTASSIUM BLD-SCNC: 4 MMOL/L (ref 3.4–5.3)
UFH PPP CHRO-ACNC: 0.41 IU/ML

## 2021-11-02 PROCEDURE — 250N000013 HC RX MED GY IP 250 OP 250 PS 637: Performed by: INTERNAL MEDICINE

## 2021-11-02 PROCEDURE — 210N000002 HC R&B HEART CARE

## 2021-11-02 PROCEDURE — 84132 ASSAY OF SERUM POTASSIUM: CPT | Performed by: INTERNAL MEDICINE

## 2021-11-02 PROCEDURE — 250N000011 HC RX IP 250 OP 636: Performed by: INTERNAL MEDICINE

## 2021-11-02 PROCEDURE — 85520 HEPARIN ASSAY: CPT | Performed by: INTERNAL MEDICINE

## 2021-11-02 PROCEDURE — 99233 SBSQ HOSP IP/OBS HIGH 50: CPT | Performed by: INTERNAL MEDICINE

## 2021-11-02 PROCEDURE — 83735 ASSAY OF MAGNESIUM: CPT | Performed by: INTERNAL MEDICINE

## 2021-11-02 PROCEDURE — 36415 COLL VENOUS BLD VENIPUNCTURE: CPT | Performed by: INTERNAL MEDICINE

## 2021-11-02 PROCEDURE — C9113 INJ PANTOPRAZOLE SODIUM, VIA: HCPCS | Performed by: INTERNAL MEDICINE

## 2021-11-02 RX ORDER — DIPHENHYDRAMINE HYDROCHLORIDE 50 MG/ML
25 INJECTION INTRAMUSCULAR; INTRAVENOUS EVERY 6 HOURS PRN
Status: DISCONTINUED | OUTPATIENT
Start: 2021-11-02 | End: 2021-11-02

## 2021-11-02 RX ORDER — DIPHENHYDRAMINE HCL 25 MG
25 CAPSULE ORAL EVERY 6 HOURS PRN
Status: DISCONTINUED | OUTPATIENT
Start: 2021-11-02 | End: 2021-11-02

## 2021-11-02 RX ADMIN — MULTIPLE VITAMINS W/ MINERALS TAB 1 TABLET: TAB at 08:49

## 2021-11-02 RX ADMIN — DIAZEPAM 10 MG: 5 TABLET ORAL at 02:51

## 2021-11-02 RX ADMIN — HYDROMORPHONE HYDROCHLORIDE 2 MG: 2 TABLET ORAL at 09:54

## 2021-11-02 RX ADMIN — HYDROMORPHONE HYDROCHLORIDE 2 MG: 2 TABLET ORAL at 22:04

## 2021-11-02 RX ADMIN — AMPICILLIN SODIUM AND SULBACTAM SODIUM 3 G: 2; 1 INJECTION, POWDER, FOR SOLUTION INTRAMUSCULAR; INTRAVENOUS at 22:00

## 2021-11-02 RX ADMIN — HYDROMORPHONE HYDROCHLORIDE 2 MG: 2 TABLET ORAL at 03:37

## 2021-11-02 RX ADMIN — PANTOPRAZOLE SODIUM 40 MG: 40 INJECTION, POWDER, FOR SOLUTION INTRAVENOUS at 08:50

## 2021-11-02 RX ADMIN — AMPICILLIN SODIUM AND SULBACTAM SODIUM 3 G: 2; 1 INJECTION, POWDER, FOR SOLUTION INTRAMUSCULAR; INTRAVENOUS at 03:37

## 2021-11-02 RX ADMIN — GABAPENTIN 400 MG: 300 CAPSULE ORAL at 16:21

## 2021-11-02 RX ADMIN — GABAPENTIN 400 MG: 300 CAPSULE ORAL at 21:59

## 2021-11-02 RX ADMIN — FOLIC ACID 1 MG: 1 TABLET ORAL at 08:49

## 2021-11-02 RX ADMIN — THIAMINE HCL TAB 100 MG 100 MG: 100 TAB at 08:49

## 2021-11-02 RX ADMIN — APIXABAN 10 MG: 5 TABLET, FILM COATED ORAL at 10:24

## 2021-11-02 RX ADMIN — GABAPENTIN 400 MG: 300 CAPSULE ORAL at 08:49

## 2021-11-02 RX ADMIN — HYDROMORPHONE HYDROCHLORIDE 2 MG: 2 TABLET ORAL at 00:08

## 2021-11-02 RX ADMIN — ACETAMINOPHEN 650 MG: 325 TABLET, FILM COATED ORAL at 08:50

## 2021-11-02 RX ADMIN — DIAZEPAM 10 MG: 5 TABLET ORAL at 08:49

## 2021-11-02 RX ADMIN — APIXABAN 10 MG: 5 TABLET, FILM COATED ORAL at 21:59

## 2021-11-02 RX ADMIN — HYDROMORPHONE HYDROCHLORIDE 2 MG: 2 TABLET ORAL at 14:45

## 2021-11-02 RX ADMIN — DIAZEPAM 10 MG: 5 TABLET ORAL at 16:52

## 2021-11-02 RX ADMIN — HEPARIN SODIUM 2100 UNITS/HR: 10000 INJECTION, SOLUTION INTRAVENOUS at 02:53

## 2021-11-02 RX ADMIN — ACETAMINOPHEN 650 MG: 325 TABLET, FILM COATED ORAL at 21:59

## 2021-11-02 RX ADMIN — DIAZEPAM 10 MG: 5 TABLET ORAL at 05:41

## 2021-11-02 RX ADMIN — AMPICILLIN SODIUM AND SULBACTAM SODIUM 3 G: 2; 1 INJECTION, POWDER, FOR SOLUTION INTRAMUSCULAR; INTRAVENOUS at 16:22

## 2021-11-02 RX ADMIN — AMPICILLIN SODIUM AND SULBACTAM SODIUM 3 G: 2; 1 INJECTION, POWDER, FOR SOLUTION INTRAMUSCULAR; INTRAVENOUS at 09:54

## 2021-11-02 RX ADMIN — DIAZEPAM 10 MG: 5 TABLET ORAL at 12:11

## 2021-11-02 RX ADMIN — DIAZEPAM 10 MG: 5 TABLET ORAL at 00:56

## 2021-11-02 ASSESSMENT — ACTIVITIES OF DAILY LIVING (ADL)
ADLS_ACUITY_SCORE: 7
ADLS_ACUITY_SCORE: 9
ADLS_ACUITY_SCORE: 7
ADLS_ACUITY_SCORE: 9
ADLS_ACUITY_SCORE: 9
ADLS_ACUITY_SCORE: 7
ADLS_ACUITY_SCORE: 7
ADLS_ACUITY_SCORE: 9
ADLS_ACUITY_SCORE: 9
ADLS_ACUITY_SCORE: 7
ADLS_ACUITY_SCORE: 7
ADLS_ACUITY_SCORE: 9
ADLS_ACUITY_SCORE: 9
ADLS_ACUITY_SCORE: 7
ADLS_ACUITY_SCORE: 7
ADLS_ACUITY_SCORE: 9
ADLS_ACUITY_SCORE: 7

## 2021-11-02 NOTE — PROGRESS NOTES
2008: Paged Dr. Banerjee regarding itching when patient took Oxycodone. Asking if pt should continue medication or switch. Awaiting return call.     Dr. Banerjee returned page, recommended patient to elevate leg and take Tylenol. Discontinued Oxycodone.

## 2021-11-02 NOTE — PROGRESS NOTES
[facility]    Hospitalist Progress Note    Assessment & Plan   Sebastián Nogueira is a 26 year old male admitted on 10/31/2021. He presents as a direct admission from [facility] with complaints of right lower extremity pain and discomfort found to have DVT as well as submassive pulmonary embolism.  Transferred for close monitoring and possible embolectomy     Right lower extremity DVT: Suspect provoked with recent flight to and from [place] as well as a describes cellulitis of right lower extremity.  Patient apparently has been having right lower extremity pain for slightly more than 1 week, preceded flying to [place] on Saturday of last week.  Negative ultrasound of right lower extremity reportedly at [facility] 10/25 and was prescribed 7 days of Keflex for a cellulitis at that time.  Submassive pulmonary embolism: No hypoxia, though tachycardia to the 115 range.  Chest CT with largely right-sided pulmonary embolism, though bilateral emboli.  Some right-sided pulmonary infarct versus aspiration pneumonia.  CT imaging also concerning for right heart strain.  -Echocardiogram results 11/1 shows reduced left ventricular systolic function, EF is 45-50% H, mild global hypokinesia of the left ventricle seen, mildly decreased right ventricular systolic function and dilatation noted.?  Alcohol related LV dysfunction/cardiomyopathy  -Appreciate cardiology input, plan noted for repeat echo in 1 month and follow-up with cardiology.  -Continue on cardiac telemetry now  -Changed heparin to apixaban, wean oxygen down as possible  -Appreciate IR input, no plan for any procedures noted.       Alcohol dependence:  11/2, patient is in active alcohol withdrawal, scoring very high in the CIWA scale, received Valium dosage prior to me visiting with patient  -CIWA protocol in place with Valium protocol as needed  -IV followed by oral multivitamins  -Continuing prior prescription of gabapentin 400  mg 3 times daily; can adjust this dose if patient develops florid alcohol withdrawal  -IV Protonix daily given history of alcohol abuse with reported melenic stool during February 2021 hospitalization at [facility] secondary to alcoholic gastritis.  -We will monitor here for any active bleeding, hemoglobin did drop from 12.7-11.9 but no active bleeding seen.  If no active bleeding follow-up outpatient with PCP/GI  -CBC in a.m.     Pulmonary infarcts versus aspiration pneumonia: Patient with several areas of groundglass opacity involving right upper, lower, and midlung fields.  History of nausea with emesis as well as alcohol use to the point of blacking out several times over the past week.    -Unasyn every 6 hours to treat for aspiration pneumonia, will transition to Augmentin to finish 5-day course.     Hyperglycemia: Blood glucose of 140.  No history of diabetes, though is obese.  -Hemo-globin A1c is 5.6    DVT Prophylaxis: Heparin drip  Code Status: Full Code     Disposition: Expected discharge in 1 to 2 days depending on his oxygen needs, alcohol withdrawal symptoms.    Namita Ziegler MD  Text Page   (7am to 6pm)    Interval History   Transition patient out of heparin drip to DOAC's, started on apixaban, he had mentioned significant pain on his right lower extremity, swelling is similar to prior, he is in active alcohol withdrawal now, scoring more than 9 on CIWA scale and received Valium.  Has upper extremity tremors.  Still on 2 L of oxygen.  Denies any chest pain.    -Data reviewed today: I reviewed all new labs and imaging results over the last 24 hours. I personally reviewed Labs and imaging below.  Physical Exam   Temp: 99.7  F (37.6  C) Temp src: Oral BP: 119/75 Pulse: 87   Resp: 16 SpO2: 97 % O2 Device: Nasal cannula Oxygen Delivery: 2 LPM  Vitals:    10/31/21 2200 11/01/21 0000   Weight: 130.9 kg (288 lb 9.3 oz) 130.9 kg (288 lb 9.3 oz)     Vital Signs with Ranges  Temp:  [97.5  F (36.4   C)-99.7  F (37.6  C)] 99.7  F (37.6  C)  Pulse:  [] 87  Resp:  [10-23] 16  BP: ()/(64-91) 119/75  SpO2:  [93 %-100 %] 97 %  I/O last 3 completed shifts:  In: 3107.11 [P.O.:2300; I.V.:807.11]  Out: 3080 [Urine:3080]    Constitutional: Awake, alert, cooperative, no apparent distress  Respiratory: Clear to auscultation bilaterally, no crackles or wheezing  Cardiovascular: Regular rate and rhythm, normal S1 and S2, and no murmur noted  GI: Normal bowel sounds, soft, non-distended, non-tender  Skin/Integumen: Right lower extremity has 1+ edema  Neuro : moving all 4 extremities, bilateral upper extremity fine tremors noted    Medications     heparin 2,100 Units/hr (11/02/21 0838)     - MEDICATION INSTRUCTIONS -         ampicillin-sulbactam (UNASYN) IV  3 g Intravenous Q6H     apixaban ANTICOAGULANT  10 mg Oral BID    Followed by     [START ON 11/9/2021] apixaban ANTICOAGULANT  5 mg Oral BID     folic acid  1 mg Oral Daily     gabapentin  400 mg Oral TID     multivitamin w/minerals  1 tablet Oral Daily     pantoprazole (PROTONIX) IV  40 mg Intravenous Daily with breakfast     sodium chloride (PF)  3 mL Intracatheter Q8H     thiamine  100 mg Oral Daily       Data   Recent Labs   Lab 11/02/21  0556 11/01/21  1512 11/01/21  0739 11/01/21  0143 10/31/21  2143 10/31/21  1851 10/31/21  1601   0000   WBC  --   --   --  8.9  --  10.3 9.7  --    HGB  --   --   --  11.9*  --  12.7* 13.3  --    MCV  --   --   --  90  --  90 89  --    PLT  --   --   --  264  --  244 240  --    INR  --   --   --   --   --  1.04  --   --    NA  --   --   --  136  --   --  135  --    POTASSIUM 4.0  --   --  3.7  --   --  3.8  --    CHLORIDE  --   --   --  105  --   --  107  --    CO2  --   --   --  24  --   --  22  --    BUN  --   --   --  14  --   --  10  --    CR  --   --   --  0.84  --   --  0.78  --    ANIONGAP  --   --   --  7  --   --  6  --    MARILYN  --   --   --  8.1*  --   --  8.5  --    GLC  --  105* 88 114*   < >  --  99   < >    ALBUMIN  --   --   --  3.0*  --   --   --   --    PROTTOTAL  --   --   --  6.8  --   --   --   --    BILITOTAL  --   --   --  0.5  --   --   --   --    ALKPHOS  --   --   --  80  --   --   --   --    ALT  --   --   --  47  --   --   --   --    AST  --   --   --  37  --   --   --   --    TROPONIN  --   --   --   --   --   --  <0.015  --     < > = values in this interval not displayed.     Recent Labs   Lab 11/01/21  1512 11/01/21  0739 11/01/21  0143 10/31/21  2143 10/31/21  1601   * 88 114* 140* 99       Imaging:   No results found for this or any previous visit (from the past 24 hour(s)).

## 2021-11-02 NOTE — PROVIDER NOTIFICATION
Pt reports prn dilaudid is not helping his leg pain. Rates his pain 8/10 on right LE before and after prn dilaudid. Dr. Ziegler text paged.

## 2021-11-02 NOTE — PLAN OF CARE
"Vitals: /81 (BP Location: Left arm)   Pulse 96   Temp 97.6  F (36.4  C) (Oral)   Resp 20   Ht 1.981 m (6' 6\")   Wt 130.9 kg (288 lb 9.3 oz)   SpO2 95%   BMI 33.35 kg/m    Neuro: intact, pleasant. CIWA ranging 7-9, 10mg Valium given per orders. C/o 6/10 anxiety, but no hallucinations. Currently assessing CIWA q2hrs.   CV:  SR, trace ankle edema.  Respiratory: Diminished lung sounds, RA.    GI/: Voiding in urinal. No BM during shift. Last BM 10/31.   Skin: intact, RLE feels hot- due to DVT. Order for ACE wrap x12 hrs daily. Slightly diaphoretic.   Activity: SBA, has activity intolerance due to right leg pain.   Pain: C/o RLE pain, described as throbbing. Initially was given Oxycodone, but developed itching yesterday, which pt states is his baseline when he takes Oxycodone. Benadryl given afterwards. Paged MD on evenings to ask for alternative pain medication, but MD discontinued Oxycodone, and recommended elevation of RLE and Tylenol. Notified MD again of pain rating 8/10, received new orders for Dilaudid 2mg q3hrs PRN. Pt states Dilaudid still does not cause relief for pain rating 6-7/10.   IV: 2 PIV, both infusing. Heparin gtt at 2100. Xa check in AM.  Diet: regular.        *See EPIC flowsheet for full nursing assessment findings       "

## 2021-11-02 NOTE — CONSULTS
Anticoagulation coverage check.  Patient has [facility] [employer].    Xarelto/Eliquis:  $3/mo.     Enoxaparin 150mg x 10 syringes: $1.    Eldontoven (warfarin): $1/mo.      ERWIN Nova, Pharmacy Technician/Liaison, Discharge Pharmacy, [phone]

## 2021-11-02 NOTE — PLAN OF CARE
VSS. Monitor remains Sinus rhythm/Sinus tachycardia. Dilaudid given x2 for right leg pain. Pt. Scored 9 and 8.Ativan given per order. Heparin drip stopped and Eliquis started per order. Right leg wrapped with ace wrap. Continue to monitor.

## 2021-11-03 ENCOUNTER — APPOINTMENT (OUTPATIENT)
Dept: PHYSICAL THERAPY | Facility: CLINIC | Age: 26
End: 2021-11-03
Attending: INTERNAL MEDICINE
Payer: COMMERCIAL

## 2021-11-03 VITALS
SYSTOLIC BLOOD PRESSURE: 135 MMHG | WEIGHT: 293 LBS | BODY MASS INDEX: 33.9 KG/M2 | OXYGEN SATURATION: 94 % | RESPIRATION RATE: 18 BRPM | HEART RATE: 98 BPM | DIASTOLIC BLOOD PRESSURE: 86 MMHG | HEIGHT: 78 IN | TEMPERATURE: 97.9 F

## 2021-11-03 LAB
ANION GAP SERPL CALCULATED.3IONS-SCNC: 5 MMOL/L (ref 3–14)
BUN SERPL-MCNC: 11 MG/DL (ref 7–30)
CALCIUM SERPL-MCNC: 8.6 MG/DL (ref 8.5–10.1)
CHLORIDE BLD-SCNC: 107 MMOL/L (ref 94–109)
CO2 SERPL-SCNC: 27 MMOL/L (ref 20–32)
CREAT SERPL-MCNC: 1.01 MG/DL (ref 0.66–1.25)
ERYTHROCYTE [DISTWIDTH] IN BLOOD BY AUTOMATED COUNT: 13.7 % (ref 10–15)
GFR SERPL CREATININE-BSD FRML MDRD: >90 ML/MIN/1.73M2
GLUCOSE BLD-MCNC: 105 MG/DL (ref 70–99)
HCT VFR BLD AUTO: 35 % (ref 40–53)
HGB BLD-MCNC: 11.4 G/DL (ref 13.3–17.7)
MAGNESIUM SERPL-MCNC: 2.3 MG/DL (ref 1.6–2.3)
MCH RBC QN AUTO: 30.5 PG (ref 26.5–33)
MCHC RBC AUTO-ENTMCNC: 32.6 G/DL (ref 31.5–36.5)
MCV RBC AUTO: 94 FL (ref 78–100)
PLATELET # BLD AUTO: 293 10E3/UL (ref 150–450)
POTASSIUM BLD-SCNC: 4.1 MMOL/L (ref 3.4–5.3)
RBC # BLD AUTO: 3.74 10E6/UL (ref 4.4–5.9)
SODIUM SERPL-SCNC: 139 MMOL/L (ref 133–144)
WBC # BLD AUTO: 7.2 10E3/UL (ref 4–11)

## 2021-11-03 PROCEDURE — 80048 BASIC METABOLIC PNL TOTAL CA: CPT | Performed by: INTERNAL MEDICINE

## 2021-11-03 PROCEDURE — 83735 ASSAY OF MAGNESIUM: CPT | Performed by: INTERNAL MEDICINE

## 2021-11-03 PROCEDURE — 36415 COLL VENOUS BLD VENIPUNCTURE: CPT | Performed by: INTERNAL MEDICINE

## 2021-11-03 PROCEDURE — C9113 INJ PANTOPRAZOLE SODIUM, VIA: HCPCS | Performed by: INTERNAL MEDICINE

## 2021-11-03 PROCEDURE — 97161 PT EVAL LOW COMPLEX 20 MIN: CPT | Mod: GP

## 2021-11-03 PROCEDURE — 250N000013 HC RX MED GY IP 250 OP 250 PS 637: Performed by: INTERNAL MEDICINE

## 2021-11-03 PROCEDURE — 99239 HOSP IP/OBS DSCHRG MGMT >30: CPT | Performed by: INTERNAL MEDICINE

## 2021-11-03 PROCEDURE — 250N000011 HC RX IP 250 OP 636: Performed by: INTERNAL MEDICINE

## 2021-11-03 PROCEDURE — 97530 THERAPEUTIC ACTIVITIES: CPT | Mod: GP

## 2021-11-03 PROCEDURE — 85027 COMPLETE CBC AUTOMATED: CPT | Performed by: INTERNAL MEDICINE

## 2021-11-03 RX ORDER — FOLIC ACID 1 MG/1
1 TABLET ORAL DAILY
Qty: 30 TABLET | Refills: 0 | Status: SHIPPED | OUTPATIENT
Start: 2021-11-04 | End: 2021-12-02

## 2021-11-03 RX ORDER — HYDROMORPHONE HYDROCHLORIDE 2 MG/1
2 TABLET ORAL EVERY 6 HOURS PRN
Qty: 5 TABLET | Refills: 0 | Status: SHIPPED | OUTPATIENT
Start: 2021-11-03 | End: 2021-12-02

## 2021-11-03 RX ORDER — PANTOPRAZOLE SODIUM 40 MG/1
40 TABLET, DELAYED RELEASE ORAL DAILY
Qty: 7 TABLET | Refills: 0 | Status: SHIPPED | OUTPATIENT
Start: 2021-11-03 | End: 2021-11-10

## 2021-11-03 RX ORDER — ONDANSETRON 4 MG/1
4 TABLET, FILM COATED ORAL EVERY 8 HOURS PRN
Qty: 10 TABLET | Refills: 0 | Status: ON HOLD | OUTPATIENT
Start: 2021-11-03 | End: 2021-12-30

## 2021-11-03 RX ORDER — MULTIPLE VITAMINS W/ MINERALS TAB 9MG-400MCG
1 TAB ORAL DAILY
Qty: 30 TABLET | Refills: 0 | Status: SHIPPED | OUTPATIENT
Start: 2021-11-04 | End: 2021-12-02

## 2021-11-03 RX ORDER — LANOLIN ALCOHOL/MO/W.PET/CERES
100 CREAM (GRAM) TOPICAL DAILY
Qty: 30 TABLET | Refills: 0 | Status: SHIPPED | OUTPATIENT
Start: 2021-11-04 | End: 2021-12-02

## 2021-11-03 RX ORDER — ACETAMINOPHEN 325 MG/1
650 TABLET ORAL EVERY 6 HOURS PRN
COMMUNITY
Start: 2021-11-03 | End: 2022-01-07

## 2021-11-03 RX ADMIN — APIXABAN 10 MG: 5 TABLET, FILM COATED ORAL at 08:15

## 2021-11-03 RX ADMIN — PANTOPRAZOLE SODIUM 40 MG: 40 INJECTION, POWDER, FOR SOLUTION INTRAVENOUS at 08:15

## 2021-11-03 RX ADMIN — FOLIC ACID 1 MG: 1 TABLET ORAL at 08:15

## 2021-11-03 RX ADMIN — GABAPENTIN 400 MG: 300 CAPSULE ORAL at 16:21

## 2021-11-03 RX ADMIN — AMPICILLIN SODIUM AND SULBACTAM SODIUM 3 G: 2; 1 INJECTION, POWDER, FOR SOLUTION INTRAMUSCULAR; INTRAVENOUS at 05:35

## 2021-11-03 RX ADMIN — THIAMINE HCL TAB 100 MG 100 MG: 100 TAB at 08:16

## 2021-11-03 RX ADMIN — GABAPENTIN 400 MG: 300 CAPSULE ORAL at 08:15

## 2021-11-03 RX ADMIN — MULTIPLE VITAMINS W/ MINERALS TAB 1 TABLET: TAB at 08:16

## 2021-11-03 RX ADMIN — AMPICILLIN SODIUM AND SULBACTAM SODIUM 3 G: 2; 1 INJECTION, POWDER, FOR SOLUTION INTRAMUSCULAR; INTRAVENOUS at 10:13

## 2021-11-03 RX ADMIN — ACETAMINOPHEN 650 MG: 325 TABLET, FILM COATED ORAL at 16:49

## 2021-11-03 RX ADMIN — HYDROMORPHONE HYDROCHLORIDE 2 MG: 2 TABLET ORAL at 18:25

## 2021-11-03 RX ADMIN — HYDROMORPHONE HYDROCHLORIDE 2 MG: 2 TABLET ORAL at 08:22

## 2021-11-03 RX ADMIN — ONDANSETRON 4 MG: 2 INJECTION INTRAMUSCULAR; INTRAVENOUS at 09:02

## 2021-11-03 RX ADMIN — AMPICILLIN SODIUM AND SULBACTAM SODIUM 3 G: 2; 1 INJECTION, POWDER, FOR SOLUTION INTRAMUSCULAR; INTRAVENOUS at 16:20

## 2021-11-03 RX ADMIN — HYDROMORPHONE HYDROCHLORIDE 2 MG: 2 TABLET ORAL at 15:12

## 2021-11-03 ASSESSMENT — ACTIVITIES OF DAILY LIVING (ADL)
ADLS_ACUITY_SCORE: 9
ADLS_ACUITY_SCORE: 9
ADLS_ACUITY_SCORE: 13
ADLS_ACUITY_SCORE: 9
ADLS_ACUITY_SCORE: 13
ADLS_ACUITY_SCORE: 9
ADLS_ACUITY_SCORE: 13
ADLS_ACUITY_SCORE: 13
ADLS_ACUITY_SCORE: 9
ADLS_ACUITY_SCORE: 13
ADLS_ACUITY_SCORE: 13
ADLS_ACUITY_SCORE: 9
ADLS_ACUITY_SCORE: 9
ADLS_ACUITY_SCORE: 10
ADLS_ACUITY_SCORE: 9
ADLS_ACUITY_SCORE: 10
ADLS_ACUITY_SCORE: 13
ADLS_ACUITY_SCORE: 9
ADLS_ACUITY_SCORE: 13

## 2021-11-03 ASSESSMENT — MIFFLIN-ST. JEOR: SCORE: 2432.29

## 2021-11-03 NOTE — PROGRESS NOTES
11/03/21 1500   Quick Adds   Type of Visit Initial PT Evaluation   Living Environment   People in home alone   Current Living Arrangements apartment   Home Accessibility stairs to enter home   Number of Stairs, Main Entrance greater than 10 stairs   Stair Railings, Main Entrance railings on both sides of stairs;railings safe and in good condition   Transportation Anticipated car, drives self   Living Environment Comments Pt reports he plans to stay with his parents at discharge. Parents live in a split level home.   Self-Care   Usual Activity Tolerance excellent   Current Activity Tolerance poor   Regular Exercise No   Equipment Currently Used at Home none   Disability/Function   Fall history within last six months no   General Information   Onset of Illness/Injury or Date of Surgery 10/31/21   Referring Physician Dr. Askew   Patient/Family Therapy Goals Statement (PT) To go home   Pertinent History of Current Problem (include personal factors and/or comorbidities that impact the POC) Pt is a 26 year old male admitted with right DVT and PE.   Cognition   Orientation Status (Cognition) oriented x 4   Affect/Mental Status (Cognition) WFL   Pain Assessment   Patient Currently in Pain No  (No pain at rest, 10/10 right LE pain with mobility)   Range of Motion (ROM)   ROM Quick Adds ROM WNL   Strength   Manual Muscle Testing Quick Adds Strength WNL   Bed Mobility   Bed Mobility No deficits identified   Transfers   Transfer Safety Comments Independent   Gait/Stairs (Locomotion)   Comment (Gait/Stairs) NT, unable to tolerate 2/2 uncontrolled R LE pain   Balance   Balance Comments Good   Clinical Impression   Criteria for Skilled Therapeutic Intervention yes, treatment indicated   PT Diagnosis (PT) Impaired ambulation   Influenced by the following impairments Decreased endurance   Functional limitations due to impairments Difficulty with transfers, ambulation and stair management   Clinical Presentation  Stable/Uncomplicated   Clinical Presentation Rationale VSS, pain controlled   Clinical Decision Making (Complexity) low complexity   Therapy Frequency (PT) Daily   Predicted Duration of Therapy Intervention (days/wks) 3 days   Planned Therapy Interventions (PT) gait training;patient/family education;stair training   Anticipated Equipment Needs at Discharge (PT) walker, rolling;crutches, axillary   Risk & Benefits of therapy have been explained evaluation/treatment results reviewed;care plan/treatment goals reviewed;risks/benefits reviewed;current/potential barriers reviewed;participants voiced agreement with care plan;participants included;patient   PT Discharge Planning    PT Discharge Recommendation (DC Rec) home with assist   PT Rationale for DC Rec At baseline, pt lives alone in a third floor apartment with no elevator access. Prior to admit, patient independent with all mobility and living an active lifestyle. Pt plans to discharge to his parents [place], which is split level This date, pt reports 0/10 right LE pain at rest in supine, O2 sats 98% on RA and HR is 100. Pt is able to independently transfer supine to sit. Upon sitting, patient instantly complains of right LE pain at 10/10, O2 sats 97% and HR 140s. Pts pain is currently not controlled, therefore, patient would have significant difficulty ambulating or performing stair management to discharge to his [facility]. Anticipate that if pain was under control, patient would mobilize well with crutches or FWW.   PT Brief overview of current status  Bed mobility and transfers independent, unable to tolerate ambulation   Total Evaluation Time   Total Evaluation Time (Minutes) 10

## 2021-11-03 NOTE — PLAN OF CARE
Assistance to chair. Much pain with movement. In right lower ext. Right  lower ext. Swollen and tender, Seems sob with minimal exertion but denies this. Nausea--med given..O2 sats 92-98% on room air. Refuses to  Keep o2 on

## 2021-11-03 NOTE — PLAN OF CARE
Patient is A/Ox4. VSS, weaned to RA. CIWA scores low, no prn indicated. Tele SR. LS clear. Denies SOB/BEAUCHAMP. IV SL. Up with SBA. Regular diet, tolerating well. Plan on continuing to monitor.

## 2021-11-03 NOTE — PROVIDER NOTIFICATION
MD Notification    Notified Person: MD    Notified Person Name: Dr. Ziegler    Notification Date/Time: 11/03/2021 at 1629    Notification Interaction: awaiting call    Purpose of Notification: Pt get PO Dilaudid 2 mg at 1512, still complains of pain 6/10    Orders Received:    Comments:

## 2021-11-03 NOTE — DISCHARGE SUMMARY
[facility]    Discharge Summary  Hospitalist    Date of Admission:  10/31/2021  Date of Discharge:  11/3/2021  Discharging Provider: Namita Ziegler MD    Discharge Diagnoses   DVT/PE    History of Present Illness   Please review admission history and physical.    Hospital Course   Sebastián Nogueira was admitted on 10/31/2021.  The following problems were addressed during his hospitalization:    Active Problems:    Pulmonary emboli (H)  Sebastián Nogueira is a 26 year old male admitted on 10/31/2021. He presents as a direct admission from [facility] with complaints of right lower extremity pain and discomfort found to have DVT as well as submassive pulmonary embolism.  Transferred for close monitoring and possible embolectomy     Right lower extremity DVT: Suspect provoked with recent flight to and from [place] as well as a describes cellulitis of right lower extremity.  Patient apparently has been having right lower extremity pain for slightly more than 1 week, preceded flying to [place] on Saturday of last week.  Negative ultrasound of right lower extremity reportedly at [facility] 10/25 and was prescribed 7 days of Keflex for a cellulitis at that time.  Submassive pulmonary embolism: No hypoxia, though tachycardia to the 115 range.  Chest CT with largely right-sided pulmonary embolism, though bilateral emboli.  Some right-sided pulmonary infarct versus aspiration pneumonia.  CT imaging also concerning for right heart strain.  -Echocardiogram results 11/1 shows reduced left ventricular systolic function, EF is 45-50% H, mild global hypokinesia of the left ventricle seen, mildly decreased right ventricular systolic function and dilatation noted.?  Alcohol related LV dysfunction/cardiomyopathy  -Appreciate cardiology input, plan noted for repeat echo in 1 month and follow-up with cardiology.  -Continue on cardiac telemetry now  -Changed heparin to apixaban, patient is  currently off of oxygen, discussed with nursing to assess for ambulatory pulse oximetry, patient mentions ongoing pain on his right lower extremity, swelling is not any worse than yesterday.  Discussed the discharge plans with patient with follow-up, he mentions that he will be going to his parents house and staying there.  PT assessment requested to see if he needs additional support due to his leg pain.    -Appreciate IR input, no plan for any procedures noted.        Alcohol dependence:  11/2, patient is in active alcohol withdrawal, scoring very high in the CIWA scale, received Valium dosage prior to me visiting with patient  -CIWA protocol in place with Valium protocol as needed  -IV followed by oral multivitamins  -Continuing prior prescription of gabapentin 400 mg 3 times daily; can adjust this dose if patient develops florid alcohol withdrawal  -IV Protonix daily given history of alcohol abuse with reported melenic stool during February 2021 hospitalization at [facility] secondary to alcoholic gastritis.  -We will monitor here for any active bleeding, hemoglobin did drop from 12.7-11.9 but no active bleeding seen.  If no active bleeding follow-up outpatient with PCP/GI  -Patient did not require any Valium since yesterday night, he does not appear to be in active withdrawal now, discussed abstinence from alcohol going forward considering his cardiomyopathy could be from alcohol.     Pulmonary infarcts versus aspiration pneumonia: Patient with several areas of groundglass opacity involving right upper, lower, and midlung fields.  History of nausea with emesis as well as alcohol use to the point of blacking out several times over the past week.    -Patient received Unasyn here plan to transition to Augmentin on discharge to complete 7-day course.     Hyperglycemia: Blood glucose of 140.  No history of diabetes, though is obese.  -Hemo-globin A1c is 5.6      Namita Ziegler MD    Significant Results  and Procedures       Pending Results   These results will be followed up by   Unresulted Labs Ordered in the Past 30 Days of this Admission     No orders found from 10/1/2021 to 11/1/2021.          Code Status   Full Code       Primary Care Physician   Ton López    Physical Exam   Temp: 98  F (36.7  C) Temp src: Oral BP: 132/64 Pulse: 84   Resp: 16 SpO2: 95 % O2 Device: None (Room air) Oxygen Delivery: 2 LPM  Vitals:    10/31/21 2200 11/01/21 0000 11/03/21 0557   Weight: 130.9 kg (288 lb 9.3 oz) 130.9 kg (288 lb 9.3 oz) 132.9 kg (293 lb)     Vital Signs with Ranges  Temp:  [98  F (36.7  C)-98.7  F (37.1  C)] 98  F (36.7  C)  Pulse:  [] 84  Resp:  [12-35] 16  BP: (121-139)/(64-92) 132/64  SpO2:  [91 %-98 %] 95 %  I/O last 3 completed shifts:  In: 2880 [P.O.:2880]  Out: 6800 [Urine:6800]    The patient was examined on the day of discharge.    Discharge Disposition   Discharged to home  Condition at discharge: Stable    Consultations This Hospital Stay   PHARMACY IP CONSULT  PHARMACY IP CONSULT  CARDIOLOGY IP CONSULT  PHARMACY LIAISON FOR MEDICATION COVERAGE CONSULT  PHYSICAL THERAPY ADULT IP CONSULT    Time Spent on this Encounter   INamita MD, personally saw the patient today and spent greater than 30 minutes discharging this patient.    Discharge Orders      Follow-Up with Cardiologist      Reason for your hospital stay    Deep vein thrombosis/pulmonary embolism     Follow-up and recommended labs and tests     Follow up with primary care provider, Ton López, within 7 days to evaluate medication change and for hospital follow- up.  The following labs/tests are recommended: cbc in 4-5 days.  Please set up appointment with vascular medicine outpatient for deep vein thrombosis/pulmonary embolism diagnosis     Activity    Your activity upon discharge: activity as tolerated, avoid strenuous activity .     Echocardiogram Complete    Administration of IV contrast will be tailored to this  examination per the appropriate written protocol listed in the Echocardiography department Protocol Book, or by the supervising Cardiologist. This may result in an order change.    Use of contrast is at the discretion of the supervising Cardiologist.     Diet    Follow this diet upon discharge: Orders Placed This Encounter      Regular Diet Adult     Discharge Medications   Current Discharge Medication List      START taking these medications    Details   acetaminophen (TYLENOL) 325 MG tablet Take 2 tablets (650 mg) by mouth every 6 hours as needed for mild pain or other (and adjunct with moderate or severe pain or per patient request)      amoxicillin-clavulanate (AUGMENTIN) 875-125 MG tablet Take 1 tablet by mouth 2 times daily for 6 days  Qty: 12 tablet, Refills: 0    Associated Diagnoses: Multiple subsegmental pulmonary emboli without acute cor pulmonale (H)      !! apixaban ANTICOAGULANT (ELIQUIS) 5 MG tablet Take 2 tablets (10 mg) by mouth 2 times daily for 5 days  Qty: 20 tablet, Refills: 0    Associated Diagnoses: Multiple subsegmental pulmonary emboli without acute cor pulmonale (H)      !! apixaban ANTICOAGULANT (ELIQUIS) 5 MG tablet Take 1 tablet (5 mg) by mouth 2 times daily  Qty: 60 tablet, Refills: 0    Associated Diagnoses: Multiple subsegmental pulmonary emboli without acute cor pulmonale (H)      folic acid (FOLVITE) 1 MG tablet Take 1 tablet (1 mg) by mouth daily  Qty: 30 tablet, Refills: 0    Associated Diagnoses: Multiple subsegmental pulmonary emboli without acute cor pulmonale (H)      HYDROmorphone (DILAUDID) 2 MG tablet Take 1 tablet (2 mg) by mouth every 6 hours as needed for moderate to severe pain  Qty: 5 tablet, Refills: 0    Associated Diagnoses: Multiple subsegmental pulmonary emboli without acute cor pulmonale (H)      multivitamin w/minerals (THERA-VIT-M) tablet Take 1 tablet by mouth daily  Qty: 30 tablet, Refills: 0    Associated Diagnoses: Multiple subsegmental pulmonary emboli  without acute cor pulmonale (H)      ondansetron (ZOFRAN) 4 MG tablet Take 1 tablet (4 mg) by mouth every 8 hours as needed for nausea  Qty: 10 tablet, Refills: 0    Associated Diagnoses: Alcohol abuse      pantoprazole (PROTONIX) 40 MG EC tablet Take 1 tablet (40 mg) by mouth daily for 7 days  Qty: 7 tablet, Refills: 0    Associated Diagnoses: Alcohol abuse      thiamine (B-1) 100 MG tablet Take 1 tablet (100 mg) by mouth daily  Qty: 30 tablet, Refills: 0    Associated Diagnoses: Multiple subsegmental pulmonary emboli without acute cor pulmonale (H)       !! - Potential duplicate medications found. Please discuss with provider.      CONTINUE these medications which have NOT CHANGED    Details   gabapentin (NEURONTIN) 400 MG capsule Take 400 mg by mouth 2 times daily as needed            Allergies   No Known Allergies  Data   Most Recent 3 CBC's:Recent Labs   Lab Test 11/03/21  0544 11/01/21  0143 10/31/21  1851   WBC 7.2 8.9 10.3   HGB 11.4* 11.9* 12.7*   MCV 94 90 90    264 244      Most Recent 3 BMP's:  Recent Labs   Lab Test 11/03/21  0544 11/02/21  0556 11/01/21  1512 11/01/21  0739 11/01/21  0143 10/31/21  2143 10/31/21  1601     --   --   --  136  --  135   POTASSIUM 4.1 4.0  --   --  3.7  --  3.8   CHLORIDE 107  --   --   --  105  --  107   CO2 27  --   --   --  24  --  22   BUN 11  --   --   --  14  --  10   CR 1.01  --   --   --  0.84  --  0.78   ANIONGAP 5  --   --   --  7  --  6   MARILYN 8.6  --   --   --  8.1*  --  8.5   *  --  105* 88 114*   < > 99    < > = values in this interval not displayed.     Most Recent 2 LFT's:  Recent Labs   Lab Test 11/01/21  0143 09/05/21 2037   AST 37 46*   ALT 47 56   ALKPHOS 80 69   BILITOTAL 0.5 0.3     Most Recent INR's and Anticoagulation Dosing History:  Anticoagulation Dose History     Recent Dosing and Labs Latest Ref Rng & Units 4/26/2021 10/31/2021    INR 0.86 - 1.14 0.99 1.04        Most Recent 3 Troponin's:  Recent Labs   Lab Test  10/31/21  1601   TROPONIN <0.015     Most Recent Cholesterol Panel:  Recent Labs   Lab Test 21   CHOL 180   < > 121   LDL  --   --  20   HDL 68   < > 60   TRIG 588*   < > 204*    < > = values in this interval not displayed.     Most Recent 6 Bacteria Isolates From Any Culture (See EPIC Reports for Culture Details):No lab results found.  Most Recent TSH, T4 and A1c Labs:  Recent Labs   Lab Test 21  0143 21   TSH  --  0.93   A1C 5.6  --      Results for orders placed or performed during the hospital encounter of 10/31/21   Echocardiogram Complete     Value    LVEF  45-50%    Biplane LVEF 45%    Narrative    [id number]  [id number]  [id number]  [id number]^LAURI^NIKKY^MARCIAL     [facility]  Echocardiography Laboratory  [address] [place] [address]     Name: STACEY CHAMPION  MRN: [id number]  : 1995  Study Date: 2021 07:56 AM  Age: 26 yrs  Gender: Male  Patient Location: [facility]  Reason For Study: Pulmonary Emboli  Ordering Physician: NIKKY CARREON  Referring Physician: Ton López  Performed By: Mariana Diana     BSA: 2.6 m2  Height: 78 in  Weight: 288 lb  HR: 92  BP: 108/86 mmHg  ______________________________________________________________________________  Procedure  Complete Portable Echo Adult. Optison (NDC #[id number]) given intravenously.  ______________________________________________________________________________  Interpretation Summary     Left ventricular systolic function is mildly reduced.  The visual ejection fraction is 45-50%. Traced EF 45%.  There is mild global hypokinesia of the left ventricle.  Mildly decreased right ventricular systolic function  The right ventricle is mildly dilated.  No significant valve dysfunction.  Unable to estimate PA systolic pressure.  The inferior vena cava was normal in size with preserved respiratory  variability.  There is no pericardial effusion.     No prior for  comparison.  ______________________________________________________________________________  Left Ventricle  The left ventricle is normal in size. There is mild concentric left  ventricular hypertrophy. The visual ejection fraction is 45-50%. Left  ventricular systolic function is mildly reduced. Left ventricular diastolic  function is normal. Biplane LVEF is 45%. There is mild global hypokinesia of  the left ventricle.     Right Ventricle  The right ventricle is mildly dilated. Mildly decreased right ventricular  systolic function.     Atria  Normal left atrial size. Right atrial size is normal.     Mitral Valve  There is mild mitral annular calcification.     Tricuspid Valve  The tricuspid valve is not well visualized, but is grossly normal. Right  ventricular systolic pressure could not be approximated due to inadequate  tricuspid regurgitation. There is mild (1+) tricuspid regurgitation.     Aortic Valve  The aortic valve is normal in structure and function.     Pulmonic Valve  The pulmonic valve is normal in structure and function. There is mild (1+)  pulmonic valvular regurgitation.     Vessels  The aortic root is normal size. Normal size ascending aorta. The inferior vena  cava was normal in size with preserved respiratory variability.     Pericardium  There is no pericardial effusion.     ______________________________________________________________________________  MMode/2D Measurements & Calculations  IVSd: 1.0 cm  LVIDd: 5.4 cm  LVIDs: 4.2 cm  LVPWd: 1.0 cm  FS: 21.9 %  LV mass(C)d: 208.6 grams  LV mass(C)dI: 79.1 grams/m2     Ao root diam: 3.7 cm  LA dimension: 3.6 cm  asc Aorta Diam: 3.2 cm  LA/Ao: 0.98  LA Volume (BP): 71.5 ml  LA Volume Index (BP): 27.1 ml/m2  RWT: 0.38  TAPSE: 1.7 cm     Doppler Measurements & Calculations  MV E max montrell: 59.5 cm/sec  MV A max montrell: 46.0 cm/sec  MV E/A: 1.3     MV dec time: 0.19 sec  E/E' av.5  Lateral E/e': 4.3  Medial E/e': 6.8      ______________________________________________________________________________  Report approved by: Maryjo Freeman 11/01/2021 10:28 AM

## 2021-11-03 NOTE — PLAN OF CARE
Neuro- A&OX4 CIWA score 8, 7 and 6 at 2200.   Most Recent Vitals- Temp: 98  F (36.7  C) Temp src: Oral BP: (!) 126/92 Pulse: 103   Resp: (!) 35 SpO2: 98 % O2 Device: Nasal cannula   Tele/Cardiac- SR/ST   Resp- 2L NSC   Activity- SBA  Pain- prn tylenol and dilaudid given for headache LE pain.   Drips- intermittent ABX.   Drains/Tubes- Right  arm X2 P-IV SL.   Skin- Intact   GI/- Using urinal independently at bed side.   Aggression Color- Green  COVID status- Negative  Plan-Expected discharge in 1 to 2 days depending on his oxygen needs, alcohol withdrawal symptoms.     [place]-     Lady Tristan RN

## 2021-11-03 NOTE — PROGRESS NOTES
[facility]    Hospitalist Progress Note    Assessment & Plan   Sebastián Nogueira is a 26 year old male admitted on 10/31/2021. He presents as a direct admission from [facility] with complaints of right lower extremity pain and discomfort found to have DVT as well as submassive pulmonary embolism.  Transferred for close monitoring and possible embolectomy     Right lower extremity DVT: Suspect provoked with recent flight to and from [place] as well as a describes cellulitis of right lower extremity.  Patient apparently has been having right lower extremity pain for slightly more than 1 week, preceded flying to [place] on Saturday of last week.  Negative ultrasound of right lower extremity reportedly at [facility] 10/25 and was prescribed 7 days of Keflex for a cellulitis at that time.  Submassive pulmonary embolism: No hypoxia, though tachycardia to the 115 range.  Chest CT with largely right-sided pulmonary embolism, though bilateral emboli.  Some right-sided pulmonary infarct versus aspiration pneumonia.  CT imaging also concerning for right heart strain.  -Echocardiogram results 11/1 shows reduced left ventricular systolic function, EF is 45-50% H, mild global hypokinesia of the left ventricle seen, mildly decreased right ventricular systolic function and dilatation noted.?  Alcohol related LV dysfunction/cardiomyopathy  -Appreciate cardiology input, plan noted for repeat echo in 1 month and follow-up with cardiology.  -Continue on cardiac telemetry now  -Changed heparin to apixaban, patient is currently off of oxygen, discussed with nursing to assess for ambulatory pulse oximetry, patient mentions ongoing pain on his right lower extremity, swelling is not any worse than yesterday.  Discussed the discharge plans with patient with follow-up, he mentions that he will be going to his parents house and staying there.  PT assessment requested to see if he needs additional  support due to his leg pain.    -Appreciate IR input, no plan for any procedures noted.       Alcohol dependence:  11/2, patient is in active alcohol withdrawal, scoring very high in the CIWA scale, received Valium dosage prior to me visiting with patient  -CIWA protocol in place with Valium protocol as needed  -IV followed by oral multivitamins  -Continuing prior prescription of gabapentin 400 mg 3 times daily; can adjust this dose if patient develops florid alcohol withdrawal  -IV Protonix daily given history of alcohol abuse with reported melenic stool during February 2021 hospitalization at [facility] secondary to alcoholic gastritis.  -We will monitor here for any active bleeding, hemoglobin did drop from 12.7-11.9 but no active bleeding seen.  If no active bleeding follow-up outpatient with PCP/GI  -Patient did not require any Valium since yesterday night, he does not appear to be in active withdrawal now, discussed abstinence from alcohol going forward considering his cardiomyopathy could be from alcohol.     Pulmonary infarcts versus aspiration pneumonia: Patient with several areas of groundglass opacity involving right upper, lower, and midlung fields.  History of nausea with emesis as well as alcohol use to the point of blacking out several times over the past week.    -Patient received Unasyn here plan to transition to Augmentin on discharge to complete 7-day course.     Hyperglycemia: Blood glucose of 140.  No history of diabetes, though is obese.  -Hemo-globin A1c is 5.6    DVT Prophylaxis: Heparin drip  Code Status: Full Code     Disposition: Plan to discharge later today once he is needs are assessed in terms of ambulation and oxygen with activity.  Namita Ziegler MD  Text Page   (7am to 6pm)    Interval History   Patient tolerating DOAC's well, heparin drip discontinued yesterday, his last dose of Valium was yesterday night, does not appear to be in active withdrawal now, right lower  extremity edema noted but he had a increased pain overnight, pain is much controlled now.Discussed about the plan for discharge today.  Patient is agreeable, discussed with nursing, waiting for assessment by PT to decide on his discharge needs.  Discharge orders in place (conditional).  -Data reviewed today: I reviewed all new labs and imaging results over the last 24 hours. I personally reviewed Labs and imaging below.  Physical Exam   Temp: 98  F (36.7  C) Temp src: Oral BP: 132/64 Pulse: 84   Resp: 16 SpO2: 95 % O2 Device: None (Room air) Oxygen Delivery: 2 LPM  Vitals:    10/31/21 2200 11/01/21 0000 11/03/21 0557   Weight: 130.9 kg (288 lb 9.3 oz) 130.9 kg (288 lb 9.3 oz) 132.9 kg (293 lb)     Vital Signs with Ranges  Temp:  [98  F (36.7  C)-98.7  F (37.1  C)] 98  F (36.7  C)  Pulse:  [] 84  Resp:  [12-35] 16  BP: (121-148)/(64-93) 132/64  SpO2:  [91 %-98 %] 95 %  I/O last 3 completed shifts:  In: 2880 [P.O.:2880]  Out: 6800 [Urine:6800]    Constitutional: Awake, alert, cooperative, no apparent distress  Respiratory: Clear to auscultation bilaterally, no crackles or wheezing  Cardiovascular: Regular rate and rhythm, normal S1 and S2, and no murmur noted  GI: Normal bowel sounds, soft, non-distended, non-tender  Skin/Integumen: Right lower extremity has 1+ edema  Neuro : moving all 4 extremities, bilateral upper extremity fine tremors noted    Medications     - MEDICATION INSTRUCTIONS -         ampicillin-sulbactam (UNASYN) IV  3 g Intravenous Q6H     apixaban ANTICOAGULANT  10 mg Oral BID    Followed by     [START ON 11/9/2021] apixaban ANTICOAGULANT  5 mg Oral BID     folic acid  1 mg Oral Daily     gabapentin  400 mg Oral TID     multivitamin w/minerals  1 tablet Oral Daily     pantoprazole (PROTONIX) IV  40 mg Intravenous Daily with breakfast     sodium chloride (PF)  3 mL Intracatheter Q8H     thiamine  100 mg Oral Daily       Data   Recent Labs   Lab 11/03/21  0544 11/02/21  0556 11/01/21  1512  11/01/21  0739 11/01/21  0143 10/31/21  2143 10/31/21  1851 10/31/21  1601 10/31/21  1601   WBC 7.2  --   --   --  8.9  --  10.3   < > 9.7   HGB 11.4*  --   --   --  11.9*  --  12.7*   < > 13.3   MCV 94  --   --   --  90  --  90   < > 89     --   --   --  264  --  244   < > 240   INR  --   --   --   --   --   --  1.04  --   --      --   --   --  136  --   --   --  135   POTASSIUM 4.1 4.0  --   --  3.7  --   --    < > 3.8   CHLORIDE 107  --   --   --  105  --   --   --  107   CO2 27  --   --   --  24  --   --   --  22   BUN 11  --   --   --  14  --   --   --  10   CR 1.01  --   --   --  0.84  --   --   --  0.78   ANIONGAP 5  --   --   --  7  --   --   --  6   MARILYN 8.6  --   --   --  8.1*  --   --   --  8.5   *  --  105* 88 114*   < >  --    < > 99   ALBUMIN  --   --   --   --  3.0*  --   --   --   --    PROTTOTAL  --   --   --   --  6.8  --   --   --   --    BILITOTAL  --   --   --   --  0.5  --   --   --   --    ALKPHOS  --   --   --   --  80  --   --   --   --    ALT  --   --   --   --  47  --   --   --   --    AST  --   --   --   --  37  --   --   --   --    TROPONIN  --   --   --   --   --   --   --   --  <0.015    < > = values in this interval not displayed.     Recent Labs   Lab 11/03/21  0544 11/01/21  1512 11/01/21 0739 11/01/21  0143 10/31/21  2143   * 105* 88 114* 140*       Imaging:   No results found for this or any previous visit (from the past 24 hour(s)).

## 2021-11-04 ENCOUNTER — TELEPHONE (OUTPATIENT)
Dept: CARDIOLOGY | Facility: CLINIC | Age: 26
End: 2021-11-04

## 2021-11-04 ENCOUNTER — PATIENT OUTREACH (OUTPATIENT)
Dept: CARE COORDINATION | Facility: CLINIC | Age: 26
End: 2021-11-04

## 2021-11-04 DIAGNOSIS — Z71.89 OTHER SPECIFIED COUNSELING: ICD-10-CM

## 2021-11-04 NOTE — PLAN OF CARE
Physical Therapy Discharge Summary    Reason for therapy discharge:    Discharged to home.    Progress towards therapy goal(s). See goals on Care Plan in [facility] electronic health record for goal details.  Goals not met.  Barriers to achieving goals:   discharge from facility and discharge on same date as initial evaluation.    Therapy recommendation(s):    Continued therapy is recommended.  Rationale/Recommendations:  cont PT was recommended due to inability to mobilize well due to poor pain control. It pt's pain improved, anticipate pt would progress quickly and would likely not need continued PT.       01-Mar-2020 01:45

## 2021-11-04 NOTE — TELEPHONE ENCOUNTER
"Patient was evaluated by cardiology while inpatient for RLE pain, swelling-DVT provoked due to recent flight to and from [place]. PMH: ETOH dependence-last drink AM of admission. Pt dx with RLE DVT with alexis submassive PE and new onset of CM. Echo showed EF of 45% with mild global hypokinesis-tachy mediated vs ETOH induced. Per Dr. Cano's IP note, \"Advised abstaining from alcohol completely, but especially in the next month. Consider hypercoagulable evaluation or vascular medicine follow up to determine duration of anticoagulation.\" Pt was discharged to home taking Augmentin (RLE cellulitis as well), folic acid, thiamine, Protonix, Dilaudid, Zofran and titrated dose of Eliquis-10 mg po BID x 5 days, then 5 mg po BID. Writer attempted to call patient to discuss any post hospital d/c questions he may have, review medication changes, and confirm f/u appts, but no answer. VM left to return my phone call. RN will confirm with patient that he has an echo scheduled on 11/30/21 at 0845 and then an OV on 12/2/21 at 0915 with Dr. Cano. PAMELA Anderson RN.    "

## 2021-11-04 NOTE — PLAN OF CARE
Patient discharged home  At 1755 accompanied by CNA   Written instructions sent with patient - and patient/family verbalized understanding.   Belongings sent with the patient - yes  Home medications sent with patient n/a  Prescriptions were filled and sent home with patient.

## 2021-11-04 NOTE — PROGRESS NOTES
Clinic Care Coordination Contact    Background: Care Coordination referral placed from [facility] discharge report for reason of patient meeting criteria for a TCM outreach call by [facility] team.    Assessment: Upon chart review, [facility] Team member will cancel/close the referral for TCM outreach due to reason below:     Patient has a follow up appointment with an appropriate provider today for hospital discharge.     Patient completed hospital follow up this morning with Cardiology. Pt had no concerns and confirmed upcoming appt's are on 11/30 and 12/2.    Plan: Care Coordination referral for TCM outreach canceled.    MONA Barth  [facility], [facility]

## 2021-11-05 NOTE — TELEPHONE ENCOUNTER
"Called pt -reviewed with Dr. Cano.   if calf pain is \"severe\" pt needs to proceed to the ED.  He understands and agrees.  Will have him see vascular medicine next week.    " Niacinamide Pregnancy And Lactation Text: These medications are considered safe during pregnancy.

## 2021-11-05 NOTE — TELEPHONE ENCOUNTER
"Pt calling back after received message yesterday.  Pt states his right calf is \"throbbing\" with pain.  He states he is out of dilaudid for pain and is requesting refill.  Reviewed that his right leg and foot are \"slightly\" swollen in comparison to left.  He states the left and right leg and foot are the same color. No \"blue\" discoloration noted in right leg or foot,  Pt is taking eliquis - he understands to take two tabs twice a day for total of 10mg twice a day for 5 days and then decrease to one tablet 5 mg twice a day.  Pt is keep right leg elevated.  He is not using heat or cold and is not wrapping the leg.  Will review with Dr. Cano.  "

## 2021-11-19 ENCOUNTER — NURSE TRIAGE (OUTPATIENT)
Dept: NURSING | Facility: CLINIC | Age: 26
End: 2021-11-19
Payer: COMMERCIAL

## 2021-11-20 ENCOUNTER — HOSPITAL ENCOUNTER (EMERGENCY)
Facility: CLINIC | Age: 26
Discharge: HOME | End: 2021-11-20
Attending: EMERGENCY MEDICINE | Admitting: EMERGENCY MEDICINE
Payer: COMMERCIAL

## 2021-11-20 ENCOUNTER — APPOINTMENT (OUTPATIENT)
Dept: CT IMAGING | Facility: CLINIC | Age: 26
End: 2021-11-20
Attending: EMERGENCY MEDICINE
Payer: COMMERCIAL

## 2021-11-20 VITALS
BODY MASS INDEX: 36.45 KG/M2 | HEART RATE: 90 BPM | HEIGHT: 78 IN | SYSTOLIC BLOOD PRESSURE: 116 MMHG | DIASTOLIC BLOOD PRESSURE: 60 MMHG | OXYGEN SATURATION: 96 % | RESPIRATION RATE: 17 BRPM | WEIGHT: 315 LBS | TEMPERATURE: 99.1 F

## 2021-11-20 DIAGNOSIS — R07.9 CHEST PAIN, UNSPECIFIED TYPE: ICD-10-CM

## 2021-11-20 DIAGNOSIS — Z11.52 ENCOUNTER FOR SCREENING LABORATORY TESTING FOR SEVERE ACUTE RESPIRATORY SYNDROME CORONAVIRUS 2 (SARS-COV-2): ICD-10-CM

## 2021-11-20 DIAGNOSIS — I27.82 CHRONIC PULMONARY EMBOLISM, UNSPECIFIED PULMONARY EMBOLISM TYPE, UNSPECIFIED WHETHER ACUTE COR PULMONALE PRESENT (H): ICD-10-CM

## 2021-11-20 LAB
ANION GAP SERPL CALCULATED.3IONS-SCNC: 7 MMOL/L (ref 3–14)
APTT PPP: 30 SECONDS (ref 22–38)
ATRIAL RATE - MUSE: 97 BPM
BASOPHILS # BLD AUTO: 0.1 10E3/UL (ref 0–0.2)
BASOPHILS NFR BLD AUTO: 1 %
BUN SERPL-MCNC: 16 MG/DL (ref 7–30)
CALCIUM SERPL-MCNC: 9.2 MG/DL (ref 8.5–10.1)
CHLORIDE BLD-SCNC: 109 MMOL/L (ref 94–109)
CO2 SERPL-SCNC: 25 MMOL/L (ref 20–32)
CREAT SERPL-MCNC: 0.84 MG/DL (ref 0.66–1.25)
DIASTOLIC BLOOD PRESSURE - MUSE: NORMAL MMHG
EOSINOPHIL # BLD AUTO: 0.2 10E3/UL (ref 0–0.7)
EOSINOPHIL NFR BLD AUTO: 3 %
ERYTHROCYTE [DISTWIDTH] IN BLOOD BY AUTOMATED COUNT: 16.7 % (ref 10–15)
ETHANOL SERPL-MCNC: <0.01 G/DL
FLUAV RNA SPEC QL NAA+PROBE: NEGATIVE
FLUBV RNA RESP QL NAA+PROBE: NEGATIVE
GFR SERPL CREATININE-BSD FRML MDRD: >90 ML/MIN/1.73M2
GLUCOSE BLD-MCNC: 114 MG/DL (ref 70–99)
HCT VFR BLD AUTO: 40.5 % (ref 40–53)
HGB BLD-MCNC: 13 G/DL (ref 13.3–17.7)
HOLD SPECIMEN: NORMAL
IMM GRANULOCYTES # BLD: 0.1 10E3/UL
IMM GRANULOCYTES NFR BLD: 2 %
INR PPP: 1 (ref 0.86–1.14)
INTERPRETATION ECG - MUSE: NORMAL
LYMPHOCYTES # BLD AUTO: 2.9 10E3/UL (ref 0.8–5.3)
LYMPHOCYTES NFR BLD AUTO: 37 %
MCH RBC QN AUTO: 31.2 PG (ref 26.5–33)
MCHC RBC AUTO-ENTMCNC: 32.1 G/DL (ref 31.5–36.5)
MCV RBC AUTO: 97 FL (ref 78–100)
MONOCYTES # BLD AUTO: 0.8 10E3/UL (ref 0–1.3)
MONOCYTES NFR BLD AUTO: 10 %
NEUTROPHILS # BLD AUTO: 3.7 10E3/UL (ref 1.6–8.3)
NEUTROPHILS NFR BLD AUTO: 47 %
NRBC # BLD AUTO: 0 10E3/UL
NRBC BLD AUTO-RTO: 0 /100
P AXIS - MUSE: 32 DEGREES
PLATELET # BLD AUTO: 351 10E3/UL (ref 150–450)
POTASSIUM BLD-SCNC: 3.7 MMOL/L (ref 3.4–5.3)
PR INTERVAL - MUSE: 130 MS
QRS DURATION - MUSE: 80 MS
QT - MUSE: 342 MS
QTC - MUSE: 434 MS
R AXIS - MUSE: 42 DEGREES
RBC # BLD AUTO: 4.17 10E6/UL (ref 4.4–5.9)
SARS-COV-2 RNA RESP QL NAA+PROBE: NEGATIVE
SODIUM SERPL-SCNC: 141 MMOL/L (ref 133–144)
SYSTOLIC BLOOD PRESSURE - MUSE: NORMAL MMHG
T AXIS - MUSE: 25 DEGREES
TROPONIN I SERPL-MCNC: <0.015 UG/L (ref 0–0.04)
VENTRICULAR RATE- MUSE: 97 BPM
WBC # BLD AUTO: 7.8 10E3/UL (ref 4–11)

## 2021-11-20 PROCEDURE — 85025 COMPLETE CBC W/AUTO DIFF WBC: CPT | Performed by: EMERGENCY MEDICINE

## 2021-11-20 PROCEDURE — 85610 PROTHROMBIN TIME: CPT | Performed by: EMERGENCY MEDICINE

## 2021-11-20 PROCEDURE — C9803 HOPD COVID-19 SPEC COLLECT: HCPCS

## 2021-11-20 PROCEDURE — 36415 COLL VENOUS BLD VENIPUNCTURE: CPT | Performed by: EMERGENCY MEDICINE

## 2021-11-20 PROCEDURE — 87636 SARSCOV2 & INF A&B AMP PRB: CPT | Performed by: EMERGENCY MEDICINE

## 2021-11-20 PROCEDURE — 80048 BASIC METABOLIC PNL TOTAL CA: CPT | Performed by: EMERGENCY MEDICINE

## 2021-11-20 PROCEDURE — 250N000009 HC RX 250: Performed by: EMERGENCY MEDICINE

## 2021-11-20 PROCEDURE — 82075 ASSAY OF BREATH ETHANOL: CPT

## 2021-11-20 PROCEDURE — 84484 ASSAY OF TROPONIN QUANT: CPT | Performed by: EMERGENCY MEDICINE

## 2021-11-20 PROCEDURE — 99285 EMERGENCY DEPT VISIT HI MDM: CPT | Mod: 25 | Performed by: EMERGENCY MEDICINE

## 2021-11-20 PROCEDURE — 71275 CT ANGIOGRAPHY CHEST: CPT

## 2021-11-20 PROCEDURE — 96361 HYDRATE IV INFUSION ADD-ON: CPT

## 2021-11-20 PROCEDURE — 82077 ASSAY SPEC XCP UR&BREATH IA: CPT | Performed by: EMERGENCY MEDICINE

## 2021-11-20 PROCEDURE — 85730 THROMBOPLASTIN TIME PARTIAL: CPT | Performed by: EMERGENCY MEDICINE

## 2021-11-20 PROCEDURE — 93010 ELECTROCARDIOGRAM REPORT: CPT | Performed by: EMERGENCY MEDICINE

## 2021-11-20 PROCEDURE — 258N000003 HC RX IP 258 OP 636: Performed by: EMERGENCY MEDICINE

## 2021-11-20 PROCEDURE — 96360 HYDRATION IV INFUSION INIT: CPT | Mod: 59

## 2021-11-20 PROCEDURE — 250N000011 HC RX IP 250 OP 636: Performed by: EMERGENCY MEDICINE

## 2021-11-20 PROCEDURE — 99285 EMERGENCY DEPT VISIT HI MDM: CPT | Mod: 25

## 2021-11-20 RX ORDER — IOPAMIDOL 755 MG/ML
100 INJECTION, SOLUTION INTRAVASCULAR ONCE
Status: COMPLETED | OUTPATIENT
Start: 2021-11-20 | End: 2021-11-20

## 2021-11-20 RX ORDER — SODIUM CHLORIDE 9 MG/ML
INJECTION, SOLUTION INTRAVENOUS CONTINUOUS
Status: DISCONTINUED | OUTPATIENT
Start: 2021-11-20 | End: 2021-11-20 | Stop reason: HOSPADM

## 2021-11-20 RX ADMIN — SODIUM CHLORIDE 1000 ML: 9 INJECTION, SOLUTION INTRAVENOUS at 01:41

## 2021-11-20 RX ADMIN — SODIUM CHLORIDE: 9 INJECTION, SOLUTION INTRAVENOUS at 02:54

## 2021-11-20 RX ADMIN — IOPAMIDOL 77 ML: 755 INJECTION, SOLUTION INTRAVENOUS at 02:07

## 2021-11-20 RX ADMIN — SODIUM CHLORIDE 90 ML: 9 INJECTION, SOLUTION INTRAVENOUS at 02:12

## 2021-11-20 ASSESSMENT — ENCOUNTER SYMPTOMS
FEVER: 0
SHORTNESS OF BREATH: 1
COUGH: 1

## 2021-11-20 ASSESSMENT — MIFFLIN-ST. JEOR: SCORE: 2539.34

## 2021-11-20 NOTE — ED PROVIDER NOTES
ED Provider Note  [facility]      History     Chief Complaint   Patient presents with     Shortness of Breath     Pt was diagnosed on the 11/2 with a PE, and has been taking a pill twice a day and has been compliant during that time, now feeling even more short of breath      Foot Pain     Right foot swollen     The history is provided by the patient and medical records.      Sebastián Nogeuira is a 26 year old male with history of alcohol use disorder and recent admission 10/31-11/3 at [facility] for provoked RLE DVT and submassive bilateral PE with mild RV dysfunction  now anticoagulated on Eliquis who presents to the ED with complaints of shortness of breath and right ankle swelling. Patient reports that today he developed new chest discomfort. He states that he has been short of breath with his PE's, but never had chest discomfort. He reports some coughing. He is vaccinated for Covid. No fever. He reports increased right ankle swelling. He states he has missed 2 doses of his Eliquis. He states he has been drinking since being discharged 11/3, but not heavily or regularly. He endorses drinking 4 drinks today. No drug use.     Per chart review patient was seen at  in [place] on 10/25 and was prescribed 7 days of Keflex for a RLE cellulitis, RLE US negative at that time.    Past Medical History  Past Medical History:   Diagnosis Date     Alcohol abuse      Hypertriglyceridemia      Past Surgical History:   Procedure Laterality Date     APPENDECTOMY       apixaban ANTICOAGULANT (ELIQUIS) 5 MG tablet  acetaminophen (TYLENOL) 325 MG tablet  folic acid (FOLVITE) 1 MG tablet  gabapentin (NEURONTIN) 400 MG capsule  HYDROmorphone (DILAUDID) 2 MG tablet  multivitamin w/minerals (THERA-VIT-M) tablet  ondansetron (ZOFRAN) 4 MG tablet  thiamine (B-1) 100 MG tablet      No Known Allergies  Family History  Family History   Problem Relation Age of Onset     Dementia Paternal  "Grandfather      Social History   Social History     Tobacco Use     Smoking status: Former Smoker     Smokeless tobacco: Never Used   Substance Use Topics     Alcohol use: Yes     Comment: 3 times a week     Drug use: No     Comment: vague on the answer      Past medical history, past surgical history, medications, allergies, family history, and social history were reviewed with the patient. No additional pertinent items.       Review of Systems   Constitutional: Negative for fever.   Respiratory: Positive for cough and shortness of breath.    Cardiovascular: Positive for chest pain.   Musculoskeletal:        Right ankle swelling   All other systems reviewed and are negative.    A complete review of systems was performed with pertinent positives and negatives noted in the HPI, and all other systems negative.    Physical Exam   BP: (!) 133/91  Pulse: 111  Temp: 99.1  F (37.3  C)  Resp: 18  Height: 198.1 cm (6' 6\")  Weight: 143.6 kg (316 lb 9.6 oz)  SpO2: 98 %  Physical Exam  Constitutional:       General: He is not in acute distress.     Appearance: He is not diaphoretic.   HENT:      Head: Normocephalic.      Mouth/Throat:      Pharynx: No oropharyngeal exudate.   Eyes:      Extraocular Movements: Extraocular movements intact.   Cardiovascular:      Heart sounds: Normal heart sounds.   Pulmonary:      Effort: No respiratory distress.      Breath sounds: Normal breath sounds.   Abdominal:      General: There is no distension.      Palpations: Abdomen is soft.      Tenderness: There is no abdominal tenderness.   Musculoskeletal:         General: No deformity.      Cervical back: Neck supple.   Skin:     General: Skin is dry.   Neurological:      Mental Status: He is alert.      Comments: alert   Psychiatric:         Behavior: Behavior normal.         ED Course      Procedures            EKG Interpretation:      Interpreted by Simba Mas DO  Time reviewed: 0112  Symptoms at time of EKG: chest pain   Rhythm: " normal sinus   Rate: normal  Axis: normal  Ectopy: none  Conduction: normal  ST Segments/ T Waves: No ST-T wave changes  Q Waves: none  Comparison to prior: Heart rate improved compared to prior    Clinical Impression: normal EKG      Results for orders placed or performed during the hospital encounter of 11/20/21   CT Chest Pulmonary Embolism w Contrast     Status: None    Narrative    EXAM: CT CHEST WITH CONTRAST - PULMONARY EMBOLISM PROTOCOL   LOCATION: [facility]  DATE/TIME: 11/20/2021 1:54 AM    INDICATION: Chest pain. Recent diagnosis of pulmonary embolus.  COMPARISON: 10/31/2021.    TECHNIQUE: CT angiogram chest during pulmonary arterial phase injection IV contrast. Dose reduction techniques were used.   CONTRAST: 77 mL Isovue-370    FINDINGS:    ANGIOGRAM CHEST: Eccentric filling defects within the right main pulmonary artery and extending into the adjacent lobar arteries and a few segmental and subsegmental pulmonary arteries. No filling defects in the left pulmonary arteries. The thoracic   aorta is normal in caliber without dissection.    LUNGS AND PLEURA: A very small patchy opacity in the posteromedial aspect of the right lung base (series 6 image 224), slightly decreased in prominence since the prior study. The lungs are otherwise clear.    MEDIASTINUM/AXILLAE: Unremarkable.    CORONARY ARTERY CALCIFICATION: Absent.    MUSCULOSKELETAL: No acute findings.    UPPER ABDOMEN: Unremarkable.      Impression    IMPRESSION:   1.  Pulmonary emboli within the right main pulmonary artery and extending into the adjacent lobar arteries and a few segmental and subsegmental pulmonary arteries. These were also present on the comparison study dated 10/31/2021 and have mildly decreased   in extent since that study. A few pulmonary emboli that were within the left lung on the comparison study are no longer visualized. No convincing new pulmonary embolus.  2.  A small patchy  opacity in the right lung base, slightly decreased since the comparison study. This likely represents interval maturation of a small pulmonary infarct.  3.  No other findings suspicious for active pulmonary disease.    Extra Red Top Tube     Status: None   Result Value Ref Range    Hold Specimen JIC    Extra Green Top (Lithium Heparin) Tube     Status: None   Result Value Ref Range    Hold Specimen JIC    Extra Purple Top Tube     Status: None   Result Value Ref Range    Hold Specimen JIC    Extra Blue Top Tube     Status: None   Result Value Ref Range    Hold Specimen JIC    Basic metabolic panel     Status: Abnormal   Result Value Ref Range    Sodium 141 133 - 144 mmol/L    Potassium 3.7 3.4 - 5.3 mmol/L    Chloride 109 94 - 109 mmol/L    Carbon Dioxide (CO2) 25 20 - 32 mmol/L    Anion Gap 7 3 - 14 mmol/L    Urea Nitrogen 16 7 - 30 mg/dL    Creatinine 0.84 0.66 - 1.25 mg/dL    Calcium 9.2 8.5 - 10.1 mg/dL    Glucose 114 (H) 70 - 99 mg/dL    GFR Estimate >90 >60 mL/min/1.73m2   Troponin I     Status: Normal   Result Value Ref Range    Troponin I <0.015 0.000 - 0.045 ug/L   Symptomatic Influenza A/B & SARS-CoV2 (COVID-19) Virus PCR Multiplex Nasopharyngeal     Status: Normal    Specimen: Nasopharyngeal; Swab   Result Value Ref Range    Influenza A target Negative Negative    Influenza B target Negative Negative    SARS CoV2 PCR Negative Negative    Narrative    Testing was performed using the ivana SARS-CoV-2 & Influenza A/B Assay on the ivana Mari System. This test should be ordered for the detection of SARS-CoV-2 and influenza viruses in individuals who meet clinical and/or epidemiological criteria. Test performance is unknown in asymptomatic patients. This test is for in vitro diagnostic use under the FDA EUA for laboratories certified under CLIA to perform moderate and/or high complexity testing. This test has not been FDA cleared or approved. A negative result does not rule out the presence of PCR inhibitors in  the specimen or target RNA in concentration below the limit of detection for the assay. If only one viral target is positive but coinfection with multiple targets is suspected, the sample should be re-tested with another FDA cleared, approved or authorized test, if coinfection would change clinical management. [facility] [employer] are certified under the Clinical Laboratory Improvement Amendments of 1988 (CLIA-88) as  qualified to perform moderate and/or high complexity laboratory testing.   Ethyl Alcohol Level     Status: Normal   Result Value Ref Range    Alcohol ethyl <0.01 <=0.01 g/dL   INR     Status: Normal   Result Value Ref Range    INR 1.00 0.86 - 1.14   Partial thromboplastin time     Status: Normal   Result Value Ref Range    aPTT 30 22 - 38 Seconds   CBC with platelets and differential     Status: Abnormal   Result Value Ref Range    WBC Count 7.8 4.0 - 11.0 10e3/uL    RBC Count 4.17 (L) 4.40 - 5.90 10e6/uL    Hemoglobin 13.0 (L) 13.3 - 17.7 g/dL    Hematocrit 40.5 40.0 - 53.0 %    MCV 97 78 - 100 fL    MCH 31.2 26.5 - 33.0 pg    MCHC 32.1 31.5 - 36.5 g/dL    RDW 16.7 (H) 10.0 - 15.0 %    Platelet Count 351 150 - 450 10e3/uL    % Neutrophils 47 %    % Lymphocytes 37 %    % Monocytes 10 %    % Eosinophils 3 %    % Basophils 1 %    % Immature Granulocytes 2 %    NRBCs per 100 WBC 0 <1 /100    Absolute Neutrophils 3.7 1.6 - 8.3 10e3/uL    Absolute Lymphocytes 2.9 0.8 - 5.3 10e3/uL    Absolute Monocytes 0.8 0.0 - 1.3 10e3/uL    Absolute Eosinophils 0.2 0.0 - 0.7 10e3/uL    Absolute Basophils 0.1 0.0 - 0.2 10e3/uL    Absolute Immature Granulocytes 0.1 (H) <=0.0 10e3/uL    Absolute NRBCs 0.0 10e3/uL   EKG 12 lead     Status: None (Preliminary result)   Result Value Ref Range    Systolic Blood Pressure  mmHg    Diastolic Blood Pressure  mmHg    Ventricular Rate 97 BPM    Atrial Rate 97 BPM    NM Interval 130 ms    QRS Duration 80 ms     ms    QTc 434 ms    P Axis 32 degrees    R AXIS 42 degrees     T Axis 25 degrees    Interpretation ECG Sinus rhythm  Normal ECG      [place] Draw     Status: None (In process)    Narrative    The following orders were created for panel order [place] Draw.  Procedure                               Abnormality         Status                     ---------                               -----------         ------                     Extra Red Top Tube[[id number]]                               Final result               Extra Green Top (Lithium...[[id number]]                      Final result               Extra Purple Top Tube[[id number]]                            Final result               Extra Blood Bank Purple ...[[id number]]                                                   Please view results for these tests on the individual orders.   [place] Draw     Status: None    Narrative    The following orders were created for panel order [place] Draw.  Procedure                               Abnormality         Status                     ---------                               -----------         ------                     Extra Blue Top Tube[[id number]]                              Final result                 Please view results for these tests on the individual orders.   CBC with platelets differential     Status: Abnormal    Narrative    The following orders were created for panel order CBC with platelets differential.  Procedure                               Abnormality         Status                     ---------                               -----------         ------                     CBC with platelets and d...[[id number]]  Abnormal            Final result                 Please view results for these tests on the individual orders.            Results for orders placed or performed during the hospital encounter of 11/20/21   CT Chest Pulmonary Embolism w Contrast     Status: None    Narrative    EXAM: CT CHEST WITH CONTRAST - PULMONARY EMBOLISM PROTOCOL   LOCATION: [facility]  DATE/TIME: 11/20/2021 1:54 AM    INDICATION: Chest pain. Recent diagnosis of pulmonary embolus.  COMPARISON: 10/31/2021.    TECHNIQUE: CT angiogram chest during pulmonary arterial phase injection IV contrast. Dose reduction techniques were used.   CONTRAST: 77 mL Isovue-370    FINDINGS:    ANGIOGRAM CHEST: Eccentric filling defects within the right main pulmonary artery and extending into the adjacent lobar arteries and a few segmental and subsegmental pulmonary arteries. No filling defects in the left pulmonary arteries. The thoracic   aorta is normal in caliber without dissection.    LUNGS AND PLEURA: A very small patchy opacity in the posteromedial aspect of the right lung base (series 6 image 224), slightly decreased in prominence since the prior study. The lungs are otherwise clear.    MEDIASTINUM/AXILLAE: Unremarkable.    CORONARY ARTERY CALCIFICATION: Absent.    MUSCULOSKELETAL: No acute findings.    UPPER ABDOMEN: Unremarkable.      Impression    IMPRESSION:   1.  Pulmonary emboli within the right main pulmonary artery and extending into the adjacent lobar arteries and a few segmental and subsegmental pulmonary arteries. These were also present on the comparison study dated 10/31/2021 and have mildly decreased   in extent since that study. A few pulmonary emboli that were within the left lung on the comparison study are no longer visualized. No convincing new pulmonary embolus.  2.  A small patchy opacity in the right lung base, slightly decreased since the comparison study. This likely represents interval maturation of a small pulmonary infarct.  3.  No other findings suspicious for active pulmonary disease.    Extra Red Top Tube     Status: None   Result Value Ref Range    Hold Specimen JIC    Extra Green Top (Lithium Heparin) Tube     Status: None   Result Value Ref Range    Hold Specimen JIC    Extra Purple Top Tube     Status: None   Result Value Ref  Range    Hold Specimen JIC    Extra Blue Top Tube     Status: None   Result Value Ref Range    Hold Specimen JIC    Basic metabolic panel     Status: Abnormal   Result Value Ref Range    Sodium 141 133 - 144 mmol/L    Potassium 3.7 3.4 - 5.3 mmol/L    Chloride 109 94 - 109 mmol/L    Carbon Dioxide (CO2) 25 20 - 32 mmol/L    Anion Gap 7 3 - 14 mmol/L    Urea Nitrogen 16 7 - 30 mg/dL    Creatinine 0.84 0.66 - 1.25 mg/dL    Calcium 9.2 8.5 - 10.1 mg/dL    Glucose 114 (H) 70 - 99 mg/dL    GFR Estimate >90 >60 mL/min/1.73m2   Troponin I     Status: Normal   Result Value Ref Range    Troponin I <0.015 0.000 - 0.045 ug/L   Symptomatic Influenza A/B & SARS-CoV2 (COVID-19) Virus PCR Multiplex Nasopharyngeal     Status: Normal    Specimen: Nasopharyngeal; Swab   Result Value Ref Range    Influenza A target Negative Negative    Influenza B target Negative Negative    SARS CoV2 PCR Negative Negative    Narrative    Testing was performed using the ivana SARS-CoV-2 & Influenza A/B Assay on the ivana [employer] System. This test should be ordered for the detection of SARS-CoV-2 and influenza viruses in individuals who meet clinical and/or epidemiological criteria. Test performance is unknown in asymptomatic patients. This test is for in vitro diagnostic use under the FDA EUA for laboratories certified under CLIA to perform moderate and/or high complexity testing. This test has not been FDA cleared or approved. A negative result does not rule out the presence of PCR inhibitors in the specimen or target RNA in concentration below the limit of detection for the assay. If only one viral target is positive but coinfection with multiple targets is suspected, the sample should be re-tested with another FDA cleared, approved or authorized test, if coinfection would change clinical management. [facility] are certified under the Clinical Laboratory Improvement Amendments of 1988 (CLIA-88) as  qualified to perform moderate  and/or high complexity laboratory testing.   Ethyl Alcohol Level     Status: Normal   Result Value Ref Range    Alcohol ethyl <0.01 <=0.01 g/dL   INR     Status: Normal   Result Value Ref Range    INR 1.00 0.86 - 1.14   Partial thromboplastin time     Status: Normal   Result Value Ref Range    aPTT 30 22 - 38 Seconds   CBC with platelets and differential     Status: Abnormal   Result Value Ref Range    WBC Count 7.8 4.0 - 11.0 10e3/uL    RBC Count 4.17 (L) 4.40 - 5.90 10e6/uL    Hemoglobin 13.0 (L) 13.3 - 17.7 g/dL    Hematocrit 40.5 40.0 - 53.0 %    MCV 97 78 - 100 fL    MCH 31.2 26.5 - 33.0 pg    MCHC 32.1 31.5 - 36.5 g/dL    RDW 16.7 (H) 10.0 - 15.0 %    Platelet Count 351 150 - 450 10e3/uL    % Neutrophils 47 %    % Lymphocytes 37 %    % Monocytes 10 %    % Eosinophils 3 %    % Basophils 1 %    % Immature Granulocytes 2 %    NRBCs per 100 WBC 0 <1 /100    Absolute Neutrophils 3.7 1.6 - 8.3 10e3/uL    Absolute Lymphocytes 2.9 0.8 - 5.3 10e3/uL    Absolute Monocytes 0.8 0.0 - 1.3 10e3/uL    Absolute Eosinophils 0.2 0.0 - 0.7 10e3/uL    Absolute Basophils 0.1 0.0 - 0.2 10e3/uL    Absolute Immature Granulocytes 0.1 (H) <=0.0 10e3/uL    Absolute NRBCs 0.0 10e3/uL   EKG 12 lead     Status: None (Preliminary result)   Result Value Ref Range    Systolic Blood Pressure  mmHg    Diastolic Blood Pressure  mmHg    Ventricular Rate 97 BPM    Atrial Rate 97 BPM    DC Interval 130 ms    QRS Duration 80 ms     ms    QTc 434 ms    P Axis 32 degrees    R AXIS 42 degrees    T Axis 25 degrees    Interpretation ECG Sinus rhythm  Normal ECG      [place] Draw     Status: None (In process)    Narrative    The following orders were created for panel order [place] Draw.  Procedure                               Abnormality         Status                     ---------                               -----------         ------                     Extra Red Top Tube[[id number]]                               Final result                Extra Green Top (Lithium...[[id number]]                      Final result               Extra Purple Top Tube[[id number]]                            Final result               Extra Blood Bank Purple ...[[id number]]                                                   Please view results for these tests on the individual orders.   [place] Draw     Status: None    Narrative    The following orders were created for panel order [place] Draw.  Procedure                               Abnormality         Status                     ---------                               -----------         ------                     Extra Blue Top Tube[[id number]]                              Final result                 Please view results for these tests on the individual orders.   CBC with platelets differential     Status: Abnormal    Narrative    The following orders were created for panel order CBC with platelets differential.  Procedure                               Abnormality         Status                     ---------                               -----------         ------                     CBC with platelets and d...[[id number]]  Abnormal            Final result                 Please view results for these tests on the individual orders.     Medications   0.9% sodium chloride BOLUS (0 mLs Intravenous Stopped 11/20/21 0254)     Followed by   sodium chloride 0.9% infusion ( Intravenous New Bag 11/20/21 0254)   iopamidol (ISOVUE-370) solution 100 mL (77 mLs Intravenous Given 11/20/21 0207)   sodium chloride 0.9 % bag 100mL (90 mLs Intravenous Given 11/20/21 0212)        Assessments & Plan (with Medical Decision Making)   18-year-old male presents to us with chief complaint of chest pain.  Differential includes but not limited to this arrhythmia, worsening pulmonary embolism, COVID-19, persistent discomfort due to his previous PE.  Vital signs today are unremarkable.  Troponin is negative.  BMP and CBC are unremarkable.  CT scan of  the chest shows persistent PE present from before but areas are somewhat improved in character.  EKG was normal sinus rhythm.  Symptoms are likely residual discomfort from his previous PEs.  At this point we will discharge him to follow-up with primary care and return to us as needed.  I did caution him on his alcohol use based on his previous history of problems with alcohol.  I have reviewed the nursing notes. I have reviewed the findings, diagnosis, plan and need for follow up with the patient.    New Prescriptions    No medications on file       Final diagnoses:   Chest pain, unspecified type   Chronic pulmonary embolism, unspecified pulmonary embolism type, unspecified whether acute cor pulmonale present (H)   I, Jagruti Kincaid, am serving as a trained medical scribe to document services personally performed by Simba Mas DO, based on the provider's statements to me.     I, Simba Mas DO, was physically present and have reviewed and verified the accuracy of this note documented by Jagruti Kincaid.      --  Simba Mas DO  [facility] EMERGENCY DEPARTMENT  11/20/2021     Simba Mas DO  11/20/21 0346

## 2021-11-20 NOTE — TELEPHONE ENCOUNTER
Sebastián calls and says that 2 weeks ago, he had a blood clot in his right leg that traveled to his lungs. Pt. Says that his right leg has swelling now. Pt. Also says that he has discomfort in his chest. Pt. Refuses to call 911 and says that he will get someone to take him to [facility] ER. COVID 19 Nurse Triage Plan/Patient Instructions    Please be aware that novel coronavirus (COVID-19) may be circulating in the community. If you develop symptoms such as fever, cough, or SOB or if you have concerns about the presence of another infection including coronavirus (COVID-19), please contact your health care provider or visit [email or web address].     Disposition/Instructions    Call to EMS/911 recommended. Follow protocol based instructions.     Bring Your Own Device:  Please also bring your smart device(s) (smart phones, tablets, laptops) and their charging cables for your personal use and to communicate with your care team during your visit.    Thank you for taking steps to prevent the spread of this virus.  o Limit your contact with others.  o Wear a simple mask to cover your cough.  o Wash your hands well and often.    Resources    [facility]: About COVID-19: [email or web address]    CDC: What to Do If You're Sick: www.cdc.gov/coronavirus/2019-ncov/about/steps-when-sick.html    CDC: Ending Home Isolation: www.cdc.gov/coronavirus/2019-ncov/hcp/disposition-in-home-patients.html     CDC: Caring for Someone: www.cdc.gov/coronavirus/2019-ncov/if-you-are-sick/care-for-someone.html     [facility]: Interim Guidance for Hospital Discharge to Home: www.health.state.[place]/diseases/coronavirus/hcp/hospdischarge.pdf    [facility] clinical trials (COVID-19 research studies): [email or web address]     Below are the COVID-19 hotlines at the [place] Department of Health ([facility]). Interpreters are available.   o For health questions: Call 157-076-5802 or 1-889.419.4953 (7  a.m. to 7 p.m.)  o For questions about schools and childcare: Call 277-702-7449 or 1-192.989.4137 (7 a.m. to 7 p.m.)

## 2021-11-20 NOTE — ED TRIAGE NOTES
Patient was tested negative for covid on 11/2, but was positive for a blood clot as well as pneumonia. He states to be covid vaccinated. He is feeling extremely short of breath and is having chest tightness.

## 2021-11-24 LAB — HOLD SPECIMEN: NORMAL

## 2021-11-30 ENCOUNTER — HOSPITAL ENCOUNTER (OUTPATIENT)
Dept: CARDIOLOGY | Facility: CLINIC | Age: 26
Discharge: HOME | End: 2021-11-30
Attending: NURSE PRACTITIONER | Admitting: NURSE PRACTITIONER
Payer: COMMERCIAL

## 2021-11-30 DIAGNOSIS — I26.99 PULMONARY EMBOLI (H): Primary | ICD-10-CM

## 2021-11-30 DIAGNOSIS — I26.94 MULTIPLE SUBSEGMENTAL PULMONARY EMBOLI WITHOUT ACUTE COR PULMONALE (H): ICD-10-CM

## 2021-11-30 DIAGNOSIS — I51.9 LV DYSFUNCTION: ICD-10-CM

## 2021-11-30 LAB — LVEF ECHO: NORMAL

## 2021-11-30 PROCEDURE — 255N000002 HC RX 255 OP 636: Performed by: NURSE PRACTITIONER

## 2021-11-30 PROCEDURE — 93325 DOPPLER ECHO COLOR FLOW MAPG: CPT | Mod: 26 | Performed by: INTERNAL MEDICINE

## 2021-11-30 PROCEDURE — 93321 DOPPLER ECHO F-UP/LMTD STD: CPT | Mod: 26 | Performed by: INTERNAL MEDICINE

## 2021-11-30 PROCEDURE — 93308 TTE F-UP OR LMTD: CPT | Mod: 26 | Performed by: INTERNAL MEDICINE

## 2021-11-30 PROCEDURE — 999N000208 ECHOCARDIOGRAM LIMITED

## 2021-11-30 RX ADMIN — HUMAN ALBUMIN MICROSPHERES AND PERFLUTREN 9 ML: 10; .22 INJECTION, SOLUTION INTRAVENOUS at 09:52

## 2021-12-02 ENCOUNTER — OFFICE VISIT (OUTPATIENT)
Dept: CARDIOLOGY | Facility: CLINIC | Age: 26
End: 2021-12-02
Payer: COMMERCIAL

## 2021-12-02 ENCOUNTER — TELEPHONE (OUTPATIENT)
Dept: OTHER | Facility: CLINIC | Age: 26
End: 2021-12-02

## 2021-12-02 VITALS
SYSTOLIC BLOOD PRESSURE: 135 MMHG | DIASTOLIC BLOOD PRESSURE: 86 MMHG | HEART RATE: 103 BPM | WEIGHT: 315 LBS | OXYGEN SATURATION: 96 % | HEIGHT: 78 IN | BODY MASS INDEX: 36.45 KG/M2

## 2021-12-02 DIAGNOSIS — I26.94 MULTIPLE SUBSEGMENTAL PULMONARY EMBOLI WITHOUT ACUTE COR PULMONALE (H): ICD-10-CM

## 2021-12-02 DIAGNOSIS — I51.9 LV DYSFUNCTION: ICD-10-CM

## 2021-12-02 PROCEDURE — 99214 OFFICE O/P EST MOD 30 MIN: CPT | Performed by: INTERNAL MEDICINE

## 2021-12-02 RX ORDER — METOPROLOL SUCCINATE 25 MG/1
25 TABLET, EXTENDED RELEASE ORAL DAILY
Qty: 90 TABLET | Refills: 3 | Status: SHIPPED | OUTPATIENT
Start: 2021-12-02 | End: 2022-01-01

## 2021-12-02 ASSESSMENT — MIFFLIN-ST. JEOR: SCORE: 2545.69

## 2021-12-02 NOTE — PROGRESS NOTES
[facility] Progress note    Assessment:  1. Biventricular heart failure, HFrEF after subacute presentation of bilateral submassive pulmonary emboli. NYHA II, improving.   a. EF was mildly decreased 45-50% initially, improved to low normal 50% on recent echo.   2. RLE DVT and submassive bilateral PE with mild RV dysfunction 10/31/21. This event may have been provoked by travel, but I would like him to see vascular medicine to determine duration of anticoagulation and whether additional hypercoag eval is warranted as this was a large DVT with serious sequelae after a relatively short flight to [place] in a 26 yr old.   3. Alcohol use disorder  4. Mild tachycardia  5. Elevated blood pressure  6. Daily marijuana use (smoking)  7. Dependent edema R >L  8. Morbid obesity.    Plan:  1.  Start metoprolol 25mg by mouth daily.  2.  Continue eliquis.  3.  Schedule an appointment in vascular medicine.   4.  Continue to avoid alcohol. Sobriety was recommended. Smoking marijuana is not an ideal alternative, but at this time he is not interested in pursuing additional chemical dependency counseling.   5.  Try graduated compression stockings, knee-high for dependent edema.  6.  Cardiology clinic follow up in about 6 months with Randa GONZALEZ CNP.     HPI:     Sebastián Nogueira is a very nice 26 year old man here for follow up of biventricular cardiomyopathy after submassive pulmonary emboli diagnosed in late October 2021.  He was recently back in the emergency department on 11/20/2021 for shortness of breath and chest discomfort and had another chest CT which showed a mild interval decrease in the pulmonary emboli. The symptoms resolved quickly and he still has some mild dyspnea with exertion which is improving.  A follow-up transthoracic echocardiogram also showed some improvement in his LV function with EF now in the low normal range 50%, was previously 45% and stable mild RV hypokinesis with mild to moderate right  ventricular enlargement.    He is on Eliquis and did report on the emergency department visit on the 20th that he did miss 2 doses.  Is also been recommended to abstain from alcohol but continues to drink although his last drink was 11/20/21. He has been smoking marijuana daily to replace the alcohol.     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   He lives independently in an apartment.     He also reports recent onset of numbess and tingling in his hands and was recommended to follow up with his PCP. He is already taking gabapentin.        Encounter Diagnoses   Name Primary?     Multiple subsegmental pulmonary emboli without acute cor pulmonale (H)      LV dysfunction        CURRENT MEDICATIONS:  Current Outpatient Medications   Medication Sig Dispense Refill     acetaminophen (TYLENOL) 325 MG tablet Take 2 tablets (650 mg) by mouth every 6 hours as needed for mild pain or other (and adjunct with moderate or severe pain or per patient request)       apixaban ANTICOAGULANT (ELIQUIS) 5 MG tablet Take 1 tablet (5 mg) by mouth 2 times daily 60 tablet 0     gabapentin (NEURONTIN) 400 MG capsule Take 400 mg by mouth daily as needed        ondansetron (ZOFRAN) 4 MG tablet Take 1 tablet (4 mg) by mouth every 8 hours as needed for nausea 10 tablet 0       ALLERGIES   No Known Allergies    PAST MEDICAL, SURGICAL, FAMILY, SOCIAL HISTORY:  History was reviewed and updated as needed, see medical record.      REVIEW OF SYSTEMS:  Skin:  not assessed     Eyes:  Positive for glasses  ENT:  not assessed    Respiratory:  Positive for cough;dyspnea on exertion  Cardiovascular:    Positive for;chest pain;edema  Gastroenterology: not assessed    Genitourinary:  not assessed    Musculoskeletal:  Positive for    Neurologic:  not assessed    Psychiatric:  Positive for excessive stress;anxiety  Heme/Lymph/Imm:  Negative    Endocrine:  Negative      PHYSICAL EXAM:      BP: 135/86 Pulse: 103      SpO2: 96 %      Vital Signs with Ranges  Pulse:  [103] 103  BP: (135)/(86) 135/86  SpO2:  [96 %] 96 %  318 lbs 0 oz    Constitutional: awake, alert, no distress  Eyes: sclera nonicteric  ENT: trachea midline  Respiratory: clear to ausculation bilaterally  Cardiovascular: regular rate and rhythm no murmur. Bilateral radial pulses are 2+, symmetric. Cap refill is normal, color is normal, hands are warm, pink.  GI: nondistended, nontender, bowel sounds present  Skin: dry, no rash 1-2+ pitting edema right ankle, 1+ left ankle  Musculoskeletal: grossly normal muscle bulk and tone  Neurologic: no gross focal deficits  Neuropsychiatric: normal affect    Recent Lab Results:  Echo 11/30/21: Interpretation Summary Left ventricular systolic function is mildly reduced. The visual ejectionfraction is estimated at 50%. Mild global hypokinesis of the left ventricle.Right ventricle is mild to moderately dilated. The right ventricular systolicfunction is mildly reduced. This study was compared to a TTE from 11/1/2021. Global LV function has mildlyimproved, global RV function is stable.        LIPID RESULTS:  Lab Results   Component Value Date    CHOL 180 09/05/2021    HDL 68 09/05/2021    LDL  09/05/2021      Comment:      Cannot estimate LDL when triglyceride exceeds 400 mg/dL  Age 0-19 years:  Desirable: 0-110 mg/dL   Borderline high: 110-129 mg/dL   High: >= 130 mg/dL    Age 20 years and older:  Desirable: <100mg/dL  Above desirable: 100-129 mg/dL   Borderline high: 130-159 mg/dL   High: 160-189 mg/dL   Very high: >= 190 mg/dL    TRIG 588 (H) 09/05/2021       LIVER ENZYME RESULTS:  Lab Results   Component Value Date    AST 37 11/01/2021    AST 32 04/26/2021    ALT 47 11/01/2021    ALT 38 04/26/2021       CBC RESULTS:  Lab Results   Component Value Date    WBC 7.8 11/20/2021    WBC 4.7 04/26/2021    RBC 4.17 (L) 11/20/2021    RBC 4.69 04/26/2021    HGB 13.0 (L) 11/20/2021    HGB 14.8 04/26/2021    HCT 40.5 11/20/2021    HCT 42.5  04/26/2021    MCV 97 11/20/2021    MCV 91 04/26/2021    MCH 31.2 11/20/2021    MCH 31.6 04/26/2021    MCHC 32.1 11/20/2021    MCHC 34.8 04/26/2021    RDW 16.7 (H) 11/20/2021    RDW 12.8 04/26/2021     11/20/2021     04/26/2021       BMP RESULTS:  Lab Results   Component Value Date     11/20/2021     04/26/2021    POTASSIUM 3.7 11/20/2021    POTASSIUM 3.4 04/26/2021    CHLORIDE 109 11/20/2021    CHLORIDE 101 04/26/2021    CO2 25 11/20/2021    CO2 27 04/26/2021    ANIONGAP 7 11/20/2021    ANIONGAP 12 04/26/2021     (H) 11/20/2021     (H) 04/26/2021    BUN 16 11/20/2021    BUN 7 04/26/2021    CR 0.84 11/20/2021    CR 0.77 04/26/2021    GFRESTIMATED >90 11/20/2021    GFRESTIMATED >90 04/26/2021    GFRESTBLACK >90 04/26/2021    MARILYN 9.2 11/20/2021    MARILYN 8.7 04/26/2021        A1C RESULTS:  Lab Results   Component Value Date    A1C 5.6 11/01/2021       INR RESULTS:  Lab Results   Component Value Date    INR 1.00 11/20/2021    INR 1.04 10/31/2021    INR 0.99 04/26/2021           CC  Randa Cantu, APRN CNP  [address] [place] [address]

## 2021-12-02 NOTE — TELEPHONE ENCOUNTER
Pt referred to [facility] by Darlene Cano MD for DVT and PE.     Patient has RLE venous US and CT     Pt needs to be scheduled for consult with vascular medicine.  Will route to scheduling to coordinate an appointment at next available.       Penny BELLO, RN    [facility]  Vascular [facility]  Office: [phone]  Fax: [phone]

## 2021-12-02 NOTE — PATIENT INSTRUCTIONS
1.  Start metoprolol 25mg by mouth daily.  2.  Continue eliquis.  3.  Schedule and appointment in vascular medicine.   4.  Continue to avoid alcohol.  5.  Try graduated compression stockings, knee-high.  6.  Cardiology clinic follow up in about 6 months.

## 2021-12-02 NOTE — LETTER
12/2/2021    Ton López MD  [address]  [address]  [facility] [address]    RE: Sebastián Nogueira       Dear Colleague,    I had the pleasure of seeing Sebastián Nogueira in the [facility].  [facility] Progress note    Assessment:  1. Biventricular heart failure, HFrEF after subacute presentation of bilateral submassive pulmonary emboli. NYHA II, improving.   a. EF was mildly decreased 45-50% initially, improved to low normal 50% on recent echo.   2. RLE DVT and submassive bilateral PE with mild RV dysfunction 10/31/21. This event may have been provoked by travel, but I would like him to see vascular medicine to determine duration of anticoagulation and whether additional hypercoag eval is warranted as this was a large DVT with serious sequelae after a relatively short flight to [place] in a 26 yr old.   3. Alcohol use disorder  4. Mild tachycardia  5. Elevated blood pressure  6. Daily marijuana use (smoking)  7. Dependent edema R >L  8. Morbid obesity.    Plan:  1.  Start metoprolol 25mg by mouth daily.  2.  Continue eliquis.  3.  Schedule an appointment in vascular medicine.   4.  Continue to avoid alcohol. Sobriety was recommended. Smoking marijuana is not an ideal alternative, but at this time he is not interested in pursuing additional chemical dependency counseling.   5.  Try graduated compression stockings, knee-high for dependent edema.  6.  Cardiology clinic follow up in about 6 months with Randa GONZALEZ CNP.     HPI:     Sebastián Nogueira is a very nice 26 year old man here for follow up of biventricular cardiomyopathy after submassive pulmonary emboli diagnosed in late October 2021.  He was recently back in the emergency department on 11/20/2021 for shortness of breath and chest discomfort and had another chest CT which showed a mild interval decrease in the pulmonary emboli. The symptoms resolved quickly and he still has some  mild dyspnea with exertion which is improving.  A follow-up transthoracic echocardiogram also showed some improvement in his LV function with EF now in the low normal range 50%, was previously 45% and stable mild RV hypokinesis with mild to moderate right ventricular enlargement.    He is on Eliquis and did report on the emergency department visit on the 20th that he did miss 2 doses.  Is also been recommended to abstain from alcohol but continues to drink although his last drink was 11/20/21. He has been smoking marijuana daily to replace the alcohol.     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   He lives independently in an apartment.     He also reports recent onset of numbess and tingling in his hands and was recommended to follow up with his PCP. He is already taking gabapentin.        Encounter Diagnoses   Name Primary?     Multiple subsegmental pulmonary emboli without acute cor pulmonale (H)      LV dysfunction        CURRENT MEDICATIONS:  Current Outpatient Medications   Medication Sig Dispense Refill     acetaminophen (TYLENOL) 325 MG tablet Take 2 tablets (650 mg) by mouth every 6 hours as needed for mild pain or other (and adjunct with moderate or severe pain or per patient request)       apixaban ANTICOAGULANT (ELIQUIS) 5 MG tablet Take 1 tablet (5 mg) by mouth 2 times daily 60 tablet 0     gabapentin (NEURONTIN) 400 MG capsule Take 400 mg by mouth daily as needed        ondansetron (ZOFRAN) 4 MG tablet Take 1 tablet (4 mg) by mouth every 8 hours as needed for nausea 10 tablet 0       ALLERGIES   No Known Allergies    PAST MEDICAL, SURGICAL, FAMILY, SOCIAL HISTORY:  History was reviewed and updated as needed, see medical record.      REVIEW OF SYSTEMS:  Skin:  not assessed     Eyes:  Positive for glasses  ENT:  not assessed    Respiratory:  Positive for cough;dyspnea on exertion  Cardiovascular:    Positive for;chest pain;edema  Gastroenterology: not  assessed    Genitourinary:  not assessed    Musculoskeletal:  Positive for    Neurologic:  not assessed    Psychiatric:  Positive for excessive stress;anxiety  Heme/Lymph/Imm:  Negative    Endocrine:  Negative      PHYSICAL EXAM:      BP: 135/86 Pulse: 103     SpO2: 96 %      Vital Signs with Ranges  Pulse:  [103] 103  BP: (135)/(86) 135/86  SpO2:  [96 %] 96 %  318 lbs 0 oz    Constitutional: awake, alert, no distress  Eyes: sclera nonicteric  ENT: trachea midline  Respiratory: clear to ausculation bilaterally  Cardiovascular: regular rate and rhythm no murmur. Bilateral radial pulses are 2+, symmetric. Cap refill is normal, color is normal, hands are warm, pink.  GI: nondistended, nontender, bowel sounds present  Skin: dry, no rash 1-2+ pitting edema right ankle, 1+ left ankle  Musculoskeletal: grossly normal muscle bulk and tone  Neurologic: no gross focal deficits  Neuropsychiatric: normal affect    Recent Lab Results:  Echo 11/30/21: Interpretation Summary Left ventricular systolic function is mildly reduced. The visual ejectionfraction is estimated at 50%. Mild global hypokinesis of the left ventricle.Right ventricle is mild to moderately dilated. The right ventricular systolicfunction is mildly reduced. This study was compared to a TTE from 11/1/2021. Global LV function has mildlyimproved, global RV function is stable.        LIPID RESULTS:  Lab Results   Component Value Date    CHOL 180 09/05/2021    HDL 68 09/05/2021    LDL  09/05/2021      Comment:      Cannot estimate LDL when triglyceride exceeds 400 mg/dL  Age 0-19 years:  Desirable: 0-110 mg/dL   Borderline high: 110-129 mg/dL   High: >= 130 mg/dL    Age 20 years and older:  Desirable: <100mg/dL  Above desirable: 100-129 mg/dL   Borderline high: 130-159 mg/dL   High: 160-189 mg/dL   Very high: >= 190 mg/dL    TRIG 588 (H) 09/05/2021       LIVER ENZYME RESULTS:  Lab Results   Component Value Date    AST 37 11/01/2021    AST 32 04/26/2021    ALT 47  11/01/2021    ALT 38 04/26/2021       CBC RESULTS:  Lab Results   Component Value Date    WBC 7.8 11/20/2021    WBC 4.7 04/26/2021    RBC 4.17 (L) 11/20/2021    RBC 4.69 04/26/2021    HGB 13.0 (L) 11/20/2021    HGB 14.8 04/26/2021    HCT 40.5 11/20/2021    HCT 42.5 04/26/2021    MCV 97 11/20/2021    MCV 91 04/26/2021    MCH 31.2 11/20/2021    MCH 31.6 04/26/2021    MCHC 32.1 11/20/2021    MCHC 34.8 04/26/2021    RDW 16.7 (H) 11/20/2021    RDW 12.8 04/26/2021     11/20/2021     04/26/2021       BMP RESULTS:  Lab Results   Component Value Date     11/20/2021     04/26/2021    POTASSIUM 3.7 11/20/2021    POTASSIUM 3.4 04/26/2021    CHLORIDE 109 11/20/2021    CHLORIDE 101 04/26/2021    CO2 25 11/20/2021    CO2 27 04/26/2021    ANIONGAP 7 11/20/2021    ANIONGAP 12 04/26/2021     (H) 11/20/2021     (H) 04/26/2021    BUN 16 11/20/2021    BUN 7 04/26/2021    CR 0.84 11/20/2021    CR 0.77 04/26/2021    GFRESTIMATED >90 11/20/2021    GFRESTIMATED >90 04/26/2021    GFRESTBLACK >90 04/26/2021    MARILYN 9.2 11/20/2021    MARILYN 8.7 04/26/2021        A1C RESULTS:  Lab Results   Component Value Date    A1C 5.6 11/01/2021       INR RESULTS:  Lab Results   Component Value Date    INR 1.00 11/20/2021    INR 1.04 10/31/2021    INR 0.99 04/26/2021           Darlene Cano MD    Heart Care      cc:   Randa Cantu, APRN CNP  [address] [place] [address]

## 2021-12-07 NOTE — TELEPHONE ENCOUNTER
LM for patient to call us back to schedule his  Consult Appointment.    This is our 2nd and final attempt to schedule this appointment.

## 2021-12-10 ENCOUNTER — HOSPITAL ENCOUNTER (EMERGENCY)
Facility: CLINIC | Age: 26
Discharge: HOME | End: 2021-12-10
Attending: EMERGENCY MEDICINE | Admitting: EMERGENCY MEDICINE
Payer: COMMERCIAL

## 2021-12-10 ENCOUNTER — APPOINTMENT (OUTPATIENT)
Dept: ULTRASOUND IMAGING | Facility: CLINIC | Age: 26
End: 2021-12-10
Attending: EMERGENCY MEDICINE
Payer: COMMERCIAL

## 2021-12-10 ENCOUNTER — APPOINTMENT (OUTPATIENT)
Dept: CT IMAGING | Facility: CLINIC | Age: 26
End: 2021-12-10
Attending: EMERGENCY MEDICINE
Payer: COMMERCIAL

## 2021-12-10 VITALS
HEART RATE: 103 BPM | TEMPERATURE: 97.8 F | DIASTOLIC BLOOD PRESSURE: 84 MMHG | RESPIRATION RATE: 20 BRPM | OXYGEN SATURATION: 99 % | SYSTOLIC BLOOD PRESSURE: 127 MMHG

## 2021-12-10 DIAGNOSIS — I82.401 DEEP VEIN THROMBOSIS (DVT) OF RIGHT LOWER EXTREMITY, UNSPECIFIED CHRONICITY, UNSPECIFIED VEIN (H): ICD-10-CM

## 2021-12-10 DIAGNOSIS — R20.2 PARESTHESIAS: ICD-10-CM

## 2021-12-10 LAB
ALBUMIN SERPL-MCNC: 3.9 G/DL (ref 3.4–5)
ALP SERPL-CCNC: 55 U/L (ref 40–150)
ALT SERPL W P-5'-P-CCNC: 27 U/L (ref 0–70)
ANION GAP SERPL CALCULATED.3IONS-SCNC: 7 MMOL/L (ref 3–14)
AST SERPL W P-5'-P-CCNC: 17 U/L (ref 0–45)
BASOPHILS # BLD AUTO: 0 10E3/UL (ref 0–0.2)
BASOPHILS NFR BLD AUTO: 1 %
BILIRUB SERPL-MCNC: 0.4 MG/DL (ref 0.2–1.3)
BUN SERPL-MCNC: 18 MG/DL (ref 7–30)
CALCIUM SERPL-MCNC: 9.3 MG/DL (ref 8.5–10.1)
CHLORIDE BLD-SCNC: 111 MMOL/L (ref 94–109)
CO2 SERPL-SCNC: 23 MMOL/L (ref 20–32)
CREAT SERPL-MCNC: 0.97 MG/DL (ref 0.66–1.25)
EOSINOPHIL # BLD AUTO: 0.2 10E3/UL (ref 0–0.7)
EOSINOPHIL NFR BLD AUTO: 3 %
ERYTHROCYTE [DISTWIDTH] IN BLOOD BY AUTOMATED COUNT: 14.9 % (ref 10–15)
GFR SERPL CREATININE-BSD FRML MDRD: >90 ML/MIN/1.73M2
GLUCOSE BLD-MCNC: 114 MG/DL (ref 70–99)
HCT VFR BLD AUTO: 40.4 % (ref 40–53)
HGB BLD-MCNC: 13.5 G/DL (ref 13.3–17.7)
HOLD SPECIMEN: NORMAL
IMM GRANULOCYTES # BLD: 0 10E3/UL
IMM GRANULOCYTES NFR BLD: 1 %
LYMPHOCYTES # BLD AUTO: 1.8 10E3/UL (ref 0.8–5.3)
LYMPHOCYTES NFR BLD AUTO: 30 %
MAGNESIUM SERPL-MCNC: 2.1 MG/DL (ref 1.6–2.3)
MCH RBC QN AUTO: 31.9 PG (ref 26.5–33)
MCHC RBC AUTO-ENTMCNC: 33.4 G/DL (ref 31.5–36.5)
MCV RBC AUTO: 96 FL (ref 78–100)
MONOCYTES # BLD AUTO: 0.6 10E3/UL (ref 0–1.3)
MONOCYTES NFR BLD AUTO: 10 %
NEUTROPHILS # BLD AUTO: 3.3 10E3/UL (ref 1.6–8.3)
NEUTROPHILS NFR BLD AUTO: 55 %
NRBC # BLD AUTO: 0 10E3/UL
NRBC BLD AUTO-RTO: 0 /100
PLATELET # BLD AUTO: 289 10E3/UL (ref 150–450)
POTASSIUM BLD-SCNC: 4.1 MMOL/L (ref 3.4–5.3)
PROT SERPL-MCNC: 7.5 G/DL (ref 6.8–8.8)
RBC # BLD AUTO: 4.23 10E6/UL (ref 4.4–5.9)
SODIUM SERPL-SCNC: 141 MMOL/L (ref 133–144)
TSH SERPL DL<=0.005 MIU/L-ACNC: 1.43 MU/L (ref 0.4–4)
VIT B12 SERPL-MCNC: 137 PG/ML (ref 193–986)
WBC # BLD AUTO: 6 10E3/UL (ref 4–11)

## 2021-12-10 PROCEDURE — 93010 ELECTROCARDIOGRAM REPORT: CPT | Performed by: EMERGENCY MEDICINE

## 2021-12-10 PROCEDURE — 36415 COLL VENOUS BLD VENIPUNCTURE: CPT | Performed by: EMERGENCY MEDICINE

## 2021-12-10 PROCEDURE — 83735 ASSAY OF MAGNESIUM: CPT | Performed by: EMERGENCY MEDICINE

## 2021-12-10 PROCEDURE — 93005 ELECTROCARDIOGRAM TRACING: CPT | Performed by: EMERGENCY MEDICINE

## 2021-12-10 PROCEDURE — 99285 EMERGENCY DEPT VISIT HI MDM: CPT | Mod: 25 | Performed by: EMERGENCY MEDICINE

## 2021-12-10 PROCEDURE — 85004 AUTOMATED DIFF WBC COUNT: CPT | Performed by: EMERGENCY MEDICINE

## 2021-12-10 PROCEDURE — 80053 COMPREHEN METABOLIC PANEL: CPT | Performed by: EMERGENCY MEDICINE

## 2021-12-10 PROCEDURE — 82746 ASSAY OF FOLIC ACID SERUM: CPT | Performed by: EMERGENCY MEDICINE

## 2021-12-10 PROCEDURE — 250N000011 HC RX IP 250 OP 636: Performed by: EMERGENCY MEDICINE

## 2021-12-10 PROCEDURE — 250N000013 HC RX MED GY IP 250 OP 250 PS 637: Performed by: EMERGENCY MEDICINE

## 2021-12-10 PROCEDURE — 84443 ASSAY THYROID STIM HORMONE: CPT | Performed by: EMERGENCY MEDICINE

## 2021-12-10 PROCEDURE — 93970 EXTREMITY STUDY: CPT

## 2021-12-10 PROCEDURE — 82607 VITAMIN B-12: CPT | Performed by: EMERGENCY MEDICINE

## 2021-12-10 PROCEDURE — 74177 CT ABD & PELVIS W/CONTRAST: CPT

## 2021-12-10 PROCEDURE — 250N000009 HC RX 250: Performed by: EMERGENCY MEDICINE

## 2021-12-10 RX ORDER — MAGNESIUM OXIDE 400 MG/1
800 TABLET ORAL ONCE
Status: COMPLETED | OUTPATIENT
Start: 2021-12-10 | End: 2021-12-10

## 2021-12-10 RX ORDER — ACETAMINOPHEN 500 MG
1000 TABLET ORAL ONCE
Status: COMPLETED | OUTPATIENT
Start: 2021-12-10 | End: 2021-12-10

## 2021-12-10 RX ORDER — IBUPROFEN 600 MG/1
600 TABLET, FILM COATED ORAL ONCE
Status: COMPLETED | OUTPATIENT
Start: 2021-12-10 | End: 2021-12-10

## 2021-12-10 RX ORDER — IOPAMIDOL 755 MG/ML
100 INJECTION, SOLUTION INTRAVASCULAR ONCE
Status: COMPLETED | OUTPATIENT
Start: 2021-12-10 | End: 2021-12-10

## 2021-12-10 RX ORDER — FOLIC ACID 1 MG/1
1 TABLET ORAL ONCE
Status: COMPLETED | OUTPATIENT
Start: 2021-12-10 | End: 2021-12-10

## 2021-12-10 RX ORDER — MULTIVITAMIN,THERAPEUTIC
1 TABLET ORAL ONCE
Status: COMPLETED | OUTPATIENT
Start: 2021-12-10 | End: 2021-12-10

## 2021-12-10 RX ADMIN — Medication 800 MG: at 18:00

## 2021-12-10 RX ADMIN — FOLIC ACID 1 MG: 1 TABLET ORAL at 18:00

## 2021-12-10 RX ADMIN — IOPAMIDOL 135 ML: 755 INJECTION, SOLUTION INTRAVENOUS at 19:24

## 2021-12-10 RX ADMIN — ACETAMINOPHEN 1000 MG: 500 TABLET ORAL at 19:51

## 2021-12-10 RX ADMIN — THIAMINE HCL TAB 100 MG 100 MG: 100 TAB at 18:00

## 2021-12-10 RX ADMIN — IBUPROFEN 600 MG: 600 TABLET ORAL at 19:51

## 2021-12-10 RX ADMIN — SODIUM CHLORIDE 74 ML: 9 INJECTION, SOLUTION INTRAVENOUS at 19:25

## 2021-12-10 RX ADMIN — THERA TABS 1 TABLET: TAB at 18:00

## 2021-12-10 ASSESSMENT — ENCOUNTER SYMPTOMS
ABDOMINAL PAIN: 1
SHORTNESS OF BREATH: 1
NAUSEA: 0
FEVER: 0
APPETITE CHANGE: 0
VOMITING: 0
COUGH: 1
DYSURIA: 0
DIARRHEA: 0

## 2021-12-10 NOTE — ED TRIAGE NOTES
Pt here complaining of on and off tingling in his hands and feet for a few weeks.  Pt was seen by his PMD but is worried its not getting better.  See chart for more info.

## 2021-12-10 NOTE — ED NOTES
Pt c/o hand and feet tingling started a week ago.  Pt states difficulty moving extremities from tingling.  Pt has history of blood clots pt an eliquis.

## 2021-12-10 NOTE — ED PROVIDER NOTES
ED Provider Note  [facility]      History     Chief Complaint   Patient presents with     Tingling     The history is provided by the patient and medical records.     Sebastián Nogueira is a 26 year old male who presents to the ED with complaint of bilateral upper and lower extremity paraesthesias. 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 Patient reports that in the past week and a half he has developed tingling in the hands, equal in both, more so on the palmar side. He endorses tingling in all fingers and states it is difficult to pick things up. 5 days ago he began having tingling in dorsal aspect of both feet. He states his feet feel very warm. Tingling radiates up the legs, getting less intense into the mid thigh. He has had chronic swelling in the right leg since his DVT, but swelling has worsened in the past day. He feels his left leg is now swelling. This morning he began having difficulty standing due to the swelling and tingling.  He denies any dizziness or lightheadedness.  He has to lean against something to stand to prevent falling over. He endorses new generalized abdominal pain and bloating in the past week. No vomiting or diarrhea. His appetite has been normal. He feels short of breath with intermittent cough and chest discomfort, but this has been his baseline since the diagnosis of PE's and is unchanged. He denies rash, fever, urinary changes, and history of diabetes. He had no history of heart disease prior to the PE's. He reports that he stopped drinking 2 weeks ago. He still smokes marijuana daily. He states he has been taking his Eliquis 2x daily. He states was started on Metoprolol by his cardiologist 2 days ago.     Per review of patient's chart he was seen  in cardiology clinic on 12/2. Their plan was as follows:   1.  Start metoprolol 25mg by mouth daily.  2.  Continue eliquis.  3.  Schedule an appointment in vascular medicine.   4.  Continue to avoid alcohol. Sobriety was recommended. Smoking marijuana is not an ideal alternative, but at this time he is not interested in pursuing additional chemical dependency counseling.   5.  Try graduated compression stockings, knee-high for dependent edema.  6.  Cardiology clinic follow up in about 6 months with Randa GONZALEZ CNP.   He also reports recent onset of numbess and tingling in his hands and was recommended to follow up with his PCP. He is already taking gabapentin.      Echo 11/30/21  Interpretation Summary:   Left ventricular systolic function is mildly reduced. The visual ejectionfraction is estimated at 50%. Mild global hypokinesis of the left ventricle.Right ventricle is mild to moderately dilated. The right ventricular systolicfunction is mildly reduced. This study was compared to a TTE from 11/1/2021. Global LV function has mildlyimproved, global RV function is stable.    CT chest PE 11/20/21  IMPRESSION:   1.  Pulmonary emboli within the right main pulmonary artery and extending into the adjacent lobar arteries and a few segmental and subsegmental pulmonary arteries. These were also present on the comparison study dated 10/31/2021 and have mildly decreased   in extent since that study. A few pulmonary emboli that were within the left lung on the comparison study are no longer visualized. No convincing new pulmonary embolus.  2.  A small patchy opacity in the right lung base, slightly decreased since the comparison study. This likely represents interval maturation of a small pulmonary infarct.  3.  No other findings suspicious for active pulmonary disease.        Past Medical History  Past Medical History:   Diagnosis Date     Alcohol abuse      Hypertriglyceridemia      Past Surgical History:   Procedure  Laterality Date     APPENDECTOMY       acetaminophen (TYLENOL) 325 MG tablet  apixaban ANTICOAGULANT (ELIQUIS) 5 MG tablet  gabapentin (NEURONTIN) 400 MG capsule  metoprolol succinate ER (TOPROL-XL) 25 MG 24 hr tablet  ondansetron (ZOFRAN) 4 MG tablet      No Known Allergies  Family History  Family History   Problem Relation Age of Onset     Dementia Paternal Grandfather      Social History   Social History     Tobacco Use     Smoking status: Former Smoker     Smokeless tobacco: Never Used   Substance Use Topics     Alcohol use: Yes     Comment: 3 times a week     Drug use: No     Comment: vague on the answer      Past medical history, past surgical history, medications, allergies, family history, and social history were reviewed with the patient. No additional pertinent items.       Review of Systems   Constitutional: Negative for appetite change and fever.   Respiratory: Positive for cough and shortness of breath (baseline, unchanged).    Cardiovascular: Positive for chest pain (baseline, unchanged) and leg swelling (R>L).   Gastrointestinal: Positive for abdominal pain. Negative for diarrhea, nausea and vomiting.   Genitourinary: Negative for dysuria.   Skin: Negative for rash.   Neurological:        Bilateral upper and lower extremity paraesthesias     A complete review of systems was performed with pertinent positives and negatives noted in the HPI, and all other systems negative.    Physical Exam   BP: 133/79  Pulse: 103  Temp: 97.8  F (36.6  C)  Resp: 16  SpO2: 98 %  Physical Exam  GEN:  Well developed, no acute distress  HEENT:  EOMI, Mucous membranes are moist.   Cardio:  RRR, no murmur, radial and dorsalis pulses equal bilaterally  PULM:  Lungs clear, good air movement, no wheezes, rales   Abd:  Soft, normal bowel sounds, no focal tenderness  Musculoskeletal: Right lower extremity has swelling involving the calf, ankle and right foot.  Left lower extremity has more mild swelling, only noted slightly in the  foot.  Right lower extremity has calf tenderness, no Calf tenderness on the left. normal range of motion of all 4 extremities.  There is no swelling of the upper extremities or the hands, no rash on any of the extremities.  No crepitance or ecchymosis, no sign of injury in the extremities.  Hands and feet are warm, pink.  Compartments in extremities are soft.  Neuro:  Alert and oriented X3, Follows commands, normal strength and sensation in the upper extremities bilaterally, including both hands.  Sensation in the feet is somewhat decreased, however, is intact to pinprick sensation, decreased to light touch.  Motor and strength exam of the lower extremities is normal bilaterally.  Normal patellar reflexes bilaterally.  Skin:  Warm, dry    ED Course      Procedures            EKG Interpretation:      Interpreted by Breanne Ashley MD  Time reviewed: 17:38  Symptoms at time of EKG: hand and foot paresthesias   Rhythm: normal sinus   Rate: normal  Axis: normal  Ectopy: none  Conduction: normal  ST Segments/ T Waves: No ST-T wave changes  Q Waves: none  Comparison to prior: Unchanged from 11/20/21    Clinical Impression: normal EKG         Labs are normal except as shown.  Due to the patient's alcohol history with new paresthesias, he was given a multivitamin, thiamine, folate and magnesium.  He was given ibuprofen and Tylenol for pain.  CT scan of the abdomen and pelvis was done due to his abdominal pain.  Results are shown below.      Results for orders placed or performed during the hospital encounter of 12/10/21   CT Abdomen Pelvis w Contrast     Status: None    Narrative    EXAM: CT ABDOMEN PELVIS W CONTRAST  LOCATION: [facility]  DATE/TIME: 12/10/2021 6:41 PM    INDICATION: Abdominal distension history of PE  COMPARISON: Chest CT 11/20/2021.  TECHNIQUE: CT scan of the abdomen and pelvis was performed following injection of IV contrast. Multiplanar reformats  were obtained. Dose reduction techniques were used.  CONTRAST: 135mL of isovue 370    FINDINGS:   LOWER CHEST: Small amount of residual pulmonary embolism in the right lower lobe, significantly decreased.    HEPATOBILIARY: Normal.    PANCREAS: Normal.    SPLEEN: Normal.    ADRENAL GLANDS: Normal.    KIDNEYS/BLADDER: Normal.    BOWEL: No free air, free fluid, or inflammatory change. No bowel wall thickening or abnormal enhancement. No evidence for obstruction. Appendix not identified.    LYMPH NODES: Normal.    VASCULATURE: No evidence for pelvic venous thrombosis or IVC thrombus.    PELVIC ORGANS: Normal.    MUSCULOSKELETAL: Normal.      Impression    IMPRESSION:   1.  No acute abnormality in the abdomen or pelvis. No evidence for deep pelvic vein or IVC thrombus.  2.  Mild residual pulmonary embolism in the right lower lobe, significantly decreased compared to 11/20/2021 exam.   US Lower Extremity Venous Duplex Bilateral     Status: None    Narrative    EXAM: US LOWER EXTREMITY VENOUS DUPLEX BILATERAL  LOCATION: [facility]  DATE/TIME: 12/10/2021 6:24 PM    INDICATION: worsening right leg swelling, left leg swelling  COMPARISON: 10/31/2021  TECHNIQUE: Venous Duplex ultrasound of bilateral lower extremities with and without compression, augmentation and duplex. Color flow and spectral Doppler with waveform analysis performed.    FINDINGS: Exam includes the common femoral, femoral, popliteal veins as well as segmentally visualized deep calf veins and greater saphenous vein.     RIGHT: Deep venous thrombosis is present within the distal femoral vein, popliteal vein, posterior tibial veins, as well as the peroneal veins. Thrombus is also present within the lesser saphenous veins of the mid and distal calf. No popliteal cyst.    LEFT: No deep vein thrombosis. No superficial thrombophlebitis. No popliteal cyst.      Impression    IMPRESSION:  1.  Chronic or recurrent deep  venous thrombosis within the right lower extremity from lower thigh to lower calf.   Extra Blue Top Tube     Status: None   Result Value Ref Range    Hold Specimen JIC    Extra Red Top Tube     Status: None   Result Value Ref Range    Hold Specimen JIC    Extra Green Top (Lithium Heparin) Tube     Status: None   Result Value Ref Range    Hold Specimen JIC    Extra Purple Top Tube     Status: None   Result Value Ref Range    Hold Specimen JIC    Comprehensive metabolic panel     Status: Abnormal   Result Value Ref Range    Sodium 141 133 - 144 mmol/L    Potassium 4.1 3.4 - 5.3 mmol/L    Chloride 111 (H) 94 - 109 mmol/L    Carbon Dioxide (CO2) 23 20 - 32 mmol/L    Anion Gap 7 3 - 14 mmol/L    Urea Nitrogen 18 7 - 30 mg/dL    Creatinine 0.97 0.66 - 1.25 mg/dL    Calcium 9.3 8.5 - 10.1 mg/dL    Glucose 114 (H) 70 - 99 mg/dL    Alkaline Phosphatase 55 40 - 150 U/L    AST 17 0 - 45 U/L    ALT 27 0 - 70 U/L    Protein Total 7.5 6.8 - 8.8 g/dL    Albumin 3.9 3.4 - 5.0 g/dL    Bilirubin Total 0.4 0.2 - 1.3 mg/dL    GFR Estimate >90 >60 mL/min/1.73m2   Magnesium     Status: Normal   Result Value Ref Range    Magnesium 2.1 1.6 - 2.3 mg/dL   TSH with free T4 reflex     Status: Normal   Result Value Ref Range    TSH 1.43 0.40 - 4.00 mU/L   CBC with platelets and differential     Status: Abnormal   Result Value Ref Range    WBC Count 6.0 4.0 - 11.0 10e3/uL    RBC Count 4.23 (L) 4.40 - 5.90 10e6/uL    Hemoglobin 13.5 13.3 - 17.7 g/dL    Hematocrit 40.4 40.0 - 53.0 %    MCV 96 78 - 100 fL    MCH 31.9 26.5 - 33.0 pg    MCHC 33.4 31.5 - 36.5 g/dL    RDW 14.9 10.0 - 15.0 %    Platelet Count 289 150 - 450 10e3/uL    % Neutrophils 55 %    % Lymphocytes 30 %    % Monocytes 10 %    % Eosinophils 3 %    % Basophils 1 %    % Immature Granulocytes 1 %    NRBCs per 100 WBC 0 <1 /100    Absolute Neutrophils 3.3 1.6 - 8.3 10e3/uL    Absolute Lymphocytes 1.8 0.8 - 5.3 10e3/uL    Absolute Monocytes 0.6 0.0 - 1.3 10e3/uL    Absolute Eosinophils  0.2 0.0 - 0.7 10e3/uL    Absolute Basophils 0.0 0.0 - 0.2 10e3/uL    Absolute Immature Granulocytes 0.0 <=0.4 10e3/uL    Absolute NRBCs 0.0 10e3/uL   [place] Draw     Status: None    Narrative    The following orders were created for panel order [place] Draw.  Procedure                               Abnormality         Status                     ---------                               -----------         ------                     Extra Blue Top Tube[[id number]]                              Final result               Extra Red Top Tube[[id number]]                               Final result               Extra Green Top (Lithium...[[id number]]                      Final result               Extra Purple Top Tube[[id number]]                            Final result                 Please view results for these tests on the individual orders.   CBC with platelets differential     Status: Abnormal    Narrative    The following orders were created for panel order CBC with platelets differential.  Procedure                               Abnormality         Status                     ---------                               -----------         ------                     CBC with platelets and d...[[id number]]  Abnormal            Final result                 Please view results for these tests on the individual orders.     Medications   sodium chloride 0.9 % bag 500mL for CT scan flush use (74 mLs Intravenous Given 12/10/21 1925)   multivitamin, therapeutic (THERA-VIT) tablet 1 tablet (1 tablet Oral Given 12/10/21 1800)   folic acid (FOLVITE) tablet 1 mg (1 mg Oral Given 12/10/21 1800)   thiamine (B-1) tablet 100 mg (100 mg Oral Given 12/10/21 1800)   magnesium oxide (MAG-OX) tablet 800 mg (800 mg Oral Given 12/10/21 1800)   acetaminophen (TYLENOL) tablet 1,000 mg (1,000 mg Oral Given 12/10/21 1951)   ibuprofen (ADVIL/MOTRIN) tablet 600 mg (600 mg Oral Given 12/10/21 1951)   iopamidol (ISOVUE-370) solution 100 mL (135 mLs  Intravenous Given 12/10/21 1924)        Assessments & Plan (with Medical Decision Making)   Patient presents with paresthesias in all 4 extremities of unclear etiology.  Labs are unremarkable, we are still waiting for folic acid and B12 levels.  Patient has a history of alcohol abuse, so was given p.o. multivitamin, magnesium, folate, thiamine.  There is no sign of infection, vascular insufficiency, his extremities are warm, pink, he has normal pulses in all 4 extremities.  Right leg has a known DVT, this appears to be unchanged on ultrasound.  CT abdomen was done because of some abdominal pain and bloating, no acute findings were seen.  At this CT of the abdomen did show the lower part of the lungs and there appears to be a decrease in clot burden.  These paresthesias are not in a distribution that would raise suspicion for an acute stroke.  Patient was advised to follow-up with hematology for hypercoagulable work-up as well as follow-up with his primary care physician for further work-up regarding the paresthesias.  He was advised to return if he develops any weakness, worsening symptoms, fever, any other concerns.    I have reviewed the nursing notes. I have reviewed the findings, diagnosis, plan and need for follow up with the patient.    Discharge Medication List as of 12/10/2021  8:59 PM          Final diagnoses:   Paresthesias   Deep vein thrombosis (DVT) of right lower extremity, unspecified chronicity, unspecified vein (H)   I, Jagruti Kincaid, am serving as a trained medical scribe to document services personally performed by Breanne Ashley MD, based on the provider's statements to me.     I, Breanne Ashley MD, was physically present and have reviewed and verified the accuracy of this note documented by Jagruti Kincaid.      --  Breanne Ashley  [facility] EMERGENCY DEPARTMENT  12/10/2021     Breanne Ashley MD  12/10/21 [id number]

## 2021-12-11 LAB
ATRIAL RATE - MUSE: 90 BPM
DIASTOLIC BLOOD PRESSURE - MUSE: NORMAL MMHG
FOLATE SERPL-MCNC: 66.5 NG/ML
INTERPRETATION ECG - MUSE: NORMAL
P AXIS - MUSE: 38 DEGREES
PR INTERVAL - MUSE: 154 MS
QRS DURATION - MUSE: 82 MS
QT - MUSE: 336 MS
QTC - MUSE: 411 MS
R AXIS - MUSE: 43 DEGREES
SYSTOLIC BLOOD PRESSURE - MUSE: NORMAL MMHG
T AXIS - MUSE: 19 DEGREES
VENTRICULAR RATE- MUSE: 90 BPM

## 2021-12-11 NOTE — DISCHARGE INSTRUCTIONS
Please make an appointment to follow up with Your Primary Care Provider and Hematology Oncology Clinic (phone: [phone]) as soon as possible for further evaluation and treatment. You should call these clinics on Monday to make an appointment with both. The hematology clinic appointment is so that they can test your blood to look for reasons why you had so much clotting.  The primary care visit is to evaluate the tingling further.  You should review the results of today's labs with your doctor, including the B-12 and Folate levels, which are not back at the time you are being discharged from the Emergency Department.     Please return the emergency department if you have any weakness, any further difficulty walking, fever, redness, any other concerns.

## 2021-12-11 NOTE — ED NOTES
"Pt's gait is unsteady and he states he is worried about going up a staircase to his apartment because it is outdoors and will be covered in snow. Pt does not meet admission criteria and has been given a cane to help with balance and is advised to call a friend or a family member to help him get home tonight. Pt is receiving nursing advice and suggestions with apathy, stating \"It is what it is. I guess I'll be fine. Whatever\". This RN stated that hospital staff will help pt get into his cab tonight and it is advised that he call a family member to help him get up the stairs to his apt. It is unclear whether pt will take this advice. Pt refused wheelchair to lobby and walked unsteadily with cane. Pt refusing all assistance once he got to lobby. Pt understands that staff is available to help him with ambulation as long as he is here.  "

## 2021-12-16 ENCOUNTER — APPOINTMENT (OUTPATIENT)
Dept: MRI IMAGING | Facility: CLINIC | Age: 26
End: 2021-12-16
Attending: EMERGENCY MEDICINE
Payer: COMMERCIAL

## 2021-12-16 ENCOUNTER — HOSPITAL ENCOUNTER (INPATIENT)
Facility: CLINIC | Age: 26
LOS: 5 days | Discharge: NURSING, REHAB OR LONG-TERM CARE | End: 2021-12-21
Attending: EMERGENCY MEDICINE | Admitting: HOSPITALIST
Payer: COMMERCIAL

## 2021-12-16 DIAGNOSIS — E53.8 SUBACUTE COMBINED DEGENERATION OF SPINAL CORD (H): ICD-10-CM

## 2021-12-16 DIAGNOSIS — K59.09 OTHER CONSTIPATION: ICD-10-CM

## 2021-12-16 DIAGNOSIS — G32.0 SUBACUTE COMBINED DEGENERATION OF SPINAL CORD (H): ICD-10-CM

## 2021-12-16 DIAGNOSIS — F10.229 ALCOHOL DEPENDENCE WITH INTOXICATION WITH COMPLICATION (H): Primary | ICD-10-CM

## 2021-12-16 DIAGNOSIS — R20.2 PARESTHESIAS: ICD-10-CM

## 2021-12-16 LAB
ALBUMIN SERPL-MCNC: 3.9 G/DL (ref 3.4–5)
ALBUMIN UR-MCNC: NEGATIVE MG/DL
ALP SERPL-CCNC: 51 U/L (ref 40–150)
ALT SERPL W P-5'-P-CCNC: 22 U/L (ref 0–70)
ANION GAP SERPL CALCULATED.3IONS-SCNC: 4 MMOL/L (ref 3–14)
APPEARANCE UR: CLEAR
AST SERPL W P-5'-P-CCNC: 12 U/L (ref 0–45)
BASOPHILS # BLD AUTO: 0.1 10E3/UL (ref 0–0.2)
BASOPHILS NFR BLD AUTO: 1 %
BILIRUB SERPL-MCNC: 0.6 MG/DL (ref 0.2–1.3)
BILIRUB UR QL STRIP: NEGATIVE
BUN SERPL-MCNC: 15 MG/DL (ref 7–30)
CALCIUM SERPL-MCNC: 9.2 MG/DL (ref 8.5–10.1)
CHLORIDE BLD-SCNC: 109 MMOL/L (ref 94–109)
CK SERPL-CCNC: 147 U/L (ref 30–300)
CO2 SERPL-SCNC: 24 MMOL/L (ref 20–32)
COLOR UR AUTO: YELLOW
CREAT SERPL-MCNC: 0.94 MG/DL (ref 0.66–1.25)
CRP SERPL-MCNC: <2.9 MG/L (ref 0–8)
EOSINOPHIL # BLD AUTO: 0.1 10E3/UL (ref 0–0.7)
EOSINOPHIL NFR BLD AUTO: 1 %
ERYTHROCYTE [DISTWIDTH] IN BLOOD BY AUTOMATED COUNT: 14.5 % (ref 10–15)
ERYTHROCYTE [SEDIMENTATION RATE] IN BLOOD BY WESTERGREN METHOD: 8 MM/HR (ref 0–15)
ETHANOL SERPL-MCNC: <0.01 G/DL
FOLATE SERPL-MCNC: 40.7 NG/ML
GFR SERPL CREATININE-BSD FRML MDRD: >90 ML/MIN/1.73M2
GLUCOSE BLD-MCNC: 96 MG/DL (ref 70–99)
GLUCOSE UR STRIP-MCNC: NEGATIVE MG/DL
HCT VFR BLD AUTO: 36.8 % (ref 40–53)
HGB BLD-MCNC: 12.3 G/DL (ref 13.3–17.7)
HGB UR QL STRIP: NEGATIVE
IMM GRANULOCYTES # BLD: 0 10E3/UL
IMM GRANULOCYTES NFR BLD: 0 %
KETONES UR STRIP-MCNC: NEGATIVE MG/DL
LACTATE SERPL-SCNC: 0.7 MMOL/L (ref 0.7–2)
LEUKOCYTE ESTERASE UR QL STRIP: NEGATIVE
LIPASE SERPL-CCNC: 163 U/L (ref 73–393)
LYMPHOCYTES # BLD AUTO: 1.9 10E3/UL (ref 0.8–5.3)
LYMPHOCYTES NFR BLD AUTO: 27 %
MAGNESIUM SERPL-MCNC: 2.1 MG/DL (ref 1.6–2.3)
MCH RBC QN AUTO: 31.9 PG (ref 26.5–33)
MCHC RBC AUTO-ENTMCNC: 33.4 G/DL (ref 31.5–36.5)
MCV RBC AUTO: 95 FL (ref 78–100)
MONOCYTES # BLD AUTO: 0.8 10E3/UL (ref 0–1.3)
MONOCYTES NFR BLD AUTO: 11 %
MUCOUS THREADS #/AREA URNS LPF: PRESENT /LPF
NEUTROPHILS # BLD AUTO: 4.3 10E3/UL (ref 1.6–8.3)
NEUTROPHILS NFR BLD AUTO: 60 %
NITRATE UR QL: NEGATIVE
NRBC # BLD AUTO: 0 10E3/UL
NRBC BLD AUTO-RTO: 0 /100
PH UR STRIP: 5.5 [PH] (ref 5–7)
PHOSPHATE SERPL-MCNC: 3.4 MG/DL (ref 2.5–4.5)
PLATELET # BLD AUTO: 243 10E3/UL (ref 150–450)
POTASSIUM BLD-SCNC: 4 MMOL/L (ref 3.4–5.3)
PROT SERPL-MCNC: 7 G/DL (ref 6.8–8.8)
RBC # BLD AUTO: 3.86 10E6/UL (ref 4.4–5.9)
RBC URINE: 0 /HPF
SARS-COV-2 RNA RESP QL NAA+PROBE: NEGATIVE
SODIUM SERPL-SCNC: 137 MMOL/L (ref 133–144)
SP GR UR STRIP: 1.03 (ref 1–1.03)
TROPONIN I SERPL HS-MCNC: 6 NG/L
TSH SERPL DL<=0.005 MIU/L-ACNC: 3.58 MU/L (ref 0.4–4)
UROBILINOGEN UR STRIP-MCNC: NORMAL MG/DL
VIT B12 SERPL-MCNC: 116 PG/ML (ref 193–986)
WBC # BLD AUTO: 7.1 10E3/UL (ref 4–11)
WBC URINE: <1 /HPF

## 2021-12-16 PROCEDURE — 86140 C-REACTIVE PROTEIN: CPT | Performed by: EMERGENCY MEDICINE

## 2021-12-16 PROCEDURE — 82550 ASSAY OF CK (CPK): CPT | Performed by: EMERGENCY MEDICINE

## 2021-12-16 PROCEDURE — 87635 SARS-COV-2 COVID-19 AMP PRB: CPT | Performed by: EMERGENCY MEDICINE

## 2021-12-16 PROCEDURE — 84100 ASSAY OF PHOSPHORUS: CPT | Performed by: HOSPITALIST

## 2021-12-16 PROCEDURE — 81001 URINALYSIS AUTO W/SCOPE: CPT | Performed by: EMERGENCY MEDICINE

## 2021-12-16 PROCEDURE — 82077 ASSAY SPEC XCP UR&BREATH IA: CPT | Performed by: EMERGENCY MEDICINE

## 2021-12-16 PROCEDURE — 96374 THER/PROPH/DIAG INJ IV PUSH: CPT

## 2021-12-16 PROCEDURE — 250N000011 HC RX IP 250 OP 636: Performed by: EMERGENCY MEDICINE

## 2021-12-16 PROCEDURE — 85652 RBC SED RATE AUTOMATED: CPT | Performed by: EMERGENCY MEDICINE

## 2021-12-16 PROCEDURE — 85025 COMPLETE CBC W/AUTO DIFF WBC: CPT | Performed by: EMERGENCY MEDICINE

## 2021-12-16 PROCEDURE — 83690 ASSAY OF LIPASE: CPT | Performed by: EMERGENCY MEDICINE

## 2021-12-16 PROCEDURE — A9585 GADOBUTROL INJECTION: HCPCS | Performed by: EMERGENCY MEDICINE

## 2021-12-16 PROCEDURE — 84443 ASSAY THYROID STIM HORMONE: CPT | Performed by: EMERGENCY MEDICINE

## 2021-12-16 PROCEDURE — 72141 MRI NECK SPINE W/O DYE: CPT

## 2021-12-16 PROCEDURE — 250N000013 HC RX MED GY IP 250 OP 250 PS 637: Performed by: HOSPITALIST

## 2021-12-16 PROCEDURE — 99223 1ST HOSP IP/OBS HIGH 75: CPT | Mod: AI | Performed by: HOSPITALIST

## 2021-12-16 PROCEDURE — 82746 ASSAY OF FOLIC ACID SERUM: CPT | Performed by: EMERGENCY MEDICINE

## 2021-12-16 PROCEDURE — 258N000003 HC RX IP 258 OP 636: Performed by: EMERGENCY MEDICINE

## 2021-12-16 PROCEDURE — 82607 VITAMIN B-12: CPT | Performed by: EMERGENCY MEDICINE

## 2021-12-16 PROCEDURE — 84484 ASSAY OF TROPONIN QUANT: CPT | Performed by: EMERGENCY MEDICINE

## 2021-12-16 PROCEDURE — C9803 HOPD COVID-19 SPEC COLLECT: HCPCS

## 2021-12-16 PROCEDURE — 93005 ELECTROCARDIOGRAM TRACING: CPT

## 2021-12-16 PROCEDURE — 99285 EMERGENCY DEPT VISIT HI MDM: CPT | Mod: 25

## 2021-12-16 PROCEDURE — HZ2ZZZZ DETOXIFICATION SERVICES FOR SUBSTANCE ABUSE TREATMENT: ICD-10-PCS | Performed by: HOSPITALIST

## 2021-12-16 PROCEDURE — 70553 MRI BRAIN STEM W/O & W/DYE: CPT

## 2021-12-16 PROCEDURE — 83605 ASSAY OF LACTIC ACID: CPT | Performed by: EMERGENCY MEDICINE

## 2021-12-16 PROCEDURE — 96361 HYDRATE IV INFUSION ADD-ON: CPT

## 2021-12-16 PROCEDURE — 120N000001 HC R&B MED SURG/OB

## 2021-12-16 PROCEDURE — 250N000009 HC RX 250: Performed by: EMERGENCY MEDICINE

## 2021-12-16 PROCEDURE — 255N000002 HC RX 255 OP 636: Performed by: EMERGENCY MEDICINE

## 2021-12-16 PROCEDURE — 83735 ASSAY OF MAGNESIUM: CPT | Performed by: EMERGENCY MEDICINE

## 2021-12-16 PROCEDURE — 36415 COLL VENOUS BLD VENIPUNCTURE: CPT | Performed by: EMERGENCY MEDICINE

## 2021-12-16 PROCEDURE — 80053 COMPREHEN METABOLIC PANEL: CPT | Performed by: EMERGENCY MEDICINE

## 2021-12-16 RX ORDER — LIDOCAINE 40 MG/G
CREAM TOPICAL
Status: DISCONTINUED | OUTPATIENT
Start: 2021-12-16 | End: 2021-12-21 | Stop reason: HOSPADM

## 2021-12-16 RX ORDER — FOLIC ACID 5 MG/ML
1 INJECTION, SOLUTION INTRAMUSCULAR; INTRAVENOUS; SUBCUTANEOUS ONCE
Status: COMPLETED | OUTPATIENT
Start: 2021-12-16 | End: 2021-12-16

## 2021-12-16 RX ORDER — CYANOCOBALAMIN 1000 UG/ML
1000 INJECTION, SOLUTION INTRAMUSCULAR; SUBCUTANEOUS ONCE
Status: COMPLETED | OUTPATIENT
Start: 2021-12-16 | End: 2021-12-16

## 2021-12-16 RX ORDER — GADOBUTROL 604.72 MG/ML
15 INJECTION INTRAVENOUS ONCE
Status: COMPLETED | OUTPATIENT
Start: 2021-12-16 | End: 2021-12-16

## 2021-12-16 RX ORDER — NALOXONE HYDROCHLORIDE 0.4 MG/ML
0.4 INJECTION, SOLUTION INTRAMUSCULAR; INTRAVENOUS; SUBCUTANEOUS
Status: DISCONTINUED | OUTPATIENT
Start: 2021-12-16 | End: 2021-12-21 | Stop reason: HOSPADM

## 2021-12-16 RX ORDER — ONDANSETRON 4 MG/1
4 TABLET, ORALLY DISINTEGRATING ORAL EVERY 6 HOURS PRN
Status: DISCONTINUED | OUTPATIENT
Start: 2021-12-16 | End: 2021-12-21 | Stop reason: HOSPADM

## 2021-12-16 RX ORDER — HALOPERIDOL 5 MG/ML
2.5-5 INJECTION INTRAMUSCULAR EVERY 6 HOURS PRN
Status: DISCONTINUED | OUTPATIENT
Start: 2021-12-16 | End: 2021-12-21 | Stop reason: HOSPADM

## 2021-12-16 RX ORDER — FLUMAZENIL 0.1 MG/ML
0.2 INJECTION, SOLUTION INTRAVENOUS
Status: DISCONTINUED | OUTPATIENT
Start: 2021-12-16 | End: 2021-12-21 | Stop reason: HOSPADM

## 2021-12-16 RX ORDER — ONDANSETRON 2 MG/ML
4 INJECTION INTRAMUSCULAR; INTRAVENOUS EVERY 6 HOURS PRN
Status: DISCONTINUED | OUTPATIENT
Start: 2021-12-16 | End: 2021-12-21 | Stop reason: HOSPADM

## 2021-12-16 RX ORDER — NALOXONE HYDROCHLORIDE 0.4 MG/ML
0.2 INJECTION, SOLUTION INTRAMUSCULAR; INTRAVENOUS; SUBCUTANEOUS
Status: DISCONTINUED | OUTPATIENT
Start: 2021-12-16 | End: 2021-12-21 | Stop reason: HOSPADM

## 2021-12-16 RX ORDER — DIAZEPAM 10 MG/2ML
5-10 INJECTION, SOLUTION INTRAMUSCULAR; INTRAVENOUS EVERY 30 MIN PRN
Status: DISCONTINUED | OUTPATIENT
Start: 2021-12-16 | End: 2021-12-21 | Stop reason: HOSPADM

## 2021-12-16 RX ORDER — DIAZEPAM 5 MG
10 TABLET ORAL EVERY 30 MIN PRN
Status: DISCONTINUED | OUTPATIENT
Start: 2021-12-16 | End: 2021-12-21 | Stop reason: HOSPADM

## 2021-12-16 RX ORDER — HYDROMORPHONE HYDROCHLORIDE 2 MG/1
2 TABLET ORAL EVERY 4 HOURS PRN
Status: DISCONTINUED | OUTPATIENT
Start: 2021-12-16 | End: 2021-12-21 | Stop reason: HOSPADM

## 2021-12-16 RX ORDER — AMOXICILLIN 250 MG
2 CAPSULE ORAL 2 TIMES DAILY PRN
Status: DISCONTINUED | OUTPATIENT
Start: 2021-12-16 | End: 2021-12-21 | Stop reason: HOSPADM

## 2021-12-16 RX ORDER — CLONIDINE HYDROCHLORIDE 0.1 MG/1
0.1 TABLET ORAL EVERY 8 HOURS
Status: DISCONTINUED | OUTPATIENT
Start: 2021-12-16 | End: 2021-12-18

## 2021-12-16 RX ORDER — MULTIPLE VITAMINS W/ MINERALS TAB 9MG-400MCG
1 TAB ORAL DAILY
Status: DISCONTINUED | OUTPATIENT
Start: 2021-12-16 | End: 2021-12-21 | Stop reason: HOSPADM

## 2021-12-16 RX ORDER — SODIUM CHLORIDE 9 MG/ML
INJECTION, SOLUTION INTRAVENOUS CONTINUOUS
Status: DISCONTINUED | OUTPATIENT
Start: 2021-12-16 | End: 2021-12-16

## 2021-12-16 RX ORDER — AMOXICILLIN 250 MG
1 CAPSULE ORAL 2 TIMES DAILY PRN
Status: DISCONTINUED | OUTPATIENT
Start: 2021-12-16 | End: 2021-12-21 | Stop reason: HOSPADM

## 2021-12-16 RX ORDER — CYANOCOBALAMIN 1000 UG/ML
1000 INJECTION, SOLUTION INTRAMUSCULAR; SUBCUTANEOUS EVERY OTHER DAY
Status: DISCONTINUED | OUTPATIENT
Start: 2021-12-18 | End: 2021-12-18

## 2021-12-16 RX ORDER — METOPROLOL SUCCINATE 25 MG/1
25 TABLET, EXTENDED RELEASE ORAL DAILY
Status: DISCONTINUED | OUTPATIENT
Start: 2021-12-16 | End: 2021-12-21 | Stop reason: HOSPADM

## 2021-12-16 RX ORDER — GABAPENTIN 400 MG/1
400 CAPSULE ORAL DAILY PRN
Status: DISCONTINUED | OUTPATIENT
Start: 2021-12-16 | End: 2021-12-21 | Stop reason: HOSPADM

## 2021-12-16 RX ORDER — ACETAMINOPHEN 650 MG/1
650 SUPPOSITORY RECTAL EVERY 6 HOURS PRN
Status: DISCONTINUED | OUTPATIENT
Start: 2021-12-16 | End: 2021-12-21 | Stop reason: HOSPADM

## 2021-12-16 RX ORDER — OLANZAPINE 5 MG/1
5-10 TABLET, ORALLY DISINTEGRATING ORAL EVERY 6 HOURS PRN
Status: DISCONTINUED | OUTPATIENT
Start: 2021-12-16 | End: 2021-12-21 | Stop reason: HOSPADM

## 2021-12-16 RX ORDER — FOLIC ACID 1 MG/1
1 TABLET ORAL DAILY
Status: DISCONTINUED | OUTPATIENT
Start: 2021-12-17 | End: 2021-12-21 | Stop reason: HOSPADM

## 2021-12-16 RX ORDER — ACETAMINOPHEN 325 MG/1
650 TABLET ORAL EVERY 6 HOURS PRN
Status: DISCONTINUED | OUTPATIENT
Start: 2021-12-16 | End: 2021-12-21 | Stop reason: HOSPADM

## 2021-12-16 RX ADMIN — METOPROLOL SUCCINATE 25 MG: 25 TABLET, EXTENDED RELEASE ORAL at 20:32

## 2021-12-16 RX ADMIN — MULTIPLE VITAMINS W/ MINERALS TAB 1 TABLET: TAB at 20:32

## 2021-12-16 RX ADMIN — SODIUM CHLORIDE: 9 INJECTION, SOLUTION INTRAVENOUS at 18:42

## 2021-12-16 RX ADMIN — CYANOCOBALAMIN 1000 MCG: 1000 INJECTION, SOLUTION INTRAMUSCULAR at 16:42

## 2021-12-16 RX ADMIN — GADOBUTROL 15 ML: 604.72 INJECTION INTRAVENOUS at 15:12

## 2021-12-16 RX ADMIN — FOLIC ACID 1 MG: 5 INJECTION, SOLUTION INTRAMUSCULAR; INTRAVENOUS; SUBCUTANEOUS at 16:44

## 2021-12-16 RX ADMIN — THIAMINE HCL TAB 100 MG 100 MG: 100 TAB at 20:32

## 2021-12-16 RX ADMIN — APIXABAN 5 MG: 5 TABLET, FILM COATED ORAL at 20:32

## 2021-12-16 RX ADMIN — SODIUM CHLORIDE 1000 ML: 9 INJECTION, SOLUTION INTRAVENOUS at 10:24

## 2021-12-16 RX ADMIN — CLONIDINE HYDROCHLORIDE 0.1 MG: 0.1 TABLET ORAL at 20:32

## 2021-12-16 ASSESSMENT — ENCOUNTER SYMPTOMS
JOINT SWELLING: 0
WEAKNESS: 1
DYSURIA: 0
DIAPHORESIS: 0
PARESTHESIAS: 1
POLYDIPSIA: 0
PALPITATIONS: 0
CONSTIPATION: 1
COUGH: 0
NAUSEA: 1
IRREGULAR HEARTBEAT: 0
FREQUENCY: 0
DECREASED APPETITE: 0
VOMITING: 0
BLOATING: 0
CHILLS: 0
HEMOPTYSIS: 0
ABDOMINAL PAIN: 0
FEVER: 0
BLURRED VISION: 0
DOUBLE VISION: 0
SORE THROAT: 1
SHORTNESS OF BREATH: 1

## 2021-12-16 ASSESSMENT — ACTIVITIES OF DAILY LIVING (ADL)
ADLS_ACUITY_SCORE: 16
ADLS_ACUITY_SCORE: 14
ADLS_ACUITY_SCORE: 14
ADLS_ACUITY_SCORE: 16
ADLS_ACUITY_SCORE: 14
ADLS_ACUITY_SCORE: 16
ADLS_ACUITY_SCORE: 14
ADLS_ACUITY_SCORE: 16

## 2021-12-16 NOTE — ED NOTES
Updated pt, 1 person from ED being brought to MRI now, then will be Sebastián. Verbalizes understanding

## 2021-12-16 NOTE — ED NOTES
Bed: ED07  Expected date:   Expected time:   Means of arrival:   Comments:  [facility] - 432 - 26 M blood clot eta 1631

## 2021-12-16 NOTE — PHARMACY-ADMISSION MEDICATION HISTORY
Pharmacy Medication History  Admission medication history interview status for the 12/16/2021 admission is complete. See EPIC admission navigator for prior to admission medications     Location of Interview: Patient room  Medication history sources: Patient    Significant changes made to the medication list:      In the past week, patient estimated taking medication this percent of the time: 50-90% due to irregular schedule    Additional medication history information:       Medication reconciliation completed by provider prior to medication history? No    Time spent in this activity: 15 minutes    Prior to Admission medications    Medication Sig Last Dose Taking? Auth Provider   acetaminophen (TYLENOL) 325 MG tablet Take 2 tablets (650 mg) by mouth every 6 hours as needed for mild pain or other (and adjunct with moderate or severe pain or per patient request)  at PRN Yes Namita Ziegler MD   apixaban ANTICOAGULANT (ELIQUIS) 5 MG tablet Take 1 tablet (5 mg) by mouth 2 times daily 12/15/2021 at PM Yes Darlene Cano MD   metoprolol succinate ER (TOPROL-XL) 25 MG 24 hr tablet Take 1 tablet (25 mg) by mouth daily Past Week at Unknown time Yes Darlene Cano MD   ondansetron (ZOFRAN) 4 MG tablet Take 1 tablet (4 mg) by mouth every 8 hours as needed for nausea  at PRN Yes Namita Ziegler MD   gabapentin (NEURONTIN) 400 MG capsule Take 400 mg by mouth daily as needed (Anxiety)   at PRN  Reported, Patient       The information provided in this note is only as accurate as the sources available at the time of update(s)

## 2021-12-16 NOTE — ED NOTES
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 PT noticed approx 2 1/2 weeks ago pt noticed numbness to both hands and clumsiness. Pt also sts he has had ascending numbness to lower ext up to abd for 1 1/2 weeks. Today pt slid out of bed and could not get up  Focused Assessment:  PT A&O x4, reports weakness to hands and legs bilaterally. Reports decreased fine motor use of fingers on both hands, pt reorts was unable to walk r/t bilateral lower leg weakness. Able to lift legs off of bed, RLE weaker than left. With light resistance barely able to get foot off of bed, L able to lift off bed but weak resistance.     Treatments and/or interventions provided: MRI, vitamin B replacement per dr order. IV fluids  Patient's response to treatments and/or interventions: tolerated well    To be done/followed up on inpatient unit:  unknown    Does this patient have any cognitive concerns?: none    Activity level - Baseline/Home:  Independent  Activity Level - Current: Standby assist of 2      Patient's Preferred language: English   Needed?: No    Isolation: None  Infection: Not Applicable  Patient tested for COVID 19 prior to admission: YES  Bariatric?: No    Vital Signs:   Vitals:    12/16/21 1300 12/16/21 1315 12/16/21 1330 12/16/21 1520   BP: 126/70 123/77 137/82 (!) 140/99   Pulse: 70 81 71 89   Resp:       Temp:       TempSrc:       SpO2:    100%       Cardiac Rhythm:     Was the PSS-3 completed:   Yes  What interventions are required if any?               Family Comments: not here  OBS brochure/video discussed/provided to patient/family: Yes              Name of person given brochure if not patient: n/a              Relationship to  patient: n/a    For the majority of the shift this patient's behavior was Green.   Behavioral interventions performed were none.    ED NURSE PHONE NUMBER: *[phone]

## 2021-12-16 NOTE — ED PROVIDER NOTES
History     Chief Complaint:  Generalized weakness    DIMITRIOS Nogueira is a 26 year old male who presents via EMS for generalized weakness.  He indicates that he has been feeling weak for the last couple of weeks and this morning fell out of bed and was unable to get himself off the floor.  He called his father to come over to help him up and even with his help had trouble getting up off the floor.  Therefore they called 911.  He has been having issues with paresthesias in all 4 extremities now for the last couple of weeks.  He feels that everything feels weak overall.  He is on Eliquis for a DVT and PE, which he states he has been taking.  He admits to continuing to drink alcohol, with his most recent intake being last night.  He denies any current headaches and no neck pain.  He also denies any chest pain or shortness of breath as well as any nausea, vomiting, diarrhea, blood in his stools or melena.  He additionally denies any urinary symptoms.  He believes that he has been drinking plenty of water.    ROS:  Review of Systems   All other systems reviewed and are negative.         Allergies:  No Known Allergies     Medications:    acetaminophen (TYLENOL) 325 MG tablet  apixaban ANTICOAGULANT (ELIQUIS) 5 MG tablet  gabapentin (NEURONTIN) 400 MG capsule  metoprolol succinate ER (TOPROL-XL) 25 MG 24 hr tablet  ondansetron (ZOFRAN) 4 MG tablet        Past Medical History:    Past Medical History:   Diagnosis Date     Alcohol abuse      Hypertriglyceridemia      Patient Active Problem List   Diagnosis     Alcohol withdrawal, uncomplicated (H)     Chemical dependency (H)     Alcohol dependence with intoxication with complication (H)     Alcohol abuse     Pulmonary emboli (H)        Past Surgical History:    Past Surgical History:   Procedure Laterality Date     APPENDECTOMY          Family History:    family history includes Dementia in his paternal grandfather.    Social History:   reports that he has quit  smoking. He has never used smokeless tobacco. He reports current alcohol use. He reports that he does not use drugs.  PCP: Ton López     Physical Exam     Patient Vitals for the past 24 hrs:   BP Temp Temp src Pulse Resp SpO2   12/16/21 0940 (!) 142/92 98.6  F (37  C) Oral 96 16 99 %        Physical Exam  Nursing note and vitals reviewed.  Constitutional:  Oriented to person, place, and time. Cooperative.   HENT:   Nose:    Nose normal.   Mouth/Throat:   Facemask in place.   Eyes:    Conjunctivae normal and EOM are normal.      Pupils are equal, round, and reactive to light.   Neck:    Trachea normal.   Cardiovascular:  Normal rate, regular rhythm, normal heart sounds and normal pulses. No murmur heard.  Pulmonary/Chest:  Effort normal and breath sounds normal.   Abdominal:   Soft. Normal appearance and bowel sounds are normal.      There is no tenderness.      There is no rebound and no CVA tenderness.   Musculoskeletal:  Bilateral lower extremity edema, right greater than left.  Extremities atraumatic x 4.   Lymphadenopathy:  No cervical adenopathy.   Neurological:   Alert and oriented to person, place, and time. Normal strength.  Subjective decreased sensation to light touch to the arms and legs going up into the trunk. No cranial nerve deficit. GCS eye subscore is 4. GCS verbal subscore is 5. GCS motor subscore is 6.   Skin:    Skin is intact. No rash noted.   Psychiatric:   Normal mood and affect.      Emergency Department Course   ECG:  ECG  ECG taken at 1013, ECG read at 1020  Normal sinus rhythm with sinus arrhythmia  Normal ECG   No significant change as compared to prior, dated 12/10/2021.  Rate 87 bpm. OH interval 164 ms. QRS duration 86 ms. QT/QTc 358/430 ms. P-R-T axes 44 41 24.     Imaging:  MR Brain w/o & w Contrast   Final Result   IMPRESSION:  Unremarkable brain MRI without evidence of acute infarct,   mass, hemorrhage, or herniation.         SELVIN CONN MD            SYSTEM ID:  [id number]       Cervical spine MRI w/o contrast   Final Result   IMPRESSION:     1. Abnormal T2 signal within the dorsal cervical spinal cord as   detailed above. This most likely represents subacute combined   degeneration. Clinical correlation is recommended.   2. No significant degenerative changes in the cervical spine. No   spinal canal or neural foraminal narrowing.      Results discussed with Baldomero Fernandez at 3:22 PM on 12/16/2021.       SELVIN CONN MD            SYSTEM ID:  [id number]         Report per radiology    Laboratory:  Labs Ordered and Resulted from Time of ED Arrival to Time of ED Departure   ROUTINE UA WITH MICROSCOPIC REFLEX TO CULTURE - Abnormal       Result Value    Color Urine Yellow      Appearance Urine Clear      Glucose Urine Negative      Bilirubin Urine Negative      Ketones Urine Negative      Specific Gravity Urine 1.026      Blood Urine Negative      pH Urine 5.5      Protein Albumin Urine Negative      Urobilinogen Urine Normal      Nitrite Urine Negative      Leukocyte Esterase Urine Negative      Mucus Urine Present (*)     RBC Urine 0      WBC Urine <1     CBC WITH PLATELETS AND DIFFERENTIAL - Abnormal    WBC Count 7.1      RBC Count 3.86 (*)     Hemoglobin 12.3 (*)     Hematocrit 36.8 (*)     MCV 95      MCH 31.9      MCHC 33.4      RDW 14.5      Platelet Count 243      % Neutrophils 60      % Lymphocytes 27      % Monocytes 11      % Eosinophils 1      % Basophils 1      % Immature Granulocytes 0      NRBCs per 100 WBC 0      Absolute Neutrophils 4.3      Absolute Lymphocytes 1.9      Absolute Monocytes 0.8      Absolute Eosinophils 0.1      Absolute Basophils 0.1      Absolute Immature Granulocytes 0.0      Absolute NRBCs 0.0     COMPREHENSIVE METABOLIC PANEL - Normal    Sodium 137      Potassium 4.0      Chloride 109      Carbon Dioxide (CO2) 24      Anion Gap 4      Urea Nitrogen 15      Creatinine 0.94      Calcium 9.2      Glucose 96      Alkaline Phosphatase 51      AST 12       ALT 22      Protein Total 7.0      Albumin 3.9      Bilirubin Total 0.6      GFR Estimate >90     LIPASE - Normal    Lipase 163     LACTIC ACID WHOLE BLOOD - Normal    Lactic Acid 0.7     TROPONIN I - Normal    Troponin I High Sensitivity 6     MAGNESIUM - Normal    Magnesium 2.1     TSH WITH FREE T4 REFLEX - Normal    TSH 3.58     CRP INFLAMMATION - Normal    CRP Inflammation <2.9     ERYTHROCYTE SEDIMENTATION RATE AUTO - Normal    Erythrocyte Sedimentation Rate 8     ETHYL ALCOHOL LEVEL - Normal    Alcohol ethyl <0.01     CK TOTAL - Normal         COVID-19 VIRUS (CORONAVIRUS) BY PCR - Normal    SARS CoV2 PCR Negative     VITAMIN B12   FOLATE   PHOSPHORUS        Emergency Department Course:  Reviewed:  I reviewed nursing notes, vitals, past medical history, Care Everywhere and [facility]    Interventions:  Medications   0.9% sodium chloride BOLUS (1,000 mLs Intravenous New Bag 12/16/21 1024)     Followed by   sodium chloride 0.9% infusion (has no administration in time range)        Disposition:  The patient was admitted to the hospital under the care of Dr. Hodgson.     Impression & Plan    Covid-19  Sebastián Nogueira was evaluated during a global COVID-19 pandemic, which necessitated consideration that the patient might be at risk for infection with the SARS-CoV-2 virus that causes COVID-19.   Applicable protocols for evaluation were followed during the patient's care.   COVID-19 was considered as part of the patient's evaluation. The plan for testing is:  a test was obtained during this visit.    Medical Decision Making:  This is a 26-year-old male who came in for further evaluation of generalized weakness and paresthesias.  I felt it was reasonable to check the above blood work to see if he might have an electrolyte abnormality and specifically hypokalemia.  However when his blood work came back essentially normal, I then proceeded with an MRI of his brain and cervical spine, as I was concerned he might have  some sort of spinal cord or brain abnormality.  The MRI of his spine does in fact show subacute combined degeneration, which is likely secondary to B12 deficiency.  Therefore he was provided an IM injection here of vitamin B12 and IV folate.  This might be from his alcohol use as well.  I think it is best that he be admitted to the hospital for further evaluation and management.  I subsequently spoke with Dr. Hodgson, who will be admitting him.    Diagnosis:    ICD-10-CM    1. Paresthesias  R20.2    2. 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

## 2021-12-16 NOTE — PROGRESS NOTES
RECEIVING UNIT ED HANDOFF REVIEW    ED Nurse Handoff Report was reviewed by: Demi Lane RN on December 16, 2021 at 5:56 PM

## 2021-12-16 NOTE — ED NOTES
Pt still in MRI, Pt sister Minerva Nogueira called for update.  Nursing to vie pt message to call his sister cell

## 2021-12-16 NOTE — H&P
[facility]    History and Physical  Hospitalist     Date of Admission:  12/16/2021    Assessment & Plan   Sebastián Nogueira is a 26 year old male with a past medical history of alcohol abuse, recent history of DVT and PE, cardiomyopathy with reduced ejection fraction presents to the hospital with generalized weakness.    Generalized weakness:   Suspected b12 deficiency.  Patient reporting a 2-week history of paresthesias and generalized weakness starting in upper extremities and progressing to the lower extremities.  Weakness became so severe that patient fell at home and could not get back up to bring him to the hospital.  On imaging patient was found to have an abnormal T2 signal on MRI of the spine and the cervical spinal cord which could be consistent with a B12 deficiency.  Patient does have a borderline macrocytic anemia.  If B12 deficiency is confirmed with test for autoantibodies to intrinsic factor  -f/u b12 and folate levels  -b12 1000mcg injection ordered for every other day  -po folate supplement ordered.   -f/u neurology consult  -f/u pt/ot     History of alcohol abuse  Patient has been drinking 4-6 beers for the last 5 days in a row.  He has a history of alcohol withdrawal.  -Monitor on CIWA protocol  -Benzodiazepines as needed    Hx of dvt and PE  Patient had a submassive pe diagnosed 10/31. dvt preceeded the pe and was thought to be provoked by an airplaine flight  -c/w eliquis    Cardiomyopathy with reduced EF 50%  Possibly alcohol related. Was evaluated by cardiology on last visit and was scheduled to follow up with cardiology as an outpatient.     Code Status   Full Code  DVT ppx: Eliquis  Disposition Plan   Expected discharge in greater than 2 days to transitional care unit once weakness is improving and patient has been evaluated by specialist.     Entered: Jose Miguel Hodgson MD 12/16/2021, 3:57 PM       Jose Miguel Hodgson MD, MD    Primary Care Physician    Ton López    Chief Complaint   Generalized weakness    History obtained from the patient    History of Present Illness   Sebastián UBALDO Nogueira is a 26 year old male who presents with generalized weakness.  The patient was recently hospitalized here at [facility] for submassive pulmonary embolism and he was discharged on November 3.  He was compliant with his medications and his symptoms of lower extremity swelling and shortness of breath were improving.  Approximately 2 and half weeks ago the patient developed a tingling sensation in his upper extremities describing it like his hands fell asleep.  The symptoms progressed and eventually went down to his legs.  Patient went to [facility] for evaluation on November 20 and underwent a CT scan of the abdomen and ultrasound of lower extremities as well as basic blood work patient was not found to have any cause for symptoms and he was discharged home.  Since then his symptoms have progressed and he has become weaker to the point that he has difficulty ambulating around his house.  On December 15 the patient was trying to get out of bed when he fell back into his bed and slid off of the bed onto the floor.  The patient was unable to get up off the floor due to his weakness so he called his father was unable to help him up so they eventually called EMS and the patient presented to the hospital.  Patient reports a varied diet of mostly takeout food including items such as [employer], [employer], hamburgers.  The patient eats meat regularly.    Past Medical History    I have reviewed this patient's medical history and updated it with pertinent information if needed.   Past Medical History:   Diagnosis Date     Alcohol abuse      DVT (deep venous thrombosis) (H)      Hypertriglyceridemia      Marijuana use      Pulmonary embolism (H)        Past Surgical History   I have reviewed this patient's surgical history and updated it with pertinent information if  needed.  Past Surgical History:   Procedure Laterality Date     APPENDECTOMY         Prior to Admission Medications   Prior to Admission Medications   Prescriptions Last Dose Informant Patient Reported? Taking?   acetaminophen (TYLENOL) 325 MG tablet  at PRN Self Yes Yes   Sig: Take 2 tablets (650 mg) by mouth every 6 hours as needed for mild pain or other (and adjunct with moderate or severe pain or per patient request)   apixaban ANTICOAGULANT (ELIQUIS) 5 MG tablet 12/15/2021 at PM Self No Yes   Sig: Take 1 tablet (5 mg) by mouth 2 times daily   gabapentin (NEURONTIN) 400 MG capsule  at PRN Self Yes No   Sig: Take 400 mg by mouth daily as needed (Anxiety)    metoprolol succinate ER (TOPROL-XL) 25 MG 24 hr tablet Past Week at Unknown time Self No Yes   Sig: Take 1 tablet (25 mg) by mouth daily   ondansetron (ZOFRAN) 4 MG tablet  at PRN Self No Yes   Sig: Take 1 tablet (4 mg) by mouth every 8 hours as needed for nausea      Facility-Administered Medications: None     Allergies   No Known Allergies    Social History   The patient reports previous heavy alcohol use drinking up to a liter of liquor per day but has been trying to cut back of the last 1 year.  He reports that he currently will go up to 2 weeks without a drink and then may have several days of drinking.  The patient reports that he has been drinking beer for the last 5 days 4-5 drinks a day.  Patient reports marijuana use    Family History   I have reviewed this patient's family history and updated it with pertinent information if needed.   Family History   Problem Relation Age of Onset     Dementia Paternal Grandfather        Review of Systems     Review of Systems   Constitutional: Positive for malaise/fatigue. Negative for chills, decreased appetite, diaphoresis and fever.   HENT: Positive for sore throat. Negative for congestion.    Eyes: Negative for blurred vision and double vision.   Cardiovascular: Positive for chest pain. Negative for irregular  heartbeat and palpitations.        Patient reports substernal left-sided chest pain worse with moving his arms and when lying in certain positions.   Respiratory: Positive for shortness of breath. Negative for cough and hemoptysis.         Patient reports shortness of breath is significantly improved since his hospitalization for PE   Endocrine: Negative for polydipsia and polyuria.   Skin: Negative for itching and rash.   Musculoskeletal: Negative for joint pain and joint swelling.   Gastrointestinal: Positive for constipation and nausea. Negative for bloating, abdominal pain and vomiting.        Patient reports occasional nausea.  Patient reports he has not had a bowel movement in the last 2 days.   Genitourinary: Negative for dysuria and frequency.   Neurological: Positive for paresthesias and weakness.         Physical Exam   Temp: 98.6  F (37  C) Temp src: Oral BP: (!) 140/99 Pulse: 89   Resp: 18 SpO2: 100 % O2 Device: None (Room air)    Vital Signs with Ranges  Temp:  [98.6  F (37  C)] 98.6  F (37  C)  Pulse:  [67-96] 89  Resp:  [16-18] 18  BP: (123-142)/(70-99) 140/99  SpO2:  [98 %-100 %] 100 %  0 lbs 0 oz  Physical Exam  Vitals reviewed.   Constitutional:       Appearance: Normal appearance. He is obese.   HENT:      Head: Normocephalic and atraumatic.   Eyes:      Extraocular Movements: Extraocular movements intact.      Conjunctiva/sclera: Conjunctivae normal.      Pupils: Pupils are equal, round, and reactive to light.   Cardiovascular:      Rate and Rhythm: Normal rate and regular rhythm.      Pulses: Normal pulses.   Pulmonary:      Effort: Pulmonary effort is normal. No respiratory distress.      Breath sounds: Normal breath sounds. No wheezing or rales.   Chest:      Chest wall: No tenderness.   Abdominal:      General: Abdomen is flat. Bowel sounds are normal. There is no distension.      Palpations: Abdomen is soft.   Musculoskeletal:         General: Swelling present. Normal range of motion.       Cervical back: Normal range of motion and neck supple.      Comments: Right lower extremity swelling due to DVT improving   Skin:     General: Skin is warm and dry.   Neurological:      Mental Status: He is alert and oriented to person, place, and time.      Cranial Nerves: No cranial nerve deficit.      Sensory: Sensory deficit present.      Motor: Weakness present.      Coordination: Coordination normal.      Comments: Patient has difficulty sitting up.  Hand  strength is reduced bilaterally.  Patient reports sensation to light touch is slightly increased in the left arm versus the right arm.  Lower extremity strength is diminished distally. Finger to nose intact. Heel to shin slow, but completed.          EKG reviewed sinus rhythm 87 bpm  Labs reviewed  Imaging reports reviewed

## 2021-12-16 NOTE — ED NOTES
Pt appears stiff.  Assisted by writer to reposition for comfort. Pillow under heels to keep them off the end of the bed

## 2021-12-17 ENCOUNTER — APPOINTMENT (OUTPATIENT)
Dept: PHYSICAL THERAPY | Facility: CLINIC | Age: 26
End: 2021-12-17
Attending: HOSPITALIST
Payer: COMMERCIAL

## 2021-12-17 ENCOUNTER — APPOINTMENT (OUTPATIENT)
Dept: OCCUPATIONAL THERAPY | Facility: CLINIC | Age: 26
End: 2021-12-17
Attending: HOSPITALIST
Payer: COMMERCIAL

## 2021-12-17 LAB
ANION GAP SERPL CALCULATED.3IONS-SCNC: 6 MMOL/L (ref 3–14)
ATRIAL RATE - MUSE: 87 BPM
BASOPHILS # BLD AUTO: 0 10E3/UL (ref 0–0.2)
BASOPHILS NFR BLD AUTO: 1 %
BUN SERPL-MCNC: 10 MG/DL (ref 7–30)
CALCIUM SERPL-MCNC: 9 MG/DL (ref 8.5–10.1)
CHLORIDE BLD-SCNC: 109 MMOL/L (ref 94–109)
CO2 SERPL-SCNC: 24 MMOL/L (ref 20–32)
CREAT SERPL-MCNC: 0.86 MG/DL (ref 0.66–1.25)
DIASTOLIC BLOOD PRESSURE - MUSE: NORMAL MMHG
EOSINOPHIL # BLD AUTO: 0.1 10E3/UL (ref 0–0.7)
EOSINOPHIL NFR BLD AUTO: 2 %
ERYTHROCYTE [DISTWIDTH] IN BLOOD BY AUTOMATED COUNT: 14.6 % (ref 10–15)
GFR SERPL CREATININE-BSD FRML MDRD: >90 ML/MIN/1.73M2
GLUCOSE BLD-MCNC: 97 MG/DL (ref 70–99)
GLUCOSE BLDC GLUCOMTR-MCNC: 83 MG/DL (ref 70–99)
GLUCOSE BLDC GLUCOMTR-MCNC: 87 MG/DL (ref 70–99)
GLUCOSE BLDC GLUCOMTR-MCNC: 94 MG/DL (ref 70–99)
HCT VFR BLD AUTO: 39.2 % (ref 40–53)
HGB BLD-MCNC: 12.7 G/DL (ref 13.3–17.7)
IMM GRANULOCYTES # BLD: 0 10E3/UL
IMM GRANULOCYTES NFR BLD: 0 %
INR PPP: 1.17 (ref 0.85–1.15)
INTERPRETATION ECG - MUSE: NORMAL
LYMPHOCYTES # BLD AUTO: 1.9 10E3/UL (ref 0.8–5.3)
LYMPHOCYTES NFR BLD AUTO: 39 %
MCH RBC QN AUTO: 31.5 PG (ref 26.5–33)
MCHC RBC AUTO-ENTMCNC: 32.4 G/DL (ref 31.5–36.5)
MCV RBC AUTO: 97 FL (ref 78–100)
MONOCYTES # BLD AUTO: 0.5 10E3/UL (ref 0–1.3)
MONOCYTES NFR BLD AUTO: 9 %
NEUTROPHILS # BLD AUTO: 2.4 10E3/UL (ref 1.6–8.3)
NEUTROPHILS NFR BLD AUTO: 49 %
NRBC # BLD AUTO: 0 10E3/UL
NRBC BLD AUTO-RTO: 0 /100
P AXIS - MUSE: 44 DEGREES
PLATELET # BLD AUTO: 251 10E3/UL (ref 150–450)
POTASSIUM BLD-SCNC: 3.9 MMOL/L (ref 3.4–5.3)
PR INTERVAL - MUSE: 164 MS
QRS DURATION - MUSE: 86 MS
QT - MUSE: 358 MS
QTC - MUSE: 430 MS
R AXIS - MUSE: 41 DEGREES
RBC # BLD AUTO: 4.03 10E6/UL (ref 4.4–5.9)
SODIUM SERPL-SCNC: 139 MMOL/L (ref 133–144)
SYSTOLIC BLOOD PRESSURE - MUSE: NORMAL MMHG
T AXIS - MUSE: 24 DEGREES
VENTRICULAR RATE- MUSE: 87 BPM
WBC # BLD AUTO: 4.9 10E3/UL (ref 4–11)

## 2021-12-17 PROCEDURE — 82525 ASSAY OF COPPER: CPT | Performed by: PSYCHIATRY & NEUROLOGY

## 2021-12-17 PROCEDURE — 97535 SELF CARE MNGMENT TRAINING: CPT | Mod: GO

## 2021-12-17 PROCEDURE — 80048 BASIC METABOLIC PNL TOTAL CA: CPT | Performed by: PHYSICIAN ASSISTANT

## 2021-12-17 PROCEDURE — 99233 SBSQ HOSP IP/OBS HIGH 50: CPT | Performed by: HOSPITALIST

## 2021-12-17 PROCEDURE — 999N000216 HC STATISTIC ADULT CD FACE TO FACE-NO CHRG

## 2021-12-17 PROCEDURE — 85610 PROTHROMBIN TIME: CPT | Performed by: PHYSICIAN ASSISTANT

## 2021-12-17 PROCEDURE — 97110 THERAPEUTIC EXERCISES: CPT | Mod: GP | Performed by: PHYSICAL THERAPIST

## 2021-12-17 PROCEDURE — 120N000001 HC R&B MED SURG/OB

## 2021-12-17 PROCEDURE — 97530 THERAPEUTIC ACTIVITIES: CPT | Mod: GO

## 2021-12-17 PROCEDURE — 97530 THERAPEUTIC ACTIVITIES: CPT | Mod: GP | Performed by: PHYSICAL THERAPIST

## 2021-12-17 PROCEDURE — 97167 OT EVAL HIGH COMPLEX 60 MIN: CPT | Mod: GO

## 2021-12-17 PROCEDURE — 36415 COLL VENOUS BLD VENIPUNCTURE: CPT | Performed by: PSYCHIATRY & NEUROLOGY

## 2021-12-17 PROCEDURE — 84425 ASSAY OF VITAMIN B-1: CPT | Performed by: PSYCHIATRY & NEUROLOGY

## 2021-12-17 PROCEDURE — 36415 COLL VENOUS BLD VENIPUNCTURE: CPT | Performed by: PHYSICIAN ASSISTANT

## 2021-12-17 PROCEDURE — 250N000013 HC RX MED GY IP 250 OP 250 PS 637: Performed by: HOSPITALIST

## 2021-12-17 PROCEDURE — 99207 PR APP CREDIT; MD BILLING SHARED VISIT: CPT | Performed by: PHYSICIAN ASSISTANT

## 2021-12-17 PROCEDURE — 85025 COMPLETE CBC W/AUTO DIFF WBC: CPT | Performed by: PHYSICIAN ASSISTANT

## 2021-12-17 RX ADMIN — APIXABAN 5 MG: 5 TABLET, FILM COATED ORAL at 08:26

## 2021-12-17 RX ADMIN — ACETAMINOPHEN 650 MG: 325 TABLET, FILM COATED ORAL at 03:52

## 2021-12-17 RX ADMIN — CLONIDINE HYDROCHLORIDE 0.1 MG: 0.1 TABLET ORAL at 21:23

## 2021-12-17 RX ADMIN — THIAMINE HCL TAB 100 MG 100 MG: 100 TAB at 08:26

## 2021-12-17 RX ADMIN — FOLIC ACID 1 MG: 1 TABLET ORAL at 08:25

## 2021-12-17 RX ADMIN — MULTIPLE VITAMINS W/ MINERALS TAB 1 TABLET: TAB at 08:25

## 2021-12-17 RX ADMIN — APIXABAN 5 MG: 5 TABLET, FILM COATED ORAL at 21:23

## 2021-12-17 RX ADMIN — CLONIDINE HYDROCHLORIDE 0.1 MG: 0.1 TABLET ORAL at 14:20

## 2021-12-17 RX ADMIN — METOPROLOL SUCCINATE 25 MG: 25 TABLET, EXTENDED RELEASE ORAL at 08:26

## 2021-12-17 ASSESSMENT — ACTIVITIES OF DAILY LIVING (ADL)
ADLS_ACUITY_SCORE: 16
ADLS_ACUITY_SCORE: 17
ADLS_ACUITY_SCORE: 16
ADLS_ACUITY_SCORE: 18
ADLS_ACUITY_SCORE: 17
ADLS_ACUITY_SCORE: 16
ADLS_ACUITY_SCORE: 18
ADLS_ACUITY_SCORE: 17
ADLS_ACUITY_SCORE: 16
ADLS_ACUITY_SCORE: 16
ADLS_ACUITY_SCORE: 17
ADLS_ACUITY_SCORE: 17

## 2021-12-17 NOTE — PROGRESS NOTES
Pt here with Paresthesias. Pt has known PE and DVT, on Eliquis. A&Ox4. Neuros numbness and tingling in bilateral hands and BLE, weakness in hands and feet. Edema in BLE, worse on R leg. VSS on RA. Regular diet, thin liquids. Takes pills whole. Up with A2 lift. Denies pain. CIWA score 0. Pt scoring green on the Aggression Stop Light Tool. Plan for Neuro consult and PT/OT. Discharge pending.

## 2021-12-17 NOTE — PROGRESS NOTES
[facility]    Medicine Progress Note - Hospitalist Service       Date of Admission:  12/16/2021    Assessment & Plan         Sebastián Nogueira is a 26 year old male with a past medical history of alcohol use disorder, marijuana use, recent history of RLE DVT and submassive PE, and biventricular heart failure who presented to the hospital with progressive upper and lower extremity paresthesias and weakness. Admitted under inpatient status for further evaluation and treatment.      Progressive upper and lower extremity paresthesias and weakness  Subacute combined degeneration   B12 deficiency   Hx of nitrous oxide use and alcohol use disorder: Reports 2+ weeks ago, noticed paresthesias and slight weakness/discoordination in fingers just above wrists, then 1.5 weeks ago, noticed paresthesias developing in lower extremities (R>LLE), progressing up to thighs and now with reports some numbness in abdomen. Urinating without difficulty. No change in bowels. No bulbar symptoms. No recent URI sx or GI illness. No recent vaccination.   * CMP WNL. CBC with baseline anemia. CRP WNL. TSH WNL. Trop negative. Lipase. Lactic negative. Folate WNL. B12 116. UA unremarkable. Ethanol negative. EKG with NSR, sinus arrhythmia. MR brain negative. MRI cervical with abnormal T2 signal is seen within the dorsal spinal cord extending from at least the C1-C2 level down through C5-C6. The signal abnormality in the spinal cord appears to be within inverted V-shaped on the axial sequences consistent with B12 deficiency.   * With recent relapse in alcohol use drinking 1 L whiskey + ~12 cans of beer over the last week. Also with baseline nitrous oxide use. Reports with prior reduction in alcohol use, had increased nitrous oxide use. Has been using nitrous oxide daily X5 years.   - Continue B12 1000mcg injection every other day  - Oral folate supplement ordered.   - General Neurology consulted. Call out to Dr. Tolentino to discuss  case at 0942. ? Need for LP. Note pt is on Eliquis for recent VTE. Discussed with IR, if indication for LP, can be down without holding Eliquis, goal INR <3 and platelets >20,000. Will defer decision to order MMA, homocysteine, and autoantibodies to intrinsic factor to Neurology for complete B12 deficiency work-up.   - PT and OT consulted, appreciate input   - SW for discharge planning   - Regular diet      Alcohol use disorder: Drank 1 L whiskey and 12 cans of beer over the past week. Drank 6 beers last on 12/15/21.  He has a history of alcohol withdrawal, but no withdrawal seizure. Hx of CD treatment, most recently at [facility] in May 2021.   - CIWA protocol in place, PRN Valium in place   - CD consulted, appreciate input. Not interested in inpatient CD treatment at this time.   - Clonidine 0.1 mg q8 hrs ordered on admission   - Folic acid, thiamine, MV daily      Hx of RLE DVT and submassive PE (10/2021): Diagnosed 10/31/21. Thought to be provoked by an airplaine flight  - Continue Eliquis   - Vascular Medicine referral per Cardiology at last appointment 12/2      Biventricular heart failure, HFrEF after subacute presentation of bilateral submassive pulmonary emboli. NYHA II, improving. EF was mildly decreased 45-50% initially, improved to low normal 50% on recent echo. Possibly some component of alcohol use. Last seen by Cardiology 12/2/21.   - Cardiology follow-up in 6 months.     Sinus tachycardia: Continue PTA Toprol XL.     Morbid obesity: BMI 39. Increased risk for all cause mortality.   - Diet and lifestyle modification recommended     Marijuana use: Noted.   - Pt uninterested in cessation     Dependent edema: R>L. Compression stockings      Diet: Combination Diet Regular Diet Adult    DVT Prophylaxis: DOAC  Mariano Catheter: Not present  Central Lines: None  Code Status: Full Code      Disposition Plan   Expected Discharge: 12/19/2021     Anticipated discharge location: Pending clinical course.     The  patient's care was discussed with the Attending Physician, Dr. Ponce, Bedside Nurse and Patient.    Essence Rojas PA-C  Hospitalist Service  [facility]  Securely message with the Vocera Web Console (learn more here)  Text page via [facility] Paging/Directory  __________________________________________________________________    Interval History   Still with significant paresthesias and weakness in hands and lower extremities spanning up to abdomen. Urinating well. Passing flatus. Weakness most profound in lower extremities, RLE >LLE. Vitals stable. Hx as above. Discussed importance of cutting back on alcohol and nitrous oxide use. Awaiting General Neurology consultation.     Data reviewed today: I reviewed all medications, new labs and imaging results over the last 24 hours. I personally reviewed all labs and imaging to date.     Physical Exam   Vital Signs: Temp: 97.7  F (36.5  C) Temp src: Oral BP: 113/72 Pulse: 70   Resp: 17 SpO2: 90 % O2 Device: None (Room air)    Weight: 338 lbs 1.6 oz    CONSTITUTIONAL: Obese patient laying in bed, dressed in hospital garb. Appears comfortable. Cooperative with interview.   HEENT: Normocephalic, atraumatic. Pupils equal, round, and reactive to light. EOMI, bilat. Negative for conjunctival redness or scleral icterus.  Oral mucosa pink and moist; negative for ulcerations, erythema, or exudates.    CARDIOVASCULAR: RRR, no murmurs, rubs, or extra heart sounds appreciated. Pulses +2/4 and regular in upper and lower extremities, bilaterally.   RESPIRATORY: No increased work of breathing.  CTA, bilat; no wheezes, rales, or rhonchi appreciated.  GASTROINTESTINAL:  Abdomen soft, non-distended. BS auscultated in all four quadrants. Negative for tenderness to palpation.  No masses or organomegaly noted.  MUSCULOSKELETAL: Full ROM in hands and shoulders, 3/5 in lower extremities, bilat.  No gross deformities noted. Normal muscle tone.    HEMATOLOGIC/LYMPHATIC/IMMUNOLOGIC: Negative for lower extremity edema, bilaterally.  NEUROLOGIC: Alert and oriented to person, place, and time.  strength intact. CN's II-XII grossly intact. Cerebellar function intact per finger to nose testing intact. Paresthesias of bilateral hands spanning just above wrist and lower extremities from feet up to abdomen.   SKIN: Warm, dry, intact. No jaundice noted. Negative for suspicious lesions, rashes, bruising, open sores or abrasions.     Data   Recent Labs   Lab 12/17/21  0928 12/17/21  0721 12/17/21  0132 12/16/21  1019 12/10/21  1732   WBC 4.9  --   --  7.1 6.0   HGB 12.7*  --   --  12.3* 13.5   MCV 97  --   --  95 96     --   --  243 289   INR 1.17*  --   --   --   --      --   --  137 141   POTASSIUM 3.9  --   --  4.0 4.1   CHLORIDE 109  --   --  109 111*   CO2 24  --   --  24 23   BUN 10  --   --  15 18   CR 0.86  --   --  0.94 0.97   ANIONGAP 6  --   --  4 7   MARILYN 9.0  --   --  9.2 9.3   GLC 97 94 83 96 114*   ALBUMIN  --   --   --  3.9 3.9   PROTTOTAL  --   --   --  7.0 7.5   BILITOTAL  --   --   --  0.6 0.4   ALKPHOS  --   --   --  51 55   ALT  --   --   --  22 27   AST  --   --   --  12 17   LIPASE  --   --   --  163  --      Recent Results (from the past 24 hour(s))   Cervical spine MRI w/o contrast    Narrative    MRI CERVICAL SPINE WITHOUT CONTRAST 12/16/2021 2:55 PM     HISTORY: Paresthesias to all four extremities and trunk.    TECHNIQUE: Multiplanar, multisequence MRI of the cervical spine  without contrast.     COMPARISON: None.     FINDINGS: Images are mildly degraded by motion artifact.    Normal cervical lordosis. Anterior posterior alignment of the spine is  within normal limits. Vertebral body height is maintained without  evidence of fracture. There are no destructive osseous lesions.     No significant loss of disc height or disc herniation. Normal facets.  No spinal canal or neural foraminal narrowing.    Abnormal T2 signal is seen  within the dorsal spinal cord extending  from at least the C1-C2 level down through C5-C6. The signal  abnormality in the spinal cord appears to be within inverted V-shaped  on the axial sequences.    Paraspinous soft tissues are unremarkable.       Impression    IMPRESSION:    1. Abnormal T2 signal within the dorsal cervical spinal cord as  detailed above. This most likely represents subacute combined  degeneration. Clinical correlation is recommended.  2. No significant degenerative changes in the cervical spine. No  spinal canal or neural foraminal narrowing.    Results discussed with Baldomero Fernandez at 3:22 PM on 12/16/2021.     SELVIN CONN MD         SYSTEM ID:  [id number]   MR Brain w/o & w Contrast    Narrative    MRI BRAIN WITHOUT AND WITH CONTRAST  12/16/2021 3:15 PM     HISTORY:  Paresthesias to all four extremities and trunk.    TECHNIQUE: Multiplanar, multisequence MRI of the brain without and  with 15mL Gadavist.    COMPARISON: None.     FINDINGS: There is no evidence of acute infarct, hemorrhage, mass, or  herniation. The brain parenchyma, ventricles and subarachnoid spaces  appear normal.     There is no abnormal intracranial enhancement.     Moderate scattered mucosal thickening in the paranasal sinuses. The  major arterial T2 flow voids at the base of the brain appear patent.       Impression    IMPRESSION:  Unremarkable brain MRI without evidence of acute infarct,  mass, hemorrhage, or herniation.      SELVIN CONN MD         SYSTEM ID:  [id number]     Medications     - MEDICATION INSTRUCTIONS -         apixaban ANTICOAGULANT  5 mg Oral BID     cloNIDine  0.1 mg Oral Q8H     [START ON 12/18/2021] cyanocobalamin  1,000 mcg Intramuscular Every Other Day     folic acid  1 mg Oral Daily     metoprolol succinate ER  25 mg Oral Daily     multivitamin w/minerals  1 tablet Oral Daily     sodium chloride (PF)  3 mL Intracatheter Q8H     thiamine  100 mg Oral Daily

## 2021-12-17 NOTE — CONSULTS
[facility]    Neurology Consultation     Date of Admission:  12/16/2021    Assessment & Plan   Sebastián Nogueira is a 26 year old male who was admitted on 12/16/2021. I was asked to see the patient for generalized weakness and paresthesia- tingling> numbness.    B12 deficiency- Risk - Chronic nitrous gas abuuse for 5 yrs      -MRI BRAIN 12/16/21- Unremarkable brain MRI without evidence of acute pathology  -MRI C spine 12/16/21- Abnormal T2 signal within the dorsal cervical spinal cord as detailed above. This most likely represents subacute combined  degeneration.  -B12 supplement  - Copper, B1  -Neurology will sign off    Sobia Tolentino MD  [facility] Neurology  Office Phone [phone]    History of Present Illness   From the H/P note  Sebastián Nogueira is a 26 year old male who presents with generalized weakness.      The patient was recently hospitalized here at [facility] for submassive pulmonary embolism and he was discharged on November 3.    He was compliant with his medications and his symptoms of lower extremity swelling and shortness of breath were improving.      Approximately 2 and half weeks ago the patient developed a tingling sensation in his upper extremities describing it like his hands fell asleep.  The symptoms progressed and eventually went down to his legs.    Patient went to [facility] for evaluation on November 20 and underwent a CT scan of the abdomen and ultrasound of lower extremities as well as basic blood work patient was not found to have any cause for symptoms and he was discharged home.  Since then his symptoms have progressed and he has become weaker to the point that he has difficulty ambulating around his house.      On December 15 the patient was trying to get out of bed when he fell back into his bed and slid off of the bed onto the floor.  The patient was unable to get up off the floor due to his weakness so he called his father was unable to help him  up so they eventually called EMS and the patient presented to the hospital.  Patient reports a varied diet of mostly takeout food including items such as [employer], [employer], hamburgers.  The patient eats meat regularly.        From the patient  Generalized weakness and tingling hands/feet/leg for less than a month  Not regular meal - skipped meals   Drinks alcohol everyday - 1 day prior admission  4-6 beer/time   Used to be alcoholics per pt  MJ - daily  Chronic nitrous gas abuse for 5 years     With respect to risk factors for epilepsy the patient denies any developmental delay, febrile seizure, TBI or meningitis/encephalitis, prior stroke, prior brain surgery or family history of seizure    Insomnia+  no seizure, no shaking, staring episode, tongue biting or incontinence,   Head trauma, recent illness, sleep deprivation, stress, alcohol/Drug recently.  Denied anypanic attacks or fear sensations, rising sensation, zoning out, auditory/visual/olfactory hallucinations    B1 pending  B12 137  CTH -No acute intracranial process  Alcohol <0.0.1  Na 137  Glu 96  Mg 2.1  Ca 9.2  UA   TSH- WNL  ESR WNL  CRP WNL          Past Medical History    I have reviewed this patient's medical history and updated it with pertinent information if needed.   Past Medical History:   Diagnosis Date     Alcohol abuse      DVT (deep venous thrombosis) (H)      Hypertriglyceridemia      Marijuana use      Pulmonary embolism (H)        Past Surgical History   I have reviewed this patient's surgical history and updated it with pertinent information if needed.  Past Surgical History:   Procedure Laterality Date     APPENDECTOMY         Prior to Admission Medications   Prior to Admission Medications   Prescriptions Last Dose Informant Patient Reported? Taking?   acetaminophen (TYLENOL) 325 MG tablet  at PRN Self Yes Yes   Sig: Take 2 tablets (650 mg) by mouth every 6 hours as needed for mild pain or other (and adjunct with moderate or severe pain  or per patient request)   apixaban ANTICOAGULANT (ELIQUIS) 5 MG tablet 12/15/2021 at PM Self No Yes   Sig: Take 1 tablet (5 mg) by mouth 2 times daily   gabapentin (NEURONTIN) 400 MG capsule  at PRN Self Yes No   Sig: Take 400 mg by mouth daily as needed (Anxiety)    metoprolol succinate ER (TOPROL-XL) 25 MG 24 hr tablet Past Week at Unknown time Self No Yes   Sig: Take 1 tablet (25 mg) by mouth daily   ondansetron (ZOFRAN) 4 MG tablet  at PRN Self No Yes   Sig: Take 1 tablet (4 mg) by mouth every 8 hours as needed for nausea      Facility-Administered Medications: None     Allergies   No Known Allergies    Social History   I have reviewed this patient's social history and updated it with pertinent information if needed. Sebastián Moorecresencios  reports that he has quit smoking. He has never used smokeless tobacco. He reports current alcohol use. He reports that he does not use drugs.    Family History   I have reviewed this patient's family history and updated it with pertinent information if needed.   Family History   Problem Relation Age of Onset     Dementia Paternal Grandfather        Review of Systems   The 10 point Review of Systems is negative other than noted in the HPI or here.   weakness,  tingling+\    Patient denies any LOC, HA, vision change (double vision/blurry vision/ vision loss), dizziness, imbalance, nausea, vomiting, dysphagia, dysarthria,  incontinence, saddle anesthesia, prior seizure, stroke, trauma, brain surgery, weight change, recent illness, bleeding, bruising, fever, diarrhea, chest pain or shortness breath    Physical Exam   Temp: 97.6  F (36.4  C) Temp src: Oral BP: (!) 130/92 Pulse: 60   Resp: 18 SpO2: 100 % O2 Device: None (Room air)    Vital Signs with Ranges  Temp:  [97.6  F (36.4  C)-97.7  F (36.5  C)] 97.6  F (36.4  C)  Pulse:  [60-91] 60  Resp:  [17-18] 18  BP: (112-140)/(72-99) 130/92  SpO2:  [90 %-100 %] 100 %  338 lbs 1.6 oz       General appearance:No acute distress    Neuro/Psych:Awake, alert, oriented x3. -2021 dec 17 - wendi- Maureen  Cranial nerves: II-XII intact grossly    PERRLA, Extraocular movements intact. Visual field intact, No nystagmus, Face appears symmetric. Facial sensation intact. Hearing intact to finger rub. Palate midline. Shoulder shrug/SCM intact bilaterally. Tongue at midline with no fasciculations    Motor strength: 5/5 throughout. Except for left arm /hand  3/5 and Rt HF/KF/KE/PF/DF 3/5   Sensory:DECREASED  to LT LUE, RLE  Reflexes: Trace + throughout  + B/L Babinski/neg Clonus/neg Gaffney:    Coordination: Finger-nose coordination decreased on the left . KONSTANTIN decreased on the left  Romberg n/a.   Gait: n/a      total time : 50 minutes  More than 50% of the time was spent on counseling  discussing/coordinating care with hospital staffs.

## 2021-12-17 NOTE — PROGRESS NOTES
BRIEF HOUSE OFFICER NOTE:    Assisted Fall  Patient was being transferred from the cart to the bed with a hover piero and assist of 2, the bed and cart were locked. During transfer the patient became stuck between the bed and the cart at which point the weight of the patient caused the cart to start sliding. The patient was lowered to the floor with assistance. He denies any injuries. He denies need for any repeat imaging at this time. Corey lift will be used to assist the patient off the floor.    LISA Asencio CNP  Text Page

## 2021-12-17 NOTE — PROGRESS NOTES
12/17/21 1335   Quick Adds   Type of Visit Initial PT Evaluation   Living Environment   People in home alone   Current Living Arrangements apartment   Home Accessibility stairs to enter home   Number of Stairs, Main Entrance other (see comments)  (about 36 steps to access apt)   Stair Railings, Main Entrance railings safe and in good condition   Transportation Anticipated car, drives self   Living Environment Comments No elevator access in aprt; On 3rd floor   Self-Care   Usual Activity Tolerance excellent   Current Activity Tolerance poor   Equipment Currently Used at Home other (see comments);cane, straight;crutches  (recent use of cane and crutches per chart)   Activity/Exercise/Self-Care Comment Pt reports indep with ADLs and functional mobility. Works on an ice cream truck typically. Over the past few weeks pt noticed progressive weakness/tingling in B UE/LE. Was using a cane and crutches to get around the past few weeks   Disability/Function   Concentrating, Remembering or Making Decisions Difficulty no   Fall history within last six months yes   Number of times patient has fallen within last six months 2   Change in Functional Status Since Onset of Current Illness/Injury yes   General Information   Onset of Illness/Injury or Date of Surgery 12/16/21   Referring Physician Jose Miguel Hodgson MD   Patient/Family Therapy Goals Statement (PT) not stated   Pertinent History of Current Problem (include personal factors and/or comorbidities that impact the POC) per chart: Sebastián Nogueira is a 26 year old male with a past medical history of alcohol abuse, recent history of DVT and PE, cardiomyopathy with reduced ejection fraction presents to the hospital with generalized weakness. Suspected b12 deficiency.  Patient reporting a 2-week history of paresthesias and generalized weakness starting in upper extremities and progressing to the lower extremities.  Weakness became so severe that patient fell at home and  could not get back up to bring him to the hospital.; see medical record for further information   Existing Precautions/Restrictions fall   General Observations patient sitting up in chair; wanting to return to bed   Cognition   Orientation Status (Cognition) oriented x 3   Affect/Mental Status (Cognition) flat/blunted affect   Follows Commands (Cognition) over 90% accuracy   Cognitive Status Comments defer to OT for further testing as needed   Pain Assessment   Patient Currently in Pain Yes, see Vital Sign flowsheet  (R LE from prior DVT)   Integumentary/Edema   Integumentary/Edema Comments R LE more swollen than L; see nursing notes for further information   Range of Motion (ROM)   ROM Comment difficulty with AROM secondary to weakness   Strength   Strength Comments significant weakness noted; R UE appears stronger than L; R LE appears stronger than R; distal strength better than proximial strength in LE's; see OT note for further comment on UE strength   Bed Mobility   Comment (Bed Mobility) SBA for sit>supine and scooting up in bed; set up assist   Transfers   Transfer Safety Comments currently needing mod A x 2 and Diana Steady for sit<>stand transfers   Gait/Stairs (Locomotion)   Comment (Gait/Stairs) currently not able to safely ambulate secondary to weakness   Balance   Balance Comments intact sitting balance; impaired standing balance   Sensory Examination   Sensory Perception Comments reports paresthesias in hands and feet   Muscle Tone   Muscle Tone Comments appears decreased in UE's and LE's   Clinical Impression   Criteria for Skilled Therapeutic Intervention yes, treatment indicated   PT Diagnosis (PT) impaired functional mobility   Influenced by the following impairments impaired sensation; weakness; impaired active ROM in UE's and LE's; impaired balance   Functional limitations due to impairments impaired independence with functional mobility and cares secondary to above deficits   Clinical  Presentation Evolving/Changing   Clinical Presentation Rationale clinical judgement; level of assist   Clinical Decision Making (Complexity) moderate complexity   Therapy Frequency (PT) Daily   Predicted Duration of Therapy Intervention (days/wks) 7 days   Planned Therapy Interventions (PT) bed mobility training;balance training;gait training;patient/family education;strengthening;transfer training   Anticipated Equipment Needs at Discharge (PT) wheelchair;walker, rolling;lift device   Risk & Benefits of therapy have been explained evaluation/treatment results reviewed;care plan/treatment goals reviewed;risks/benefits reviewed;current/potential barriers reviewed;participants voiced agreement with care plan;participants included;patient   PT Discharge Planning    PT Discharge Recommendation (DC Rec) Acute Rehab Center-Motivated patient will benefit from intensive, interdisciplinary therapy.  Anticipate will be able to tolerate 3 hours of therapy per day;Transitional Care Facility   PT Rationale for DC Rec Patient significantly below reported baseline for mobility and cares and previously independent; would benefit from ARU to maximize return to PLOF;if patient doesn't qualify for ARU, recommend TCU. Not safe to return home at this time   PT Brief overview of current status  A x 2 and Diana Steady   Total Evaluation Time   Total Evaluation Time (Minutes) 10

## 2021-12-17 NOTE — PROGRESS NOTES
"   12/17/21 1032   Quick Adds   Type of Visit Initial Occupational Therapy Evaluation   Living Environment   People in home alone   Current Living Arrangements apartment   Home Accessibility stairs to enter home   Transportation Anticipated car, drives self   Living Environment Comments No elevator access in aprt; On 3rd floor.    Self-Care   Usual Activity Tolerance excellent   Current Activity Tolerance poor   Activity/Exercise/Self-Care Comment Pt reports indep with ADLs and functional mobility. Works on an ice cream truck typically. Over the past few weeks pt noticed progressive weakness/tingling in B UE/LE. Was using a cane and crutches to get around the past few weeks.    Disability/Function   Fall history within last six months yes   Number of times patient has fallen within last six months 1   General Information   Onset of Illness/Injury or Date of Surgery 12/16/21   Referring Physician Jose Miguel Hodgson MD   Patient/Family Therapy Goal Statement (OT) \"get this figured out\"   Additional Occupational Profile Info/Pertinent History of Current Problem Sebastián Nogueira is a 26 year old male with a past medical history of alcohol abuse, recent history of DVT and PE, cardiomyopathy with reduced ejection fraction presents to the hospital with generalized weakness. Suspected b12 deficiency.  Patient reporting a 2-week history of paresthesias and generalized weakness starting in upper extremities and progressing to the lower extremities.  Weakness became so severe that patient fell at home and could not get back up to bring him to the hospital.   Existing Precautions/Restrictions fall   Cognitive Status Examination   Orientation Status orientation to person, place and time   Follows Commands follows one-step commands;over 90% accuracy   Visual Perception   Visual Impairment/Limitations corrective lenses full-time   Sensory   Sensory Quick Adds Light touch   Sensory Comments decreased sensation in B UE/LE with " tingling senstaion.    Pain Assessment   Patient Currently in Pain No   Integumentary/Edema   Integumentary/Edema other (describe)   Integumentary/Edema Comments B LE edema, pitting edema in B feet, ankles swollen   Posture   Posture forward head position;protracted shoulders;kyphosis   Range of Motion Comprehensive   General Range of Motion no range of motion deficits identified;bilateral upper extremity ROM WNL   Strength Comprehensive (MMT)   General Manual Muscle Testing (MMT) Assessment upper extremity strength deficits identified   Comment, General Manual Muscle Testing (MMT) Assessment Generalized weakness throughout. 5/5 strength in B bicep/tricep, shld 3/5. Pt able to complete SLR from bed but difficult to maintain movement.    Coordination   Upper Extremity Coordination Left UE impaired;Right UE impaired   Functional Limitations Fine motor ADL performance impaired;Impaired ability to perform bilateral tasks;Object transport impaired;Reach to targets impaired   Coordination Comments drops tooth brush during oral cares   Bed Mobility   Bed Mobility supine-sit   Supine-Sit [place] (Bed Mobility) minimum assist (75% patient effort)   Assistive Device (Bed Mobility) bed rails;other (see comments)  (HOB elevated)   Transfers   Transfers sit-stand transfer;bed-chair transfer   Transfer Comments mod-max A x2 for stand step tx; B LEs buckle   Activities of Daily Living   BADL Assessment upper body dressing;grooming;lower body dressing   Upper Body Dressing Assessment   [place] Level (Upper Body Dressing) moderate assist (50% patient effort)   Lower Body Dressing Assessment   [place] Level (Lower Body Dressing) dependent (less than 25% patient effort)   Grooming Assessment   [place] Level (Grooming) supervision;set up   Comment (Grooming) Increased time and effort noted   Clinical Impression   Criteria for Skilled Therapeutic Interventions Met (OT) yes;meets criteria;skilled treatment is  necessary   OT Diagnosis Impaired ADLs and functional mobility   OT Problem List-Impairments impacting ADL problems related to;activity tolerance impaired;balance;coordination;mobility;range of motion (ROM);strength;sensory feedback   Assessment of Occupational Performance 5 or more Performance Deficits   Identified Performance Deficits dressing, toileting, functional  mobility, bathing, home management   Planned Therapy Interventions (OT) ADL retraining;bed mobility training;fine motor coordination training;neuromuscular re-education;ROM;strengthening;transfer training;progressive activity/exercise;risk factor education   Clinical Decision Making Complexity (OT) high complexity   Therapy Frequency (OT) 2x/day   Predicted Duration of Therapy 5   Risk & Benefits of therapy have been explained evaluation/treatment results reviewed;care plan/treatment goals reviewed   OT Discharge Planning    OT Discharge Recommendation (DC Rec) [facility] patient will benefit from intensive, interdisciplinary therapy.  Anticipate will be able to tolerate 3 hours of therapy per day   OT Rationale for DC Rec Pt below baseline indep with functional mobilitty and self-cares. Pt would benefit from ARC for intense, skilled therapy to maximize indep. Pt lives alone and has to go up 3 flights of stairs to enter apartment. Unrealistic to return home at this time.    Total Evaluation Time (Minutes)   Total Evaluation Time (Minutes) 10

## 2021-12-17 NOTE — CONSULTS
12/17/2021    CD consult completed.  Phone screening done as notes indicate pt was not interested in treatment. Called pt's bedside phone, pt reports he is not interested in treatment. I asked pt if he would like any resources, pt reports he has those. Pt denied anything further from writer, told me to have a good day and ended the call.    Pt can call Mental Health and Addiction Services Line: [phone] if he changes his mind and set up an outpatient evaluation appt.     [email or web address]  Direct phone: [phone]

## 2021-12-17 NOTE — UTILIZATION REVIEW
"  Admission Status; Secondary Review Determination         Under the authority of the Utilization Management Committee, the utilization review process indicated a secondary review on the above patient.  The review outcome is based on review of the medical records, discussions with staff, and applying clinical experience noted on the date of the review.        (xxx)      Inpatient Status Appropriate - This patient's medical care is consistent with medical management for inpatient care and reasonable inpatient medical practice.      () Observation Status Appropriate - This patient does not meet hospital inpatient criteria and is placed in observation status. If this patient's primary payer is Medicare and was admitted as an inpatient, Condition Code 44 should be used and patient status changed to \"observation\".   () Admission Status NOT Appropriate - This patient's medical care is not consistent with medical management for Inpatient or Observation Status.          RATIONALE FOR DETERMINATION     Sebastián Nogueira is a 26 year old male with a past medical history of alcohol abuse, recent history of DVT and PE, and cardiomyopathy with reduced ejection fraction who presented with paresthesias x 2 weeks and generalized weakness which started in the upper extremities and progressed to involve the lower extremities.  On imaging patient was found to have an abnormal T2 signal on MRI of the spine and the cervical spinal cord which could be consistent with a B12 deficiency.  B12 level low at 114.  He was admitted for further testing, evaluation, IV medication, neurology consultation and close clinical monitoring.  IP status appropriate.      The severity of illness, intensity of service provided, expected LOS and risk for adverse outcome make the care complex, high risk and appropriate for hospital admission.        The information on this document is developed by the utilization review team in order for the business office to " ensure compliance.  This only denotes the appropriateness of proper admission status and does not reflect the quality of care rendered.         The definitions of Inpatient Status and Observation Status used in making the determination above are those provided in the CMS Coverage Manual, Chapter 1 and Chapter 6, section 70.4.      Sincerely,     Tamie Srinivasan MD  Physician Advisor   Utilization Review/ Case Management  [facility].

## 2021-12-17 NOTE — PROGRESS NOTES
Pt here with Paresthesias. Pt has known PE and DVT- currently on Eliquis. A&O X4. Neuros numbness and tingling in BUE and BLE, weakness in hands and feet (1/5). =2 edema in BLE. Bruise on right for arm. VSS. Regular diet, thin liquids. Takes pills whole. Up with Ax2 lift. Denies pain. CIWA score 0. Pt scoring green on the Aggression Stop Light Tool. Plan for Neuro follow up and therapy. Discharge pending.

## 2021-12-18 ENCOUNTER — APPOINTMENT (OUTPATIENT)
Dept: OCCUPATIONAL THERAPY | Facility: CLINIC | Age: 26
End: 2021-12-18
Payer: COMMERCIAL

## 2021-12-18 ENCOUNTER — APPOINTMENT (OUTPATIENT)
Dept: PHYSICAL THERAPY | Facility: CLINIC | Age: 26
End: 2021-12-18
Payer: COMMERCIAL

## 2021-12-18 LAB
ANION GAP SERPL CALCULATED.3IONS-SCNC: 5 MMOL/L (ref 3–14)
BASOPHILS # BLD AUTO: 0.1 10E3/UL (ref 0–0.2)
BASOPHILS NFR BLD AUTO: 1 %
BUN SERPL-MCNC: 12 MG/DL (ref 7–30)
CALCIUM SERPL-MCNC: 9.3 MG/DL (ref 8.5–10.1)
CHLORIDE BLD-SCNC: 108 MMOL/L (ref 94–109)
CO2 SERPL-SCNC: 25 MMOL/L (ref 20–32)
CREAT SERPL-MCNC: 0.92 MG/DL (ref 0.66–1.25)
EOSINOPHIL # BLD AUTO: 0.1 10E3/UL (ref 0–0.7)
EOSINOPHIL NFR BLD AUTO: 2 %
ERYTHROCYTE [DISTWIDTH] IN BLOOD BY AUTOMATED COUNT: 14.6 % (ref 10–15)
GFR SERPL CREATININE-BSD FRML MDRD: >90 ML/MIN/1.73M2
GLUCOSE BLD-MCNC: 97 MG/DL (ref 70–99)
HCT VFR BLD AUTO: 39 % (ref 40–53)
HGB BLD-MCNC: 12.9 G/DL (ref 13.3–17.7)
IMM GRANULOCYTES # BLD: 0 10E3/UL
IMM GRANULOCYTES NFR BLD: 0 %
LYMPHOCYTES # BLD AUTO: 2.3 10E3/UL (ref 0.8–5.3)
LYMPHOCYTES NFR BLD AUTO: 41 %
MCH RBC QN AUTO: 31.9 PG (ref 26.5–33)
MCHC RBC AUTO-ENTMCNC: 33.1 G/DL (ref 31.5–36.5)
MCV RBC AUTO: 96 FL (ref 78–100)
MONOCYTES # BLD AUTO: 0.5 10E3/UL (ref 0–1.3)
MONOCYTES NFR BLD AUTO: 9 %
NEUTROPHILS # BLD AUTO: 2.6 10E3/UL (ref 1.6–8.3)
NEUTROPHILS NFR BLD AUTO: 47 %
NRBC # BLD AUTO: 0 10E3/UL
NRBC BLD AUTO-RTO: 0 /100
PLATELET # BLD AUTO: 244 10E3/UL (ref 150–450)
POTASSIUM BLD-SCNC: 3.8 MMOL/L (ref 3.4–5.3)
RBC # BLD AUTO: 4.05 10E6/UL (ref 4.4–5.9)
SODIUM SERPL-SCNC: 138 MMOL/L (ref 133–144)
WBC # BLD AUTO: 5.5 10E3/UL (ref 4–11)

## 2021-12-18 PROCEDURE — 250N000013 HC RX MED GY IP 250 OP 250 PS 637: Performed by: HOSPITALIST

## 2021-12-18 PROCEDURE — 97112 NEUROMUSCULAR REEDUCATION: CPT | Mod: GO

## 2021-12-18 PROCEDURE — 83090 ASSAY OF HOMOCYSTEINE: CPT | Performed by: INTERNAL MEDICINE

## 2021-12-18 PROCEDURE — 120N000001 HC R&B MED SURG/OB

## 2021-12-18 PROCEDURE — 99233 SBSQ HOSP IP/OBS HIGH 50: CPT | Performed by: INTERNAL MEDICINE

## 2021-12-18 PROCEDURE — 82310 ASSAY OF CALCIUM: CPT | Performed by: PHYSICIAN ASSISTANT

## 2021-12-18 PROCEDURE — 97530 THERAPEUTIC ACTIVITIES: CPT | Mod: GO

## 2021-12-18 PROCEDURE — 36415 COLL VENOUS BLD VENIPUNCTURE: CPT | Performed by: PHYSICIAN ASSISTANT

## 2021-12-18 PROCEDURE — 83921 ORGANIC ACID SINGLE QUANT: CPT | Performed by: INTERNAL MEDICINE

## 2021-12-18 PROCEDURE — 97530 THERAPEUTIC ACTIVITIES: CPT | Mod: GP | Performed by: PHYSICAL THERAPIST

## 2021-12-18 PROCEDURE — 85025 COMPLETE CBC W/AUTO DIFF WBC: CPT | Performed by: PHYSICIAN ASSISTANT

## 2021-12-18 PROCEDURE — 250N000011 HC RX IP 250 OP 636: Performed by: HOSPITALIST

## 2021-12-18 PROCEDURE — 36415 COLL VENOUS BLD VENIPUNCTURE: CPT | Performed by: INTERNAL MEDICINE

## 2021-12-18 PROCEDURE — 97110 THERAPEUTIC EXERCISES: CPT | Mod: GP | Performed by: PHYSICAL THERAPIST

## 2021-12-18 RX ORDER — LANOLIN ALCOHOL/MO/W.PET/CERES
100 CREAM (GRAM) TOPICAL DAILY
Qty: 5 TABLET | Refills: 0 | Status: ON HOLD | DISCHARGE
Start: 2021-12-19 | End: 2021-12-30

## 2021-12-18 RX ORDER — FOLIC ACID 1 MG/1
1 TABLET ORAL DAILY
Status: ON HOLD | DISCHARGE
Start: 2021-12-19 | End: 2021-12-30

## 2021-12-18 RX ORDER — MULTIPLE VITAMINS W/ MINERALS TAB 9MG-400MCG
1 TAB ORAL DAILY
Status: ON HOLD | DISCHARGE
Start: 2021-12-19 | End: 2021-12-30

## 2021-12-18 RX ORDER — CYANOCOBALAMIN 1000 UG/ML
1000 INJECTION, SOLUTION INTRAMUSCULAR; SUBCUTANEOUS DAILY
Status: DISCONTINUED | OUTPATIENT
Start: 2021-12-19 | End: 2021-12-21 | Stop reason: HOSPADM

## 2021-12-18 RX ADMIN — CLONIDINE HYDROCHLORIDE 0.1 MG: 0.1 TABLET ORAL at 06:03

## 2021-12-18 RX ADMIN — CLONIDINE HYDROCHLORIDE 0.1 MG: 0.1 TABLET ORAL at 13:23

## 2021-12-18 RX ADMIN — MULTIPLE VITAMINS W/ MINERALS TAB 1 TABLET: TAB at 08:47

## 2021-12-18 RX ADMIN — METOPROLOL SUCCINATE 25 MG: 25 TABLET, EXTENDED RELEASE ORAL at 08:47

## 2021-12-18 RX ADMIN — CYANOCOBALAMIN 1000 MCG: 1000 INJECTION, SOLUTION INTRAMUSCULAR at 08:47

## 2021-12-18 RX ADMIN — THIAMINE HCL TAB 100 MG 100 MG: 100 TAB at 08:47

## 2021-12-18 RX ADMIN — SENNOSIDES AND DOCUSATE SODIUM 1 TABLET: 50; 8.6 TABLET ORAL at 19:47

## 2021-12-18 RX ADMIN — APIXABAN 5 MG: 5 TABLET, FILM COATED ORAL at 08:47

## 2021-12-18 RX ADMIN — FOLIC ACID 1 MG: 1 TABLET ORAL at 08:47

## 2021-12-18 RX ADMIN — APIXABAN 5 MG: 5 TABLET, FILM COATED ORAL at 21:14

## 2021-12-18 ASSESSMENT — ACTIVITIES OF DAILY LIVING (ADL)
ADLS_ACUITY_SCORE: 17

## 2021-12-18 NOTE — PROGRESS NOTES
[facility]    Hospitalist Progress Note    Assessment & Plan   Date of Admission:  12/16/2021     Assessment & Plan         Sebastián Nogueira is a 26 year old male with a past medical history of alcohol use disorder, marijuana use, recent history of RLE DVT and submassive PE, and biventricular heart failure who presented to the hospital with progressive upper and lower extremity paresthesias and weakness. Admitted under inpatient status for further evaluation and treatment.      Progressive upper and lower extremity paresthesias and weakness  Subacute combined degeneration   B12 deficiency with subacute combined degeneration  Hx of nitrous oxide use and alcohol use disorder: Reports 2+ weeks ago, noticed paresthesias and slight weakness/discoordination in fingers just above wrists, then 1.5 weeks ago, noticed paresthesias developing in lower extremities (R>LLE), progressing up to thighs and now with reports some numbness in abdomen. Urinating without difficulty. No change in bowels. No bulbar symptoms. No recent URI sx or GI illness. No recent vaccination.   * CMP WNL. CBC with baseline anemia. CRP WNL. TSH WNL. Trop negative. Lipase. Lactic negative. Folate WNL. B12 116. UA unremarkable. Ethanol negative. EKG with NSR, sinus arrhythmia. MR brain negative. MRI cervical with abnormal T2 signal is seen within the dorsal spinal cord extending from at least the C1-C2 level down through C5-C6. The signal abnormality in the spinal cord appears to be within inverted V-shaped on the axial sequences consistent with B12 deficiency.   * With recent relapse in alcohol use drinking 1 L whiskey + ~12 cans of beer over the last week. Also with baseline nitrous oxide use. Reports with prior reduction in alcohol use, had increased nitrous oxide use. Has been using nitrous oxide daily X5 years.   -B12 defic 2/2 to chronic nitrous oxide use, possible poor po intake 2/2 chronic etoh use. no other culprit  meds/substances: not on PPI or metformin. No hx of abd surgery or malabsorption hx- mild normocytic anemia  -started on B12 1000mcg every other day IM  -checked methylmelonic acid and homocystein level for completeness though has already received 2 dose of B12  -rbc folate nl.   - copper level pending  -exam; nl strength through out, decreased sensation and subjective numbness bilateral feet to distal thigh, bilateral hands to ~mid forearm.     Plan;   -follow up copper level  - Continue B12 1000mcg injection but increase to every day for first week/total of 7 doses. Then B12 1000mcg IM every week for 4 weeks. Can consider extending to 3 months if neurologic improvement. Then start maintenance dose B12 1000mcg IM every month or 1000mcg SL daily. Follow up B12, cbc with platelet count in 2 weeks then 4 weeks. Ensure normalization of B12 level. Cbc every 6 months at least and monitor B12 level as well outpatient. Follow up with PCP.   If off nitrous oxide permanently then may not need long term B12 supplementation as this may have been inciting medication/substance  - Oral folate supplement ordered.   - General Neurology consulted. Will defer decision to order MMA, homocysteine, and autoantibodies to intrinsic factor to Neurology for complete B12 deficiency work-up.   - PT and OT consulted, appreciate input   -  for discharge planning   - Regular diet   -checked methylmelonic acid and homocystein level for completeness though has already received 2 dose of B12   -needs ARU.   - follow up [facility]  - follow B12 levels outpatient  -stop all nitrous oxide- discussed care plan in detail with patient     Alcohol use disorder: Drank 1 L whiskey and 12 cans of beer over the past week. Drank 6 beers last on 12/15/21.  He has a history of alcohol withdrawal, but no withdrawal seizure. Hx of CD treatment, most recently at [facility] in May 2021.   - CIWA protocol in place, PRN Valium in place   - CD  consulted, appreciate input. Not interested in inpatient CD treatment at this time.   - Clonidine 0.1 mg q8 hrs ordered on admission   - Folic acid, thiamine, MV daily      Hx of RLE DVT and submassive PE (10/2021): Diagnosed 10/31/21. Thought to be provoked by an airplaine flight  - Continue Eliquis   - Vascular Medicine referral per Cardiology at last appointment 12/2   -start compression stocking knee high during day. Mild swelling LLE.      Biventricular heart failure, HFrEF after subacute presentation of bilateral submassive pulmonary emboli. NYHA II, improving. EF was mildly decreased 45-50% initially, improved to low normal 50% on recent echo. Possibly some component of alcohol use. Last seen by Cardiology 12/2/21.   - Cardiology follow-up in 6 months.      Sinus tachycardia: Continue PTA Toprol XL.      Morbid obesity: BMI 39. Increased risk for all cause mortality.   - Diet and lifestyle modification recommended        Marijuana use: Noted.   - Pt uninterested in cessation     Dependent edema: R>L. Compression stockings   Diet: Combination Diet Regular Diet Adult    DVT Prophylaxis: DOAC  Mariano Catheter: Not present  Central Lines: None  Code Status: Full Code       Disposition Plan   Expected Discharge: 12/19/2021     Anticipated discharge location: patient ready for discharge to TCU/ARU.   [facility] of [facility] follow up 4 weeks.          Rik Carr MD  Text Page  (7am to 6pm)  Interval History   No new issues.   No cp or sob  No limb weakness  Was using nitrous oxide more frequently than usual lately.   Working with PT      -Data reviewed today: I reviewed all new labs and imaging results over the last 24 hours. I personally reviewed labs and imaging since admission.   Physical Exam   Temp: 97.4  F (36.3  C) Temp src: Oral BP: 119/75 Pulse: 78   Resp: 16 SpO2: 97 % O2 Device: None (Room air)    Vitals:    12/17/21 0529   Weight: (!) 153.4 kg (338 lb 1.6 oz)     Vital Signs with Ranges  Temp:   [97.4  F (36.3  C)-97.8  F (36.6  C)] 97.4  F (36.3  C)  Pulse:  [67-95] 78  Resp:  [16-18] 16  BP: (105-119)/(59-76) 119/75  SpO2:  [93 %-97 %] 97 %  I/O last 3 completed shifts:  In: 120 [P.O.:120]  Out: 2075 [Urine:2075]    Constitutional: Nad, up in bed  Respiratory: CTAB  Cardiovascular: RRR no r/g/m  GI: soft, nt, nd  Skin/Integumen: no rash  RLE 1+ edema, pretibial  Neuro: dtr patellar 2+ Bilateral, strength 5/5 extrem X 4.   Decreased sensation light touch bilateral feet to distal thigh level, bilateral hands to ~mid forearm bilaterally  Nl speech and mentation  Psych: pleasant, cooperative, flat affect.       Medications     - MEDICATION INSTRUCTIONS -         apixaban ANTICOAGULANT  5 mg Oral BID     cloNIDine  0.1 mg Oral Q8H     cyanocobalamin  1,000 mcg Intramuscular Every Other Day     folic acid  1 mg Oral Daily     metoprolol succinate ER  25 mg Oral Daily     multivitamin w/minerals  1 tablet Oral Daily     sodium chloride (PF)  3 mL Intracatheter Q8H     thiamine  100 mg Oral Daily       Data   Recent Labs   Lab 12/18/21  0631 12/17/21  1156 12/17/21  0928 12/17/21  0132 12/16/21  1019   WBC 5.5  --  4.9  --  7.1   HGB 12.9*  --  12.7*  --  12.3*   MCV 96  --  97  --  95     --  251  --  243   INR  --   --  1.17*  --   --      --  139  --  137   POTASSIUM 3.8  --  3.9  --  4.0   CHLORIDE 108  --  109  --  109   CO2 25  --  24  --  24   BUN 12  --  10  --  15   CR 0.92  --  0.86  --  0.94   ANIONGAP 5  --  6  --  4   MARILYN 9.3  --  9.0  --  9.2   GLC 97 87 97   < > 96   ALBUMIN  --   --   --   --  3.9   PROTTOTAL  --   --   --   --  7.0   BILITOTAL  --   --   --   --  0.6   ALKPHOS  --   --   --   --  51   ALT  --   --   --   --  22   AST  --   --   --   --  12   LIPASE  --   --   --   --  163    < > = values in this interval not displayed.       Imaging:   No results found for this or any previous visit (from the past 24 hour(s)).

## 2021-12-18 NOTE — PLAN OF CARE
Reason for Admission: Increased numbness and tingling in extremities    Cognitive/Mentation: A/Ox 4  Neuros/CMS: Intact ex numbness and tingling in BUE and BLE, RUE and RLE weaker than left side.  VS: stable.  GI: BS audible, + flatus, last BM 12/14. Continent.  : Urinal at bedside. Bladder scan post-void. Continent.  Pulmonary: LS clear.  Pain: slight headache.     Drains: none  Skin: intact ex +3/4 edema to bilateral ankle and foot  Activity: Assist x 2 with lift.  Diet: regular with thin liquids. Takes pills whole with water.     Therapies recs: TBD  Discharge: pending    End of shift summary: Pt scoring 0-2 on CIWA assessments. Obtain one more bladder scan <100 before discontinuing.

## 2021-12-18 NOTE — CONSULTS
"Care Management Initial Consult    General Information  Assessment completed with:  ,         Primary Care Provider verified and updated as needed: Yes (Dr. López at [facility] phone [phone])   Readmission within the last 30 days:        Reason for Consult: discharge planning  Advance Care Planning:    no ACP document on file in [facility]        Communication Assessment  Patient's communication style: spoken language (English or Bilingual)             Cognitive  Cognitive/Neuro/Behavioral: WDL  Level of Consciousness: alert     Orientation: oriented x 4  Mood/Behavior: calm,cooperative,anxious  Best Language: 0 - No aphasia  Speech: clear,spontaneous,logical    Living Environment:   People in home: alone     Current living Arrangements: apartment      Able to return to prior arrangements:         Family/Social Support:  Care provided by:    Provides care for: self                 Description of Support System:  Pt's parents live in [place] with a split entry hoe. Pt states that the stairs are not lengthy like at his apartment.  Pt states that his parents would have him stay at their home post rehab.  His sister, Minerva lives within 10 minutes away from parents home and assists him with \"medical things\".    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       Financial Concerns:   [facility]       Lifestyle & Psychosocial Needs:  Social Determinants of Health     Tobacco Use: Medium Risk     Smoking Tobacco Use: Former Smoker     Smokeless Tobacco Use: Never Used   Alcohol Use: Not on file   Financial Resource Strain: Not on file "   Food Insecurity: Not on file   Transportation Needs: Not on file   Physical Activity: Not on file   Stress: Not on file   Social Connections: Not on file   Intimate Partner Violence: Not on file   Depression: Not on file   Housing Stability: Not on file       Functional Status:  Prior to admission patient needed assistance:  Pt was independent prior to admission.     Mental Health Status:          Chemical Dependency Status:          ETOH and nitrous oxide abuse    Values/Beliefs:  Spiritual, Cultural Beliefs, [employer] Practices, Values that affect care:                 Additional Information:  Pt has discussed recommendation with his therapists and is agreeable to [facility]. He believes this is the best option. Referral made to ARU.      MONA Diaz  [facility]  Care Transitions  [phone]

## 2021-12-18 NOTE — PROGRESS NOTES
Reason for Admission: B12 deficiency, paresthesias, subacute combined degeneration of spinal cord.     Cognitive/Mentation: A/Ox 4  Neuros/CMS: Numbness and Tingling in all extremities. Decreased sensation in lower extremities to thigh and right forearm. Is able to sense movement on noted extremities.   VS: Stable RA. Tele: none.    GI: BS Audible, + flatus, last BM 12/14  Continent.  : Urinal at bedside, voiding. Continent.  Pulmonary: LS Clear.  Pain: mild headache.     Drains: None  Skin: intact, ex slight bruising at inner right forearm, 3/4 edema at bilateral ankle and feet.   Activity: Assist x 2 with trang steady .  Diet: Regular with thin liquids. Takes pills whole.     Therapies recs: PT/OT and TCU/[place] (ARU)  Discharge: Expected discontinue 12/19/2021    End of shift summary: Pt Hospitalist recommending discharge to TCU/ARU on 12/19/2021. Knee High Compression socks ordered, awaiting arrival.

## 2021-12-18 NOTE — PLAN OF CARE
Pt here with paresthesis and generalized weakness causing a fall at home. A&O. Numbness/tingling in bilateral UE and LE. LUE sensation < RUE. Right UE and LE weaker than left side. Headache intermittent. RUE with some spasticity/involuntary twisting. BLE edematous, R>L (Hx of DVT in RLE). VSS. Regular diet, thin liquids. Takes pills whole. Up with A2/lift to chair. Pt sore, denying need for pain medications. No BM. Voiding adequately, continue bladder scans. Pt scoring yellow on the Aggression Stop Light Tool. Plan for possible LP pending general neuro. Discharge pending.  No home meds  patient belongings remain in room

## 2021-12-19 ENCOUNTER — APPOINTMENT (OUTPATIENT)
Dept: OCCUPATIONAL THERAPY | Facility: CLINIC | Age: 26
End: 2021-12-19
Payer: COMMERCIAL

## 2021-12-19 ENCOUNTER — APPOINTMENT (OUTPATIENT)
Dept: PHYSICAL THERAPY | Facility: CLINIC | Age: 26
End: 2021-12-19
Payer: COMMERCIAL

## 2021-12-19 PROCEDURE — 97110 THERAPEUTIC EXERCISES: CPT | Mod: GO

## 2021-12-19 PROCEDURE — 120N000001 HC R&B MED SURG/OB

## 2021-12-19 PROCEDURE — 250N000011 HC RX IP 250 OP 636: Performed by: INTERNAL MEDICINE

## 2021-12-19 PROCEDURE — 250N000013 HC RX MED GY IP 250 OP 250 PS 637: Performed by: INTERNAL MEDICINE

## 2021-12-19 PROCEDURE — 97530 THERAPEUTIC ACTIVITIES: CPT | Mod: GP | Performed by: PHYSICAL THERAPIST

## 2021-12-19 PROCEDURE — 99232 SBSQ HOSP IP/OBS MODERATE 35: CPT | Performed by: INTERNAL MEDICINE

## 2021-12-19 PROCEDURE — 97530 THERAPEUTIC ACTIVITIES: CPT | Mod: GO

## 2021-12-19 PROCEDURE — 97535 SELF CARE MNGMENT TRAINING: CPT | Mod: GO

## 2021-12-19 PROCEDURE — 250N000013 HC RX MED GY IP 250 OP 250 PS 637: Performed by: HOSPITALIST

## 2021-12-19 RX ORDER — MAGNESIUM CARB/ALUMINUM HYDROX 105-160MG
148 TABLET,CHEWABLE ORAL ONCE
Status: DISCONTINUED | OUTPATIENT
Start: 2021-12-19 | End: 2021-12-19

## 2021-12-19 RX ORDER — BISACODYL 10 MG
10 SUPPOSITORY, RECTAL RECTAL DAILY PRN
Status: DISCONTINUED | OUTPATIENT
Start: 2021-12-19 | End: 2021-12-21 | Stop reason: HOSPADM

## 2021-12-19 RX ORDER — POLYETHYLENE GLYCOL 3350 17 G/17G
17 POWDER, FOR SOLUTION ORAL DAILY
Status: DISCONTINUED | OUTPATIENT
Start: 2021-12-19 | End: 2021-12-21 | Stop reason: HOSPADM

## 2021-12-19 RX ADMIN — MULTIPLE VITAMINS W/ MINERALS TAB 1 TABLET: TAB at 08:27

## 2021-12-19 RX ADMIN — SENNOSIDES AND DOCUSATE SODIUM 2 TABLET: 50; 8.6 TABLET ORAL at 08:25

## 2021-12-19 RX ADMIN — MAGNESIUM HYDROXIDE 30 ML: 400 SUSPENSION ORAL at 14:44

## 2021-12-19 RX ADMIN — APIXABAN 5 MG: 5 TABLET, FILM COATED ORAL at 21:24

## 2021-12-19 RX ADMIN — CYANOCOBALAMIN 1000 MCG: 1000 INJECTION, SOLUTION INTRAMUSCULAR at 08:27

## 2021-12-19 RX ADMIN — POLYETHYLENE GLYCOL 3350 17 G: 17 POWDER, FOR SOLUTION ORAL at 14:44

## 2021-12-19 RX ADMIN — THIAMINE HCL TAB 100 MG 100 MG: 100 TAB at 08:26

## 2021-12-19 RX ADMIN — METOPROLOL SUCCINATE 25 MG: 25 TABLET, EXTENDED RELEASE ORAL at 08:26

## 2021-12-19 RX ADMIN — APIXABAN 5 MG: 5 TABLET, FILM COATED ORAL at 08:26

## 2021-12-19 RX ADMIN — FOLIC ACID 1 MG: 1 TABLET ORAL at 08:26

## 2021-12-19 ASSESSMENT — ACTIVITIES OF DAILY LIVING (ADL)
ADLS_ACUITY_SCORE: 17

## 2021-12-19 NOTE — PLAN OF CARE
Pt here with increased numbness and tingling--B12 deficiency likely. Known DVT in RLE. A&O. Neuros stable. Numbness and tingling to all extremities. Decreased sensation apparent in BLE > BUE. VSS. Regular diet, thin liquids. Takes pills whole. Up with A2/SS. Denies pain. Voiding in urinal. Continue bladder scans post void. No BM this shift. Pt scoring green on the Aggression Stop Light Tool. Plan for ARU. Discharge pending placement.  No home meds  Patient belongings remain in room

## 2021-12-19 NOTE — PLAN OF CARE
Reason for Admission: Increased numbness and tingling in extremities, B-12 deficiency      Cognitive/Mentation: A/Ox 4  Neuros/CMS: Intact ex numbness and tingling in BUE and BLE, RUE and RLE weaker than left side.  VS: stable.  GI: BS audible, + flatus, last BM 12/14. Senna given x1.   : Urinal at bedside. Bladder scan post-void. Continent.  Pulmonary: LS clear.  Pain: denies.      Drains: none  Skin: intact ex +3/4 edema to bilateral ankle and foot.   Activity: Assist x 2 with trang steady.  Diet: regular with thin liquids. Takes pills whole with water.      Therapies recs: ARU  Discharge: pending     End of shift summary: Pt scoring 0 on CIWA assessments. Pt to wear compression stalkings during the day.

## 2021-12-19 NOTE — PROGRESS NOTES
[facility]    Hospitalist Progress Note    Assessment & Plan   Date of Admission:  12/16/2021     Assessment & Plan         Sebastián Nogueira is a 26 year old male with a past medical history of alcohol use disorder, marijuana use, recent history of RLE DVT and submassive PE, and biventricular heart failure who presented to the hospital with progressive upper and lower extremity paresthesias and weakness. Admitted under inpatient status for further evaluation and treatment.      Progressive upper and lower extremity paresthesias and weakness  Subacute combined degeneration   B12 deficiency with subacute combined degeneration  Hx of nitrous oxide use and alcohol use disorder: Reports 2+ weeks ago, noticed paresthesias and slight weakness/discoordination in fingers just above wrists, then 1.5 weeks ago, noticed paresthesias developing in lower extremities (R>LLE), progressing up to thighs and now with reports some numbness in abdomen. Urinating without difficulty. No change in bowels. No bulbar symptoms. No recent URI sx or GI illness. No recent vaccination.   * CMP WNL. CBC with baseline anemia. CRP WNL. TSH WNL. Trop negative. Lipase. Lactic negative. Folate WNL. B12 116. UA unremarkable. Ethanol negative. EKG with NSR, sinus arrhythmia. MR brain negative. MRI cervical with abnormal T2 signal is seen within the dorsal spinal cord extending from at least the C1-C2 level down through C5-C6. The signal abnormality in the spinal cord appears to be within inverted V-shaped on the axial sequences consistent with B12 deficiency.   * With recent relapse in alcohol use drinking 1 L whiskey + ~12 cans of beer over the last week. Also with baseline nitrous oxide use. Reports with prior reduction in alcohol use, had increased nitrous oxide use. Has been using nitrous oxide daily X5 years.   -B12 defic 2/2 to chronic nitrous oxide use, possible poor po intake 2/2 chronic etoh use. no other culprit  meds/substances: not on PPI or metformin. No hx of abd surgery or malabsorption hx- mild normocytic anemia  -started on B12 1000mcg every other day IM  -checked methylmelonic acid and homocystein level for completeness though has already received 2 dose of B12  -rbc folate nl.   - copper level pending  -exam; nl strength through out, decreased sensation and subjective numbness bilateral feet to distal thigh, bilateral hands to ~mid forearm.     Plan;   -follow up copper level, homocysteine, methylmelonic acid (these are being checked after he had already had dose of B12\)  - Continue B12 1000mcg injection but increase to every day for first week/total of 7 doses (3/7 for first week) Then B12 1000mcg IM every week for 4 weeks. Can consider extending to 3 months if neurologic improvement. Then start maintenance dose B12 1000mcg IM every month or 1000mcg SL daily. Follow up B12, cbc with platelet count in 2 weeks then 4 weeks. Ensure normalization of B12 level. Cbc every 6 months at least and monitor B12 level as well outpatient. Follow up with PCP.   If off nitrous oxide permanently then may not need long term B12 supplementation as this may have been inciting medication/substance  - Oral folate supplement ordered.   - General Neurology consulted. Will defer decision to order MMA, homocysteine, and autoantibodies to intrinsic factor to Neurology for complete B12 deficiency work-up.   - PT and OT consulted, appreciate input   -  for discharge planning   - Regular diet   -checked methylmelonic acid and homocystein level for completeness though has already received 2 dose of B12   -needs ARU.   - follow up [facility]  - follow B12 levels outpatient  -stop all nitrous oxide- discussed care plan in detail with patient     Alcohol use disorder: Drank 1 L whiskey and 12 cans of beer over the past week. Drank 6 beers last on 12/15/21.  He has a history of alcohol withdrawal, but no withdrawal seizure. Hx of CD  treatment, most recently at [facility] in May 2021.   - CIWA protocol in place, PRN Valium in place   - CD consulted, appreciate input. Not interested in inpatient CD treatment at this time.   - stop clonidine  - Folic acid, thiamine, MV daily      Hx of RLE DVT and submassive PE (10/2021): Diagnosed 10/31/21. Thought to be provoked by an airplaine flight  - Continue Eliquis   - Vascular Medicine referral per Cardiology at last appointment 12/2   -start compression stocking knee high during day. Mild swelling LLE.      Biventricular heart failure, HFrEF after subacute presentation of bilateral submassive pulmonary emboli. NYHA II, improving. EF was mildly decreased 45-50% initially, improved to low normal 50% on recent echo. Possibly some component of alcohol use. Last seen by Cardiology 12/2/21.   - Cardiology follow-up in 6 months.      Sinus tachycardia: Continue PTA Toprol XL.      Morbid obesity: BMI 39. Increased risk for all cause mortality.   - Diet and lifestyle modification recommended      Constipation  Benign abd exam. No obstructive sxs.   Given dose MOM and start daily miralax  Has had prn senna    Marijuana use: Noted.   - Pt uninterested in cessation     Dependent edema: R>L. Compression stockings started  Diet: Combination Diet Regular Diet Adult    DVT Prophylaxis: DOAC  Mariano Catheter: Not present  Central Lines: None  Code Status: Full Code       Disposition Plan   Expected Discharge: 12/19/2021     Anticipated discharge location: patient ready for discharge to TCU/ARU.   [facility] follow up 4 weeks.          Rik Carr MD  Text Page  (7am to 6pm)  Interval History   Feeling constipated.   No bm for one week. No n/v/d/abd pain   Paresthesias same today        -Data reviewed today: I reviewed all new labs and imaging results over the last 24 hours. I personally reviewed labs and imaging since admission.   Physical Exam   Temp: 97.5  F (36.4  C) Temp src: Oral BP: 120/76  Pulse: 60   Resp: 18 SpO2: 100 % O2 Device: None (Room air)    Vitals:    12/17/21 0529   Weight: (!) 153.4 kg (338 lb 1.6 oz)     Vital Signs with Ranges  Temp:  [97.5  F (36.4  C)-97.8  F (36.6  C)] 97.5  F (36.4  C)  Pulse:  [52-66] 60  Resp:  [16-18] 18  BP: (113-128)/(65-77) 120/76  SpO2:  [96 %-100 %] 100 %  I/O last 3 completed shifts:  In: 240 [P.O.:240]  Out: 1825 [Urine:1825]    Constitutional: Nad, up in chair  Respiratory: CTAB  Cardiovascular: RRR no r/g/m  GI: soft, nt, nd  Skin/Integumen: no rash  RLE 1+ edema, pretibial  Neuro: dtr patellar 2+ Bilateral, strength 5/5 extrem X 4.   Nl speech and mentation  Psych: pleasant, cooperative, flat affect.       Medications     - MEDICATION INSTRUCTIONS -         apixaban ANTICOAGULANT  5 mg Oral BID     cyanocobalamin  1,000 mcg Intramuscular Daily     folic acid  1 mg Oral Daily     magnesium hydroxide  30 mL Oral Once     metoprolol succinate ER  25 mg Oral Daily     multivitamin w/minerals  1 tablet Oral Daily     polyethylene glycol  17 g Oral Daily     sodium chloride (PF)  3 mL Intracatheter Q8H     thiamine  100 mg Oral Daily       Data   Recent Labs   Lab 12/18/21  0631 12/17/21  1156 12/17/21  0928 12/17/21  0132 12/16/21  1019   WBC 5.5  --  4.9  --  7.1   HGB 12.9*  --  12.7*  --  12.3*   MCV 96  --  97  --  95     --  251  --  243   INR  --   --  1.17*  --   --      --  139  --  137   POTASSIUM 3.8  --  3.9  --  4.0   CHLORIDE 108  --  109  --  109   CO2 25  --  24  --  24   BUN 12  --  10  --  15   CR 0.92  --  0.86  --  0.94   ANIONGAP 5  --  6  --  4   MARILYN 9.3  --  9.0  --  9.2   GLC 97 87 97   < > 96   ALBUMIN  --   --   --   --  3.9   PROTTOTAL  --   --   --   --  7.0   BILITOTAL  --   --   --   --  0.6   ALKPHOS  --   --   --   --  51   ALT  --   --   --   --  22   AST  --   --   --   --  12   LIPASE  --   --   --   --  163    < > = values in this interval not displayed.       Imaging:   No results found for this or any  previous visit (from the past 24 hour(s)).

## 2021-12-20 ENCOUNTER — APPOINTMENT (OUTPATIENT)
Dept: OCCUPATIONAL THERAPY | Facility: CLINIC | Age: 26
End: 2021-12-20
Payer: COMMERCIAL

## 2021-12-20 ENCOUNTER — APPOINTMENT (OUTPATIENT)
Dept: PHYSICAL THERAPY | Facility: CLINIC | Age: 26
End: 2021-12-20
Payer: COMMERCIAL

## 2021-12-20 PROBLEM — T41.0X1S: Status: ACTIVE | Noted: 2021-12-20

## 2021-12-20 PROBLEM — E53.8 VITAMIN B12 DEFICIENCY (NON ANEMIC): Status: ACTIVE | Noted: 2021-12-20

## 2021-12-20 LAB — VIT B1 SERPL-SCNC: 12 NMOL/L

## 2021-12-20 PROCEDURE — 97530 THERAPEUTIC ACTIVITIES: CPT | Mod: GP | Performed by: PHYSICAL THERAPIST

## 2021-12-20 PROCEDURE — 97110 THERAPEUTIC EXERCISES: CPT | Mod: GO

## 2021-12-20 PROCEDURE — 97535 SELF CARE MNGMENT TRAINING: CPT | Mod: GO

## 2021-12-20 PROCEDURE — 250N000011 HC RX IP 250 OP 636: Performed by: INTERNAL MEDICINE

## 2021-12-20 PROCEDURE — 97110 THERAPEUTIC EXERCISES: CPT | Mod: GP | Performed by: PHYSICAL THERAPIST

## 2021-12-20 PROCEDURE — 97530 THERAPEUTIC ACTIVITIES: CPT | Mod: GO

## 2021-12-20 PROCEDURE — 99232 SBSQ HOSP IP/OBS MODERATE 35: CPT | Performed by: INTERNAL MEDICINE

## 2021-12-20 PROCEDURE — 250N000013 HC RX MED GY IP 250 OP 250 PS 637: Performed by: HOSPITALIST

## 2021-12-20 PROCEDURE — 120N000001 HC R&B MED SURG/OB

## 2021-12-20 RX ORDER — BISACODYL 10 MG
10 SUPPOSITORY, RECTAL RECTAL DAILY PRN
Status: ON HOLD | DISCHARGE
Start: 2021-12-20 | End: 2021-12-30

## 2021-12-20 RX ORDER — CYANOCOBALAMIN 1000 UG/ML
1000 INJECTION, SOLUTION INTRAMUSCULAR; SUBCUTANEOUS DAILY
Qty: 2 ML | Refills: 0 | Status: ON HOLD | DISCHARGE
Start: 2021-12-22 | End: 2021-12-30

## 2021-12-20 RX ORDER — CYANOCOBALAMIN 1000 UG/ML
1 INJECTION, SOLUTION INTRAMUSCULAR; SUBCUTANEOUS
Status: ON HOLD | DISCHARGE
Start: 2021-12-24 | End: 2021-12-30

## 2021-12-20 RX ORDER — POLYETHYLENE GLYCOL 3350 17 G/17G
17 POWDER, FOR SOLUTION ORAL DAILY
Status: ON HOLD | DISCHARGE
Start: 2021-12-21 | End: 2021-12-30

## 2021-12-20 RX ADMIN — APIXABAN 5 MG: 5 TABLET, FILM COATED ORAL at 08:27

## 2021-12-20 RX ADMIN — MULTIPLE VITAMINS W/ MINERALS TAB 1 TABLET: TAB at 08:26

## 2021-12-20 RX ADMIN — THIAMINE HCL TAB 100 MG 100 MG: 100 TAB at 08:26

## 2021-12-20 RX ADMIN — METOPROLOL SUCCINATE 25 MG: 25 TABLET, EXTENDED RELEASE ORAL at 08:26

## 2021-12-20 RX ADMIN — APIXABAN 5 MG: 5 TABLET, FILM COATED ORAL at 21:09

## 2021-12-20 RX ADMIN — FOLIC ACID 1 MG: 1 TABLET ORAL at 08:26

## 2021-12-20 RX ADMIN — CYANOCOBALAMIN 1000 MCG: 1000 INJECTION, SOLUTION INTRAMUSCULAR at 08:27

## 2021-12-20 ASSESSMENT — ACTIVITIES OF DAILY LIVING (ADL)
ADLS_ACUITY_SCORE: 15
ADLS_ACUITY_SCORE: 15
ADLS_ACUITY_SCORE: 17
ADLS_ACUITY_SCORE: 15
ADLS_ACUITY_SCORE: 17
ADLS_ACUITY_SCORE: 15
ADLS_ACUITY_SCORE: 17
ADLS_ACUITY_SCORE: 15
ADLS_ACUITY_SCORE: 15
ADLS_ACUITY_SCORE: 17

## 2021-12-20 NOTE — INTERIM SUMMARY
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 2+ weeks ago, pt noticed paresthesias and slight weakness/discoordination in fingers just above wrists, then 1.5 weeks ago, noticed paresthesias developing in lower extremities (R>LLE), progressing up to thighs and now with reports some numbness in abdomen. Urinating without difficulty. No change in bowels. No bulbar symptoms. No recent URI sx or GI illness. No recent vaccination. MR brain negative. MRI cervical with abnormal T2 signal is seen within the dorsal spinal cord extending from at least the C1-C2 level down through C5-C6. The signal abnormality in the spinal cord appears to be within inverted V-shaped on the axial sequences consistent with B12 deficiency. Recent relapse in alcohol use drinking 1 L whiskey + ~12 cans of beer over the last week. Also with baseline nitrous oxide use. Reports with prior reduction in alcohol use, had increased nitrous oxide use. Has been using nitrous oxide daily x5 years. CD was consulted and called pt and pt reports he is not interested in treatment. Patient has been started on high dose B12 injections and is currently stable to transfer to inpatient acute rehab.   Patient requires an intensive inpatient rehab program to address the following acute impairments:impaired ROM, significantly impaired strength,  impaired activity tolerance, impaired balance, impaired coordination and impaired weight shifting.     Current Active Medical Management Needs/Risks for Clinical Complications  The patient requires the high level of rehabilitation physician supervision that accompanies the provision of intensive rehabilitation therapy. The patient needs the services of the rehabilitation physician to assess the patient medically and functionally and to modify the course of treatment as needed to maximize the patient's capacity to benefit from the rehabilitation process. Patient requires ongoing medical management and assessment of the following:     Body Mass Index 39.07: obesity complicates cares and places patient at high risk for all cause mortality; will benefit from diet and lifestyle modification education    Biventricular heart failure, HFrEF after subacute presentation of bilateral submassive pulmonary emboli. NYHA II, improving. EF was mildly decreased 45-50% initially, improved to low normal 50% on recent echo. Possibly some component of alcohol use.     RLE DVT and submassive PE (10/2021): Diagnosed 10/31/21. Thought to be provoked by an airplaine flight; plan to continue Eliquis, start compression stocking knee high during day. Mild swelling LLE. Will need ongoing management of anticoagulation.     Alcohol use disorder: Drank 1 L whiskey and 12 cans of beer over prior week. Drank 6 beers last on 12/15/21. He has a history of alcohol withdrawal, but no withdrawal seizure. Hx of CD treatment, most recently at [facility] in May 2021. Folic acid, thiamine, MV daily. Will benefit from ongoing education on the importance of ETOH cessation.     B12 deficiency: Continue B12 1000mcg injection but increase to every day for first week/total of 7 doses (3/7 for first week). Then B12 1000mcg IM every week for 4 weeks. Can consider extending to 3 months if neurologic improvement. Then start maintenance dose B12 1000mcg IM every  month or 1000mcg SL daily. Follow up B12, cbc with platelet count in 2 weeks then 4 weeks. Ensure normalization of B12 level. Cbc every 6 months at least and monitor B12 level as well outpatient.     Progressive upper and lower extremity paresthesias and weakness due to subacute combined degeneration of spinal cord: will need ongoing assessment for changes in neuro status with intervention as indicated    Patient is at risk for falls with injury and mood/sleep disturbance.     Past Medical/Surgical History   Surgery in the past 100 days: No   Additional relevant past medical history: alcohol use disorder, marijuana use, recent history of RLE DVT and submassive PE, and biventricular heart failure     Level of Functioning Prior to Admission:    LIVING ENVIRONMENT   People in home: alone   Current Living Arrangements: apartment   Home Accessibility: stairs to enter home    Number of Stairs, Main Entrance: other (see comments) (about 36 steps to access apt)    Stair Railings, Main Entrance: railings safe and in good condition   Transportation Anticipated: car, drives self   Living Environment Comments: No elevator access in aprt; On 3rd floor    SELF-CARE   Usual Activity Tolerance: excellent   Equipment Currently Used at Home: other (see comments),cane, straight,crutches (recent use of cane and crutches per chart)   Activity/Exercise/Self-Care Comment: Pt reports indep with ADLs and functional mobility. Works on an ice cream truck typically. Over the past few weeks pt noticed progressive weakness/tingling in B UE/LE. Was using a cane and crutches to get around the past few weeks    DISABILITY/FUNCTION   Concentrating, Remembering or Making Decisions Difficulty: no   Fall history within last six months: yes; Number of times patient has fallen within last six months: 2   Change in Functional Status Since Onset of Current Illness/Injury: yes  Additional Comments: Patient is able to discharge from rehab to his parents  split level home.     Level of Function: GG Scale (Section GG Functional Ability and Goals; CMS's REYES Version 3.0 Manual effective 10.1.2019):  PT Current Function Goals for Rehab   Bed Rolling 4 Supervision or touching assitance 6 Independent   Supine to Sit 4 Supervision or touching assitance 6 Independent   Sit to Stand 1 Dependent 6 Independent   Transfer 1 Dependent 6 Independent   Ambulation 1 Dependent 6 Independent   Stairs Not completed 3 Partial/moderate assistance     OT Current Function Goals for Rehab   Feeding 5 Setup or clean-up assistance 6 Independent   Grooming 3 Partial/moderate assistance 6 Independent   Bathing Not completed 6 Independent   Upper Body Dressing 3 Partial/moderate assistance 6 Independent   Lower Body Dressing 1 Dependent 6 Independent   Toileting 4 Supervision or touching assitance (uses urinal at bedside) 6 Independent   Toilet Transfer 1 Dependent 6 Independent   Tub/Shower Transfer Not completed 4 Supervision or touching assitance   Cognition Not Assessed Supervision     SLP Current Function Goals for Rehab   Swallow Not Impaired Not applicable   Communication Not Impaired Not applicable       Current Diet:  0-Thin and 7-Regular/easy to chew    Summary Statement:  Patient is currently participating in daily PT and OT and is making progress. Pt is able to perform sit <> stand from chair with Max A x 2 from low recliner chair, took 2 attempts, cues for forward weight shift and head forward. Patient standing with Min A x 2 with liko pants in place. Performed standing marching x 10 reps with cues for upright posture and gaze. Pt able to take 1 step forward, 1 step backward with Karen Walker, lock in place, 3 reps performed. Pt set up w/ g/h tasks seated EOB, impaired BUE coordination, difficulty manipulating toothpaste container, education provided on visual compensation for sensory deficits. Increased difficulty when distracted in conversation. Demonstrates uncoordinated mvmts with  B hands during facewashing and tooth brushing tasks. Attempted to don socks sitting EOB, unable to reach and poor FM coordination. OT will screen for cognitive deficits and involve SLP as indicated.     Expected Therapies and Services Required During Inpatient Rehab Admission  Intensity of Therapy: Patient requires intensive therapies not available in a lesser level of care. Patient is motivated, making gains, and can tolerate 3 hours of therapy a day.  Physical Therapy: 90 minutes per day, 7 days a week for 21 days  Occupational Therapy: 90 minutes per day, 7 days a week for 21 days  Speech and Language Therapy: Not indicated at this time.   Rehabilitation Nursing Needs: Patient requires 24 hour Rehab Nursing to manage bowel program, vitals, medication education, positioning, carryover of new rehab techniques, care coordination, skin integrity, assess neurologic status, provide safe environment for patient at falls risk and monitor nutritional intake.    Precautions/restrictions/special needs:   Precautions: fall precautions   Restrictions: none   Special Needs: lift    Expected Level of Improvement: mod I with mobility, transfers, and ADLs; anticipate pt will need assist for IADLs  Expected Length of time to achieve: 21 days    Anticipated Discharge Needs:  Anticipated Discharge Destination: Other home - parent's house  Anticipated Discharge Support: Family member  24/7 support available : Unknown  Identified caregiver(s):  Parents  Anticipated Discharge Needs: Home with homecare    Identified challenges/barriers:  ETOH abuse; lives alone    Liaison signature/date/time:       Physician statement of review and agreement:  I have reviewed and am in agreement of the need for IRF stay to address above functional and medical needs. In addition to above statements address, Patient requires intensive active and ongoing therapeutic intervention and multiple therapies; Patient requires medical supervision; Expected to  actively participate in the intensive rehab program; Sufficiently stable to actively participate; Expectation for measurable improvement in functional capacity or adaption to impairments.    MD signature/date/time:

## 2021-12-20 NOTE — PLAN OF CARE
Reason for Admission: B12 deficiency, numbness and tingling      Cognitive/Mentation: A/Ox 4  Neuros/CMS: Intact. On CIWA monitoring. It was normal.   VS: stable. Tele: not on tele.  GI: BS active x4, passing flatus, last BM 12/20/21. Continent.  : voiding without difficulty, uses urinal at bedside. Continent.  Pulmonary: LS clear  Pain: no pain.     Drains: PIV saline locked  Skin: intact, +2 edema on bilateral lower extremity   Activity: Assist x 2 with sera steady and GB.  Diet: regular with thin liquids. Takes pills whole.     Discharge: ARU/TCU pending.

## 2021-12-20 NOTE — PROGRESS NOTES
[facility]    Hospitalist Progress Note    Assessment & Plan   Date of Admission:  12/16/2021     Assessment & Plan         Sebastián Nogueira is a 26 year old male with a past medical history of alcohol use disorder, marijuana use, recent history of RLE DVT and submassive PE, and biventricular heart failure who presented to the hospital with progressive upper and lower extremity paresthesias and weakness. Admitted under inpatient status for further evaluation and treatment.      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 Urinating without difficulty. No change in bowels. No bulbar symptoms. No recent URI sx or GI illness. No recent vaccination.   * CMP WNL. CBC with baseline anemia. CRP WNL. TSH WNL. Trop negative. Lipase. Lactic negative. Folate WNL. B12 116. UA unremarkable. Ethanol negative. EKG with NSR, sinus arrhythmia. MR brain negative. MRI cervical with abnormal T2 signal is seen within the dorsal spinal cord extending from at least the C1-C2 level down through C5-C6. The signal abnormality in the spinal cord appears to be within inverted V-shaped on the axial sequences consistent with B12 deficiency.   * With recent relapse in alcohol use drinking 1 L whiskey + ~12 cans of beer over the last week. Also with baseline nitrous oxide use. Reports with prior reduction in alcohol use, had increased nitrous oxide use. Has been using nitrous oxide daily X5 years.   -B12 defic 2/2 to chronic nitrous oxide use, possible poor po intake 2/2  chronic etoh use. no other culprit meds/substances: not on PPI or metformin. No hx of abd surgery or malabsorption hx- mild normocytic anemia  -started on B12 1000mcg every other day IM  -checked methylmelonic acid and homocystein level for completeness though has already received 2 dose of B12  -rbc folate nl.   - copper level pending  -exam; nl strength through out, decreased sensation and subjective numbness bilateral feet to distal thigh, bilateral hands to ~mid forearm.     Plan;   -follow up copper level, homocysteine, methylmelonic acid (these are being checked after he had already had dose of B12\)  - Continue B12 1000mcg injection but increase to every day for first week/total of 7 doses (3/7 for first week) Then B12 1000mcg IM every week for 4 weeks (have started this course then future course per ARU and outpatient providere). Can consider extending to 3 months if neurologic improvement. Then start maintenance dose B12 1000mcg IM every month or 1000mcg SL daily. Follow up B12, cbc with platelet count in 2 weeks then 4 weeks. Ensure normalization of B12 level. Cbc every 6 months at least and monitor B12 level as well outpatient. Follow up with PCP.   If off nitrous oxide permanently then may not need long term B12 supplementation as this may have been inciting medication/substance  - Oral folate supplement ordered.   - General Neurology consulted. Will defer decision to order MMA, homocysteine, and autoantibodies to intrinsic factor to Neurology for complete B12 deficiency work-up.   - PT and OT consulted, appreciate input   -  for discharge planning   - Regular diet   -checked methylmelonic acid and homocystein level for completeness though has already received 2 dose of B12   -needs ARU.   - follow up [facility]  - follow B12 levels outpatient  -stop all nitrous oxide- discussed care plan in detail with patient     Alcohol use disorder: Drank 1 L whiskey and 12 cans of beer over the past  week. Drank 6 beers last on 12/15/21.  He has a history of alcohol withdrawal, but no withdrawal seizure. Hx of CD treatment, most recently at [facility] in May 2021.   - CIWA protocol in place, PRN Valium in place   - CD consulted, appreciate input. Not interested in inpatient CD treatment at this time.   - stop clonidine  - Folic acid, thiamine, MV daily      Hx of RLE DVT and submassive PE (10/2021): Diagnosed 10/31/21. Thought to be provoked by an airplaine flight  - Continue Eliquis   - Vascular Medicine referral per Cardiology at last appointment 12/2   -start compression stocking knee high during day. Mild swelling LLE.   -vascular medicine outpatient follow up      Biventricular heart failure, HFrEF after subacute presentation of bilateral submassive pulmonary emboli. NYHA II, improving. EF was mildly decreased 45-50% initially, improved to low normal 50% on recent echo. Possibly some component of alcohol use. Last seen by Cardiology 12/2/21.   - Cardiology follow-up in 6 months.      Sinus tachycardia: Continue PTA Toprol XL.      Morbid obesity: BMI 39. Increased risk for all cause mortality.   - Diet and lifestyle modification recommended      Constipation  Benign abd exam. No obstructive sxs.   Given dose MOM and start daily miralax  Has had prn senna  Had BM 12/19  Daily miralax, prn bowel meds.     Marijuana use: Noted.   - Pt uninterested in cessation     Dependent edema: R>L. Compression stockings started  Diet: Combination Diet Regular Diet Adult    DVT Prophylaxis: DOAC  Mariano Catheter: Not present  Central Lines: None  Code Status: Full Code       Disposition Plan   Expected Discharge: 12/19/2021     Anticipated discharge location: patient ready for discharge to TCU/ARU.   [facility] [place] [facility] 4 weeks.   Pt ready for discharge        Discussed care plan with pt and care coordinator today    Rik Carr MD  Text Page  (7am to 6pm)  Interval History   Had BM yesterday. No GI  sxs  Feels paresthesias in hands/arms may be a little bit better.       -Data reviewed today: I reviewed all new labs and imaging results over the last 24 hours. I personally reviewed labs and imaging since admission.   Physical Exam   Temp: 98  F (36.7  C) Temp src: Oral BP: 126/77 Pulse: 75   Resp: 17 SpO2: 97 % O2 Device: None (Room air)    Vitals:    12/17/21 0529   Weight: (!) 153.4 kg (338 lb 1.6 oz)     Vital Signs with Ranges  Temp:  [97.5  F (36.4  C)-98  F (36.7  C)] 98  F (36.7  C)  Pulse:  [60-91] 75  Resp:  [16-18] 17  BP: (120-130)/(72-77) 126/77  SpO2:  [96 %-100 %] 97 %  I/O last 3 completed shifts:  In: 400 [P.O.:400]  Out: 875 [Urine:875]    Constitutional: Nad, up in bed  Respiratory: CTAB  Cardiovascular: RRR no r/g/m  GI: soft, nt, nd  Skin/Integumen: no rash  RLE 1+ edema, pretibial  Neuro:  strength 5/5 extrem X 4.   Nl speech and mentation  Psych: pleasant, cooperative, flat affect.       Medications     - MEDICATION INSTRUCTIONS -         apixaban ANTICOAGULANT  5 mg Oral BID     cyanocobalamin  1,000 mcg Intramuscular Daily     folic acid  1 mg Oral Daily     metoprolol succinate ER  25 mg Oral Daily     multivitamin w/minerals  1 tablet Oral Daily     polyethylene glycol  17 g Oral Daily     sodium chloride (PF)  3 mL Intracatheter Q8H       Data   Recent Labs   Lab 12/18/21  0631 12/17/21  1156 12/17/21  0928 12/17/21  0132 12/16/21  1019   WBC 5.5  --  4.9  --  7.1   HGB 12.9*  --  12.7*  --  12.3*   MCV 96  --  97  --  95     --  251  --  243   INR  --   --  1.17*  --   --      --  139  --  137   POTASSIUM 3.8  --  3.9  --  4.0   CHLORIDE 108  --  109  --  109   CO2 25  --  24  --  24   BUN 12  --  10  --  15   CR 0.92  --  0.86  --  0.94   ANIONGAP 5  --  6  --  4   MARILYN 9.3  --  9.0  --  9.2   GLC 97 87 97   < > 96   ALBUMIN  --   --   --   --  3.9   PROTTOTAL  --   --   --   --  7.0   BILITOTAL  --   --   --   --  0.6   ALKPHOS  --   --   --   --  51   ALT  --   --   --    --  22   AST  --   --   --   --  12   LIPASE  --   --   --   --  163    < > = values in this interval not displayed.       Imaging:   No results found for this or any previous visit (from the past 24 hour(s)).

## 2021-12-20 NOTE — PLAN OF CARE
Reason for Admission: Increased numbness and tingling in extremities, B-12 deficiency      Cognitive/Mentation: A/Ox 4  Neuros/CMS: Intact ex numbness and tingling in BUE and BLE, R upper and lower extremities weaker than left side.  VS: stable.  GI: BS audible, + flatus, last BM 12/19.   : Urinal at bedside. Continent.  Pulmonary: LS clear.  Pain: denies.      Drains: none  Skin: intact ex +3 edema to bilateral ankle and foot.   Activity: Assist x 2 with trang steady.  Diet: regular with thin liquids. Takes pills whole with water.      Therapies recs: ARU  Discharge: pending placement     End of shift summary: Pt stated calves felt uncomfortably warm after compression stockings were taken off for the night. Denies pain in calves, ice packs applied, legs elevated. Improved overnight. Continue to monitor.

## 2021-12-20 NOTE — PROGRESS NOTES
Care Management Follow Up    Length of Stay (days): 4    Expected Discharge Date: 12/21/2021     Concerns to be Addressed: discharge planning     Patient plan of care discussed at interdisciplinary rounds: Yes    Anticipated Discharge Disposition: Acute Rehab  Disposition Comments: [facility]  Anticipated Discharge Services:    Anticipated Discharge DME:      Patient/family educated on Medicare website which has current facility and service quality ratings: yes  Education Provided on the Discharge Plan:    Patient/Family in Agreement with the Plan: yes    Referrals Placed by CM/SW:    Private pay costs discussed: Not applicable    Additional Information:  Writer set up a tentative wheelchair ride for 1300 tomorrow pending insurance authorization to [facility]. Will confirm tomorrow morning if that is still the plan with ARU.       MONA Eid

## 2021-12-20 NOTE — DISCHARGE SUMMARY
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  The patient was recently hospitalized here at [facility] for submassive pulmonary embolism and he was discharged on November 3.  He was compliant with his medications and his symptoms of lower extremity swelling and shortness of breath were improving.  Approximately 2 and half weeks ago the patient developed a tingling sensation in his upper extremities describing it like his hands fell asleep.  The symptoms progressed and eventually went down to his legs.  Patient went to [facility] for evaluation on November 20 and underwent a CT scan of the abdomen and ultrasound of lower extremities as well as basic blood work patient was not found to have any cause for symptoms and he was discharged home.  Since then his symptoms have progressed and he has become weaker to the point that he has difficulty ambulating around his house.  On December 15 the patient was trying to get out of bed when he fell back into his bed and slid off of the bed onto the floor.  The patient was unable to get up off the floor due to his weakness so he called his father was unable to help him up so they eventually called EMS and the patient presented to the hospital.  Patient reports a varied diet of mostly takeout food including items such as [employer], [employer], hamburgers.  The  patient eats meat regularly.       Hospital Course   Sebastián Nogueira was admitted on 12/16/2021.  The following problems were addressed during his hospitalization:    Principal Problem:    Subacute combined degeneration of spinal cord (H)  Active Problems:    Paresthesias    Vitamin B12 deficiency (non anemic)    Poisoning by nitrous oxide, accidental or unintentional, sequela  Date of Admission:  12/16/2021     Assessment & Plan         Sebastián Nogueira is a 26 year old male with a past medical history of alcohol use disorder, marijuana use, recent history of RLE DVT and submassive PE, and biventricular heart failure who presented to the hospital with progressive upper and lower extremity paresthesias and weakness. Admitted under inpatient status for further evaluation and treatment.      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 Urinating without difficulty. No change in bowels. No bulbar symptoms. No recent URI sx or GI illness. No recent vaccination.   * CMP WNL. CBC with baseline anemia. CRP WNL. TSH WNL. Trop negative. Lipase. Lactic negative. Folate WNL. B12 116. UA unremarkable. Ethanol negative.   -EKG with NSR, sinus arrhythmia.   -MR brain negative. MRI cervical with abnormal T2 signal is seen within the dorsal spinal cord extending from at least the C1-C2 level down through C5-C6. The signal abnormality in the spinal cord appears to be within inverted V-shaped on the axial sequences consistent with B12 deficiency.   * With recent relapse in alcohol  use drinking 1 L whiskey + ~12 cans of beer over the last week. Also with baseline nitrous oxide use. Reports with prior reduction in alcohol use, had increased nitrous oxide use. Has been using nitrous oxide daily X5 years.   -B12 defic 2/2 to chronic nitrous oxide use, possible poor po intake 2/2 chronic etoh use. no other culprit meds/substances: not on PPI or metformin. 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   - copper level pending  -exam; nl strength through out, decreased sensation and subjective numbness bilateral feet to distal thigh, bilateral hands to ~mid forearm.   -subjective improvement in numbness and function BUE     Plan;   -follow up copper level, homocysteine, methylmelonic acid (these are being checked after he had already had dose of B12\)  - Continue B12 1000mcg injection but increase to every day for first week/total of 7 doses (3/7 for first week) Then B12 1000mcg IM every week for 4 weeks (have started this course then future course per ARU and outpatient providere). Can consider extending to 3 months if neurologic improvement. Then start maintenance dose B12 1000mcg IM every month or 1000mcg SL daily. Follow up B12, cbc with platelet count in 2 weeks then 4 weeks. Ensure normalization of B12 level. Cbc every 6 months at least and monitor B12 level as well outpatient. Follow up with PCP.   If off nitrous oxide permanently then may not need long term B12 supplementation as this may have been inciting medication/substance, to be determined at later date by neurology and PCP.  - Oral folate  supplement ordered.   - General Neurology consulted. B12 induced   - PT and OT following. Needs ARU  - Regular diet   -checked methylmelonic acid and homocystein level for completeness though has already received 2 dose of B12 at time of check.   -needs ARU.   - [facility]  - follow B12 levels outpatient at ARU and with PCP to ensure normalize  -stop all nitrous oxide- discussed care plan in detail with patient  -ARU     Alcohol use disorder: Drank 1 L whiskey and 12 cans of beer over the past week. Drank 6 beers last on 12/15/21.  He has a history of alcohol withdrawal, but no withdrawal seizure. Hx of CD treatment, most recently at [facility] in May 2021.   - CIWA protocol in place, PRN Valium in place   - CD consulted, appreciate input. Not interested in inpatient CD treatment at this time.   - stop clonidine  - Folic acid, thiamine, MV daily for now  -PCP follow up     Hx of RLE DVT and submassive PE (10/2021): Diagnosed 10/31/21. Thought to be provoked by an airplaine flight  - Continue Eliquis   - Vascular Medicine referral per Cardiology at last appointment 12/2   -start compression stocking knee high during day. Mild swelling LLE.   RLE slight edema.   -vascular medicine outpatient follow up following ARU discharge     Biventricular heart failure, HFrEF after subacute presentation of bilateral submassive pulmonary emboli. NYHA II, improving. EF was mildly decreased 45-50% initially, improved to low normal 50% on recent echo. Possibly some component of alcohol use. Last seen by Cardiology 12/2/21.   - Cardiology follow-up in 6 months.   -pcp follow up 1-2 weeks after ARU discharge     Sinus tachycardia: Continue PTA Toprol XL.      Morbid obesity: BMI 39. Increased risk for all cause mortality.   - Diet and lifestyle modification recommended      Constipation  Benign abd exam. No obstructive sxs.   Given dose MOM and start daily miralax  Has had prn senna  Had BM  12/19  Daily miralax, prn bowel meds.   Taking po well     Marijuana use: Noted.   - Pt uninterested in cessation     Dependent edema: R>L. Compression stockings started  Wear during day, off at night. Hx of DVT.     Diet: Combination Diet Regular Diet Adult    DVT Prophylaxis: DOAC  Mariano Catheter: Not present  Central Lines: None  Code Status: Full Code       Disposition Plan - discharge to ARU.   Needs follow up with pcp, cardiology, neurology and vascular medicine thereafter, see above        Rik Carr MD, MD    Significant Results and Procedures   See hospital course    Pending Results   These results will be followed up by hospitalist  Unresulted Labs Ordered in the Past 30 Days of this Admission     Date and Time Order Name Status Description    12/18/2021  1:35 PM Homocysteine In process     12/18/2021  1:34 PM Methylmalonic Acid In process     12/17/2021  3:17 PM Copper level In process           Code Status   Full Code       Primary Care Physician   Ton López    Physical Exam   Temp: 98.1  F (36.7  C) Temp src: Oral BP: 136/82 Pulse: 73   Resp: 18 SpO2: 98 % O2 Device: None (Room air)    Vitals:    12/17/21 0529   Weight: (!) 153.4 kg (338 lb 1.6 oz)     Vital Signs with Ranges  Temp:  [97.5  F (36.4  C)-98.8  F (37.1  C)] 97.5  F (36.4  C)  Pulse:  [58-77] 58  Resp:  [18] 18  BP: (117-136)/(69-82) 122/69  SpO2:  [96 %-98 %] 96 %  I/O last 3 completed shifts:  In: 700 [P.O.:700]  Out: 1050 [Urine:1050]  Constitutional: Nad, up in bed  Respiratory:     CTAB  Cardiovascular: RRR no r/g/m  GI: soft, nt, nd  Skin/Integumen: no rash  RLE 1+ edema, pretibial  Neuro:  strength 5/5 extrem X 4. Some difficulty with coordination of BUE  Decreased sensation to light touch bilateral feet to distal thigh and Bilateral hands to bilateral forearms. Symmetric.   Alert  DTR patellar 2+ symmetric  Nl speech and mentation  Psych: pleasant, cooperative, flat affect.     Discharge Disposition   Discharged to  rehabilitation facility/ARU  Condition at discharge: Stable    Consultations This Hospital Stay   NEUROLOGY IP CONSULT  PHYSICAL THERAPY ADULT IP CONSULT  OCCUPATIONAL THERAPY ADULT IP CONSULT  CHEMICAL DEPENDENCY IP CONSULT  CARE MANAGEMENT / SOCIAL WORK IP CONSULT  PHYSICAL THERAPY ADULT IP CONSULT  OCCUPATIONAL THERAPY ADULT IP CONSULT    Time Spent on this Encounter   Rik MEI MD, personally saw the patient today and spent less than or equal to 30 minutes discharging this patient.    Discharge Orders      General info for SNF    Length of Stay Estimate: Short Term Care: Estimated # of Days <30  Condition at Discharge: Improving  Level of care:skilled   Rehabilitation Potential: Good  Admission H&P remains valid and up-to-date: Yes  Recent Chemotherapy: N/A  Use Nursing Home Standing Orders: Yes     Mantoux instructions    Give two-step Mantoux (PPD) Per Facility Policy Yes     Reason for your hospital stay    Progressive upper and lower extremity paresthesias and weakness  Subacute combined degeneration   B12 deficiency with subacute combined degeneration  Hx of nitrous oxide use and alcohol use disorder  Recent RLE DVT and submassive PE (10/2012) on anticoagulation     Daily weights    Call Provider for weight gain of more than 2 pounds per day or 5 pounds per week.     Activity - Up with nursing assistance     Additional Discharge Instructions    Compression stocking: thigh level, wear during daytime     Follow Up and recommended labs and tests    1. Follow up with [facility] 4 weeks to follow up Progressive upper and lower extremity paresthesias and weakness  Subacute combined degeneration   B12 deficiency with subacute combined degeneration  2. Follow up with PCP 1 week after ARU/TCU discharge with cbc with platelet count, B12 level  3. Follow up B12, cbc with platelet count in 2 weeks then 4 weeks. Ensure normalization of B12 level outpatient. Cbc every 6 months at least  and monitor B12 level outpatient longterm per pcp direction.  4. Follow up with Vascular Medicine Clinic regarding PE/DVT 4 weeks  5. [facility] follow CHF hx 4 weeks  6. Bmp on 12/22     Full Code     Physical Therapy Adult Consult    Evaluate and treat as clinically indicated.    Reason:  Progressive upper and lower extremity paresthesias and weakness  Subacute combined degeneration   B12 deficiency with subacute combined degeneration     Occupational Therapy Adult Consult    Evaluate and treat as clinically indicated.    Reason:  Progressive upper and lower extremity paresthesias and weakness  Subacute combined degeneration   B12 deficiency with subacute combined degeneration     Fall precautions     Diet    Follow this diet upon discharge: Orders Placed This Encounter      Regular Diet Adult     Discharge Medications   Current Discharge Medication List      START taking these medications    Details   bisacodyl (DULCOLAX) 10 MG suppository Place 1 suppository (10 mg) rectally daily as needed for constipation    Associated Diagnoses: Other constipation      !! cyanocobalamin (CYANOCOBALAMIN) 1000 MCG/ML injection Inject 1 mL (1,000 mcg) into the muscle daily for 2 doses  Qty: 2 mL, Refills: 0    Associated Diagnoses: Subacute combined degeneration of spinal cord (H)      !! cyanocobalamin (CYANOCOBALAMIN) 1000 MCG/ML injection Inject 1 mL (1,000 mcg) into the muscle every 7 days    Associated Diagnoses: Subacute combined degeneration of spinal cord (H)      folic acid (FOLVITE) 1 MG tablet Take 1 tablet (1 mg) by mouth daily    Associated Diagnoses: Alcohol dependence with intoxication with complication (H)      multivitamin w/minerals (THERA-VIT-M) tablet Take 1 tablet by mouth daily    Associated Diagnoses: Alcohol dependence with intoxication with complication (H)      polyethylene glycol (MIRALAX) 17 g packet Take 17 g by mouth daily    Associated Diagnoses: Other constipation       thiamine (B-1) 100 MG tablet Take 1 tablet (100 mg) by mouth daily for 5 days  Qty: 5 tablet, Refills: 0    Associated Diagnoses: Alcohol dependence with intoxication with complication (H)       !! - Potential duplicate medications found. Please discuss with provider.      CONTINUE these medications which have NOT CHANGED    Details   acetaminophen (TYLENOL) 325 MG tablet Take 2 tablets (650 mg) by mouth every 6 hours as needed for mild pain or other (and adjunct with moderate or severe pain or per patient request)      apixaban ANTICOAGULANT (ELIQUIS) 5 MG tablet Take 1 tablet (5 mg) by mouth 2 times daily  Qty: 90 tablet, Refills: 3    Associated Diagnoses: Multiple subsegmental pulmonary emboli without acute cor pulmonale (H); LV dysfunction      metoprolol succinate ER (TOPROL-XL) 25 MG 24 hr tablet Take 1 tablet (25 mg) by mouth daily  Qty: 90 tablet, Refills: 3    Associated Diagnoses: LV dysfunction      ondansetron (ZOFRAN) 4 MG tablet Take 1 tablet (4 mg) by mouth every 8 hours as needed for nausea  Qty: 10 tablet, Refills: 0    Associated Diagnoses: Alcohol abuse      gabapentin (NEURONTIN) 400 MG capsule Take 400 mg by mouth daily as needed (Anxiety)            Allergies   No Known Allergies  Data   Most Recent 3 CBC's:Recent Labs   Lab Test 12/18/21  0631 12/17/21  0928 12/16/21  1019   WBC 5.5 4.9 7.1   HGB 12.9* 12.7* 12.3*   MCV 96 97 95    251 243      Most Recent 3 BMP's:  Recent Labs   Lab Test 12/18/21  0631 12/17/21  1156 12/17/21  0928 12/17/21  0132 12/16/21  1019     --  139  --  137   POTASSIUM 3.8  --  3.9  --  4.0   CHLORIDE 108  --  109  --  109   CO2 25  --  24  --  24   BUN 12  --  10  --  15   CR 0.92  --  0.86  --  0.94   ANIONGAP 5  --  6  --  4   MARILYN 9.3  --  9.0  --  9.2   GLC 97 87 97   < > 96    < > = values in this interval not displayed.     Most Recent 2 LFT's:  Recent Labs   Lab Test 12/16/21  1019 12/10/21  1732   AST 12 17   ALT 22 27   ALKPHOS 51 49    BILITOTAL 0.6 0.4     Most Recent INR's and Anticoagulation Dosing History:  Anticoagulation Dose History     Recent Dosing and Labs Latest Ref Rng & Units 4/26/2021 10/31/2021 11/20/2021 12/17/2021    INR 0.85 - 1.15 0.99 1.04 1.00 1.17(H)        Most Recent 3 Troponin's:  Recent Labs   Lab Test 11/20/21  0103 10/31/21  1601   TROPONIN <0.015 <0.015     Most Recent Cholesterol Panel:  Recent Labs   Lab Test 09/05/21  2037 07/27/21  0721   CHOL 180 121   LDL  --  20   HDL 68 60   TRIG 588* 204*     Most Recent 6 Bacteria Isolates From Any Culture (See EPIC Reports for Culture Details):No lab results found.  Most Recent TSH, T4 and A1c Labs:  Recent Labs   Lab Test 12/16/21  1019 12/10/21  1732 11/01/21  0143   TSH 3.58   < >  --    A1C  --   --  5.6    < > = values in this interval not displayed.     Results for orders placed or performed during the hospital encounter of 12/16/21   Cervical spine MRI w/o contrast    Narrative    MRI CERVICAL SPINE WITHOUT CONTRAST 12/16/2021 2:55 PM     HISTORY: Paresthesias to all four extremities and trunk.    TECHNIQUE: Multiplanar, multisequence MRI of the cervical spine  without contrast.     COMPARISON: None.     FINDINGS: Images are mildly degraded by motion artifact.    Normal cervical lordosis. Anterior posterior alignment of the spine is  within normal limits. Vertebral body height is maintained without  evidence of fracture. There are no destructive osseous lesions.     No significant loss of disc height or disc herniation. Normal facets.  No spinal canal or neural foraminal narrowing.    Abnormal T2 signal is seen within the dorsal spinal cord extending  from at least the C1-C2 level down through C5-C6. The signal  abnormality in the spinal cord appears to be within inverted V-shaped  on the axial sequences.    Paraspinous soft tissues are unremarkable.       Impression    IMPRESSION:    1. Abnormal T2 signal within the dorsal cervical spinal cord as  detailed above.  This most likely represents subacute combined  degeneration. Clinical correlation is recommended.  2. No significant degenerative changes in the cervical spine. No  spinal canal or neural foraminal narrowing.    Results discussed with Baldomero Fernandez at 3:22 PM on 12/16/2021.     SELVIN CONN MD         SYSTEM ID:  [id number]   MR Brain w/o & w Contrast    Narrative    MRI BRAIN WITHOUT AND WITH CONTRAST  12/16/2021 3:15 PM     HISTORY:  Paresthesias to all four extremities and trunk.    TECHNIQUE: Multiplanar, multisequence MRI of the brain without and  with 15mL Gadavist.    COMPARISON: None.     FINDINGS: There is no evidence of acute infarct, hemorrhage, mass, or  herniation. The brain parenchyma, ventricles and subarachnoid spaces  appear normal.     There is no abnormal intracranial enhancement.     Moderate scattered mucosal thickening in the paranasal sinuses. The  major arterial T2 flow voids at the base of the brain appear patent.       Impression    IMPRESSION:  Unremarkable brain MRI without evidence of acute infarct,  mass, hemorrhage, or herniation.      SELVIN CONN MD         SYSTEM ID:  [id number]

## 2021-12-20 NOTE — PLAN OF CARE
Pt here with increased numbness and tingling in all extremities. Worse in UE compared to LE. Pt states LE numbness and tingling seems to be improving. Able to follow most commands, generally weak. A&O. VSS. Regular diet, thin liquids. Takes pills whole. Up with A2/trang steady to commode. 1 BM today. Voiding. Denies pain. Pt scoring green on the Aggression Stop Light Tool. Plan for rehab, medically stable. Discharge pending to ARU when bed is available. Compression stockings in place.  No home meds  Patient  belongings remain in room

## 2021-12-21 ENCOUNTER — APPOINTMENT (OUTPATIENT)
Dept: OCCUPATIONAL THERAPY | Facility: CLINIC | Age: 26
End: 2021-12-21
Payer: COMMERCIAL

## 2021-12-21 ENCOUNTER — APPOINTMENT (OUTPATIENT)
Dept: OCCUPATIONAL THERAPY | Facility: CLINIC | Age: 26
End: 2021-12-21
Attending: PHYSICAL MEDICINE & REHABILITATION
Payer: COMMERCIAL

## 2021-12-21 ENCOUNTER — APPOINTMENT (OUTPATIENT)
Dept: PHYSICAL THERAPY | Facility: CLINIC | Age: 26
End: 2021-12-21
Payer: COMMERCIAL

## 2021-12-21 ENCOUNTER — HOSPITAL ENCOUNTER (INPATIENT)
Facility: CLINIC | Age: 26
LOS: 10 days | Discharge: HOME | End: 2021-12-31
Attending: PHYSICAL MEDICINE & REHABILITATION | Admitting: PHYSICAL MEDICINE & REHABILITATION
Payer: COMMERCIAL

## 2021-12-21 VITALS
TEMPERATURE: 98 F | HEART RATE: 90 BPM | WEIGHT: 315 LBS | BODY MASS INDEX: 39.07 KG/M2 | OXYGEN SATURATION: 98 % | SYSTOLIC BLOOD PRESSURE: 124 MMHG | RESPIRATION RATE: 18 BRPM | DIASTOLIC BLOOD PRESSURE: 88 MMHG

## 2021-12-21 DIAGNOSIS — F10.229 ALCOHOL DEPENDENCE WITH INTOXICATION WITH COMPLICATION (H): ICD-10-CM

## 2021-12-21 DIAGNOSIS — E53.8: Primary | ICD-10-CM

## 2021-12-21 DIAGNOSIS — G99.2: Primary | ICD-10-CM

## 2021-12-21 DIAGNOSIS — E53.8 SUBACUTE COMBINED DEGENERATION OF SPINAL CORD (H): ICD-10-CM

## 2021-12-21 DIAGNOSIS — G32.0 SUBACUTE COMBINED DEGENERATION OF SPINAL CORD (H): ICD-10-CM

## 2021-12-21 DIAGNOSIS — R26.89 IMPAIRMENT OF BALANCE: ICD-10-CM

## 2021-12-21 LAB
COPPER SERPL-MCNC: 134.1 UG/DL
METHYLMALONATE SERPL-SCNC: 5.77 UMOL/L (ref 0–0.4)

## 2021-12-21 PROCEDURE — 97167 OT EVAL HIGH COMPLEX 60 MIN: CPT | Mod: GO | Performed by: OCCUPATIONAL THERAPIST

## 2021-12-21 PROCEDURE — 250N000011 HC RX IP 250 OP 636: Performed by: INTERNAL MEDICINE

## 2021-12-21 PROCEDURE — 99222 1ST HOSP IP/OBS MODERATE 55: CPT | Performed by: PHYSICAL MEDICINE & REHABILITATION

## 2021-12-21 PROCEDURE — 250N000013 HC RX MED GY IP 250 OP 250 PS 637: Performed by: PHYSICIAN ASSISTANT

## 2021-12-21 PROCEDURE — 250N000013 HC RX MED GY IP 250 OP 250 PS 637: Performed by: HOSPITALIST

## 2021-12-21 PROCEDURE — 128N000003 HC R&B REHAB

## 2021-12-21 PROCEDURE — 97116 GAIT TRAINING THERAPY: CPT | Mod: GP

## 2021-12-21 PROCEDURE — 97535 SELF CARE MNGMENT TRAINING: CPT | Mod: GO

## 2021-12-21 PROCEDURE — 99238 HOSP IP/OBS DSCHRG MGMT 30/<: CPT | Performed by: INTERNAL MEDICINE

## 2021-12-21 PROCEDURE — 250N000013 HC RX MED GY IP 250 OP 250 PS 637: Performed by: INTERNAL MEDICINE

## 2021-12-21 RX ORDER — CYANOCOBALAMIN 1000 UG/ML
1000 INJECTION, SOLUTION INTRAMUSCULAR; SUBCUTANEOUS DAILY
Status: COMPLETED | OUTPATIENT
Start: 2021-12-22 | End: 2021-12-23

## 2021-12-21 RX ORDER — FOLIC ACID 1 MG/1
1 TABLET ORAL DAILY
Status: DISCONTINUED | OUTPATIENT
Start: 2021-12-22 | End: 2021-12-31 | Stop reason: HOSPADM

## 2021-12-21 RX ORDER — AMOXICILLIN 250 MG
1 CAPSULE ORAL 2 TIMES DAILY
Status: DISCONTINUED | OUTPATIENT
Start: 2021-12-21 | End: 2021-12-31 | Stop reason: HOSPADM

## 2021-12-21 RX ORDER — CYANOCOBALAMIN 1000 UG/ML
1000 INJECTION, SOLUTION INTRAMUSCULAR; SUBCUTANEOUS
Status: DISCONTINUED | OUTPATIENT
Start: 2021-12-24 | End: 2021-12-31 | Stop reason: HOSPADM

## 2021-12-21 RX ORDER — POLYETHYLENE GLYCOL 3350 17 G/17G
17 POWDER, FOR SOLUTION ORAL DAILY PRN
Status: DISCONTINUED | OUTPATIENT
Start: 2021-12-21 | End: 2021-12-31 | Stop reason: HOSPADM

## 2021-12-21 RX ORDER — AMOXICILLIN 250 MG
1 CAPSULE ORAL 2 TIMES DAILY PRN
Status: ON HOLD | COMMUNITY
End: 2021-12-30

## 2021-12-21 RX ORDER — ACETAMINOPHEN 325 MG/1
650 TABLET ORAL EVERY 6 HOURS PRN
Status: DISCONTINUED | OUTPATIENT
Start: 2021-12-21 | End: 2021-12-31 | Stop reason: HOSPADM

## 2021-12-21 RX ORDER — ONDANSETRON 4 MG/1
4 TABLET, FILM COATED ORAL EVERY 8 HOURS PRN
Status: DISCONTINUED | OUTPATIENT
Start: 2021-12-21 | End: 2021-12-31 | Stop reason: HOSPADM

## 2021-12-21 RX ORDER — METOPROLOL SUCCINATE 25 MG/1
25 TABLET, EXTENDED RELEASE ORAL DAILY
Status: DISCONTINUED | OUTPATIENT
Start: 2021-12-22 | End: 2021-12-31 | Stop reason: HOSPADM

## 2021-12-21 RX ORDER — CLONIDINE HYDROCHLORIDE 0.1 MG/1
0.1 TABLET ORAL
Status: ON HOLD | COMMUNITY
End: 2021-12-30

## 2021-12-21 RX ORDER — MULTIPLE VITAMINS W/ MINERALS TAB 9MG-400MCG
1 TAB ORAL DAILY
Status: DISCONTINUED | OUTPATIENT
Start: 2021-12-22 | End: 2021-12-31 | Stop reason: HOSPADM

## 2021-12-21 RX ORDER — BISACODYL 10 MG
10 SUPPOSITORY, RECTAL RECTAL DAILY PRN
Status: DISCONTINUED | OUTPATIENT
Start: 2021-12-21 | End: 2021-12-31 | Stop reason: HOSPADM

## 2021-12-21 RX ORDER — CYANOCOBALAMIN 1000 UG/ML
1 INJECTION, SOLUTION INTRAMUSCULAR; SUBCUTANEOUS DAILY
Status: ON HOLD | COMMUNITY
End: 2021-12-30

## 2021-12-21 RX ADMIN — FOLIC ACID 1 MG: 1 TABLET ORAL at 10:07

## 2021-12-21 RX ADMIN — GABAPENTIN 400 MG: 400 CAPSULE ORAL at 03:20

## 2021-12-21 RX ADMIN — APIXABAN 5 MG: 2.5 TABLET, FILM COATED ORAL at 20:13

## 2021-12-21 RX ADMIN — DOCUSATE SODIUM AND SENNOSIDES 1 TABLET: 8.6; 5 TABLET, FILM COATED ORAL at 20:13

## 2021-12-21 RX ADMIN — THIAMINE HCL TAB 100 MG 100 MG: 100 TAB at 16:49

## 2021-12-21 RX ADMIN — SENNOSIDES AND DOCUSATE SODIUM 1 TABLET: 50; 8.6 TABLET ORAL at 03:23

## 2021-12-21 RX ADMIN — MULTIPLE VITAMINS W/ MINERALS TAB 1 TABLET: TAB at 10:08

## 2021-12-21 RX ADMIN — METOPROLOL SUCCINATE 25 MG: 25 TABLET, EXTENDED RELEASE ORAL at 10:07

## 2021-12-21 RX ADMIN — CYANOCOBALAMIN 1000 MCG: 1000 INJECTION, SOLUTION INTRAMUSCULAR at 10:06

## 2021-12-21 RX ADMIN — POLYETHYLENE GLYCOL 3350 17 G: 17 POWDER, FOR SOLUTION ORAL at 10:08

## 2021-12-21 RX ADMIN — APIXABAN 5 MG: 5 TABLET, FILM COATED ORAL at 10:07

## 2021-12-21 ASSESSMENT — ACTIVITIES OF DAILY LIVING (ADL)
FALL_HISTORY_WITHIN_LAST_SIX_MONTHS: NO
ADLS_ACUITY_SCORE: 15
ADLS_ACUITY_SCORE: 9
ADLS_ACUITY_SCORE: 15
WALKING_OR_CLIMBING_STAIRS_DIFFICULTY: NO
DIFFICULTY_COMMUNICATING: NO
CONCENTRATING,_REMEMBERING_OR_MAKING_DECISIONS_DIFFICULTY: NO
DIFFICULTY_EATING/SWALLOWING: NO
ADLS_ACUITY_SCORE: 15
VISION_MANAGEMENT: GLASSES
ADLS_ACUITY_SCORE: 9
HEARING_DIFFICULTY_OR_DEAF: NO
ADLS_ACUITY_SCORE: 15
ADLS_ACUITY_SCORE: 13
TOILETING_ISSUES: NO
ADLS_ACUITY_SCORE: 8
ADLS_ACUITY_SCORE: 15
ADLS_ACUITY_SCORE: 6
DOING_ERRANDS_INDEPENDENTLY_DIFFICULTY: NO
ADLS_ACUITY_SCORE: 15
DRESSING/BATHING_DIFFICULTY: NO
ADLS_ACUITY_SCORE: 11
WEAR_GLASSES_OR_BLIND: YES
ADLS_ACUITY_SCORE: 9
ADLS_ACUITY_SCORE: 15
ADLS_ACUITY_SCORE: 9
ADLS_ACUITY_SCORE: 15
ADLS_ACUITY_SCORE: 15
PREVIOUS_RESPONSIBILITIES: MEAL PREP;HOUSEKEEPING;LAUNDRY;SHOPPING;MEDICATION MANAGEMENT;FINANCES;DRIVING;WORK
WHICH_OF_THE_ABOVE_FUNCTIONAL_RISKS_HAD_A_RECENT_ONSET_OR_CHANGE?: AMBULATION;TRANSFERRING;TOILETING;BATHING;DRESSING;EATING;COGNITION;COMMUNICATION/SPEECH
ADLS_ACUITY_SCORE: 15
ADLS_ACUITY_SCORE: 9
ADLS_ACUITY_SCORE: 9
ADLS_ACUITY_SCORE: 15
ADLS_ACUITY_SCORE: 15

## 2021-12-21 NOTE — PROVIDER NOTIFICATION
"Paged Dr. Rik Rossi \"room 707 RAYNA, FYI: his methylmalonic acid level is 5.77. thanks!  " · Trending down  · monitor

## 2021-12-21 NOTE — PROVIDER NOTIFICATION
MD Notification    Notified Person: MD    Notified Person Name: SHERI GRAHAM BARBARA    Notification Date/Time: 12/20/21 2129    Notification Interaction: AMCOM    Purpose of Notification: Pt. Usually gets gabapentin at home. Asking if we are not giving it because of the B12 deficiency?    Orders Received:    Comments:

## 2021-12-21 NOTE — PLAN OF CARE
Neuro were intact except pt has problem with grasping items in his hands, lower extremity weakness at baseline. AxO x4. Vitals were stable, AVS discharge packet was given to pt, discharge packet was reviewed with pt and pt verbalized understanding. Pt was safely discharged. Report was given to rogerio ESTRADA RN at  ARU.

## 2021-12-21 NOTE — PLAN OF CARE
Occupational Therapy Discharge Summary    Reason for therapy discharge:    Discharged to acute rehabilitation facility.    Progress towards therapy goal(s). See goals on Care Plan in [facility] electronic health record for goal details.  Goals not met.  Barriers to achieving goals:   discharge from facility.    Therapy recommendation(s):    Continued therapy is recommended.  Rationale/Recommendations:  Skilled OT in ARU setting to address safety and independence in I/ADLs.

## 2021-12-21 NOTE — PROGRESS NOTES
Rehab Admissions:  Spoke with the pt at the request of care transitions to discuss referral to ARC. Educated the pt in the benefits of ARC including intensive therapies, close medical management, and rehabilitative nursing care. Educated him on location of unit, visitor hours and policy, and estimated LOS. Per sending team the plan post ARC will be to discharge to family's home. Prior authorization was obtained from the patient's insurance this morning and the patient was clinically accepted for admit today.     Thank you for the referral.    Determination of admission is based upon the patient's need for an intensive, interdisciplinary approach to rehabilitation, their ability to progress, their ability to tolerate intensive therapies, their need for daily physician supervision, their need for twenty four hour nursing assistance, and their ability and willingness to participate in such a program.    Jony Shen CM  Rehab Liaison/  [facility]  12/21/2021    9:11 AM

## 2021-12-21 NOTE — LETTER
Health Information Management Services               Recipient: [facility] Intake          Sender: Nyasia LINN [phone]          Date: December 28, 2021  Patient Name:  Sebastián Nogueira  Routing Message:  Home Care referral for SN/PT/OT/HHA. Pt is discharging home on 12/31/2021. Please let me know if you can accept. Thank you!          The documents accompanying this notice contain confidential information belonging to the sender.  This information is intended only for the use of the individual or entity named above.  The authorized recipient of this information is prohibited from disclosing this information to any other party and is required to destroy the information after its stated need has been fulfilled, unless otherwise required by state law.      If you are not the intended recipient, you are hereby notified that any disclosure, copy, distribution or action taken in reliance on the contents of these documents is strictly prohibited.  If you have received this document in error, please return it by fax to [phone] with a note on the cover sheet explaining why you are returning it (e.g. not your patient, not your provider, etc.).  If you need further assistance, please call [facility] Centralized Transcription at [phone].  Documents may also be returned by mail to [facility], , [facility] [address], [place], [place] [address].

## 2021-12-21 NOTE — PROGRESS NOTES
[id number]  Pt here with B12 deficiency, subacute combined degeneration of spinal cord. A&O X4. Neuros numbness and tingling in upper and lower extremities- Pt states improving. Pt has decreased dexterity in BUE, trouble grasping. Therapy has tools for Pt to  help with eating and drinking. VSS. Regular diet, thin liquids. Takes pills whole. Up with Ax2 trang steady. Denies pain but does complain of damion socks being too tight. +2 edema on bilateral lower extremity. CIWA score was 0. Pt scoring green on the Aggression Stop Light Tool. Plan for discharge to ARU VIA WC ride at 1300- SW will confirm tomorrow.

## 2021-12-21 NOTE — H&P
"    [facility]   Acute Rehabilitation Unit  Admission History and Physical    CHIEF COMPLAINT   \"spinal cord injury from the nitrous\"    HISTORY OF PRESENT ILLNESS  Sebastián Nogueira is a 26 year old man with past medical history of polysubstance abuse, PE/DVT diagnosed 10/31/21, cardiomyopathy with reduced EF who presented 12/16/21 with generalized weakness.  Reported 2 week history of paresthesias and generalized weakness which started in upper extremities and progress to lower extremities.  Imaging revealed abnormal T2 signal on MRI C spine likely represents subacute combined degeneration in setting of alcohol and nitrous gas abuse.     Jamar was noted to have impaired strength, impaired activity tolerance, impaired balance, and impaired coordination.  He is currently performing sit to stand with max assist of 2.  He is standing with min assist of 2 with lift pants in place.  He performed standing marching x 10 reps.  Performed seated grooming and hygiene with set up and cues.  He attempted to don socks though unable to coordinate.     On admission to ARU Jamar reports several weeks progressing numbness/weakness that started in hands and progressed to feet.  He says initial presentation was discharged home, represented imaging and labs led to diagnosis of spinal cord injury in setting of B12 deficiency secondary to nitrous use. He describes impaired sensation in bilateral hands up to forearm and bilateral feet to below knee.  He denies pain.  He denies n/v/d, says he feels constipated, is passing gas no ab pain.  Is eating, sleeping ok.  Denies issues with urination.  Some intermittent though improving shortness of breath, denies chest pain, heart palpitations, and headache.   Jamar reports using up to 500 whippets a day, reports consistent and heavy use, says he is drinking though less than he used to.  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 He is motivated to regain " "independence and reports he is \"going to try\" to abstain from nitrous.     PAST MEDICAL HISTORY   Reviewed and updated in Epic.  Past Medical History:   Diagnosis Date     Alcohol abuse      DVT (deep venous thrombosis) (H)      Hypertriglyceridemia      Marijuana use      Pulmonary embolism (H)        SURGICAL HISTORY  Reviewed and updated in Epic.  Past Surgical History:   Procedure Laterality Date     APPENDECTOMY         SOCIAL HISTORY  Reviewed and updated in Epic.  Marital Status: not   Living situation: lives alone in [facility] with stairs to enter building and 3 flights to [facility].   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   Illicit drug use: daily nitrous, occasional cocaine, hallucinogens  Social History     Socioeconomic History     Marital status: Single     Spouse name: Not on file     Number of children: Not on file     Years of education: Not on file     Highest education level: Not on file   Occupational History     Not on file   Tobacco Use     Smoking status: Former Smoker     Smokeless tobacco: Never Used   Substance and Sexual Activity     Alcohol use: Yes     Comment: 3 times a week     Drug use: No     Comment: vague on the answer     Sexual activity: Not on file   Other Topics Concern     Parent/sibling w/ CABG, MI or angioplasty before 65F 55M? Not Asked   Social History Narrative     Not on file     Social Determinants of Health     Financial Resource Strain: Not on file   Food Insecurity: Not on file   Transportation Needs: Not on file   Physical Activity: Not on file   Stress: Not on file   Social Connections: Not on file   Intimate Partner Violence: Not on file   Housing Stability: Not on file       FAMILY HISTORY  Reviewed and updated in Epic.  Family History   Problem Relation Age of Onset " "    Dementia Paternal Grandfather          PRIOR FUNCTIONAL HISTORY   Pt was independent with all ADLs/IADLs, transfers, mobility and gait.      MEDICATIONS  Scheduled meds  Medications Prior to Admission   Medication Sig Dispense Refill Last Dose     acetaminophen (TYLENOL) 325 MG tablet Take 2 tablets (650 mg) by mouth every 6 hours as needed for mild pain or other (and adjunct with moderate or severe pain or per patient request)        apixaban ANTICOAGULANT (ELIQUIS) 5 MG tablet Take 1 tablet (5 mg) by mouth 2 times daily 90 tablet 3      metoprolol succinate ER (TOPROL-XL) 25 MG 24 hr tablet Take 1 tablet (25 mg) by mouth daily 90 tablet 3      ondansetron (ZOFRAN) 4 MG tablet Take 1 tablet (4 mg) by mouth every 8 hours as needed for nausea 10 tablet 0      gabapentin (NEURONTIN) 400 MG capsule Take 400 mg by mouth daily as needed (Anxiety)           ALLERGIES   No Known Allergies      REVIEW OF SYSTEMS  A 10 point ROS was performed and negative unless otherwise noted in HPI.     PHYSICAL EXAM  VITAL SIGNS:  /86 (BP Location: Right arm)   Pulse 99   Temp 97.5  F (36.4  C) (Oral)   Resp 16   SpO2 97%   BMI:  Estimated body mass index is 39.07 kg/m  as calculated from the following:    Height as of 12/2/21: 1.981 m (6' 6\").    Weight as of 12/17/21: 153.4 kg (338 lb 1.6 oz).     General: awake alert   HEENT: mmm eomi  Pulmonary: non labored clear   Cardiovascular: rrr   Abdominal: obese non tender  Extremities: warm, well perfused,  edema in bilateral lower extremities R>L, no tenderness in calves   MSK/neuro:   Mental Status:  alert and oriented   Cranial Nerves: grossly normal      Sensory: impaired sensation to hands up forearm, and feet to below knee.    Strength:    SF  EF  EE  WE  G   HF  KE  DF  PF   R  5 5 5 5 4 2 4- 3 4    L  5 5 5 5 4 4 4 4 4       Tone per modified Radha Scale: none   Abnormal movements: None    Coordination: _+ dysmetria bilateral rapid alternating finger.    Speech:clear " coherent   Cognition:appropriate for admission conversation   Gait: not tested  Skin: no redness, warmth, or swelling      LABS  CBC RESULTS: Recent Labs   Lab Test 12/18/21  0631 12/17/21  0928 12/16/21  1019   WBC 5.5 4.9 7.1   RBC 4.05* 4.03* 3.86*   HGB 12.9* 12.7* 12.3*   HCT 39.0* 39.2* 36.8*   MCV 96 97 95   MCH 31.9 31.5 31.9   MCHC 33.1 32.4 33.4   RDW 14.6 14.6 14.5    251 243     Last Basic Metabolic Panel:  Recent Labs   Lab Test 12/18/21  0631 12/17/21  1156 12/17/21  0928 12/17/21  0132 12/16/21  1019     --  139  --  137   POTASSIUM 3.8  --  3.9  --  4.0   CHLORIDE 108  --  109  --  109   CO2 25  --  24  --  24   ANIONGAP 5  --  6  --  4   GLC 97 87 97   < > 96   BUN 12  --  10  --  15   CR 0.92  --  0.86  --  0.94   GFRESTIMATED >90  --  >90  --  >90   MARILYN 9.3  --  9.0  --  9.2    < > = values in this interval not displayed.       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  Admitted to acute rehab 12/21/21 in setting of impaired strength, impaired activity tolerance, impaired balance, and impaired coordination.    Admission to acute inpatient rehab spinal cord dysfunction: upper and lower extremity paraesthesias and weakness due to combined degeneration of spinal cord 2/2 B12 deficiencey    Impairment group code: 04.120      1. PT, OT 90 minutes of each on a daily basis, in addition to rehab nursing and close management of physiatrist.      2. Impairment of ADL's: Noted to have impaired strength, impaired activity tolerance, and impaired coordination  leading to decreased ability to independently complete ADL's.  Will benefit from ongoing OT with goal for MOD I with basic ADLs.     3. Impairment of mobility:  Noted to have impaired  strength, impaired activity tolerance, and impaired balance leading to decreased mobility.  Will benefit from ongoing PT with goal for JESSICA with basic mobility.       4. 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 B12 116.  MRI cervical with abnormal T2 signal is seen within the dorsal spinal cord extending from at least the C1-C2 level down through C5-C6. The signal abnormality in the spinal cord appears to be within inverted V-shaped on the axial sequences consistent with B12 deficiency, felt to be 2/2 to chronic nitrous oxide use, possible poor po intake 2/2 chronic etoh use.  copper level (WNL) , homocysteine (in process) , methylmelonic acid (5.77)  seen by General Neurology  -continue B12 1000mcg every other day IM ( x 7 days) through 12/23  Followed by B12 1000mcg IM every week for 4 weeks   - f/u  B12 /CBC in 2 weeks and 4 weeks with PCP.   Can consider extending b12 IM tx to 3 months if neurologic improvement. Then start maintenance dose B12 1000mcg IM every month or 1000mcg SL daily.  -f/u neurology.    -stop all nitrous oxide- discussed care plan in detail with patient  -continue PT/OT    Alcohol use disorder: Drank 1 L whiskey and 12 cans of beer over the past week. He has a history of alcohol withdrawal, but no withdrawal seizure. Hx of CD treatment, most recently at [facility] in May 2021.   - CD consulted, patient Not interested in inpatient CD treatment at this time.   - Folic acid, thiamine, MV daily for now  -PCP follow up     Hx of RLE DVT and submassive PE (10/2021): Diagnosed 10/31/21. Thought to be provoked by an airplaine  flight  - Continue Eliquis   - Vascular Medicine referral per Cardiology at last appointment 12/2 -vascular medicine outpatient follow up   -start compression stocking knee high during day.      Biventricular heart failure, HFrEF   after subacute presentation of bilateral submassive pulmonary emboli. NYHA II, improving. EF was mildly decreased 45-50% initially, improved to low normal 50% on recent echo. Possibly some component of alcohol use. Last seen by Cardiology 12/2/21.   - Cardiology follow-up in 6 months.   -f/u pcp     Sinus tachycardia:   Continue PTA Toprol XL.      Morbid obesity:   BMI 39. Increased risk for all cause mortality.   - Diet and lifestyle modification recommended      Constipation  Benign abd exam. No obstructive sxs.   - senokot bid  -prn miralax/ supp  -encourage fluids     Marijuana use: Noted.   - Pt uninterested in cessation     Dependent edema: R>L. Compression stockings started  Wear during day, off at night. Hx of DVT.     5. Adjustment to disability:  Clinical psychology to eval and treat as indicated.  6. FEN: reg  7. Bowel: constipated monitor.   8. Bladder: continent  9. DVT Prophylaxis: on apixaban  10. GI Prophylaxis: none  11. Code: full  12. Disposition:  Goal for home  13. ELOS:   3 weeks. .  14. Rehab prognosis:  fair  15. Follow up Appointments on Discharge: pcp, neurology.        discussed with Dr. Garcia , PM&R staff physician     Yahaira Tello PA-C  Rehab Service

## 2021-12-22 ENCOUNTER — APPOINTMENT (OUTPATIENT)
Dept: OCCUPATIONAL THERAPY | Facility: CLINIC | Age: 26
End: 2021-12-22
Attending: PHYSICAL MEDICINE & REHABILITATION
Payer: COMMERCIAL

## 2021-12-22 ENCOUNTER — APPOINTMENT (OUTPATIENT)
Dept: PHYSICAL THERAPY | Facility: CLINIC | Age: 26
End: 2021-12-22
Attending: PHYSICAL MEDICINE & REHABILITATION
Payer: COMMERCIAL

## 2021-12-22 LAB — HCYS SERPL-SCNC: 57.7 UMOL/L (ref 4–12)

## 2021-12-22 PROCEDURE — 128N000003 HC R&B REHAB

## 2021-12-22 PROCEDURE — 99233 SBSQ HOSP IP/OBS HIGH 50: CPT | Performed by: PHYSICAL MEDICINE & REHABILITATION

## 2021-12-22 PROCEDURE — 97535 SELF CARE MNGMENT TRAINING: CPT | Mod: GO

## 2021-12-22 PROCEDURE — 250N000011 HC RX IP 250 OP 636: Performed by: PHYSICIAN ASSISTANT

## 2021-12-22 PROCEDURE — 97530 THERAPEUTIC ACTIVITIES: CPT | Mod: GP

## 2021-12-22 PROCEDURE — 97163 PT EVAL HIGH COMPLEX 45 MIN: CPT | Mod: GP

## 2021-12-22 PROCEDURE — 97167 OT EVAL HIGH COMPLEX 60 MIN: CPT | Mod: GO

## 2021-12-22 PROCEDURE — 250N000013 HC RX MED GY IP 250 OP 250 PS 637: Performed by: PHYSICIAN ASSISTANT

## 2021-12-22 PROCEDURE — 97116 GAIT TRAINING THERAPY: CPT | Mod: GP

## 2021-12-22 RX ADMIN — GABAPENTIN 400 MG: 100 CAPSULE ORAL at 08:33

## 2021-12-22 RX ADMIN — DOCUSATE SODIUM AND SENNOSIDES 1 TABLET: 8.6; 5 TABLET, FILM COATED ORAL at 20:18

## 2021-12-22 RX ADMIN — METOPROLOL SUCCINATE 25 MG: 25 TABLET, EXTENDED RELEASE ORAL at 08:33

## 2021-12-22 RX ADMIN — MULTIPLE VITAMINS W/ MINERALS TAB 1 TABLET: TAB at 08:33

## 2021-12-22 RX ADMIN — CYANOCOBALAMIN 1000 MCG: 1000 INJECTION, SOLUTION INTRAMUSCULAR at 08:32

## 2021-12-22 RX ADMIN — FOLIC ACID 1 MG: 1 TABLET ORAL at 08:33

## 2021-12-22 RX ADMIN — DOCUSATE SODIUM AND SENNOSIDES 1 TABLET: 8.6; 5 TABLET, FILM COATED ORAL at 08:33

## 2021-12-22 RX ADMIN — APIXABAN 5 MG: 2.5 TABLET, FILM COATED ORAL at 20:18

## 2021-12-22 RX ADMIN — APIXABAN 5 MG: 2.5 TABLET, FILM COATED ORAL at 08:33

## 2021-12-22 RX ADMIN — THIAMINE HCL TAB 100 MG 100 MG: 100 TAB at 08:33

## 2021-12-22 ASSESSMENT — ACTIVITIES OF DAILY LIVING (ADL)
ADLS_ACUITY_SCORE: 13

## 2021-12-22 NOTE — PROGRESS NOTES
12/22/21 1100   Quick Adds   Type of Visit Initial PT Evaluation   Living Environment   People in home alone   Current Living Arrangements apartment   Home Accessibility stairs to enter home;stairs within home   Number of Stairs, Main Entrance 10   Stair Railings, Main Entrance railing on left side (ascending)   Number of Stairs, Within Home, Primary greater than 10 stairs  (4 flights of 10 stairs)   Stair Railings, Within Home, Primary railings on both sides of stairs   Transportation Anticipated car, drives self;family or friend will provide   Living Environment Comments PT: per OT report Pt lives alone in [facility] in [facility]. Pt reports having a flight of stairs to enter the building and 4 flight of stairs to get to apartment that is on the 3rd floor. Pt has tub/shower combo with no grab bars. Pt has standard height toilet. pt has queen sized bed and gets out on the right or foot of the bed. Laundry is located in the basement of the apartment. Pt has carpeting throughout the living room, hallway and bedroom; tile in the bathroom; laminate in the kitchen.    Self-Care   Usual Activity Tolerance good   Current Activity Tolerance fair   Regular Exercise No   Equipment Currently Used at Home cane, straight;crutches;walker, rolling   Activity/Exercise/Self-Care Comment PT: per OT report Pt enjoys longboarding. Pt works at a ice cream truck in the summertime. Pt enjoys blowing glass with his brother.    Disability/Function   Hearing Difficulty or Deaf no   Wear Glasses or Blind yes   Vision Management PT: Corrective lenses, near sighted   Concentrating, Remembering or Making Decisions Difficulty no   Difficulty Communicating no   Difficulty Eating/Swallowing no   Walking or Climbing Stairs Difficulty no   Dressing/Bathing Difficulty no   Toileting issues no   Doing Errands Independently Difficulty (such as shopping) yes   Fall history within last six months yes   Number of times patient has fallen within last six months  1  (falling and unable to get up, had to call for help)   Change in Functional Status Since Onset of Current Illness/Injury yes   General Information   Onset of Illness/Injury or Date of Surgery 12/16/21  (present to ED w/ generalized weakness)   Referring Physician Juan   Patient/Family Therapy Goals Statement (PT) return home at previous level of function    Pertinent History of Current Problem (include personal factors and/or comorbidities that impact the POC) CMH: 10/31/2021 pt was diagnosed with DVT/PE, on 12/16/2021 he presented with generalized weakness, N/T that began approximately two weeks prior. PMH: cardiomyopathy with reduced EF, Alcohol abuse, Hypertriglyceridemia, hypertriglyceridemia, DVT, substance abuse   General Observations PT: pt had difficulty with fine motor coordination, ataxic gait pattern, weakness R>L   Range of Motion (ROM)   ROM Quick Adds ROM WFL   MMT: Hip, Rehab Eval   Hip Flexion - Left Side (4/5) good, left   Hip Flexion - Right Side (3+/5) fair plus, right   MMT: Knee, Rehab Eval   Knee Flexion - Left Side (4/5) good, left   Knee Extension - Left Side (4/5) good, left   Knee Flexion - Right Side (4-/5) good minus, right   Knee Extension - Right Side (3+/5) fair plus, right   MMT: Ankle, Rehab Eval   Ankle Dorsiflexion - Left Side (4/5) good, left   Ankle Dorsiflexion - Right Side (3+/5) fair plus, right   ARC Assessment Only   Acute Rehab Functional Assessment See IP Rehab Daily Documentation Flowsheet for Functional Mobility/ADL Assessment   Sensory Examination   Sensory Tests PT: protective sensation completed on BLEs, no protective sensation on R great toe, better sensation proximally as comapred to distally, moderate impairment on L great toe    Sensation Light Touch   LUE w/i normal limits  (10/10 light touch )   LLE w/i normal limits  (10/10 light touch)   RUE w/i normal limits  (10/10 light touch )   RLE mild impairment  (10/10 but noted slightly decreased compared to L)    Sensation Sharp Dull Discrimination   LUE w/i normal limits   LLE mild impairment  (8/10, a few missed on great toe)   RUE w/i normal limits   RLE moderate impairment  (6/10 - missed on great toe )   Coordination   Coordination other (see comments)   Coordination Comments PT: impaired coordination with fine motor movement, noted dysmetria w/ LUE during FTN, heel on shin impaired likely d/t deccreased strength, KONSTANTIN pronation/supination intact    Muscle Tone   Muscle Tone no deficits were identified   Clinical Impression   Criteria for Skilled Therapeutic Intervention yes, treatment indicated   PT Diagnosis (PT) impaired strength, sensation, coordination, balance    Influenced by the following impairments stregnth, sensation, balance, coordination    Functional limitations due to impairments transfers, gait, stairs, w/c mobility   Clinical Presentation Unstable/Unpredictable   Clinical Presentation Rationale nontraumatic spinal cord injury and complications of blood clots within the RLE and the lungs   Clinical Decision Making (Complexity) high complexity   Therapy Frequency (PT) Daily   Predicted Duration of Therapy Intervention (days/wks) 21 days    Planned Therapy Interventions (PT) balance training;bed mobility training;gait training;home exercise program;manual therapy techniques;motor coordination training;neuromuscular re-education;orthotic fitting/training;patient/family education;postural re-education;ROM (range of motion);stair training;strengthening;stretching;transfer training;wheelchair management/propulsion training;progressive activity/exercise;risk factor education;home program guidelines   Anticipated Equipment Needs at Discharge (PT) wheelchair;walker, rolling   Risk & Benefits of therapy have been explained evaluation/treatment results reviewed;care plan/treatment goals reviewed;risks/benefits reviewed;current/potential barriers reviewed;participants voiced agreement with care plan;participants  included;patient   Clinical Impression Comments Sebastián will benefit from skilled therapy intervention to progress strength, sensation and coordination deficits in order to promote functional transfers, wheelchair mobility, ataxic gait and stairs so he may return home at a modified independent level.    Total Evaluation Time   Total Evaluation Time (Minutes) 40

## 2021-12-22 NOTE — PLAN OF CARE
Discharge Planner Post-Acute Rehab OT:     Discharge Plan: alone, 3rd floor apartment - [address] and multiple flights of steps within vs TBD, likely family home    Precautions: falls, sensory     Current Status:  ADLs:    Mobility: Flat bed mobility SBA. Ax1 SaraStedy - Ax2 SaraStedy with nsg    Oral cares/Grooming: SBA with s/u    Dressing: Not assessed    Bathing: Not assessed    Toileting: Simulated toilet transfer Ax1 sliding scale > toilet/platform BSC. Continue Ax2 SaraStedy with nsg.    IADLs: IND PTA including driving. 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 Hobbies include longboarding and blowing glass with brother.    Vision/Cognition: Glasses. A&Ox4 - monitor prn, higher level cog assessment.    Assessment: Pt is admitted to IP ARU following admission for degenerative SCI in context of B12 deficiency and poly-substance abuse. Pt performance with ADLs is significantly below baseline, impacted by distal weakness, gross and fine motor deficits impacting coordination, and sensory deficits impairing safety and independence in ADLs and functional mobility. Goals to progress with Mod I in ADLs. Pt will benefit from skilled occupational therapy intervention to address goals in POC and maximize safety and independence in ADLs. ELOS 3 weeks.    Eval completed and tx initiated. Session focus on functional transfers with graded assistance and education on OOB activity and skin mgmt. Continue POC, shower and full ADLs GG, progress functional transfers, distal UB and FM strength and coordination.    Other Barriers to Discharge (DME, Family Training, etc):   Pt lives alone in [facility] in [facility]. Pt reports having a flight of stairs to enter the building and 4 flight of stairs to get to apartment that is on the 3rd floor. Pt has tub/shower combo with no grab bars. Pt has standard height toilet. pt has queen sized bed and gets out on the right or foot of the bed. Laundry is located in the basement of  the apartment. Pt has carpeting throughout the living room, hallway and bedroom; tile in the bathroom; laminate in the kitchen.     Family training TBD    DME TBD    Outcome Measures  -  strength (12/22/21) RUE 55, LUE 70    (Reassess week of d/c)    - Box and Block (12/22/21) - RUE 18, LUE 20    (Reassess week of d/c)    Fug Stark score (12/22/21) -  (reassess week of discharge)

## 2021-12-22 NOTE — PLAN OF CARE
FOCUS/GOAL  Bowel management, Bladder management, Medication management, Pain management, Medical management, Mobility, Skin integrity, and Safety management    ASSESSMENT, INTERVENTIONS AND CONTINUING PLAN FOR GOAL:      Pt is alert and oriented x 4. Pt reported trouble with prn gabapentin being once a day as he needs it more at bedtime, notified provider and medication was changed to twice a day prn. Continent of bowel and bladder. Ax2 trang stedy transfers. Reg diet, thin liquids. Denied pain, nausea and vomiting, SOB. Alarms on, call light within reach. Will continue with plan of care.

## 2021-12-22 NOTE — PROGRESS NOTES
12/21/21 1538   Quick Adds   Type of Visit Initial Occupational Therapy Evaluation  (Initiated Reema Soriano OTR/L; Completed Farhad Foote OTR/L)   Living Environment   People in home alone   Current Living Arrangements apartment   Home Accessibility stairs to enter home;stairs within home   Number of Stairs, Main Entrance greater than 10 stairs  (~25 stairs)   Stair Railings, Main Entrance railing on left side (ascending)   Number of Stairs, Within Home, Primary greater than 10 stairs  (4 flights of 10 stairs )   Stair Railings, Within Home, Primary railings on both sides of stairs   Transportation Anticipated car, drives self;family or friend will provide   Living Environment Comments OT: Pt lives alone in [facility] in [facility]. Pt reports having a flight of stairs to enter the building and 4 flight of stairs to get to apartment that is on the 3rd floor. Pt has tub/shower combo with no grab bars. Pt has standard height toilet. pt has queen sized bed and gets out on the right or foot of the bed. Laundry is located in the basement of the apartment. Pt has carpeting throughout the living room, hallway and bedroom; tile in the bathroom; laminate in the kitchen.    Self-Care   Usual Activity Tolerance good   Current Activity Tolerance fair   Regular Exercise No   Equipment Currently Used at Home cane, straight;crutches;walker, rolling   Activity/Exercise/Self-Care Comment OT: Pt enjoys longboarding. Pt works at a [employer] in the summertime. Pt enjoys blowing glass with his brother.    Instrumental Activities of Daily Living (IADL)   Previous Responsibilities meal prep;housekeeping;laundry;shopping;medication management;finances;driving;work   Disability/Function   Hearing Difficulty or Deaf no   Wear Glasses or Blind yes   Vision Management OT: Corrective lenses, near sighted   Concentrating, Remembering or Making Decisions Difficulty no   Difficulty Communicating no   Difficulty Eating/Swallowing no   Walking or  "Climbing Stairs Difficulty no   Dressing/Bathing Difficulty no   Toileting issues no   Doing Errands Independently Difficulty (such as shopping) yes   Fall history within last six months yes   Number of times patient has fallen within last six months 1   Change in Functional Status Since Onset of Current Illness/Injury yes   General Information   Onset of Illness/Injury or Date of Surgery 12/16/21   Referring Physician Dr. 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  Admitted to acute rehab 12/21/21 in setting of impaired strength, impaired activity tolerance, impaired balance, and impaired coordination.   Existing Precautions/Restrictions fall   Heart Disease Risk Factors Lack of physical activity;Overweight;Medical history   Cognitive Status Examination   Orientation Status orientation to person, place and time   Affect/Mental Status (Cognitive) flat/blunted affect   Follows Commands follows multi-step commands   Visual Perception   Impact of Vision Impairment on Function (Vision) OT: Pt wears glasses and is near sighted. Visual tracking, peripheral and convergence intact.    Sensory   Sensory Quick Adds Pain;Sharp dull discrimination;Two point discrimination;Proprioception;Light touch;Sensory tests   Sensory Comments OT: Pt reports NT in hands and up to mid-forearm, BLEs predominantly in feet and bottom 2/3 lower leg.  Delayed registration of hot/cold on LUE ulnar nerve distribution.   Sensation Light Touch, Rehab Eval   LUE within normal limits   RUE within normal limits   Sensation Sharp " Dull Discrimination, Rehab Eval   LUE absent sensation   RUE absent sensation   Two Point Discrimination, OT Eval   LUE severe impairment   RUE severe impairment   Proprioception, OT Eval   LUE within normal limits   RUE within normal limits   Pain Assessment   Patient Currently in Pain No   Integumentary/Edema   Integumentary/Edema Comments OT: RLE: 1+ edema. no concerns with LLE   Posture   Posture forward head position   Range of Motion Comprehensive   Comment, General Range of Motion OT: BUE's: WFL all planes. increased time with finger opposition   Strength Comprehensive (MMT)   Comment, General Manual Muscle Testing (MMT) Assessment OT: 5/5 with bilateral shoulder flex/ext, sh. abd/add. 4-/5 with elb. flex/ext, 3+/5 wrist flex/ext and     Hand Strength   Left hand  (pounds) 55  (non-dominant hand, below baseline (, avg 110.8))   Right hand  (pounds) 70  (dominant hand, below avg age range (, average 120.8))   Coordination   Left hand, Box and Block test (cubes transferred in 1 minute) 20   Right hand, Box and Block test (cubes transerred in 1 minute) 18   ARC Assessment Only   Acute Rehab Functional Assessment See IP Rehab Daily Documentation Flowsheet for Functional Mobility/ADL Assessment   Clinical Impression   Criteria for Skilled Therapeutic Interventions Met (OT) yes;meets criteria;skilled treatment is necessary   OT Diagnosis OT: decreased safety and IND with ADLs/IADLs   OT Problem List-Impairments impacting ADL problems related to;activity tolerance impaired;balance;cognition;coordination;inability to direct their own care;mobility;motor control;range of motion (ROM);sensation;strength;postural control;other (see comments)   Assessment of Occupational Performance 5 or more Performance Deficits   Planned Therapy Interventions (OT) ADL retraining;IADL retraining;balance training;bed mobility training;cognition;E-stim;fine motor coordination training;groups;motor coordination  training;neuromuscular re-education;ROM;strengthening;stretching;transfer training;home program guidelines;progressive activity/exercise;risk factor education;other (see comments)   Clinical Decision Making Complexity (OT) high complexity   Therapy Frequency (OT) Daily   Predicted Duration of Therapy ELOS 3 weeks   Anticipated Equipment Needs Upon Discharge (OT) bathing equipment;dressing equipment;toileting equipment   Risk & Benefits of therapy have been explained evaluation/treatment results reviewed;care plan/treatment goals reviewed;risks/benefits reviewed;current/potential barriers reviewed;participants voiced agreement with care plan;participants included;patient   Comment-Clinical Impression Pt is admitted to IP ARU following admission for degenerative SCI in context of B12 deficiency and poly-substance abuse. Pt performance with ADLs is significantly below baseline, impacted by distal weakness, gross and fine motor deficits, and sensory deficits impairing safety in ADLs and functional mobility. Goals to progress with Mod I in ADLs. Pt will benefit from skilled occupational therapy intervention to address goals in POC and maximize safety and independence in ADLs.   Total Evaluation Time (Minutes)   Total Evaluation Time (Minutes) 35

## 2021-12-22 NOTE — PROGRESS NOTES
[facility]   Acute Rehabilitation Unit  Daily progress note    INTERVAL HISTORY  Notes reviewed.  No acute events overnight.  Slept well, but this morning was unhappy that he was given Gabapentin even though he did not ask for it and says it will make him tired for therapies.  At home he takes it TID PRN, with an average of 2 doses per day, which he uses for anxiety.  Will increase dose to BID PRN.  Still with numbness in his hands and feet, but decreased coordination and balance are his biggest concerns.  Functionally, therapy evaluations underway today.        MEDICATIONS  Scheduled:    apixaban ANTICOAGULANT  5 mg Oral BID     cyanocobalamin  1,000 mcg Intramuscular Daily     [START ON 12/24/2021] cyanocobalamin  1,000 mcg Intramuscular Q7 Days     folic acid  1 mg Oral Daily     metoprolol succinate ER  25 mg Oral Daily     multivitamin w/minerals  1 tablet Oral Daily     senna-docusate  1 tablet Oral BID     thiamine  100 mg Oral Daily        PRN:  acetaminophen, bisacodyl, gabapentin, ondansetron, polyethylene glycol       PHYSICAL EXAM  Patient Vitals for the past 24 hrs:   BP Temp Temp src Pulse Resp SpO2   12/22/21 0830 116/81 (!) 96.3  F (35.7  C) Oral 75 16 98 %   12/22/21 0230 112/70 97.5  F (36.4  C) Axillary 62 18 95 %   12/21/21 1623 123/84 97.7  F (36.5  C) Oral 90 16 96 %   12/21/21 1348 135/86 97.5  F (36.4  C) Oral 99 16 97 %       GEN: NAD, flat affect  HEENT: NC/AT  CVS: RRR, S1+S2, no m/r/g  PULM: CTA b/l, no w/r/r  ABD: Soft, NT, ND, bowel sounds present  EXT: No LE edema or calf tenderness b/l  Neuro: Answers appropriately, follows commands.  Diminished sensation in b/l LEs and decreased proprioception.  +Dysmetria, L>R.      LABS  CBC RESULTS:   Recent Labs   Lab Test 12/18/21  0631   WBC 5.5   RBC 4.05*   HGB 12.9*   HCT 39.0*   MCV 96   MCH 31.9   MCHC 33.1   RDW 14.6          Last Comprehensive Metabolic Panel:  Sodium   Date Value Ref Range  Status   12/18/2021 138 133 - 144 mmol/L Final   04/26/2021 140 133 - 144 mmol/L Final     Potassium   Date Value Ref Range Status   12/18/2021 3.8 3.4 - 5.3 mmol/L Final   04/26/2021 3.4 3.4 - 5.3 mmol/L Final     Chloride   Date Value Ref Range Status   12/18/2021 108 94 - 109 mmol/L Final   04/26/2021 101 94 - 109 mmol/L Final     Carbon Dioxide   Date Value Ref Range Status   04/26/2021 27 20 - 32 mmol/L Final     Carbon Dioxide (CO2)   Date Value Ref Range Status   12/18/2021 25 20 - 32 mmol/L Final     Anion Gap   Date Value Ref Range Status   12/18/2021 5 3 - 14 mmol/L Final   04/26/2021 12 3 - 14 mmol/L Final     Glucose   Date Value Ref Range Status   12/18/2021 97 70 - 99 mg/dL Final   04/26/2021 110 (H) 70 - 99 mg/dL Final     Urea Nitrogen   Date Value Ref Range Status   12/18/2021 12 7 - 30 mg/dL Final   04/26/2021 7 7 - 30 mg/dL Final     Creatinine   Date Value Ref Range Status   12/18/2021 0.92 0.66 - 1.25 mg/dL Final   04/26/2021 0.77 0.66 - 1.25 mg/dL Final     GFR Estimate   Date Value Ref Range Status   12/18/2021 >90 >60 mL/min/1.73m2 Final     Comment:     As of July 11, 2021, eGFR is calculated by the CKD-EPI creatinine equation, without race adjustment. eGFR can be influenced by muscle mass, exercise, and diet. The reported eGFR is an estimation only and is only applicable if the renal function is stable.   04/26/2021 >90 >60 mL/min/[1.73_m2] Final     Comment:     Non  GFR Calc  Starting 12/18/2018, serum creatinine based estimated GFR (eGFR) will be   calculated using the Chronic Kidney Disease Epidemiology Collaboration   (CKD-EPI) equation.       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  Admitted to acute rehab 12/21/21 in setting of impaired strength, impaired activity tolerance, impaired balance, and impaired coordination.     Admission to acute inpatient rehab spinal cord dysfunction: upper and lower extremity paraesthesias and weakness due to combined degeneration of spinal cord 2/2 B12 deficiencey    Impairment group code: 04.120        1. PT, OT 90 minutes of each on a daily basis, in addition to rehab nursing and close management of physiatrist.       2. Impairment of ADL's: Noted to have impaired strength, impaired activity tolerance, and impaired coordination  leading to decreased ability to independently complete ADL's.  Will benefit from ongoing OT with goal for MOD I with basic ADLs.      3. Impairment of mobility:  Noted to have impaired strength, impaired activity tolerance, and impaired balance leading to decreased mobility.  Will benefit from ongoing PT with goal for JESSICA with basic mobility.         4. 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 B12 116.  MRI cervical with abnormal T2 signal is seen within the dorsal spinal cord extending from at least the C1-C2 level down through C5-C6. The signal abnormality in the spinal cord appears to be within inverted V-shaped on the axial sequences consistent with B12 deficiency, felt to be 2/2 to chronic nitrous oxide use, possible poor po intake  2/2 chronic etoh use.  copper level (WNL) , homocysteine (in process) , methylmelonic acid (5.77)  seen by General Neurology  -continue B12 1000mcg every other day IM ( x 7 days) through 12/23  Followed by B12 1000mcg IM every week for 4 weeks   - f/u  B12 /CBC in 2 weeks and 4 weeks with PCP.   Can consider extending b12 IM tx to 3 months if neurologic improvement. Then start maintenance dose B12 1000mcg IM every month or 1000mcg SL daily.  -f/u neurology.    -stop all nitrous oxide- discussed care plan in detail with patient  -continue PT/OT     Alcohol use disorder: Drank 1 L whiskey and 12 cans of beer over the past week. He has a history of alcohol withdrawal, but no withdrawal seizure. Hx of CD treatment, most recently at [facility] in May 2021.   - CD consulted, patient Not interested in inpatient CD treatment at this time.   - Folic acid, thiamine, MV daily for now  -PCP follow up     Hx of RLE DVT and submassive PE (10/2021): Diagnosed 10/31/21. Thought to be provoked by an airplaine flight  - Continue Eliquis   - Vascular Medicine referral per Cardiology at last appointment 12/2 -vascular medicine outpatient follow up   -start compression stocking knee high during day.      Biventricular heart failure, HFrEF   after subacute presentation of bilateral submassive pulmonary emboli. NYHA II, improving. EF was mildly decreased 45-50% initially, improved to low normal 50% on recent echo. Possibly some component of alcohol use. Last seen by Cardiology 12/2/21.   - Cardiology follow-up in 6 months.   -f/u pcp     Sinus tachycardia:   Continue PTA Toprol XL.      Morbid obesity:   BMI 39. Increased risk for all cause mortality.   - Diet and lifestyle modification recommended      Constipation  Benign abd exam. No obstructive sxs.   - senokot bid  -prn miralax/ supp  -encourage fluids     Marijuana use: Noted.   - Pt uninterested in cessation     Dependent edema: R>L. Compression stockings started  Wear during  day, off at night. Hx of DVT.     Anxiety  -Gabapentin BID PRN.  At home he has it ordered TID PRN but uses it twice per day on average and he notes it does make him tired in the day.         5. Adjustment to disability:  Clinical psychology to eval and treat as indicated.  6. FEN: reg  7. Bowel: constipated monitor.   8. Bladder: continent  9. DVT Prophylaxis: on apixaban  10. GI Prophylaxis: none  11. Code: full  12. Disposition:  Goal for home  13. ELOS:   3 weeks. .  14. Rehab prognosis:  fair  15. Follow up Appointments on Discharge: pcp, neurology.         Otilio Martinez MD  Department of Rehabilitation Medicine      Time Spent on this Encounter   I, Otilio Martinez, spent a total of 35 minutes face-to-face or managing the care of Sebastián Nogueira. Over 50% of my time on the unit was spent counseling the patient and coordinating care. See note for details.

## 2021-12-22 NOTE — PLAN OF CARE
FOCUS/GOAL  Medical management    ASSESSMENT, INTERVENTIONS AND CONTINUING PLAN FOR GOAL:    Pt is alert and oriented x4. Uses the call light appropriately. VSS. Denied pain, sob, N/V, or any new weakness. Numbness and tingling on bilateral hands and feet are pt's baseline. A2 allen steady in transfers. Independent in bed mobility. Continent of bladder using the urinal. LBM 12/19. No results from senna that was given in the evening. Woke up around 0230 and was watching tv. Per pt has had trouble with sleep since in the hospital. Was taking melatonin and trazodone at home. Declined to take anything at this time. Safety hourly rounds done. Will continue poc.

## 2021-12-22 NOTE — PHARMACY-MEDICATION REGIMEN REVIEW
Pharmacy Medication Regimen Review  Sebastián UBALDO Nogueira is a 26 year old male who is currently in the Acute Rehab Unit.    Assessment: All medications have an appropriate indications, durations and no unnecessary use was found    Plan:   1. Continue medications as ordered.     Attending provider will be sent this note for review.  If there are any emergent issues noted above, pharmacist will contact provider directly by phone.      Pharmacy will periodically review the resident's medication regimen for any PRN medications not administered in > 72 hours and discontinue them. The pharmacist will discuss gradual dose reductions of psychopharmacologic medications with interdisciplinary team on a regular basis.    Please contact pharmacy if the above does not answer specific medication questions/concerns.    Background:  A pharmacist has reviewed all medications and pertinent medical history today.  Medications were reviewed for appropriate use and any irregularities found are listed with recommendations.      Current Facility-Administered Medications:      acetaminophen (TYLENOL) tablet 650 mg, 650 mg, Oral, Q6H PRN, Yahaira Tello PA     apixaban ANTICOAGULANT (ELIQUIS) tablet 5 mg, 5 mg, Oral, BID, Yahaira Tello PA, 5 mg at 12/22/21 0833     bisacodyl (DULCOLAX) Suppository 10 mg, 10 mg, Rectal, Daily PRN, Yahaira Tello PA     cyanocobalamin injection 1,000 mcg, 1,000 mcg, Intramuscular, Daily, Yahaira Tello PA, 1,000 mcg at 12/22/21 0832     [START ON 12/24/2021] cyanocobalamin injection 1,000 mcg, 1,000 mcg, Intramuscular, Q7 Days, Yahaira Tello PA     folic acid (FOLVITE) tablet 1 mg, 1 mg, Oral, Daily, Yahaira Tello PA, 1 mg at 12/22/21 0833     gabapentin (NEURONTIN) capsule 400 mg, 400 mg, Oral, BID PRN, Ej Martinez MD     metoprolol succinate ER (TOPROL-XL) 24 hr tablet 25 mg, 25 mg, Oral, Daily, Yahaira Tello PA, 25 mg at 12/22/21 0833     multivitamin w/minerals  (THERA-VIT-M) tablet 1 tablet, 1 tablet, Oral, Daily, Yahaira Tello PA, 1 tablet at 12/22/21 0833     ondansetron (ZOFRAN) tablet 4 mg, 4 mg, Oral, Q8H PRN, Yahaira Tello PA     polyethylene glycol (MIRALAX) Packet 17 g, 17 g, Oral, Daily PRN, Yahaira Tello PA     senna-docusate (SENOKOT-S/PERICOLACE) 8.6-50 MG per tablet 1 tablet, 1 tablet, Oral, BID, Yahaira Tello PA, 1 tablet at 12/22/21 0833     thiamine (B-1) tablet 100 mg, 100 mg, Oral, Daily, Yahaira Tello PA, 100 mg at 12/22/21 0833  No current outpatient prescriptions on file.   PMH: alcohol use disorder, marijuana use, recent history of RLE DVT and submassive PE, and biventricular heart failure (from PE, now improving to EF 50%)     Aristides Goncalves, PharmD

## 2021-12-22 NOTE — PROGRESS NOTES
Physical Therapy Discharge Summary    Reason for therapy discharge:    Discharged to acute rehabilitation facility.    Progress towards therapy goal(s). See goals on Care Plan in [facility] electronic health record for goal details.  Goals not met.  Barriers to achieving goals:   discharge from facility.    Therapy recommendation(s):    Continued therapy is recommended.  Rationale/Recommendations:  below functional baseline per flowsheet.

## 2021-12-22 NOTE — PLAN OF CARE
FOCUS/GOAL  Bladder management, Pain management, Medical management, Mobility, and Safety management    ASSESSMENT, INTERVENTIONS AND CONTINUING PLAN FOR GOAL:    A&O x 4. Talks slower at times. Calls appropriately. Transfers with WINSOME VERA and trang shah. Regular diet, thin liquids, takes pills whole. States numbness and tingling in extremities. Denies pain or SOB. Voided with urinal, PVR = 50 mL.  Last BM 12/19, scheduled senna given this shift. Pt requested continuing PTA gabapentin order, sticky note left to provider. Continue with plan of care.     Shannan Kendall RN

## 2021-12-22 NOTE — PLAN OF CARE
Discharge Planner Post-Acute Rehab PT:     Discharge Plan: return to parents home wheelchair based due to inaccessible apartment building    Precautions: falls, DVT/PE    Current Status:  Bed Mobility: independent   Transfer: assist x 2 with Diana Steady  Gait: w/c based, amb w/ Liko Lift, FWW, Yeyo with anterior weight shift, w/c follow   Stairs: not safe at this time   Balance: normal seated balance, poor standing balance     Assessment:  Sebastián will benefit from skilled therapy intervention to progress strength, sensation and coordination deficits in order to promote functional transfers, wheelchair mobility, ataxic gait and stairs so he may return home at a modified independent level.     Other Barriers to Discharge (DME, Family Training, etc):   DME - wheelchair evaluation initiated for next week, will need FWW    Family training - to be scheduled, determine how much assist family will be able to provide

## 2021-12-23 ENCOUNTER — APPOINTMENT (OUTPATIENT)
Dept: OCCUPATIONAL THERAPY | Facility: CLINIC | Age: 26
End: 2021-12-23
Attending: PHYSICAL MEDICINE & REHABILITATION
Payer: COMMERCIAL

## 2021-12-23 ENCOUNTER — APPOINTMENT (OUTPATIENT)
Dept: PHYSICAL THERAPY | Facility: CLINIC | Age: 26
End: 2021-12-23
Attending: PHYSICAL MEDICINE & REHABILITATION
Payer: COMMERCIAL

## 2021-12-23 PROCEDURE — 97530 THERAPEUTIC ACTIVITIES: CPT | Mod: GP

## 2021-12-23 PROCEDURE — 250N000011 HC RX IP 250 OP 636: Performed by: PHYSICIAN ASSISTANT

## 2021-12-23 PROCEDURE — 250N000013 HC RX MED GY IP 250 OP 250 PS 637: Performed by: PHYSICIAN ASSISTANT

## 2021-12-23 PROCEDURE — 97110 THERAPEUTIC EXERCISES: CPT | Mod: GO

## 2021-12-23 PROCEDURE — 97535 SELF CARE MNGMENT TRAINING: CPT | Mod: GO

## 2021-12-23 PROCEDURE — 97112 NEUROMUSCULAR REEDUCATION: CPT | Mod: GP

## 2021-12-23 PROCEDURE — 128N000003 HC R&B REHAB

## 2021-12-23 PROCEDURE — 97110 THERAPEUTIC EXERCISES: CPT | Mod: GP

## 2021-12-23 PROCEDURE — 97116 GAIT TRAINING THERAPY: CPT | Mod: GP

## 2021-12-23 PROCEDURE — 99231 SBSQ HOSP IP/OBS SF/LOW 25: CPT | Performed by: PHYSICAL MEDICINE & REHABILITATION

## 2021-12-23 PROCEDURE — 250N000013 HC RX MED GY IP 250 OP 250 PS 637: Performed by: PHYSICAL MEDICINE & REHABILITATION

## 2021-12-23 RX ADMIN — DOCUSATE SODIUM AND SENNOSIDES 1 TABLET: 8.6; 5 TABLET, FILM COATED ORAL at 08:16

## 2021-12-23 RX ADMIN — METOPROLOL SUCCINATE 25 MG: 25 TABLET, EXTENDED RELEASE ORAL at 08:17

## 2021-12-23 RX ADMIN — THIAMINE HCL TAB 100 MG 100 MG: 100 TAB at 08:16

## 2021-12-23 RX ADMIN — GABAPENTIN 400 MG: 300 CAPSULE ORAL at 20:04

## 2021-12-23 RX ADMIN — DOCUSATE SODIUM AND SENNOSIDES 1 TABLET: 8.6; 5 TABLET, FILM COATED ORAL at 20:04

## 2021-12-23 RX ADMIN — CYANOCOBALAMIN 1000 MCG: 1000 INJECTION, SOLUTION INTRAMUSCULAR at 08:16

## 2021-12-23 RX ADMIN — APIXABAN 5 MG: 2.5 TABLET, FILM COATED ORAL at 20:04

## 2021-12-23 RX ADMIN — MULTIPLE VITAMINS W/ MINERALS TAB 1 TABLET: TAB at 08:16

## 2021-12-23 RX ADMIN — FOLIC ACID 1 MG: 1 TABLET ORAL at 08:17

## 2021-12-23 RX ADMIN — APIXABAN 5 MG: 2.5 TABLET, FILM COATED ORAL at 08:17

## 2021-12-23 ASSESSMENT — ACTIVITIES OF DAILY LIVING (ADL)
ADLS_ACUITY_SCORE: 13

## 2021-12-23 NOTE — CONSULTS
Social Work: Initial Assessment with Discharge Plan    Patient Name: Sebastián Nogueira  : 1995  Age: 26 year old  MRN: [id number]  Completed assessment with: Chart review and interview with patient   Admitted to ARU: 2021    Presenting Information   Date of SW assessment: 2021  Health Care Directive: Patient considering completing and Provided education  Primary Health Care Agent: Pt  Secondary Health Care Agent: Parents are NOK. SW provided education and blank copy of HCD to pt. Pt considering completing.   Living Situation: Lives alone in an apartment [address]. 4 flights of 10 stairs inside.   Previous Functional Status: IND w/ mobility, ADLs, IADLs, gait and transfers.   DME available: Cane, walker and crutches.  Patient and family understanding of hospitalization: Appropriate. A&Ox4.   Cultural/Language/Spiritual Considerations: English speaking male.       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  Admitted to acute rehab 21 in setting of impaired strength, impaired activity tolerance, impaired balance, and impaired coordination.    Provider Information   Primary Care Physician:Ton López    [address]  [address], [facility] [address]   : None reported    Mental Health/Chemical Dependency:   Diagnosis: Anxiety per pt  Alcohol/Tobacco/Narcotis: ETOH. 12 beers and 1 liter whiskey in week prior to hospitalization. Daily nitrous, occasional cocaine, hallucinogens.  Support/Services in Place: CD consult was placed and pt was offered resources, but declined. SW again offered CD resources, but pt declined. Pt states he takes medication for anxiety.   Services  "Needed/Recommended: Supportive services available by consult (Health Psychology and [place] services)   Sexuality/Intimacy: Not discussed     Support System  Marital Status: Single  Family support: Father, Jaguar, and mother, Leighann, live in [place], [place] and are \"somewhat involved, but don't help out that much.\" Brother, Massimo (23), lives w/ parents in [place] and pt reports they have a close relationships. Sister, Minerva (25), lives in [place] and pt states she is the most involved in terms of taking pt to appointments and \"medical coordination.\"  Other support available: None reported    Community Resources  Current in home services: None reported  Previous services: None reported    Financial/Employment/Education  Employment Status: Works odd jobs, didn't go into detail.  Income Source: Money from different \"odd jobs\" and savings.  Education: High school degree  Financial Concerns:  None reported  Insurance: [employer] PMAP AND MNCARE      Discharge Plan   Patient and family discharge goal: Home with HC vs OP, pending progress.   Provided Education on discharge plan: Yes  Patient agreeable to discharge plan:  Yes  Provided education and attained signature for Medicare IM and IRF Patient Rights and Privacy Information provided to patient : NA  Provided patient with [facility] [place] Resources: NA  Barriers to discharge: Medical clearance, therapy goals met.     Discharge Recommendations   Disposition: See above   Transportation Needs: Family assistance   Name of Transportation Company and Phone: N/A     Additional comments   Discharge FARRUKH RECINOS 3 weeks. 15/15 on BIMS.    SW will remain available and continue to follow as needs arise.     Please invite to Care Conference:  JORJE PETERSEN, LUANA Villanueva   Phone: [phone]  Pager: [phone]              "

## 2021-12-23 NOTE — PLAN OF CARE
FUGL-HILTON ASSESSMENT   UPPER EXTREMITY  Assessment of sensorimotor function   Stroke specific, performance-based impairment index. It is designed to assess motor functioning, sensation, and joint functioning in patients with post-stroke hemiplegia. Administered to pt despite different diagnosis than typical population d/t similar UE dysfunction caused by CNS damage. Modified score form to capture differences in right and left hands vs unilateral deficits.    Scoring  The FMA is an impairment measure, consisting of 33 motor tasks and 30 sensory/pain observations.   Motor and sensory items are scored on a 0-2 scale: 0 none, 1 partial, and 2 full.  Motor Function   Upper Extremity 36/36   Wrist 10/10   R Hand  or  L Hand 10/14    12/14   Coordination/Speed 0/6   Total Right 56/66  Left 58/66     Note: Pt able to form hand positions with extra time and visual attention.     Motor Classification  Very Severe (0-12)  Severe-Moderate (13-30)  Moderate-Mild (31-47)  Mild (48-60)    Sensory/joint observations (light touch, proprioception, PROM, PMHx impacting UE function): Recorded in evaluation, see POC note.      References  FMA-UE PROTOCOL Rehabilitation Medicine, [facility]  Approved by Fugl-Hilton AR 2010  Fugl-Hilton AR, Low L, Billy I, Hortencia S, Elizabeth S: The post-stroke hemiplegic patient. A method for evaluation of physical performance. Scand J Rehabil Med 1975, 7:13-31.  Erica hoyos al.: Determining Levels of Upper Extremity Movement Impairment by Applying a Cluster Analysis to the Fugl-Hilton Assessment of the Upper Extremity in Chronic Stroke. Archives of Physical Medicine and Rehabilitation 2016, 98: 456-462.

## 2021-12-23 NOTE — PROGRESS NOTES
[facility]   Acute Rehabilitation Unit  Daily progress note    INTERVAL HISTORY  No acute events overnight.  This morning Mr. Nogueira has no new concerns or complaints.  He had a shower this morning which he is very happy about.  Denies pain, chest pain, or shortness of breath.  He was seen ambulating with the ceiling harness in PT, and he tells me they were even able to do some ambulation without it.  He has also initiated some stairs.  Provided encouragement and congratulations for the progress he is making.      MEDICATIONS  Scheduled:    apixaban ANTICOAGULANT  5 mg Oral BID     [START ON 12/24/2021] cyanocobalamin  1,000 mcg Intramuscular Q7 Days     folic acid  1 mg Oral Daily     metoprolol succinate ER  25 mg Oral Daily     multivitamin w/minerals  1 tablet Oral Daily     senna-docusate  1 tablet Oral BID     thiamine  100 mg Oral Daily        PRN:  acetaminophen, bisacodyl, gabapentin, ondansetron, polyethylene glycol       PHYSICAL EXAM  Patient Vitals for the past 24 hrs:   BP Temp Temp src Pulse Resp SpO2   12/23/21 0817 118/77 -- -- -- -- --   12/23/21 0700 117/74 (!) 96.1  F (35.6  C) Oral 71 16 98 %   12/22/21 1609 132/74 99.8  F (37.7  C) Axillary 87 16 97 %       GEN: NAD, flat affect  HEENT: NC/AT  CVS: RRR, S1+S2, no m/r/g  PULM: CTA b/l, no w/r/r  ABD: Soft, NT, ND, bowel sounds present  EXT: No LE edema or calf tenderness b/l  Neuro: Answers appropriately, follows commands.     LABS  CBC RESULTS:   Recent Labs   Lab Test 12/18/21  0631   WBC 5.5   RBC 4.05*   HGB 12.9*   HCT 39.0*   MCV 96   MCH 31.9   MCHC 33.1   RDW 14.6          Last Comprehensive Metabolic Panel:  Sodium   Date Value Ref Range Status   12/18/2021 138 133 - 144 mmol/L Final   04/26/2021 140 133 - 144 mmol/L Final     Potassium   Date Value Ref Range Status   12/18/2021 3.8 3.4 - 5.3 mmol/L Final   04/26/2021 3.4 3.4 - 5.3 mmol/L Final     Chloride   Date Value Ref Range Status    12/18/2021 108 94 - 109 mmol/L Final   04/26/2021 101 94 - 109 mmol/L Final     Carbon Dioxide   Date Value Ref Range Status   04/26/2021 27 20 - 32 mmol/L Final     Carbon Dioxide (CO2)   Date Value Ref Range Status   12/18/2021 25 20 - 32 mmol/L Final     Anion Gap   Date Value Ref Range Status   12/18/2021 5 3 - 14 mmol/L Final   04/26/2021 12 3 - 14 mmol/L Final     Glucose   Date Value Ref Range Status   12/18/2021 97 70 - 99 mg/dL Final   04/26/2021 110 (H) 70 - 99 mg/dL Final     Urea Nitrogen   Date Value Ref Range Status   12/18/2021 12 7 - 30 mg/dL Final   04/26/2021 7 7 - 30 mg/dL Final     Creatinine   Date Value Ref Range Status   12/18/2021 0.92 0.66 - 1.25 mg/dL Final   04/26/2021 0.77 0.66 - 1.25 mg/dL Final     GFR Estimate   Date Value Ref Range Status   12/18/2021 >90 >60 mL/min/1.73m2 Final     Comment:     As of July 11, 2021, eGFR is calculated by the CKD-EPI creatinine equation, without race adjustment. eGFR can be influenced by muscle mass, exercise, and diet. The reported eGFR is an estimation only and is only applicable if the renal function is stable.   04/26/2021 >90 >60 mL/min/[1.73_m2] Final     Comment:     Non  GFR Calc  Starting 12/18/2018, serum creatinine based estimated GFR (eGFR) will be   calculated using the Chronic Kidney Disease Epidemiology Collaboration   (CKD-EPI) equation.       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  Admitted to acute rehab 12/21/21 in setting  of impaired strength, impaired activity tolerance, impaired balance, and impaired coordination.     Admission to acute inpatient rehab spinal cord dysfunction: upper and lower extremity paraesthesias and weakness due to combined degeneration of spinal cord 2/2 B12 deficiencey    Impairment group code: 04.120        1. PT, OT 90 minutes of each on a daily basis, in addition to rehab nursing and close management of physiatrist.       2. Impairment of ADL's: Noted to have impaired strength, impaired activity tolerance, and impaired coordination  leading to decreased ability to independently complete ADL's.  Will benefit from ongoing OT with goal for MOD I with basic ADLs.      3. Impairment of mobility:  Noted to have impaired strength, impaired activity tolerance, and impaired balance leading to decreased mobility.  Will benefit from ongoing PT with goal for JESSICA with basic mobility.         4. 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 B12 116.  MRI cervical with abnormal T2 signal is seen within the dorsal spinal cord extending from at least the C1-C2 level down through C5-C6. The signal abnormality in the spinal cord appears to be within inverted V-shaped on the axial sequences consistent with B12 deficiency, felt to be 2/2 to chronic nitrous oxide use, possible poor po intake 2/2 chronic etoh use.  copper level (WNL) , homocysteine (in process) , methylmelonic acid (5.77)  seen by General Neurology  -continue B12 1000mcg every other day IM ( x 7 days) through 12/23  Followed by B12 1000mcg IM every week for 4 weeks   - f/u  B12 /CBC in 2 weeks and 4  weeks with PCP.   Can consider extending b12 IM tx to 3 months if neurologic improvement. Then start maintenance dose B12 1000mcg IM every month or 1000mcg SL daily.  -f/u neurology.    -stop all nitrous oxide- discussed care plan in detail with patient  -continue PT/OT     Alcohol use disorder: Drank 1 L whiskey and 12 cans of beer over the past week. He has a history of alcohol withdrawal, but no withdrawal seizure. Hx of CD treatment, most recently at [facility] in May 2021.   - CD consulted, patient Not interested in inpatient CD treatment at this time.   - Folic acid, thiamine, MV daily for now  -PCP follow up     Hx of RLE DVT and submassive PE (10/2021): Diagnosed 10/31/21. Thought to be provoked by an airplaine flight  - Continue Eliquis   - Vascular Medicine referral per Cardiology at last appointment 12/2 -vascular medicine outpatient follow up   -start compression stocking knee high during day.      Biventricular heart failure, HFrEF   after subacute presentation of bilateral submassive pulmonary emboli. NYHA II, improving. EF was mildly decreased 45-50% initially, improved to low normal 50% on recent echo. Possibly some component of alcohol use. Last seen by Cardiology 12/2/21.   - Cardiology follow-up in 6 months.   -f/u pcp     Sinus tachycardia:   Continue PTA Toprol XL.      Morbid obesity:   BMI 39. Increased risk for all cause mortality.   - Diet and lifestyle modification recommended      Constipation  Benign abd exam. No obstructive sxs.   - senokot bid  -prn miralax/ supp  -encourage fluids     Marijuana use: Noted.   - Pt uninterested in cessation     Dependent edema: R>L. Compression stockings started  Wear during day, off at night. Hx of DVT.     Anxiety  -Gabapentin BID PRN.  At home he has it ordered TID PRN but uses it twice per day on average and he notes it does make him tired in the day.         5. Adjustment to disability:  Clinical psychology to eval and treat as indicated.  6. FEN:  reg  7. Bowel: constipated monitor.   8. Bladder: continent  9. DVT Prophylaxis: on apixaban  10. GI Prophylaxis: none  11. Code: full  12. Disposition:  Goal for home  13. ELOS:   3 weeks.  14. Rehab prognosis:  fair  15. Follow up Appointments on Discharge: pcp, neurology.         Otilio Martinez MD  Department of Rehabilitation Medicine      Time Spent on this Encounter   I, Otilio Martinez, spent a total of 15 minutes face-to-face or managing the care of Sebastián UBALDO Jero. Over 50% of my time on the unit was spent counseling the patient and coordinating care. See note for details.

## 2021-12-23 NOTE — PLAN OF CARE
FOCUS/GOAL  Medical management    ASSESSMENT, INTERVENTIONS AND CONTINUING PLAN FOR GOAL:    Pt is alert and oriented. Denied pain, sob, dizziness, N/V, abdominal pain or any new weakness. General extremity N/T are pt's baseline as verbalized. Continent of bladder using the urinal independently, staff to empty. Independent in bed mobility. Stayed in bed this shift. Appears sleeping during hourly safety rounds. Will continue poc.

## 2021-12-23 NOTE — PLAN OF CARE
"Discharge Planner Post-Acute Rehab PT:      Discharge Plan: home to apartment with 20+ stairs to access apartment; discussed discharge to parents home but pt reporting he cannot d/t strained relationship with them. Will need to assess continued progress with therapy to determine if pt will be able to complete stairs to access his current apartment.     Precautions: falls, DVT/PE     Current Status:  Bed Mobility: independent   Transfer: stand pivot, FWW, Yeyo with therapy; Diana Vaz Ax2 with RN staff. Hoping to progress to transfers FWW with RN staff soon.  Gait: amb 50', weighted cart, Yeyo + w/c follow. Amb 200', FWW, Yeyo in Liko rail.  Stairs: up/down 3x6\" steps, B rails, modA for stability ascending steps, Yeyo descending.  Balance: normal seated balance, poor standing balance      Assessment:  improving BLE coordination, completed amb and stairs. Discussed alternative discharge locations, pt reports can't go to parents house d/t strained relationship. Will need to continue to progress independence with stairs.     Other Barriers to Discharge (DME, Family Training, etc):   DME - wheelchair evaluation initiated for next week, will need FWW     Family training - to be scheduled, determine how much assist family will be able to provide   "

## 2021-12-23 NOTE — PLAN OF CARE
FOCUS/GOAL  Bowel management, Medication management, Mobility, and Safety management    ASSESSMENT, INTERVENTIONS AND CONTINUING PLAN FOR GOAL:    A&O x 4. Calls appropriately. Denied any chest pain, SOB, nausea or vomiting. Transfers with WINSOME VERA and trang shah. Regular diet, thin liquids, takes pills whole. States numbness and tingling in extremities. Continent of bowel and bladder. Voided with urinal  Last BM 12/22. Continue with plan of care.    Shannan Kendall RN

## 2021-12-23 NOTE — PLAN OF CARE
FOCUS/GOAL  Bowel management, Bladder management, Medication management, Pain management, Medical management, Mobility, Skin integrity, and Safety management     ASSESSMENT, INTERVENTIONS AND CONTINUING PLAN FOR GOAL:    Pt is alert and oriented x 4. Vital signs stable. Reported baseline numbness and tingling in all extremities. Denied pain, nausea and vomiting, shortness of breath. Received shower with OT this morning. Alarms on, side rails up x 3 for safety. Call light within reach, able to make needs known. No concerns regarding bowel and bladder, continent of both. Pt transfers with assist of 2 with trang shah. Reg diet, thin liquids. No new concerns at this time. Will continue with plan of care.

## 2021-12-23 NOTE — PLAN OF CARE
Discharge Planner Post-Acute Rehab OT:     Discharge Plan: alone, 3rd floor apartment - [address] and multiple flights of steps within vs TBD, likely family home    Precautions: falls, sensory     Current Status:  ADLs:    Mobility: Flat bed mobility SBA. Ax1 SaraStedy - Ax2 SaraStedy with nsg    Oral cares/Grooming: s/u    UB Dressing: s/u    LB Dressing: Max A sitting and standing w Diana Steady    Bathing: Supv after s/u in dependent purple shower chair, Diana Steady tf to shower chair    Toileting:  Ax1 Diana Steady > toilet.   IADLs: IND PTA, including driving. 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 Cognition appears to be at baseline    Assessment: Pt participates well in ADLs including showering and dressing tasks, demonstrating increased tolerance of self cares.   Pt performance with ADLs continues to be significantly below baseline, impacted by distal weakness, gross and fine motor deficits impacting coordination, and sensory deficits impairing safety and independence in ADLs and functional mobility. Goals to progress with Mod I in ADLs. Pt will benefit from skilled occupational therapy intervention to address goals in POC and maximize safety and independence in ADLs. ELOS 3 weeks.    Other Barriers to Discharge (DME, Family Training, etc):   Pt lives alone in apt in [place] - flight of stairs to enter the building and 4 flight of stairs to get to apartment that is on the 3rd floor. Pt has tub/shower combo with no grab bars.   Family training TBD  DME TBD    Outcome Measures  -  strength (12/22/21) RUE 55, LUE 70    (Reassess week of d/c)    - Box and Block (12/22/21) - RUE 18, LUE 20    (Reassess week of d/c)    Fug Stark score (12/22/21) -  (reassess week of discharge)

## 2021-12-24 ENCOUNTER — APPOINTMENT (OUTPATIENT)
Dept: PHYSICAL THERAPY | Facility: CLINIC | Age: 26
End: 2021-12-24
Attending: PHYSICAL MEDICINE & REHABILITATION
Payer: COMMERCIAL

## 2021-12-24 ENCOUNTER — APPOINTMENT (OUTPATIENT)
Dept: OCCUPATIONAL THERAPY | Facility: CLINIC | Age: 26
End: 2021-12-24
Attending: PHYSICAL MEDICINE & REHABILITATION
Payer: COMMERCIAL

## 2021-12-24 PROCEDURE — 97535 SELF CARE MNGMENT TRAINING: CPT | Mod: GO

## 2021-12-24 PROCEDURE — 250N000011 HC RX IP 250 OP 636: Performed by: PHYSICIAN ASSISTANT

## 2021-12-24 PROCEDURE — 99232 SBSQ HOSP IP/OBS MODERATE 35: CPT | Performed by: PHYSICAL MEDICINE & REHABILITATION

## 2021-12-24 PROCEDURE — 97530 THERAPEUTIC ACTIVITIES: CPT | Mod: GP | Performed by: PHYSICAL THERAPIST

## 2021-12-24 PROCEDURE — 128N000003 HC R&B REHAB

## 2021-12-24 PROCEDURE — 97110 THERAPEUTIC EXERCISES: CPT | Mod: GP | Performed by: PHYSICAL THERAPIST

## 2021-12-24 PROCEDURE — 97110 THERAPEUTIC EXERCISES: CPT | Mod: GO

## 2021-12-24 PROCEDURE — 250N000013 HC RX MED GY IP 250 OP 250 PS 637: Performed by: PHYSICIAN ASSISTANT

## 2021-12-24 RX ADMIN — DOCUSATE SODIUM AND SENNOSIDES 1 TABLET: 8.6; 5 TABLET, FILM COATED ORAL at 21:05

## 2021-12-24 RX ADMIN — MULTIPLE VITAMINS W/ MINERALS TAB 1 TABLET: TAB at 09:32

## 2021-12-24 RX ADMIN — CYANOCOBALAMIN 1000 MCG: 1000 INJECTION, SOLUTION INTRAMUSCULAR at 09:32

## 2021-12-24 RX ADMIN — APIXABAN 5 MG: 2.5 TABLET, FILM COATED ORAL at 09:32

## 2021-12-24 RX ADMIN — FOLIC ACID 1 MG: 1 TABLET ORAL at 09:32

## 2021-12-24 RX ADMIN — APIXABAN 5 MG: 2.5 TABLET, FILM COATED ORAL at 21:05

## 2021-12-24 RX ADMIN — THIAMINE HCL TAB 100 MG 100 MG: 100 TAB at 09:32

## 2021-12-24 RX ADMIN — DOCUSATE SODIUM AND SENNOSIDES 1 TABLET: 8.6; 5 TABLET, FILM COATED ORAL at 09:32

## 2021-12-24 RX ADMIN — METOPROLOL SUCCINATE 25 MG: 25 TABLET, EXTENDED RELEASE ORAL at 09:32

## 2021-12-24 ASSESSMENT — ACTIVITIES OF DAILY LIVING (ADL)
ADLS_ACUITY_SCORE: 13

## 2021-12-24 NOTE — PLAN OF CARE
VS: 121/79 BP, 73 HR, 96.8 T   O2: 97% RA, denies MELINA   Neuro: A/O x4, numbness to all extremities   GI: Cont, LBM 12/22/21   : Cont using urinal    Skin: Intact   Dressing: None   LDA: None   Diet: Reg/thin   Transfer: A1 with slideboard   Pills: Whole   Pain: No complaints   POC: Continue POC   Events:

## 2021-12-24 NOTE — PLAN OF CARE
FOCUS/GOAL  Medication management, Mobility, Reinforcement of self-care/ADL, and Safety management    ASSESSMENT, INTERVENTIONS AND CONTINUING PLAN FOR GOAL:    A&O x 4. Calls appropriately. Denied any chest pain, SOB, nausea or vomiting. Transfers with WINSOME VERA and trang shah. Regular diet, thin liquids, takes pills whole. States numbness and tingling in extremities, requested PRN gabapentin for this. Continent of bowel and bladder. Voided with urinal  Last BM 12/22. Continue with plan of care.

## 2021-12-24 NOTE — PLAN OF CARE
Discharge Planner Post-Acute Rehab OT:     Discharge Plan: alone, 3rd floor apartment - [address] and multiple flights of steps within vs TBD, likely family home    Precautions: falls, sensory     Current Status:  ADLs:    Mobility: wc based mobility, direct SPT from wc with min A x 1    Oral cares/Grooming: s/u    UB Dressing: s/u    LB Dressing: Max A sitting and standing     Bathing: Supv after s/u in dependent purple shower chair, Diana Steady tf to shower chair    Toileting:  Transfer min A x 1, hygiene min A for thoroughness  IADLs: IND PTA, including driving. 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 Cognition appears to be at baseline    Assessment:  Pt demonstrates increased IND with functional transfers and self cares in 2nd OT session, progressing to SPT with min A x 1 consistently with 8+ transfers. Cont per POC.    Other Barriers to Discharge (DME, Family Training, etc):   Pt lives alone in apt in [place] - flight of stairs to enter the building and 4 flight of stairs to get to apartment that is on the 3rd floor. Pt has tub/shower combo with no grab bars.   Family training TBD  DME TBD    Outcome Measures  -  strength (12/22/21) RUE 55, LUE 70    (Reassess week of d/c)    - Box and Block (12/22/21) - RUE 18, LUE 20    (Reassess week of d/c)    Fug Stark score (12/22/21) -  (reassess week of discharge)

## 2021-12-24 NOTE — PLAN OF CARE
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     ADL: Pt is admitted to IP ARU following admission for degenerative SCI in context of B12 deficiency and poly-substance abuse. Pt performance with ADLs is significantly below baseline, impacted by distal weakness, gross and fine motor deficits, and sensory deficits impairing safety in ADLs and functional mobility. Goals to progress with Mod I in ADLs. Pt will benefit from skilled occupational therapy intervention to address goals in POC and maximize safety and independence in ADLs.    Communication/Cognition/Swallow:     In tact      Intensity of therapy:   PT 90 minutes, Daily, for 21 days   OT 90 minutes, Daily, for ELOS 3 weeks    Orthotics None  Education anticoagulation and substance use cessation  Neuropsychology Testing: No  Other:  None    Medical Prognosis: Good      Physician summary statement: Overall medically stable.  Has been participating well and making gains.  Continue with PT/OT.      Discharge destination: prior home  Discharge rehabilitation needs: home care vs outpatient, PT and OT      Estimated length of stay: 3 weeks      Rehabilitation Physician Ej Martinez MD

## 2021-12-24 NOTE — PLAN OF CARE
"Discharge Planner Post-Acute Rehab PT:      Discharge Plan: home to apartment with 20+ stairs to access apartment; discussed discharge to parents home but pt reporting he cannot d/t strained relationship with them. Will need to assess continued progress with therapy to determine if pt will be able to complete stairs to access his current apartment.     Precautions: falls, DVT/PE     Current Status:  Bed Mobility: independent   Transfer: stand pivot ww, one assist  Gait: amb 50', weighted cart, Yeyo + w/c follow. Amb 200', FWW, Yeyo in Liko rail.  Stairs: up/down 3x6\" steps, B rails, modA for stability ascending steps, Yeyo descending.  Balance: normal seated balance, poor standing balance      Assessment:  improving BLE coordination, completed amb and stairs. Discussed alternative discharge locations, pt reports can't go to parents house d/t strained relationship. Will need to continue to progress independence with stairs.     Other Barriers to Discharge (DME, Family Training, etc):   DME - wheelchair evaluation initiated for next week, will need FWW     Family training - to be scheduled, determine how much assist family will be able to provide   "

## 2021-12-24 NOTE — PLAN OF CARE
Discharge Planner Post-Acute Rehab OT:     Discharge Plan: alone, 3rd floor apartment - [address] and multiple flights of steps within vs TBD, likely family home    Precautions: falls, sensory     Current Status:  ADLs:    Mobility: Flat bed mobility SBA. Ax1 SaraStedy - Ax2 SaraStedy with nsg    Oral cares/Grooming: s/u    UB Dressing: s/u    LB Dressing: Max A sitting and standing w Diana Steady    Bathing: Supv after s/u in dependent purple shower chair, Diana Steady tf to shower chair    Toileting:  Ax1 Diana Steady > toilet.   IADLs: IND PTA, including driving. 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 Cognition appears to be at baseline    Assessment:  focus on ADL progression, pt SBA supine to sit EOB, CGA x2 STS from elevated bed height to fww and dependent to adjust shorts over hips d/t standing balance and challenges with decreased hand coordination (pt already had shorts on prior to OT and declined to change to new ones this am).  Instructed pt in use of sock aid, pt able to don socks with set up. Diana shah Ax2 EOB to w/c. Pt completed g/h at sink with set up while seated in w/c, dons pull over shirt with set up.  After adls completed, instructed pt in threading shorts up to knees from sitting using reacher with loose shorts from hospital, pt able to thread up to knees w/reacher with set up. Cont per POC    Other Barriers to Discharge (DME, Family Training, etc):   Pt lives alone in [facility] in [place] - flight of stairs to enter the building and 4 flight of stairs to get to apartment that is on the 3rd floor. Pt has tub/shower combo with no grab bars.   Family training TBD  DME TBD    Outcome Measures  -  strength (12/22/21) RUE 55, LUE 70    (Reassess week of d/c)    - Box and Block (12/22/21) - RUE 18, LUE 20    (Reassess week of d/c)    Fug Stark score (12/22/21) -  (reassess week of discharge)

## 2021-12-24 NOTE — PROGRESS NOTES
[facility]   Acute Rehabilitation Unit  Daily progress note    INTERVAL HISTORY  No acute events overnight.  This morning has no new concerns or complaints.  Denies chest pain or shortness of breath.  Numbness in extremities still present.  Will start FES, however reviewed imaging and recent ultrasound on 12/10 still demonstrates DVTs from thigh to lower calf, and therefore will avoid FES to the RLE.  Functionally was seen [facility] and [facility].  Diana Steady now removed from room.  Ambulating 50 ft with a weighted card and Min A, 200 ft with Min A in the [place].        MEDICATIONS  Scheduled:    apixaban ANTICOAGULANT  5 mg Oral BID     cyanocobalamin  1,000 mcg Intramuscular Q7 Days     folic acid  1 mg Oral Daily     metoprolol succinate ER  25 mg Oral Daily     multivitamin w/minerals  1 tablet Oral Daily     senna-docusate  1 tablet Oral BID     thiamine  100 mg Oral Daily        PRN:  acetaminophen, bisacodyl, gabapentin, ondansetron, polyethylene glycol       PHYSICAL EXAM  Patient Vitals for the past 24 hrs:   BP Temp Temp src Pulse Resp SpO2   12/24/21 0932 121/79 -- -- 73 -- --   12/24/21 0728 120/71 96.8  F (36  C) Oral 71 16 97 %   12/23/21 1533 132/79 96.9  F (36.1  C) Oral 81 16 97 %       GEN: NAD  HEENT: NC/AT  CVS: RRR, S1+S2, no m/r/g  PULM: CTA b/l, no w/r/r  ABD: Soft, NT, ND, bowel sounds present  EXT: No LE edema or calf tenderness b/l  Neuro: Answers appropriately, follows commands.     LABS  CBC RESULTS:   Recent Labs   Lab Test 12/18/21  0631   WBC 5.5   RBC 4.05*   HGB 12.9*   HCT 39.0*   MCV 96   MCH 31.9   MCHC 33.1   RDW 14.6          Last Comprehensive Metabolic Panel:  Sodium   Date Value Ref Range Status   12/18/2021 138 133 - 144 mmol/L Final   04/26/2021 140 133 - 144 mmol/L Final     Potassium   Date Value Ref Range Status   12/18/2021 3.8 3.4 - 5.3 mmol/L Final   04/26/2021  3.4 3.4 - 5.3 mmol/L Final     Chloride   Date Value Ref Range Status   12/18/2021 108 94 - 109 mmol/L Final   04/26/2021 101 94 - 109 mmol/L Final     Carbon Dioxide   Date Value Ref Range Status   04/26/2021 27 20 - 32 mmol/L Final     Carbon Dioxide (CO2)   Date Value Ref Range Status   12/18/2021 25 20 - 32 mmol/L Final     Anion Gap   Date Value Ref Range Status   12/18/2021 5 3 - 14 mmol/L Final   04/26/2021 12 3 - 14 mmol/L Final     Glucose   Date Value Ref Range Status   12/18/2021 97 70 - 99 mg/dL Final   04/26/2021 110 (H) 70 - 99 mg/dL Final     Urea Nitrogen   Date Value Ref Range Status   12/18/2021 12 7 - 30 mg/dL Final   04/26/2021 7 7 - 30 mg/dL Final     Creatinine   Date Value Ref Range Status   12/18/2021 0.92 0.66 - 1.25 mg/dL Final   04/26/2021 0.77 0.66 - 1.25 mg/dL Final     GFR Estimate   Date Value Ref Range Status   12/18/2021 >90 >60 mL/min/1.73m2 Final     Comment:     As of July 11, 2021, eGFR is calculated by the CKD-EPI creatinine equation, without race adjustment. eGFR can be influenced by muscle mass, exercise, and diet. The reported eGFR is an estimation only and is only applicable if the renal function is stable.   04/26/2021 >90 >60 mL/min/[1.73_m2] Final     Comment:     Non  GFR Calc  Starting 12/18/2018, serum creatinine based estimated GFR (eGFR) will be   calculated using the Chronic Kidney Disease Epidemiology Collaboration   (CKD-EPI) equation.       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  Admitted to acute rehab  12/21/21 in setting of impaired strength, impaired activity tolerance, impaired balance, and impaired coordination.     Admission to acute inpatient rehab spinal cord dysfunction: upper and lower extremity paraesthesias and weakness due to combined degeneration of spinal cord 2/2 B12 deficiencey    Impairment group code: 04.120        1. PT, OT 90 minutes of each on a daily basis, in addition to rehab nursing and close management of physiatrist.       2. Impairment of ADL's: Noted to have impaired strength, impaired activity tolerance, and impaired coordination  leading to decreased ability to independently complete ADL's.  Will benefit from ongoing OT with goal for MOD I with basic ADLs.      3. Impairment of mobility:  Noted to have impaired strength, impaired activity tolerance, and impaired balance leading to decreased mobility.  Will benefit from ongoing PT with goal for JESSICA with basic mobility.         4. 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 B12 116.  MRI cervical with abnormal T2 signal is seen within the dorsal spinal cord extending from at least the C1-C2 level down through C5-C6. The signal abnormality in the spinal cord appears to be within inverted V-shaped on the axial sequences consistent with B12 deficiency, felt to be 2/2 to chronic nitrous oxide use, possible poor po intake 2/2 chronic etoh use.  copper level (WNL) , homocysteine (in process) , methylmelonic acid (5.77)  seen by General Neurology  -continue B12 1000mcg every other day IM ( x 7 days) through 12/23  Followed by B12 1000mcg IM every week for 4 weeks   - f/u  B12  /CBC in 2 weeks and 4 weeks with PCP.   Can consider extending b12 IM tx to 3 months if neurologic improvement. Then start maintenance dose B12 1000mcg IM every month or 1000mcg SL daily.  -f/u neurology.    -stop all nitrous oxide- discussed care plan in detail with patient  -continue PT/OT     Alcohol use disorder: Drank 1 L whiskey and 12 cans of beer over the past week. He has a history of alcohol withdrawal, but no withdrawal seizure. Hx of CD treatment, most recently at [facility] in May 2021.   - CD consulted, patient Not interested in inpatient CD treatment at this time.   - Folic acid, thiamine, MV daily for now  -PCP follow up     Hx of RLE DVT and submassive PE (10/2021): Diagnosed 10/31/21. Thought to be provoked by an airplaine flight  - Continue Eliquis   - Vascular Medicine referral per Cardiology at last appointment 12/2 -vascular medicine outpatient follow up   -start compression stocking knee high during day.      Biventricular heart failure, HFrEF   after subacute presentation of bilateral submassive pulmonary emboli. NYHA II, improving. EF was mildly decreased 45-50% initially, improved to low normal 50% on recent echo. Possibly some component of alcohol use. Last seen by Cardiology 12/2/21.   - Cardiology follow-up in 6 months.   -f/u pcp     Sinus tachycardia:   Continue PTA Toprol XL.      Morbid obesity:   BMI 39. Increased risk for all cause mortality.   - Diet and lifestyle modification recommended      Constipation  Benign abd exam. No obstructive sxs.   - senokot bid  -prn miralax/ supp  -encourage fluids     Marijuana use: Noted.   - Pt uninterested in cessation     Dependent edema: R>L. Compression stockings started  Wear during day, off at night. Hx of DVT.     Anxiety  -Gabapentin BID PRN.  At home he has it ordered TID PRN but uses it twice per day on average and he notes it does make him tired in the day.         5. Adjustment to disability:  Clinical psychology to eval and treat  as indicated.  6. FEN: reg  7. Bowel: constipated monitor.   8. Bladder: continent  9. DVT Prophylaxis: on apixaban  10. GI Prophylaxis: none  11. Code: full  12. Disposition:  Goal for home  13. ELOS:   3 weeks.  14. Rehab prognosis:  fair  15. Follow up Appointments on Discharge: pcp, neurology.         Otilio Martinez MD  Department of Rehabilitation Medicine      Time Spent on this Encounter   I, Otilio Martinez, spent a total of 25 minutes face-to-face or managing the care of Sebastián Nogueira. Over 50% of my time on the unit was spent counseling the patient and coordinating care. See note for details.

## 2021-12-24 NOTE — PLAN OF CARE
FOCUS/GOAL  Medical management    ASSESSMENT, INTERVENTIONS AND CONTINUING PLAN FOR GOAL:    Pt is alert and oriented. Makes needs known. Denied pain, sob, dizziness, N/V or any new weakness. N/T on hands and feet are pt's baseline. Continent of bladder using the urinal independently with staff to empty. Stayed in bed all night. Endorsed to  am staff to have bed zeroed for bed alarms to work. No concerns overnight. Will continue poc.

## 2021-12-25 PROCEDURE — 250N000013 HC RX MED GY IP 250 OP 250 PS 637: Performed by: PHYSICIAN ASSISTANT

## 2021-12-25 PROCEDURE — 128N000003 HC R&B REHAB

## 2021-12-25 RX ADMIN — FOLIC ACID 1 MG: 1 TABLET ORAL at 08:09

## 2021-12-25 RX ADMIN — APIXABAN 5 MG: 2.5 TABLET, FILM COATED ORAL at 08:09

## 2021-12-25 RX ADMIN — THIAMINE HCL TAB 100 MG 100 MG: 100 TAB at 08:09

## 2021-12-25 RX ADMIN — APIXABAN 5 MG: 2.5 TABLET, FILM COATED ORAL at 20:04

## 2021-12-25 RX ADMIN — METOPROLOL SUCCINATE 25 MG: 25 TABLET, EXTENDED RELEASE ORAL at 08:09

## 2021-12-25 RX ADMIN — DOCUSATE SODIUM AND SENNOSIDES 1 TABLET: 8.6; 5 TABLET, FILM COATED ORAL at 20:04

## 2021-12-25 RX ADMIN — DOCUSATE SODIUM AND SENNOSIDES 1 TABLET: 8.6; 5 TABLET, FILM COATED ORAL at 08:09

## 2021-12-25 RX ADMIN — MULTIPLE VITAMINS W/ MINERALS TAB 1 TABLET: TAB at 08:09

## 2021-12-25 ASSESSMENT — ACTIVITIES OF DAILY LIVING (ADL)
ADLS_ACUITY_SCORE: 13

## 2021-12-25 NOTE — PLAN OF CARE
FOCUS/GOAL  Bowel management, Bladder management, Medication management, Pain management, Medical management, Mobility, Skin integrity, and Safety management     ASSESSMENT, INTERVENTIONS AND CONTINUING PLAN FOR GOAL:     Pt is alert and oriented x 4. Vital signs stable. Reported baseline numbness and tingling in all extremities. Denied pain, nausea and vomiting, shortness of breath. Alarms on, side rails up x 3 for safety. Call light within reach, able to make needs known. No concerns regarding bowel and bladder, continent of both. Pt transfers with slide board, assist of 1 and gait belt. Reg diet, thin liquids. No new concerns at this time. Will continue with plan of care.

## 2021-12-25 NOTE — PLAN OF CARE
FOCUS/GOAL  Medical management    ASSESSMENT, INTERVENTIONS AND CONTINUING PLAN FOR GOAL:  Patient is alert and oriented, able to make needs known, used call light appropriately, denied pain, chest pain, N&V, headache, no respiratory distress noted. Patient used urinal independently, emptied by staff no BM this shift. Patient slept most of the night awaken in between toilet needs, safety rounding checks implemented, 3 siderails up, call light in reach, no concern at this time, may continue with POC.

## 2021-12-25 NOTE — PLAN OF CARE
Pt is AxOx4. Pt is A1-2 with slide board. Pt is continent of bowel and bladder and had one noted BM on shift; uses urinal bedside. Takes meds whole with thin liquids. Pt has swelling in BLE. No c/o pain, SOB, CP, or anxiety. Continue POC.       Patient's most recent vital signs are:     Vital signs:  BP: 116/71  Temp: 98.6  HR: 84  RR: 16  SpO2: 99 %     Patient does not have new respiratory symptoms.  Patient does not have new sore throat.  Patient does not have a fever greater than 99.5.

## 2021-12-26 ENCOUNTER — APPOINTMENT (OUTPATIENT)
Dept: OCCUPATIONAL THERAPY | Facility: CLINIC | Age: 26
End: 2021-12-26
Attending: PHYSICAL MEDICINE & REHABILITATION
Payer: COMMERCIAL

## 2021-12-26 ENCOUNTER — APPOINTMENT (OUTPATIENT)
Dept: PHYSICAL THERAPY | Facility: CLINIC | Age: 26
End: 2021-12-26
Attending: PHYSICAL MEDICINE & REHABILITATION
Payer: COMMERCIAL

## 2021-12-26 PROCEDURE — 97530 THERAPEUTIC ACTIVITIES: CPT | Mod: GO

## 2021-12-26 PROCEDURE — 250N000013 HC RX MED GY IP 250 OP 250 PS 637: Performed by: PHYSICIAN ASSISTANT

## 2021-12-26 PROCEDURE — 97535 SELF CARE MNGMENT TRAINING: CPT | Mod: GO

## 2021-12-26 PROCEDURE — 128N000003 HC R&B REHAB

## 2021-12-26 PROCEDURE — 97530 THERAPEUTIC ACTIVITIES: CPT | Mod: GP

## 2021-12-26 PROCEDURE — 97112 NEUROMUSCULAR REEDUCATION: CPT | Mod: GP

## 2021-12-26 PROCEDURE — 99231 SBSQ HOSP IP/OBS SF/LOW 25: CPT | Performed by: STUDENT IN AN ORGANIZED HEALTH CARE EDUCATION/TRAINING PROGRAM

## 2021-12-26 RX ADMIN — DOCUSATE SODIUM AND SENNOSIDES 1 TABLET: 8.6; 5 TABLET, FILM COATED ORAL at 20:11

## 2021-12-26 RX ADMIN — DOCUSATE SODIUM AND SENNOSIDES 1 TABLET: 8.6; 5 TABLET, FILM COATED ORAL at 08:58

## 2021-12-26 RX ADMIN — APIXABAN 5 MG: 2.5 TABLET, FILM COATED ORAL at 08:59

## 2021-12-26 RX ADMIN — APIXABAN 5 MG: 2.5 TABLET, FILM COATED ORAL at 20:11

## 2021-12-26 RX ADMIN — MULTIPLE VITAMINS W/ MINERALS TAB 1 TABLET: TAB at 08:59

## 2021-12-26 RX ADMIN — THIAMINE HCL TAB 100 MG 100 MG: 100 TAB at 08:59

## 2021-12-26 RX ADMIN — FOLIC ACID 1 MG: 1 TABLET ORAL at 08:58

## 2021-12-26 RX ADMIN — METOPROLOL SUCCINATE 25 MG: 25 TABLET, EXTENDED RELEASE ORAL at 08:58

## 2021-12-26 ASSESSMENT — ACTIVITIES OF DAILY LIVING (ADL)
ADLS_ACUITY_SCORE: 13

## 2021-12-26 NOTE — PROGRESS NOTES
[facility]   Acute Rehabilitation Unit  Daily progress note    INTERVAL HISTORY  Seen this morning at bedside. No acute events overnight.  This morning has no new concerns or complaints.  Denies any chest pain, SOB, headaches, abdominal pain. Otherwise doing well.       MEDICATIONS  Scheduled:    apixaban ANTICOAGULANT  5 mg Oral BID     cyanocobalamin  1,000 mcg Intramuscular Q7 Days     folic acid  1 mg Oral Daily     metoprolol succinate ER  25 mg Oral Daily     multivitamin w/minerals  1 tablet Oral Daily     senna-docusate  1 tablet Oral BID     thiamine  100 mg Oral Daily        PRN:  acetaminophen, bisacodyl, gabapentin, ondansetron, polyethylene glycol       PHYSICAL EXAM  Patient Vitals for the past 24 hrs:   BP Temp Temp src Pulse Resp SpO2   12/26/21 0611 120/77 (!) 96.2  F (35.7  C) Oral 70 16 98 %   12/25/21 1616 115/78 (!) 95.7  F (35.4  C) Oral 75 16 97 %       GEN: NAD, sitting up in bed.  HEENT: NC/AT  CVS: RRR, S1+S2, no m/r/g  PULM: CTA b/l, no w/r/r  ABD: Soft, NT, ND, bowel sounds present  EXT: No LE edema or calf tenderness b/l  Neuro: Answers appropriately, follows commands.     LABS  CBC RESULTS:   Recent Labs   Lab Test 12/18/21  0631   WBC 5.5   RBC 4.05*   HGB 12.9*   HCT 39.0*   MCV 96   MCH 31.9   MCHC 33.1   RDW 14.6          Last Comprehensive Metabolic Panel:  Sodium   Date Value Ref Range Status   12/18/2021 138 133 - 144 mmol/L Final   04/26/2021 140 133 - 144 mmol/L Final     Potassium   Date Value Ref Range Status   12/18/2021 3.8 3.4 - 5.3 mmol/L Final   04/26/2021 3.4 3.4 - 5.3 mmol/L Final     Chloride   Date Value Ref Range Status   12/18/2021 108 94 - 109 mmol/L Final   04/26/2021 101 94 - 109 mmol/L Final     Carbon Dioxide   Date Value Ref Range Status   04/26/2021 27 20 - 32 mmol/L Final     Carbon Dioxide (CO2)   Date Value Ref Range Status   12/18/2021 25 20 - 32 mmol/L Final     Anion Gap   Date Value Ref Range Status    12/18/2021 5 3 - 14 mmol/L Final   04/26/2021 12 3 - 14 mmol/L Final     Glucose   Date Value Ref Range Status   12/18/2021 97 70 - 99 mg/dL Final   04/26/2021 110 (H) 70 - 99 mg/dL Final     Urea Nitrogen   Date Value Ref Range Status   12/18/2021 12 7 - 30 mg/dL Final   04/26/2021 7 7 - 30 mg/dL Final     Creatinine   Date Value Ref Range Status   12/18/2021 0.92 0.66 - 1.25 mg/dL Final   04/26/2021 0.77 0.66 - 1.25 mg/dL Final     GFR Estimate   Date Value Ref Range Status   12/18/2021 >90 >60 mL/min/1.73m2 Final     Comment:     As of July 11, 2021, eGFR is calculated by the CKD-EPI creatinine equation, without race adjustment. eGFR can be influenced by muscle mass, exercise, and diet. The reported eGFR is an estimation only and is only applicable if the renal function is stable.   04/26/2021 >90 >60 mL/min/[1.73_m2] Final     Comment:     Non  GFR Calc  Starting 12/18/2018, serum creatinine based estimated GFR (eGFR) will be   calculated using the Chronic Kidney Disease Epidemiology Collaboration   (CKD-EPI) equation.       Calcium   Date Value Ref Range Status   12/18/2021 9.3 8.5 - 10.1 mg/dL Final   04/26/2021 8.7 8.5 - 10.1 mg/dL Final       No results for input(s): GLC, BGM in the last 168 hours.    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  Admitted to acute rehab 12/21/21 in setting of impaired strength, impaired activity tolerance, impaired balance, and impaired coordination.     Admission to acute inpatient rehab spinal cord dysfunction: upper and lower extremity paraesthesias and weakness due to combined degeneration of spinal cord 2/2 B12 deficiencey    Impairment group code: 04.120     --Vitals stable. No lab today.  --Continue  ongoing medical management.  --Continue therapies and plan of care.    Yajaira Montesinos MD  Physical Medicine & Rehabilitation    I spent a total of 15 minutes face-to-face and managing the care of the patient. Over 50% of my time on the unit was spent counseling the patient and coordinating care. See note for details.

## 2021-12-26 NOTE — PLAN OF CARE
FOCUS/GOAL  Medical management    ASSESSMENT, INTERVENTIONS AND CONTINUING PLAN FOR GOAL:  Patient is alert and oriented, able to make needs known, used call light appropriately. Patient slept most of the night, awaken in between toilet needs. Patient is Continent of Bladder, used urinal independently at night, emptied by staff, no BM this shift. Patient denied pain when asked, no chest pain, headache, N&V, breathing normal. Rounding checked implemented for safety, 3 side rails UP, urinal/call light in reach, no concern at this time, may continue with POC

## 2021-12-26 NOTE — PLAN OF CARE
XCite stim unit for Activity Based Therapy.     Skilled set up to place electrodes on L gluts, quads, hams, and gastrocs; determined appropriate FES parameters for each muscle group based off of strong tetanic response of muscle test and/or sensory feedback.    Xcite Activities Included: standing mini squats    Intervention and patient response: no adverse reaction to stim. More difficulty with coordinating R>LLE without UE support with 1x LOB needing to sit to mat. Demonstrating improved coordination of BLE vs Thursday session with more consistent placement of BLE during pivot transfers.    Please see [email or web address] for further details on patient's stimulation parameters.

## 2021-12-26 NOTE — PLAN OF CARE
Orientation: A/Ox4  Bowel: No BM on this shift  Bladder: Continent of bladder using urinal at bedside with staff emptying  Pain: Denies pain  Ambulation/Transfers: Ax1 SPT for transfers, WC based  Diet/ Liquids: Tolerating diet well with good appetite  Bed alarm on no longer indicated per patient whiteboard, call light within reach. Continue with POC.

## 2021-12-26 NOTE — PLAN OF CARE
Discharge Planner Post-Acute Rehab OT:     Discharge Plan: alone, 3rd floor apartment - [address] and multiple flights of steps within vs TBD, likely family home    Precautions: falls, sensory     Current Status:  ADLs:    Mobility: wc based mobility, direct SPT from wc with CGA x 1    Oral cares/Grooming: s/u    UB Dressing: s/u    LB Dressing: threads pants w/reacher EOB, CGA to fww standing to pull over hips, set up and sock aide/reacher to doff/don socks     Bathing: Supv after s/u in dependent purple shower chair, Diana Steady tf to shower chair    Toileting:  Transfer CGA x 1, hygiene min A for thoroughness  IADLs: IND PTA, including driving. 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 Cognition appears to be at baseline    Assessment: . pt progressing with functional tsfs CGA EOB to w/c direct pivot tsf, and toilet tsf w/grab bar direct pivot CGA. LB dressing also improving, pt donning pants and underwear seated EOB with AE, CGA standing w/fww to pull up over hips. Pt did have slight LOB and sat back down on bed. Able to stand again and complete his dressing CGA. Doffed socks w/reacher, Donned socks w/extra time with sock aide. Set up for UB dsg and g/h at sink seated. During pm session pt demo'ing ability to stand 3min 30s prior to rest break, and able to stand short durations unsupported with CGA and chair behind.    Other Barriers to Discharge (DME, Family Training, etc):   Pt lives alone in apt in [place] - flight of stairs to enter the building and 4 flight of stairs to get to apartment that is on the 3rd floor. Pt has tub/shower combo with no grab bars.   Family training TBD  DME TBD    Outcome Measures  -  strength (12/22/21) RUE 55, LUE 70    (Reassess week of d/c)    - Box and Block (12/22/21) - RUE 18, LUE 20    (Reassess week of d/c)    Fug Stark score (12/22/21) -  (reassess week of discharge)

## 2021-12-26 NOTE — PLAN OF CARE
FOCUS/GOAL  Bowel management and Mobility    ASSESSMENT, INTERVENTIONS AND CONTINUING PLAN FOR GOAL:  Alert et oriented x4. Large BM this shift on toilet. Taking food et fluids well. Transferring CGA SP with one staff. Uses urinal when in bed et staff empties. Denies pain nausea or vomiting. No numbness or tingling.

## 2021-12-27 ENCOUNTER — APPOINTMENT (OUTPATIENT)
Dept: PHYSICAL THERAPY | Facility: CLINIC | Age: 26
End: 2021-12-27
Attending: PHYSICAL MEDICINE & REHABILITATION
Payer: COMMERCIAL

## 2021-12-27 ENCOUNTER — APPOINTMENT (OUTPATIENT)
Dept: OCCUPATIONAL THERAPY | Facility: CLINIC | Age: 26
End: 2021-12-27
Attending: PHYSICAL MEDICINE & REHABILITATION
Payer: COMMERCIAL

## 2021-12-27 LAB
ANION GAP SERPL CALCULATED.3IONS-SCNC: 5 MMOL/L (ref 3–14)
BUN SERPL-MCNC: 15 MG/DL (ref 7–30)
CALCIUM SERPL-MCNC: 9.4 MG/DL (ref 8.5–10.1)
CHLORIDE BLD-SCNC: 112 MMOL/L (ref 94–109)
CO2 SERPL-SCNC: 25 MMOL/L (ref 20–32)
CREAT SERPL-MCNC: 0.99 MG/DL (ref 0.66–1.25)
GFR SERPL CREATININE-BSD FRML MDRD: >90 ML/MIN/1.73M2
GLUCOSE BLD-MCNC: 99 MG/DL (ref 70–99)
HOLD SPECIMEN: NORMAL
POTASSIUM BLD-SCNC: 4.4 MMOL/L (ref 3.4–5.3)
SODIUM SERPL-SCNC: 142 MMOL/L (ref 133–144)

## 2021-12-27 PROCEDURE — 128N000003 HC R&B REHAB

## 2021-12-27 PROCEDURE — 80048 BASIC METABOLIC PNL TOTAL CA: CPT | Performed by: PHYSICIAN ASSISTANT

## 2021-12-27 PROCEDURE — 97116 GAIT TRAINING THERAPY: CPT | Mod: GP

## 2021-12-27 PROCEDURE — 99232 SBSQ HOSP IP/OBS MODERATE 35: CPT | Performed by: PHYSICIAN ASSISTANT

## 2021-12-27 PROCEDURE — 36415 COLL VENOUS BLD VENIPUNCTURE: CPT | Performed by: PHYSICIAN ASSISTANT

## 2021-12-27 PROCEDURE — 250N000013 HC RX MED GY IP 250 OP 250 PS 637: Performed by: PHYSICIAN ASSISTANT

## 2021-12-27 PROCEDURE — 97535 SELF CARE MNGMENT TRAINING: CPT | Mod: GO

## 2021-12-27 RX ADMIN — THIAMINE HCL TAB 100 MG 100 MG: 100 TAB at 08:33

## 2021-12-27 RX ADMIN — APIXABAN 5 MG: 2.5 TABLET, FILM COATED ORAL at 08:33

## 2021-12-27 RX ADMIN — METOPROLOL SUCCINATE 25 MG: 25 TABLET, EXTENDED RELEASE ORAL at 08:33

## 2021-12-27 RX ADMIN — APIXABAN 5 MG: 2.5 TABLET, FILM COATED ORAL at 20:23

## 2021-12-27 RX ADMIN — DOCUSATE SODIUM AND SENNOSIDES 1 TABLET: 8.6; 5 TABLET, FILM COATED ORAL at 08:33

## 2021-12-27 RX ADMIN — DOCUSATE SODIUM AND SENNOSIDES 1 TABLET: 8.6; 5 TABLET, FILM COATED ORAL at 20:22

## 2021-12-27 RX ADMIN — MULTIPLE VITAMINS W/ MINERALS TAB 1 TABLET: TAB at 08:33

## 2021-12-27 RX ADMIN — FOLIC ACID 1 MG: 1 TABLET ORAL at 08:33

## 2021-12-27 ASSESSMENT — ACTIVITIES OF DAILY LIVING (ADL)
ADLS_ACUITY_SCORE: 13
ADLS_ACUITY_SCORE: 11
ADLS_ACUITY_SCORE: 11
ADLS_ACUITY_SCORE: 13
ADLS_ACUITY_SCORE: 13
ADLS_ACUITY_SCORE: 11
ADLS_ACUITY_SCORE: 13
ADLS_ACUITY_SCORE: 11
ADLS_ACUITY_SCORE: 13
ADLS_ACUITY_SCORE: 13
ADLS_ACUITY_SCORE: 11
ADLS_ACUITY_SCORE: 11
ADLS_ACUITY_SCORE: 13
ADLS_ACUITY_SCORE: 11
ADLS_ACUITY_SCORE: 13
ADLS_ACUITY_SCORE: 13
ADLS_ACUITY_SCORE: 11
ADLS_ACUITY_SCORE: 13
ADLS_ACUITY_SCORE: 13

## 2021-12-27 NOTE — PROGRESS NOTES
VS: /76 (BP Location: Right arm)   Pulse 68   Temp 97.2  F (36.2  C) (Oral)   Resp 16   SpO2 97% , Pt denies CP or SOB.    O2: Room air sat. > 90%.    Output: Voiding adequate amount in bathroom. Urinal at bedside.   Last BM: 12/26/21   Activity: AX 1 with sliding board. Wheel chair based.   Skin: Intact.Ex BLE edema    Pain: Denies pain.   CMS: CMS and neuro intact.A/O X 4. Able to make needs known. Baseline neuropathy   Dressing: None in place.   Diet: Tolerating regular diet.   LDA: PIV  SL   Equipment: IV pole, Urina and personal belongings.   Plan: Continue with plan of care. Call light within reach.   Additional Info:

## 2021-12-27 NOTE — PLAN OF CARE
FOCUS/GOAL  Bowel management, Medical management, and Reinforcement of self-care/ADL    ASSESSMENT, INTERVENTIONS AND CONTINUING PLAN FOR GOAL:  Patient is alert/oriented x4, somewhat flat affect but communicates needs appropriately and involved in understanding cares.  VSS, denies any pain, BLE edema noted but per patient is improving- still R>L side slightly.  Patient reports numbness/tingling to hands and feet and up into calves, no pain or tenderness there.  Patient is able to tolerate being up in the chair during the day, voiding spontaneously and bm x1 today. Demonstrated IS today, deep breathing exercises encouraged.  No additional care concerns at this time, continue with POC.

## 2021-12-27 NOTE — PROGRESS NOTES
"  [facility]   Acute Rehabilitation Unit  Daily progress note    INTERVAL HISTORY  No acute events overnight. Seen working with OT had some loss of balance with transfer with more \"plop\" then controlled seat in wheel chair.  He says feeling well, sleeping ok, eating \"better than I do at home\", denies n/v/d, sob, headaches, dizziness and fevers.  Is making progress with therapy though frustrated with situation and ongoing function.  He declined psychology/  and was not interested in discussing alternative treatments for mood at this time, encouraged to think about these options and let team know if he changes his mind. Sebastián expresses gratitude for care he has received and denies other concerns.     OT: Pt participates in donning footwear, functional transfers and in room mobility with FWW , as well as training in extended tub bench transfers. Pt experiences 1 LOB during EOB > wc transfer with uncontrolled descent into wc, reporting losing footing. Pt performance continues to be impacted by decreased sensation and coordination in B UEs and LEs, deconditioning, impaired balance, and decreased safety awareness. Goal is to progress to mod I for all basic ADLs and light IADLs to allow for return home alone with HH OT. Estimating 7 more days in ARU.       MEDICATIONS  Scheduled:    apixaban ANTICOAGULANT  5 mg Oral BID     cyanocobalamin  1,000 mcg Intramuscular Q7 Days     folic acid  1 mg Oral Daily     metoprolol succinate ER  25 mg Oral Daily     multivitamin w/minerals  1 tablet Oral Daily     senna-docusate  1 tablet Oral BID     thiamine  100 mg Oral Daily        PRN:  acetaminophen, bisacodyl, gabapentin, ondansetron, polyethylene glycol       PHYSICAL EXAM  Patient Vitals for the past 24 hrs:   BP Temp Temp src Pulse Resp SpO2   12/27/21 1103 125/76 -- -- 81 -- --   12/27/21 1058 (!) 146/100 -- -- 87 -- --   12/27/21 0833 130/86 97.3  F (36.3  C) Oral 74 16 98 % "       GEN: NAD  HEENT: NC/AT  CVS: RRR, S1+S2, no m/r/g  PULM: CTA b/l, no w/r/r  ABD: Soft, NT, ND, bowel sounds present  EXT: No LE edema or calf tenderness b/l  Neuro: Answers appropriately, follows commands.     LABS  CBC RESULTS:   Recent Labs   Lab Test 12/18/21  0631   WBC 5.5   RBC 4.05*   HGB 12.9*   HCT 39.0*   MCV 96   MCH 31.9   MCHC 33.1   RDW 14.6          Last Comprehensive Metabolic Panel:  Sodium   Date Value Ref Range Status   12/27/2021 142 133 - 144 mmol/L Final   04/26/2021 140 133 - 144 mmol/L Final     Potassium   Date Value Ref Range Status   12/27/2021 4.4 3.4 - 5.3 mmol/L Final   04/26/2021 3.4 3.4 - 5.3 mmol/L Final     Chloride   Date Value Ref Range Status   12/27/2021 112 (H) 94 - 109 mmol/L Final   04/26/2021 101 94 - 109 mmol/L Final     Carbon Dioxide   Date Value Ref Range Status   04/26/2021 27 20 - 32 mmol/L Final     Carbon Dioxide (CO2)   Date Value Ref Range Status   12/27/2021 25 20 - 32 mmol/L Final     Anion Gap   Date Value Ref Range Status   12/27/2021 5 3 - 14 mmol/L Final   04/26/2021 12 3 - 14 mmol/L Final     Glucose   Date Value Ref Range Status   12/27/2021 99 70 - 99 mg/dL Final   04/26/2021 110 (H) 70 - 99 mg/dL Final     Urea Nitrogen   Date Value Ref Range Status   12/27/2021 15 7 - 30 mg/dL Final   04/26/2021 7 7 - 30 mg/dL Final     Creatinine   Date Value Ref Range Status   12/27/2021 0.99 0.66 - 1.25 mg/dL Final   04/26/2021 0.77 0.66 - 1.25 mg/dL Final     GFR Estimate   Date Value Ref Range Status   12/27/2021 >90 >60 mL/min/1.73m2 Final     Comment:     Effective December 21, 2021 eGFRcr in adults is calculated using the 2021 CKD-EPI creatinine equation which includes age and gender (Moshe et al., NEJM, DOI: 10.1056/VQGOqb7950834)   04/26/2021 >90 >60 mL/min/[1.73_m2] Final     Comment:     Non  GFR Calc  Starting 12/18/2018, serum creatinine based estimated GFR (eGFR) will be   calculated using the Chronic Kidney Disease  Epidemiology Collaboration   (CKD-EPI) equation.       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  Admitted to acute rehab 12/21/21 in setting of impaired strength, impaired activity tolerance, impaired balance, and impaired coordination.     Admission to acute inpatient rehab spinal cord dysfunction: upper and lower extremity paraesthesias and weakness due to combined degeneration of spinal cord 2/2 B12 deficiencey    Impairment group code: 04.120        1. PT, OT 90 minutes of each on a daily basis, in addition to rehab nursing and close management of physiatrist.       2. Impairment of ADL's: Noted to have impaired strength, impaired activity tolerance, and impaired coordination  leading to decreased ability to independently complete ADL's.  Will benefit from ongoing OT with goal for MOD I with basic ADLs.      3. Impairment of mobility:  Noted to have impaired strength, impaired activity tolerance, and impaired balance leading to decreased mobility.  Will benefit from ongoing PT with goal for JESSICA with basic mobility.         4. 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 B12 116.  MRI cervical with abnormal T2 signal is seen  within the dorsal spinal cord extending from at least the C1-C2 level down through C5-C6. The signal abnormality in the spinal cord appears to be within inverted V-shaped on the axial sequences consistent with B12 deficiency, felt to be 2/2 to chronic nitrous oxide use, possible poor po intake 2/2 chronic etoh use.  copper level (WNL) , homocysteine (in process) , methylmelonic acid (5.77)  seen by General Neurology  -continue B12 1000mcg every other day IM ( x 7 days) through 12/23  Followed by B12 1000mcg IM every week for 4 weeks   - f/u  B12 /CBC in 2 weeks and 4 weeks with PCP.   Can consider extending b12 IM tx to 3 months if neurologic improvement. Then start maintenance dose B12 1000mcg IM every month or 1000mcg SL daily.  -f/u neurology.    -stop all nitrous oxide- discussed care plan in detail with patient  -continue PT/OT     Alcohol use disorder: Drank 1 L whiskey and 12 cans of beer over the past week. He has a history of alcohol withdrawal, but no withdrawal seizure. Hx of CD treatment, most recently at [facility] in May 2021.   - CD consulted, patient Not interested in inpatient CD treatment at this time.   - Folic acid, thiamine, MV daily  -PCP follow up     Hx of RLE DVT and submassive PE (10/2021): Diagnosed 10/31/21. Thought to be provoked by an airplaine flight  - Continue Eliquis   - Vascular Medicine referral per Cardiology at last appointment 12/2 -vascular medicine outpatient follow up   -start compression stocking knee high during day.      Biventricular heart failure, HFrEF   after subacute presentation of bilateral submassive pulmonary emboli. NYHA II, improving. EF was mildly decreased 45-50% initially, improved to low normal 50% on recent echo. Possibly some component of alcohol use. Last seen by Cardiology 12/2/21.   - Cardiology follow-up in 6 months.   -f/u pcp     Sinus tachycardia:   Continue PTA Toprol XL.      Morbid obesity:   BMI 39. Increased risk for all cause mortality.   -  Diet and lifestyle modification recommended      Constipation  Benign abd exam. No obstructive sxs.   - senokot bid  -prn miralax/ supp  -encourage fluids     Marijuana use: Noted.   - Pt uninterested in cessation     Dependent edema: R>L. Compression stockings started  Wear during day, off at night. Hx of DVT.     Anxiety  -Gabapentin BID PRN.  At home he has it ordered TID PRN but uses it twice per day on average and he notes it does make him tired in the day. Declined emotional support, psychology/, etc.          5. Adjustment to disability:  Clinical psychology to eval and treat as indicated.  6. FEN: reg  7. Bowel: constipated monitor.   8. Bladder: continent  9. DVT Prophylaxis: on apixaban  10. GI Prophylaxis: none  11. Code: full  12. Disposition:  Goal for home  13. ELOS:   3 weeks.  14. Rehab prognosis:  fair  15. Follow up Appointments on Discharge: pcp, neurology.         Yahaira Tello PA-C   Department of Rehabilitation Medicine

## 2021-12-27 NOTE — PLAN OF CARE
Discharge Planner Post-Acute Rehab OT:     Discharge Plan: alone, 3rd floor apartment - [address] and multiple flights of steps within (prefers not to discharge to parents home d/t strained relationship)     Precautions: falls, sensory     Current Status:  ADLs:    Mobility: mod I wc based mobility,  SPT from  CGA - direct or w FWW    Oral cares/Grooming: mod I    UB Dressing: mod I    LB Dressing: threads pants w/reacher EOB, CGA to fww standing to pull over hips, set up and sock aide/reacher to doff/don socks and compression stockinettes    Bathing: Supv after s/u in dependent purple shower chair, CGA transfer to extended tub bench w grab bar     Toileting:  Transfer direct SPT from  - CGA, hygiene min A for thoroughness  IADLs: IND PTA, including driving. 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 Cognition appears to be at baseline    Assessment: Pt participates in donning footwear, functional transfers and in room mobility with FWW , as well as training in extended tub bench transfers. Pt experiences 1 LOB during EOB > wc transfer with uncontrolled descent into wc, reporting losing footing. Pt performance continues to be impacted by decreased sensation and coordination in B UEs and LEs, deconditioning, impaired balance, and decreased safety awareness. Goal is to progress to mod I for all basic ADLs and light IADLs to allow for return home alone with HH OT. Estimating 7 more days in ARU.     Other Barriers to Discharge (DME, Family Training, etc):   Pt lives alone in [facility] in [place] - flight of stairs to enter the building and 4 flight of stairs to get to apartment that is on the 3rd floor. Pt has tub/shower combo with no grab bars.   Family training TBD - has some social support from brother and sister, strained relationship w parents  DME: extended tub bench, grab bar     Outcome Measures  -  strength (12/22/21) RUE 55, LUE 70    (Reassess week of d/c)    - Box and [place] (12/22/21) - RUE 18,  LUE 20    (Reassess week of d/c)

## 2021-12-27 NOTE — PLAN OF CARE
"Discharge Planner Post-Acute Rehab PT:      Discharge Plan: home to apartment with 20+ stairs to access apartment; discussed discharge to parents home but pt reporting he cannot d/t strained relationship with them. Will need to assess continued progress with therapy to determine if pt will be able to complete stairs to access his current apartment.     Precautions: falls, DVT/PE     Current Status:  Bed Mobility: independent   Transfer: stand pivot ww, one assist  Gait: amb 150', B forearm crutches, CGA, ataxic gait with near LOB but able to self correct.  Stairs: 24x6\" steps, B rails, CGA, ascending fwd descending bwd. Fabien 12x6\" steps, L rail with BUE, CGA.  Balance: normal seated balance, poor standing balance      Assessment: progressing well with amb, starting to use B forearm crutches. Planning to have B forearm crutches for discharge DME. W/c eval scheduled for tomorrow.     Other Barriers to Discharge (DME, Family Training, etc):   DME - wheelchair evaluation 12/28. Will need B forearm crutches.     Family training - to be scheduled, determine how much assist family will be able to provide   "

## 2021-12-27 NOTE — PLAN OF CARE
FOCUS/GOAL  Medication management, Mobility, and Safety management    ASSESSMENT, INTERVENTIONS AND CONTINUING PLAN FOR GOAL:  Pt Aox4, able to make needs known, using call light appropriately, no alarms.  CGA 1 pivot transfer, WC based.  Denies pain, cough, sob, chest pain, dizziness, N/V, N/T.  Pt has edema to BLE tubi  on during the day off at HS. Cont fo B&B, LBM 12/26.  Reg/thin, taking pills whole with water.  Nursing will continue to monitor.

## 2021-12-28 ENCOUNTER — APPOINTMENT (OUTPATIENT)
Dept: OCCUPATIONAL THERAPY | Facility: CLINIC | Age: 26
End: 2021-12-28
Attending: PHYSICAL MEDICINE & REHABILITATION
Payer: COMMERCIAL

## 2021-12-28 ENCOUNTER — APPOINTMENT (OUTPATIENT)
Dept: PHYSICAL THERAPY | Facility: CLINIC | Age: 26
End: 2021-12-28
Attending: PHYSICAL MEDICINE & REHABILITATION
Payer: COMMERCIAL

## 2021-12-28 PROCEDURE — 97112 NEUROMUSCULAR REEDUCATION: CPT | Mod: GO | Performed by: OCCUPATIONAL THERAPIST

## 2021-12-28 PROCEDURE — 99233 SBSQ HOSP IP/OBS HIGH 50: CPT | Performed by: PHYSICAL MEDICINE & REHABILITATION

## 2021-12-28 PROCEDURE — 97535 SELF CARE MNGMENT TRAINING: CPT | Mod: GO | Performed by: OCCUPATIONAL THERAPIST

## 2021-12-28 PROCEDURE — 97112 NEUROMUSCULAR REEDUCATION: CPT | Mod: GP | Performed by: PHYSICAL THERAPIST

## 2021-12-28 PROCEDURE — 97542 WHEELCHAIR MNGMENT TRAINING: CPT | Mod: GO | Performed by: OCCUPATIONAL THERAPIST

## 2021-12-28 PROCEDURE — 97542 WHEELCHAIR MNGMENT TRAINING: CPT | Mod: GP

## 2021-12-28 PROCEDURE — 250N000013 HC RX MED GY IP 250 OP 250 PS 637: Performed by: PHYSICIAN ASSISTANT

## 2021-12-28 PROCEDURE — 97530 THERAPEUTIC ACTIVITIES: CPT | Mod: GP

## 2021-12-28 PROCEDURE — 999N000150 HC STATISTIC PT MED CONFERENCE < 30 MIN

## 2021-12-28 PROCEDURE — 128N000003 HC R&B REHAB

## 2021-12-28 PROCEDURE — 999N000125 HC STATISTIC PATIENT MED CONFERENCE < 30 MIN: Performed by: OCCUPATIONAL THERAPIST

## 2021-12-28 PROCEDURE — 97116 GAIT TRAINING THERAPY: CPT | Mod: GP

## 2021-12-28 RX ADMIN — METOPROLOL SUCCINATE 25 MG: 25 TABLET, EXTENDED RELEASE ORAL at 07:48

## 2021-12-28 RX ADMIN — APIXABAN 5 MG: 2.5 TABLET, FILM COATED ORAL at 20:47

## 2021-12-28 RX ADMIN — APIXABAN 5 MG: 2.5 TABLET, FILM COATED ORAL at 07:48

## 2021-12-28 RX ADMIN — DOCUSATE SODIUM AND SENNOSIDES 1 TABLET: 8.6; 5 TABLET, FILM COATED ORAL at 07:47

## 2021-12-28 RX ADMIN — MULTIPLE VITAMINS W/ MINERALS TAB 1 TABLET: TAB at 07:48

## 2021-12-28 RX ADMIN — DOCUSATE SODIUM AND SENNOSIDES 1 TABLET: 8.6; 5 TABLET, FILM COATED ORAL at 20:47

## 2021-12-28 RX ADMIN — THIAMINE HCL TAB 100 MG 100 MG: 100 TAB at 07:47

## 2021-12-28 RX ADMIN — FOLIC ACID 1 MG: 1 TABLET ORAL at 07:48

## 2021-12-28 ASSESSMENT — ACTIVITIES OF DAILY LIVING (ADL)
ADLS_ACUITY_SCORE: 11
ADLS_ACUITY_SCORE: 12
ADLS_ACUITY_SCORE: 11
ADLS_ACUITY_SCORE: 12
ADLS_ACUITY_SCORE: 11
ADLS_ACUITY_SCORE: 12
ADLS_ACUITY_SCORE: 11
ADLS_ACUITY_SCORE: 12
ADLS_ACUITY_SCORE: 11
ADLS_ACUITY_SCORE: 11
ADLS_ACUITY_SCORE: 12
ADLS_ACUITY_SCORE: 11

## 2021-12-28 ASSESSMENT — MIFFLIN-ST. JEOR: SCORE: 2511.67

## 2021-12-28 NOTE — PROGRESS NOTES
Per team rounds, pt's anticipated discharge date is Tuesday 1/4/2022 and the team recommends he go home w/ home care RN/PT/OT/HHA. KAVEH sent a referral to [facility].    Addendum: Pt declined by [facility] since they do not accept pt's insurance. KAVEH sent referral to [facility] (PH: [phone]; F: [phone]).    Nyasia PETERSEN, [facility]   Phone: [phone]  Pager: [phone]

## 2021-12-28 NOTE — PLAN OF CARE
VS: 116/67 BP, 94 HR, 98.7 T   O2: 95% RA  Pt states he has some dyspnea on exertion   Neuro: A/O x4  Numbness to all extremities   GI: Cont using toilet, LBM 12/27/21   : Cont using urinal   Skin: Intact   Dressing: None   LDA: None   Diet: Reg/thin   Transfer: A1 stand pivot   Pills: Whole   Pain: No complaints   POC: Continue POC   Events:

## 2021-12-28 NOTE — PLAN OF CARE
VS: Blood pressure 128/80, pulse 66, temperature 97  F (36.1  C), temperature source Oral, resp. rate 16, SpO2 98 %.    Denies chest pain, nausea, dizziness.   O2: Room air, denies SOB unless with exertion. Lungs diminished bilat.   Output: Uses urinal independently, adequate output.   Last BM: 12/27 per pt report.   Activity: Mod I in room with walker.   Skin: No skin concerns.   Pain: Denies pain.   CMS: Denies any changes to sensation, baseline N/T in BLE's.   Dressing: N/A.   Diet: Reg diet, thin liquids, takes pills whole.   LDA: N/A.   Equipment: Walker, urinal.   Plan: Continue with plan of care.   Additional Info: Pt alert and oriented x4, able to make needs known with call light in reach at all times. Flat affect. Had a busy day with therapy.

## 2021-12-28 NOTE — PROGRESS NOTES
XCite stim unit for Activity Based Therapy. Skilled set up; determined appropriate FES parameters for each muscle group based off of strong tetanic response of muscle test.  Used the following activities from the software library: Hand  Intervention and patient response:Pt completed 30 repetitions x1 set with BUE's with opposition with writer guiding through transitional movements.   Please see [email or web address] for further details on patient's stimulation parameters.

## 2021-12-28 NOTE — PROGRESS NOTES
[facility]   Acute Rehabilitation Unit  Daily progress note    INTERVAL HISTORY  Pt seen in team rounds.  No acute events overnight.  This morning has no new concerns or complaints.  Continues to have numbness in all extremities, but denies pain, chest pain, or shortness of breath.  He is making excellent progress and for full functional updates, see team rounds note from today.  Have moved up tentative discharge date to 1/4/22.    MEDICATIONS  Scheduled:    apixaban ANTICOAGULANT  5 mg Oral BID     cyanocobalamin  1,000 mcg Intramuscular Q7 Days     folic acid  1 mg Oral Daily     metoprolol succinate ER  25 mg Oral Daily     multivitamin w/minerals  1 tablet Oral Daily     senna-docusate  1 tablet Oral BID     thiamine  100 mg Oral Daily        PRN:  acetaminophen, bisacodyl, gabapentin, ondansetron, polyethylene glycol       PHYSICAL EXAM  Patient Vitals for the past 24 hrs:   BP Temp Temp src Pulse Resp SpO2   12/28/21 0742 128/80 97  F (36.1  C) Oral 66 16 98 %       GEN: NAD  HEENT: NC/AT  PULM: Non-labored breathing on RA  ABD: Non-distended  EXT: +LE edema  Neuro: Answers appropriately, follows commands.     LABS  CBC RESULTS:   Recent Labs   Lab Test 12/18/21  0631   WBC 5.5   RBC 4.05*   HGB 12.9*   HCT 39.0*   MCV 96   MCH 31.9   MCHC 33.1   RDW 14.6          Last Comprehensive Metabolic Panel:  Sodium   Date Value Ref Range Status   12/27/2021 142 133 - 144 mmol/L Final   04/26/2021 140 133 - 144 mmol/L Final     Potassium   Date Value Ref Range Status   12/27/2021 4.4 3.4 - 5.3 mmol/L Final   04/26/2021 3.4 3.4 - 5.3 mmol/L Final     Chloride   Date Value Ref Range Status   12/27/2021 112 (H) 94 - 109 mmol/L Final   04/26/2021 101 94 - 109 mmol/L Final     Carbon Dioxide   Date Value Ref Range Status   04/26/2021 27 20 - 32 mmol/L Final     Carbon Dioxide (CO2)   Date Value Ref Range Status   12/27/2021 25 20 - 32 mmol/L Final     Anion Gap   Date Value Ref  Range Status   12/27/2021 5 3 - 14 mmol/L Final   04/26/2021 12 3 - 14 mmol/L Final     Glucose   Date Value Ref Range Status   12/27/2021 99 70 - 99 mg/dL Final   04/26/2021 110 (H) 70 - 99 mg/dL Final     Urea Nitrogen   Date Value Ref Range Status   12/27/2021 15 7 - 30 mg/dL Final   04/26/2021 7 7 - 30 mg/dL Final     Creatinine   Date Value Ref Range Status   12/27/2021 0.99 0.66 - 1.25 mg/dL Final   04/26/2021 0.77 0.66 - 1.25 mg/dL Final     GFR Estimate   Date Value Ref Range Status   12/27/2021 >90 >60 mL/min/1.73m2 Final     Comment:     Effective December 21, 2021 eGFRcr in adults is calculated using the 2021 CKD-EPI creatinine equation which includes age and gender (Moshe hoyos al., NE, DOI: 10.1056/BFZOok0211219)   04/26/2021 >90 >60 mL/min/[1.73_m2] Final     Comment:     Non  GFR Calc  Starting 12/18/2018, serum creatinine based estimated GFR (eGFR) will be   calculated using the Chronic Kidney Disease Epidemiology Collaboration   (CKD-EPI) equation.       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  Admitted to acute rehab 12/21/21 in setting of impaired strength, impaired activity tolerance, impaired balance, and impaired coordination.     Admission to acute inpatient rehab spinal cord dysfunction: upper and lower extremity paraesthesias and weakness due to combined degeneration of spinal cord 2/2 B12 deficiencey    Impairment group code: 04.120        1. PT, OT 90 minutes of each on a daily basis, in addition to rehab nursing and close management of physiatrist.        2. Impairment of ADL's: Noted to have impaired strength, impaired activity tolerance, and impaired coordination  leading to decreased ability to independently complete ADL's.  Will benefit from ongoing OT with goal for MOD I with basic ADLs.      3. Impairment of mobility:  Noted to have impaired strength, impaired activity tolerance, and impaired balance leading to decreased mobility.  Will benefit from ongoing PT with goal for JESSICA with basic mobility.         4. 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 B12 116.  MRI cervical with abnormal T2 signal is seen within the dorsal spinal cord extending from at least the C1-C2 level down through C5-C6. The signal abnormality in the spinal cord appears to be within inverted V-shaped on the axial sequences consistent with B12 deficiency, felt to be 2/2 to chronic nitrous oxide use, possible poor po intake 2/2 chronic etoh use.  copper level (WNL) , homocysteine (in process) , methylmelonic acid (5.77)  seen by General Neurology  -continue B12 1000mcg every other day IM ( x 7 days) through 12/23  Followed by B12 1000mcg IM every week for 4 weeks   - f/u  B12 /CBC in 2 weeks and 4 weeks with PCP.   Can consider extending b12 IM tx to 3 months if neurologic improvement. Then start maintenance dose B12 1000mcg IM every month or 1000mcg SL daily.  -f/u neurology.    -stop all nitrous oxide- discussed care plan in detail with patient  -continue PT/OT     Alcohol use disorder: Drank 1 L whiskey and 12 cans of beer over the past week. He has a history of alcohol withdrawal, but no withdrawal seizure. Hx of CD  treatment, most recently at [facility] in May 2021.   - CD consulted, patient Not interested in inpatient CD treatment at this time.   - Folic acid, thiamine, MV daily  -PCP follow up     Hx of RLE DVT and submassive PE (10/2021): Diagnosed 10/31/21. Thought to be provoked by an airplaine flight  - Continue Eliquis   - Vascular Medicine referral per Cardiology at last appointment 12/2 -vascular medicine outpatient follow up   -start compression stocking knee high during day.      Biventricular heart failure, HFrEF   after subacute presentation of bilateral submassive pulmonary emboli. NYHA II, improving. EF was mildly decreased 45-50% initially, improved to low normal 50% on recent echo. Possibly some component of alcohol use. Last seen by Cardiology 12/2/21.   - Cardiology follow-up in 6 months.   -f/u pcp     Sinus tachycardia:   Continue PTA Toprol XL.      Morbid obesity:   BMI 39. Increased risk for all cause mortality.   - Diet and lifestyle modification recommended      Constipation  Benign abd exam. No obstructive sxs.   - senokot bid  -prn miralax/ supp  -encourage fluids     Marijuana use: Noted.   - Pt uninterested in cessation     Dependent edema: R>L. Compression stockings started  Wear during day, off at night. Hx of DVT.     Anxiety  -Gabapentin BID PRN.  At home he has it ordered TID PRN but uses it twice per day on average and he notes it does make him tired in the day. Declined emotional support, psychology/, etc.          5. Adjustment to disability:  Clinical psychology to eval and treat as indicated.  6. FEN: reg  7. Bowel: constipated monitor.   8. Bladder: continent  9. DVT Prophylaxis: on apixaban  10. GI Prophylaxis: none  11. Code: full  12. Disposition:  Goal for home  13. ELOS:   Tentative discharge date 1/4/22  14. Rehab prognosis:  fair  15. Follow up Appointments on Discharge: pcp, neurology.         Otilio Martinez MD  Department of Rehabilitation Medicine    Time Spent on this  Encounter   I, Otilio Martinez, spent a total of 35 minutes face-to-face or managing the care of Sebastián Nogueira. Over 50% of my time on the unit was spent counseling the patient and coordinating care. See note for details.

## 2021-12-28 NOTE — PLAN OF CARE
"Acute Rehab Care Conference/Team Rounds      Type: Team Rounds    Present: Otilio Martinez MD; Yahaira Tello PA-C; Yosef Panda PT; Reema Soriano OT; Nyasia LINN, Jeanette Hinds RN, Patient Sebastián Nogueira      Discharge Barriers/Treatment/Education    Rehab Diagnosis: Non-traumatic spinal cord injury due to B12 deficiency    Active Medical Co-morbidities/Prognosis: Polysubstance abuse, obesity, Hx of DVT and PE on anticoagulation, HFrEF, sinus tachycardia, edema, anxiety    Safety: A1 stand Pivot for transfer, wheelchair base, used call light appropriately    Pain: denies pain    Medications, Skin, Tubes/Lines: Takes medication whole, no lines/tubes    Swallowing/Nutrition:    Bowel/Bladder: Continent of Bladder, used urinal independently at night, set up and emptied by staff, [facility] 12/27/2021    [facility] Family lives locally. Anxiety per pt. ETOH. 12 beers and 1 liter whiskey in week prior to hospitalization. Daily nitrous, occasional cocaine, hallucinogens.    ADLs/IADLs: Pt making steady progress with ADLs. Pt requires set-up with UBD tasks and CGA with LBD tasks with FWW and use of AE training. Pt requires CGA with toileting with FWW. Pt requires CGA with G/H tasks standing at EOS with FWW. Will trial use of forearm crutches with ADLs tomorrow. Pt continues to be limited by impaired by FMC skills with ADLs/IADLs. Will initiate use of Xcite to increase FMC skills and dexterity. Recommend HC OT services upon discharge.     Mobility: Progressing well with mobility, demo sup<>sit independently, STS FWW CGA, amb with + ft CGA ataxic gait pattern, and jarred 24x6\" steps B rails CGA with vc's for sequencing and 12x6\" steps single rail Yeyo with vc's for sequencing. Need to continue to progress standing balance, coordination, and independence with amb and stairs for home apartment navigation. Recommend  PT for home safety eval. W/c eval setup for today for K4 rental " w/c. Goal to be mod I with B forearm crutches at discharge, will likely purchase them from [employer].    Cognition/Language:    Community Re-Entry: w/c based for community distances, plan to be mod I with B forearm crutches at d/c    Transportation: requires assist from friends, family, or public transportation    Decision maker: self    Plan of Care and goals reviewed and updated.    Discharge Plan/Recommendations    Fall Precautions: continue    Overall plan for the patient:   Overall medically stable.  Functionally has made excellent progress.  Now ambulating 175 ft with [place] crutches, improved ataxia, and able to self correct LOB.  Able to navigate 20+ stairs CGA with 1 or 2 rails but still need needs to work toward independence for 3 flights and with [place] crutches in order to discharge home, but will also be partially WC based for longer distances.  CGA for mobility to bedroom, able to tolerate 5 minutes of standing, CGA LBD.  Will need to be able to go into his bathroom with crutches as this will not be WC accessible.  Starting Excite to help with UE coordination and strength.  Will set up WC eval prior to discharge.  Move up tentative discharge date to 1/4/22 with  PT and OT.        Utilization Review and Continued Stay Justification    Medical Necessity Criteria:    For any criteria that is not met, please document reason and plan for discharge, transfer, or modification of plan of care to address.    Requires intensive rehabilitation program to treat functional deficits?: Yes    Requires 3x per week or greater involvement of rehabilitation physician to oversee rehabilitation program?: Yes    Requires rehabilitation nursing interventions?: Yes    Patient is making functional progress?: Yes    There is a potential for additional functional progress? Yes    Patient is participating in therapy 3 hours per day a minimum of 5 days per week or 15 hours per week in 7 day period?:Yes    Has discharge needs  that require coordinated discharge planning approach?:Yes      Barriers/Concerns related to meeting medical necessity criteria:  Stairs    Team Plan to Address Concern:  Ongoing therapies and stair training      Final Physician Sign off    Statement of Approval: I have reviewed and agree with the recommendations and documentation in this care conference note.       Patient Goals  SW: Confirm discharge recommendations with therapy, coordinate safe discharge plan and remain available to support and assist as needed.       OT Frequency: Daily/ minutes  OT goal: hygiene/grooming: modified independent,using adaptive equipment  OT goal: upper body dressing: including set-up/clothing retrieval,Modified independent,using adaptive equipment  OT goal: lower body dressing: Modified independent,including set-up/clothing retrieval,using adaptive equipment  OT goal: upper body bathing: Modified independent,using adaptive equipment  OT goal: lower body bathing: Modified independent,using adaptive equipment  OT goal: bed mobility: Independent  OT Goal: transfer: Modified independent,with assistive device  OT goal: toilet transfer/toileting: Modified independent,using adaptive equipment,cleaning and garment management,toilet transfer  OT goal: meal preparation: Modified independent,with simple meal preparation,using adaptive equipment  OT goal: home management: Modified independent,with light demand household tasks,using adaptive equipment  OT goal: cognitive: Patient/caregiver will verbalize understanding of cognitive assessment results/recommendations as needed for safe discharge planning                         PT Frequency: 90 min daily  PT goal: bed mobility:  (met)  PT goal: transfers: Modified independent,Bed to/from chair,Assistive device (SB transfer)  PT goal: gait: Modified independent,150 feet,Assistive device (B forearm crutches)  PT goal: stairs: Modified independent,Rail on left,10 stairs,Greater than 10  stairs,Rail on both sides (10 stairs L rail to enter, then 24 steps B rails to apt)  PT goal: perform aerobic activity with stable cardiovascular response: continuous activity,20 minutes,NuStep  PT goal 1: Floor tranfser, furniture assist, mod I  PT Goal 2: HEP - independent with handout for seated core strengthening and standing balance                      Patient/Family Goal: Medication Management: Patient will be able to successfully participate in MAP program while staying at [facility].     Goal: Mobility: Patient will increase mobility by working with therapies and be able to ambulate safetly by discharge date.                       Goal: Safety Management: Patient will remain free from injury while staying at [facility].  Goal: Prevention of Secondary Complications: Patient will not have any secondary complications while staying at [facility].

## 2021-12-28 NOTE — PLAN OF CARE
"Discharge Planner Post-Acute Rehab PT:      Discharge Plan: home to apartment with 10 stairs to access apartment front door with L rail and 24 stairs to apartment with B rails; discussed discharge to parents home but pt reporting he cannot d/t strained relationship with them. Mixed mobility - primarily w/c based with intermittent amb using B forearm crutches.     Precautions: falls, DVT/PE     Current Status:  Bed Mobility: independent   Transfer: STS, B forearm crutches, CGA  Gait: amb, B forearm crutches, 400', CGA, ataxic gait with poor push off.  Stairs: 24x6\" steps, B rails, CGA; 12x6\" steps, L rail with Yeyo ARRIAZA.  Balance: normal seated balance, poor standing balance      Assessment: w/c eval completed, planning for K4 w/c rental; however, [employer] looking into insurance coverage as they may not be an in-network vendor. If they are not in network, writer would need to find another vendor to supply a w/c, but this would likely not delay his expected discharge of next Tuesday.     Other Barriers to Discharge (DME, Family Training, etc):   [facility]  K4 w/c rental started with [employer], awaiting jay as of 12/28 and may need to switch vendors depending on insurance coverage.     Family training - to be scheduled, determine how much assist family will be able to provide   "

## 2021-12-29 ENCOUNTER — APPOINTMENT (OUTPATIENT)
Dept: OCCUPATIONAL THERAPY | Facility: CLINIC | Age: 26
End: 2021-12-29
Attending: PHYSICAL MEDICINE & REHABILITATION
Payer: COMMERCIAL

## 2021-12-29 ENCOUNTER — APPOINTMENT (OUTPATIENT)
Dept: PHYSICAL THERAPY | Facility: CLINIC | Age: 26
End: 2021-12-29
Attending: PHYSICAL MEDICINE & REHABILITATION
Payer: COMMERCIAL

## 2021-12-29 PROCEDURE — 128N000003 HC R&B REHAB

## 2021-12-29 PROCEDURE — 97112 NEUROMUSCULAR REEDUCATION: CPT | Mod: GO | Performed by: OCCUPATIONAL THERAPIST

## 2021-12-29 PROCEDURE — 250N000013 HC RX MED GY IP 250 OP 250 PS 637: Performed by: PHYSICIAN ASSISTANT

## 2021-12-29 PROCEDURE — 99232 SBSQ HOSP IP/OBS MODERATE 35: CPT | Performed by: PHYSICIAN ASSISTANT

## 2021-12-29 PROCEDURE — 97530 THERAPEUTIC ACTIVITIES: CPT | Mod: GP

## 2021-12-29 PROCEDURE — 97116 GAIT TRAINING THERAPY: CPT | Mod: GP

## 2021-12-29 PROCEDURE — 97535 SELF CARE MNGMENT TRAINING: CPT | Mod: GO | Performed by: OCCUPATIONAL THERAPIST

## 2021-12-29 RX ADMIN — FOLIC ACID 1 MG: 1 TABLET ORAL at 09:26

## 2021-12-29 RX ADMIN — THIAMINE HCL TAB 100 MG 100 MG: 100 TAB at 09:25

## 2021-12-29 RX ADMIN — DOCUSATE SODIUM AND SENNOSIDES 1 TABLET: 8.6; 5 TABLET, FILM COATED ORAL at 09:25

## 2021-12-29 RX ADMIN — METOPROLOL SUCCINATE 25 MG: 25 TABLET, EXTENDED RELEASE ORAL at 09:26

## 2021-12-29 RX ADMIN — APIXABAN 5 MG: 2.5 TABLET, FILM COATED ORAL at 20:33

## 2021-12-29 RX ADMIN — MULTIPLE VITAMINS W/ MINERALS TAB 1 TABLET: TAB at 09:25

## 2021-12-29 RX ADMIN — DOCUSATE SODIUM AND SENNOSIDES 1 TABLET: 8.6; 5 TABLET, FILM COATED ORAL at 20:33

## 2021-12-29 RX ADMIN — APIXABAN 5 MG: 2.5 TABLET, FILM COATED ORAL at 09:25

## 2021-12-29 ASSESSMENT — ACTIVITIES OF DAILY LIVING (ADL)
ADLS_ACUITY_SCORE: 11
ADLS_ACUITY_SCORE: 12
ADLS_ACUITY_SCORE: 11
ADLS_ACUITY_SCORE: 12
ADLS_ACUITY_SCORE: 11

## 2021-12-29 NOTE — PLAN OF CARE
"Discharge Planner Post-Acute Rehab PT:      Discharge Plan: home to apartment with 10 stairs to access apartment front door with L rail and 24 stairs to apartment with B rails; discussed discharge to parents home but pt reporting he cannot d/t strained relationship with them. Mixed mobility - primarily w/c based with intermittent amb using B forearm crutches.     Precautions: falls, DVT/PE     Current Status:  Bed Mobility: independent   Transfer: STS, B forearm crutches, mod I  Gait: amb, B forearm crutches, 165'x2, mod I  Stairs: 20x6\" steps, B rails + B forearm crutches, SBA; 6x6\" steps, L rail + R forearm crutch (keeping L forearm crutch around his L forearm), SBA up and CGA down. Pt performs descent better when descending bwd.    Mod I in room with B forearm crutches.     Assessment: improving independence with stairs. Considering discharge Saturday 1/1/22. ATP reporting he plans to bring a manual w/c to [facility] tomorrow 12/30. Will issue B forearm crutches from [facility]. Plan to f/up with HH PT, but if not covered by insurance plan, would recommend OP PT at [facility].     Other Barriers to Discharge (DME, Family Training, etc):   [facility]  K4 w/c rental started with [employer] to be delivered 12/30.     Family training - pt will be mod I with w/c and B forearm crutches at discharge, so no family training needed.  "

## 2021-12-29 NOTE — PLAN OF CARE
FOCUS/GOAL  Medical management    ASSESSMENT, INTERVENTIONS AND CONTINUING PLAN FOR GOAL:  Pt is alert and oriented. No complaints of pain. Assist of 1 pivot w/c based. Continent of bladder. Pt appeared to be sleeping majority of the shift.

## 2021-12-29 NOTE — PLAN OF CARE
FOCUS/GOAL  Bowel management, Bladder management, Medication management and Medical management    ASSESSMENT, INTERVENTIONS AND CONTINUING PLAN FOR GOAL:  Pt has been continent of bladder this shift. LBM 12/28. Pt will start MAP tomorrow. Order placed. Alerted on-coming RN for remaining set up and education. Pt made Mod I with forearm crutches by therapy today. He denied pain throughout shift. Will continue with POC.

## 2021-12-29 NOTE — PROGRESS NOTES
"  [facility]   Acute Rehabilitation Unit  Daily progress note    INTERVAL HISTORY  Pt seen sitting up in wheel chair, says he is doing pretty well.  Denies sob, n/v/d, fevers, headache, dizziness.  Says therapy is going well and is making progress with bilateral crutches, says he needs a bit more coordination and practice on the stairs but this is going well.  He was made MOD I in room today with crutches, therapy discussing safe discharge planning and considering further moving up discharge date.      MEDICATIONS  Scheduled:    - Medication Assessment Program - Rehab Services   Does not apply See Admin Instructions     apixaban ANTICOAGULANT  5 mg Oral BID     cyanocobalamin  1,000 mcg Intramuscular Q7 Days     folic acid  1 mg Oral Daily     metoprolol succinate ER  25 mg Oral Daily     multivitamin w/minerals  1 tablet Oral Daily     senna-docusate  1 tablet Oral BID     thiamine  100 mg Oral Daily        PRN:  acetaminophen, bisacodyl, gabapentin, ondansetron, polyethylene glycol       PHYSICAL EXAM  Patient Vitals for the past 24 hrs:   BP Temp Temp src Pulse Resp SpO2 Height Weight   12/29/21 0831 128/72 (!) 96.5  F (35.8  C) Oral 74 16 96 % -- --   12/28/21 2044 -- -- -- -- -- -- 1.981 m (6' 6\") 140.8 kg (310 lb 8 oz)   12/28/21 1814 123/75 98.1  F (36.7  C) Axillary 85 18 96 % -- --       GEN: NAD  HEENT: NC/AT  PULM: Non-labored clear  on RA  CV: rrr  ABD: Non-distended non tender.   EXT: +LE edema mild  Neuro: Answers appropriately, follows commands.     LABS  CBC RESULTS:   Recent Labs   Lab Test 12/18/21  0631   WBC 5.5   RBC 4.05*   HGB 12.9*   HCT 39.0*   MCV 96   MCH 31.9   MCHC 33.1   RDW 14.6          Last Comprehensive Metabolic Panel:  Sodium   Date Value Ref Range Status   12/27/2021 142 133 - 144 mmol/L Final   04/26/2021 140 133 - 144 mmol/L Final     Potassium   Date Value Ref Range Status   12/27/2021 4.4 3.4 - 5.3 mmol/L Final   04/26/2021 3.4 3.4 " - 5.3 mmol/L Final     Chloride   Date Value Ref Range Status   12/27/2021 112 (H) 94 - 109 mmol/L Final   04/26/2021 101 94 - 109 mmol/L Final     Carbon Dioxide   Date Value Ref Range Status   04/26/2021 27 20 - 32 mmol/L Final     Carbon Dioxide (CO2)   Date Value Ref Range Status   12/27/2021 25 20 - 32 mmol/L Final     Anion Gap   Date Value Ref Range Status   12/27/2021 5 3 - 14 mmol/L Final   04/26/2021 12 3 - 14 mmol/L Final     Glucose   Date Value Ref Range Status   12/27/2021 99 70 - 99 mg/dL Final   04/26/2021 110 (H) 70 - 99 mg/dL Final     Urea Nitrogen   Date Value Ref Range Status   12/27/2021 15 7 - 30 mg/dL Final   04/26/2021 7 7 - 30 mg/dL Final     Creatinine   Date Value Ref Range Status   12/27/2021 0.99 0.66 - 1.25 mg/dL Final   04/26/2021 0.77 0.66 - 1.25 mg/dL Final     GFR Estimate   Date Value Ref Range Status   12/27/2021 >90 >60 mL/min/1.73m2 Final     Comment:     Effective December 21, 2021 eGFRcr in adults is calculated using the 2021 CKD-EPI creatinine equation which includes age and gender (Moshe et al., NEJM, DOI: 10.1056/FPJAnl9582627)   04/26/2021 >90 >60 mL/min/[1.73_m2] Final     Comment:     Non  GFR Calc  Starting 12/18/2018, serum creatinine based estimated GFR (eGFR) will be   calculated using the Chronic Kidney Disease Epidemiology Collaboration   (CKD-EPI) equation.       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  Admitted to acute rehab 12/21/21 in setting of impaired strength, impaired activity tolerance, impaired balance, and  impaired coordination.     Admission to acute inpatient rehab spinal cord dysfunction: upper and lower extremity paraesthesias and weakness due to combined degeneration of spinal cord 2/2 B12 deficiencey    Impairment group code: 04.120        1. PT, OT 90 minutes of each on a daily basis, in addition to rehab nursing and close management of physiatrist.       2. Impairment of ADL's: Noted to have impaired strength, impaired activity tolerance, and impaired coordination  leading to decreased ability to independently complete ADL's.  Will benefit from ongoing OT with goal for MOD I with basic ADLs.      3. Impairment of mobility:  Noted to have impaired strength, impaired activity tolerance, and impaired balance leading to decreased mobility.  Will benefit from ongoing PT with goal for JESSICA with basic mobility.         4. 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 B12 116.  MRI cervical with abnormal T2 signal is seen within the dorsal spinal cord extending from at least the C1-C2 level down through C5-C6. The signal abnormality in the spinal cord appears to be within inverted V-shaped on the axial sequences consistent with B12 deficiency, felt to be 2/2 to chronic nitrous oxide use, possible poor po intake 2/2 chronic etoh use.  copper level (WNL) , homocysteine (57.7) , methylmelonic acid (5.77)  seen by General Neurology.  Recieved B12 1000mcg every other day IM ( x 7 days) through 12/23   - continue  B12 1000mcg IM every week for 4 weeks   - f/u  B12 /CBC in 2 weeks and 4 weeks with PCP.   Can consider extending b12 IM tx to 3 months if neurologic  improvement. Then start maintenance dose B12 1000mcg IM every month or 1000mcg SL daily.  -f/u neurology.    -stop all nitrous oxide- discussed care plan in detail with patient  -continue PT/OT     Alcohol use disorder: Drank 1 L whiskey and 12 cans of beer over the past week. He has a history of alcohol withdrawal, but no withdrawal seizure. Hx of CD treatment, most recently at [facility] in May 2021.   - CD consulted, patient Not interested in inpatient CD treatment at this time.   - Folic acid, thiamine, MV daily  -PCP follow up     Hx of RLE DVT and submassive PE (10/2021): Diagnosed 10/31/21. Thought to be provoked by an airplaine flight  - Continue Eliquis   - Vascular Medicine referral per Cardiology at last appointment 12/2 -vascular medicine outpatient follow up     Dependent edema: R>L. Compression stockings started  Wear during day, off at night. Hx of DVT.      Biventricular heart failure, HFrEF   after subacute presentation of bilateral submassive pulmonary emboli. NYHA II, improving. EF was mildly decreased 45-50% initially, improved to low normal 50% on recent echo. Possibly some component of alcohol use. Last seen by Cardiology 12/2/21.   - Cardiology follow-up in 6 months.   -f/u pcp     Sinus tachycardia:   Continue PTA Toprol XL.      Morbid obesity:   BMI 39. Increased risk for all cause mortality.   - Diet and lifestyle modification recommended      Constipation  Benign abd exam. No obstructive sxs.   - senokot bid  -prn miralax/ supp  -encourage fluids     Marijuana use: Noted.   - Pt uninterested in cessation     Anxiety  -Gabapentin BID PRN.  At home he has it ordered TID PRN but uses it twice per day on average and he notes it does make him tired in the day. Declined emotional support, psychology/, etc.          5. Adjustment to disability:  Clinical psychology to eval and treat as indicated.  6. FEN: reg  7. Bowel: constipated monitor.   8. Bladder: continent  9. DVT Prophylaxis:  on apixaban  10. GI Prophylaxis: none  11. Code: full  12. Disposition:  Goal for home  13. ELOS:   Tentative discharge date 1/4/22- likely sooner given progress.   14. Rehab prognosis:  fair  15. Follow up Appointments on Discharge: pcp, neurology.         Yahaira Tello PA-C  Department of Rehabilitation Medicine    Time Spent on this Encounter   IYahaira spent a total of 25 minutes face-to-face or managing the care of Sebastián Nogueira. Over 50% of my time on the unit was spent counseling the patient and coordinating care. See note for details.

## 2021-12-29 NOTE — PROGRESS NOTES
Discharge Planner Post-Acute Rehab OT:      Discharge Plan: alone, 3rd floor apartment - 10 [address] and multiple flights of steps within (prefers not to discharge to parents home d/t strained relationship) HC vs OP     Precautions: falls, sensory      Current Status:  ADLs:    Mobility: mod IND with forearm crutches    Oral cares/Grooming: mod I with forearm crutchtes    UB Dressing: mod I    LB Dressing: mod IND with forearm crutches    Bathing: supervision transfer to extended tub bench w forearm crutches    Toileting: Mod IND with forearm crutches   IADLs: IND PTA, including driving. 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 Cognition appears to be at baseline     Assessment: Pt okay for Mod IND in BR with forearm crutches with ADLs and functional mobility, updated whiteboard and requested for orders from PA. Assessed pt's safety with med mgmt; pt unable to manipulate med box and won't be able to safely use upon discharge. Pt will likely just open/close pill bottles for each dose. Cont. With current OT POC.      Other Barriers to Discharge (DME, Family Training, etc):   Pt lives alone in apt in [place] - flight of stairs to enter the building and 4 flight of stairs to get to apartment that is on the 3rd floor. Pt has tub/shower combo with no grab bars.   Family training TBD - has some social support from brother and sister, strained relationship w parents  DME: extended tub bench, grab bar      Outcome Measures  -  strength (12/22/21) RUE 55, LUE 70    (Reassess week of d/c)     - Box and Block (12/22/21) - RUE 18, LUE 20    (Reassess week of d/c)    XCite stim unit for Activity Based Therapy. Skilled set up; determined appropriate FES parameters for each muscle group based off of strong tetanic response of muscle test.  Used the following activities from the software library: Hand  Intervention and patient response: Pt completed 15 repetitions x2 sets with tip pinch with BUE's with writer guiding  through transitional movements. Pt completed 10 repetitions x2 sets with lumbrical grasp with writer guiding through transitional movements.   Please see [email or web address] for further details on patient's stimulation parameters.

## 2021-12-29 NOTE — PLAN OF CARE
FOCUS/GOAL  Medication management, Mobility, Safety management, and Prevention of secondary complications    ASSESSMENT, INTERVENTIONS AND CONTINUING PLAN FOR GOAL:  Pt was A/O, able to use call light and make needs known. Denies pain, SOB, chest pain, N/V, nor dizziness. VSS. Assist of 1 SP, w/c based with transfers. On regular diet, thin liquids, takes medicines whole. Cont of BL/BM, LBM-12/28/21. Refused COVID test. Will continue with current POC.

## 2021-12-29 NOTE — PROGRESS NOTES
SW notified by PT/OT that pt wants to discharge home on Friday 12/31 and they are OK w/ this. They are recommending HC RN/PT/OT/HHA. No accepting home care agency at this time. KAVEH will continue to try and find pt a HC agency. SW met w/ pt and discussed this. SW provided pt w/ a [employer] application in case no HC agency accepts him and he needs to do OP therapy.     Nyasia Manzanares MSW, [facility]   Phone: [phone]  Pager: [phone]

## 2021-12-30 ENCOUNTER — APPOINTMENT (OUTPATIENT)
Dept: OCCUPATIONAL THERAPY | Facility: CLINIC | Age: 26
End: 2021-12-30
Attending: PHYSICAL MEDICINE & REHABILITATION
Payer: COMMERCIAL

## 2021-12-30 ENCOUNTER — APPOINTMENT (OUTPATIENT)
Dept: PHYSICAL THERAPY | Facility: CLINIC | Age: 26
End: 2021-12-30
Attending: PHYSICAL MEDICINE & REHABILITATION
Payer: COMMERCIAL

## 2021-12-30 ENCOUNTER — DOCUMENTATION ONLY (OUTPATIENT)
Dept: REHABILITATION | Facility: CLINIC | Age: 26
End: 2021-12-30
Payer: COMMERCIAL

## 2021-12-30 PROCEDURE — 250N000013 HC RX MED GY IP 250 OP 250 PS 637: Performed by: PHYSICIAN ASSISTANT

## 2021-12-30 PROCEDURE — 97112 NEUROMUSCULAR REEDUCATION: CPT | Mod: GO | Performed by: OCCUPATIONAL THERAPIST

## 2021-12-30 PROCEDURE — 97530 THERAPEUTIC ACTIVITIES: CPT | Mod: GP | Performed by: PHYSICAL THERAPIST

## 2021-12-30 PROCEDURE — 97110 THERAPEUTIC EXERCISES: CPT | Mod: GP | Performed by: PHYSICAL THERAPIST

## 2021-12-30 PROCEDURE — 99232 SBSQ HOSP IP/OBS MODERATE 35: CPT | Performed by: PHYSICAL MEDICINE & REHABILITATION

## 2021-12-30 PROCEDURE — 128N000003 HC R&B REHAB

## 2021-12-30 PROCEDURE — 97535 SELF CARE MNGMENT TRAINING: CPT | Mod: GO | Performed by: OCCUPATIONAL THERAPIST

## 2021-12-30 PROCEDURE — 97116 GAIT TRAINING THERAPY: CPT | Mod: GP | Performed by: PHYSICAL THERAPIST

## 2021-12-30 RX ORDER — LANOLIN ALCOHOL/MO/W.PET/CERES
100 CREAM (GRAM) TOPICAL DAILY
Qty: 30 TABLET | Refills: 0 | Status: SHIPPED | OUTPATIENT
Start: 2021-12-30 | End: 2022-01-01

## 2021-12-30 RX ORDER — MULTIPLE VITAMINS W/ MINERALS TAB 9MG-400MCG
1 TAB ORAL DAILY
Qty: 30 TABLET | Refills: 0 | Status: SHIPPED | OUTPATIENT
Start: 2021-12-30

## 2021-12-30 RX ORDER — CYANOCOBALAMIN 1000 UG/ML
1 INJECTION, SOLUTION INTRAMUSCULAR; SUBCUTANEOUS
Qty: 10 ML | Refills: 0 | Status: SHIPPED | OUTPATIENT
Start: 2022-01-07 | End: 2022-01-01

## 2021-12-30 RX ORDER — FOLIC ACID 1 MG/1
1 TABLET ORAL DAILY
Qty: 30 TABLET | Refills: 0 | Status: SHIPPED | OUTPATIENT
Start: 2021-12-30 | End: 2022-01-01

## 2021-12-30 RX ADMIN — DOCUSATE SODIUM AND SENNOSIDES 1 TABLET: 8.6; 5 TABLET, FILM COATED ORAL at 09:02

## 2021-12-30 RX ADMIN — THIAMINE HCL TAB 100 MG 100 MG: 100 TAB at 09:02

## 2021-12-30 RX ADMIN — MULTIPLE VITAMINS W/ MINERALS TAB 1 TABLET: TAB at 09:02

## 2021-12-30 RX ADMIN — APIXABAN 5 MG: 2.5 TABLET, FILM COATED ORAL at 20:33

## 2021-12-30 RX ADMIN — APIXABAN 5 MG: 2.5 TABLET, FILM COATED ORAL at 09:02

## 2021-12-30 RX ADMIN — FOLIC ACID 1 MG: 1 TABLET ORAL at 09:01

## 2021-12-30 RX ADMIN — METOPROLOL SUCCINATE 25 MG: 25 TABLET, EXTENDED RELEASE ORAL at 09:02

## 2021-12-30 RX ADMIN — DOCUSATE SODIUM AND SENNOSIDES 1 TABLET: 8.6; 5 TABLET, FILM COATED ORAL at 20:33

## 2021-12-30 ASSESSMENT — ACTIVITIES OF DAILY LIVING (ADL)
ADLS_ACUITY_SCORE: 11

## 2021-12-30 NOTE — PLAN OF CARE
FOCUS/GOAL  Medication management    ASSESSMENT, INTERVENTIONS AND CONTINUING PLAN FOR GOAL:  Map started today. Pt called for medications on time and picked out all correct medications and verbalized understanding of indications. Mod I w/ forearm crutches for functional mobility. Up in W/C most of the day/pm. Continent of bowel and bladder. Had bm today.   VSS. Educated pt regarding Eliquis and handout given.   Scheduled to discharge home tomorrow.

## 2021-12-30 NOTE — PROGRESS NOTES
Prior Authorization **INITIATED**    Medication: Cyanocobalamin (B-12) 1000mcg/ml [employer] Company: [employer] - Phone [phone] Fax [phone]  Pharmacy Filling the Rx: [facility] [place], [place] - [address]  Filling Pharmacy Phone: [phone]  Filling Pharmacy Fax: [phone]  Start Date: 12/30/2021  Reference #: CoverMyMeds Key: [id number]  Comments:  Plan limits to 1ml per 30 days.      Migdalia Booker CPhT  [place] [facility]charge [facility] Liaison  Pronouns: She/Her/Hers    [facility]  [address]  [address] [address]   [email or web address]  [email or web address]   Phone: [phone]  Pager: [phone]  Fax: [phone]

## 2021-12-30 NOTE — PROGRESS NOTES
[facility]   Acute Rehabilitation Unit  Daily progress note    INTERVAL HISTORY  No acute events overnight.  This morning Sebastián has no new concerns or complaints.  He denies chest pain or shortness of breath.  Has shown improvement in therapies, and is now Mod I in the room with forearm crutches.  He has shown improving independence with stairs, and given progress will move up tentative discharge date to 12/31/21 after a full day of therapies.      MEDICATIONS  Scheduled:    - Medication Assessment Program - Rehab Services   Does not apply See Admin Instructions     apixaban ANTICOAGULANT  5 mg Oral BID     cyanocobalamin  1,000 mcg Intramuscular Q7 Days     folic acid  1 mg Oral Daily     metoprolol succinate ER  25 mg Oral Daily     multivitamin w/minerals  1 tablet Oral Daily     senna-docusate  1 tablet Oral BID     thiamine  100 mg Oral Daily        PRN:  acetaminophen, bisacodyl, gabapentin, ondansetron, polyethylene glycol       PHYSICAL EXAM  Patient Vitals for the past 24 hrs:   BP Temp Temp src Pulse Resp SpO2   12/30/21 0806 129/76 (!) 95.9  F (35.5  C) Oral 63 16 97 %   12/29/21 1607 115/65 97.5  F (36.4  C) Oral 88 16 96 %       GEN: NAD  HEENT: NC/AT  PULM: Non-labored clear  on RA  CV: rrr, S1+S2, no m/r/g  ABD: Non-distended non tender, soft,   EXT: +LE edema mild, no calf tenderness  Neuro: Answers appropriately, follows commands.     LABS  CBC RESULTS: Recent Labs   Lab Test 12/18/21  0631   WBC 5.5   RBC 4.05*   HGB 12.9*   HCT 39.0*   MCV 96   MCH 31.9   MCHC 33.1   RDW 14.6          Last Comprehensive Metabolic Panel:  Sodium   Date Value Ref Range Status   12/27/2021 142 133 - 144 mmol/L Final   04/26/2021 140 133 - 144 mmol/L Final     Potassium   Date Value Ref Range Status   12/27/2021 4.4 3.4 - 5.3 mmol/L Final   04/26/2021 3.4 3.4 - 5.3 mmol/L Final     Chloride   Date Value Ref Range Status   12/27/2021 112 (H) 94 - 109 mmol/L Final    04/26/2021 101 94 - 109 mmol/L Final     Carbon Dioxide   Date Value Ref Range Status   04/26/2021 27 20 - 32 mmol/L Final     Carbon Dioxide (CO2)   Date Value Ref Range Status   12/27/2021 25 20 - 32 mmol/L Final     Anion Gap   Date Value Ref Range Status   12/27/2021 5 3 - 14 mmol/L Final   04/26/2021 12 3 - 14 mmol/L Final     Glucose   Date Value Ref Range Status   12/27/2021 99 70 - 99 mg/dL Final   04/26/2021 110 (H) 70 - 99 mg/dL Final     Urea Nitrogen   Date Value Ref Range Status   12/27/2021 15 7 - 30 mg/dL Final   04/26/2021 7 7 - 30 mg/dL Final     Creatinine   Date Value Ref Range Status   12/27/2021 0.99 0.66 - 1.25 mg/dL Final   04/26/2021 0.77 0.66 - 1.25 mg/dL Final     GFR Estimate   Date Value Ref Range Status   12/27/2021 >90 >60 mL/min/1.73m2 Final     Comment:     Effective December 21, 2021 eGFRcr in adults is calculated using the 2021 CKD-EPI creatinine equation which includes age and gender (Moshe et al., NEJ, DOI: 10.1056/WIYFem5189772)   04/26/2021 >90 >60 mL/min/[1.73_m2] Final     Comment:     Non  GFR Calc  Starting 12/18/2018, serum creatinine based estimated GFR (eGFR) will be   calculated using the Chronic Kidney Disease Epidemiology Collaboration   (CKD-EPI) equation.       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  Admitted to acute rehab 12/21/21 in setting of impaired strength, impaired activity tolerance, impaired balance, and impaired coordination.     Admission to acute inpatient rehab spinal cord dysfunction: upper and lower extremity  paraesthesias and weakness due to combined degeneration of spinal cord 2/2 B12 deficiencey    Impairment group code: 04.120        1. PT, OT 90 minutes of each on a daily basis, in addition to rehab nursing and close management of physiatrist.       2. Impairment of ADL's: Noted to have impaired strength, impaired activity tolerance, and impaired coordination  leading to decreased ability to independently complete ADL's.  Will benefit from ongoing OT with goal for MOD I with basic ADLs.      3. Impairment of mobility:  Noted to have impaired strength, impaired activity tolerance, and impaired balance leading to decreased mobility.  Will benefit from ongoing PT with goal for JESSICA with basic mobility.         4. 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 B12 116.  MRI cervical with abnormal T2 signal is seen within the dorsal spinal cord extending from at least the C1-C2 level down through C5-C6. The signal abnormality in the spinal cord appears to be within inverted V-shaped on the axial sequences consistent with B12 deficiency, felt to be 2/2 to chronic nitrous oxide use, possible poor po intake 2/2 chronic etoh use.  copper level (WNL) , homocysteine (57.7) , methylmelonic acid (5.77)  seen by General Neurology.  Recieved B12 1000mcg every other day IM ( x 7 days) through 12/23   - continue  B12 1000mcg IM every week for 4 weeks   - f/u  B12 /CBC in 2 weeks and 4 weeks with PCP.   Can consider extending b12 IM tx to 3 months if neurologic improvement. Then start maintenance dose B12 1000mcg IM every month or 1000mcg SL daily.  -f/u neurology.    -stop  all nitrous oxide- discussed care plan in detail with patient  -continue PT/OT     Alcohol use disorder: Drank 1 L whiskey and 12 cans of beer over the past week. He has a history of alcohol withdrawal, but no withdrawal seizure. Hx of CD treatment, most recently at [facility] in May 2021.   - CD consulted, patient Not interested in inpatient CD treatment at this time.   - Folic acid, thiamine, MV daily  -PCP follow up     Hx of RLE DVT and submassive PE (10/2021): Diagnosed 10/31/21. Thought to be provoked by an airplaine flight  - Continue Eliquis   - Vascular Medicine referral per Cardiology at last appointment 12/2 -vascular medicine outpatient follow up     Dependent edema: R>L. Compression stockings started  Wear during day, off at night. Hx of DVT.      Biventricular heart failure, HFrEF   after subacute presentation of bilateral submassive pulmonary emboli. NYHA II, improving. EF was mildly decreased 45-50% initially, improved to low normal 50% on recent echo. Possibly some component of alcohol use. Last seen by Cardiology 12/2/21.   - Cardiology follow-up in 6 months.   -f/u pcp     Sinus tachycardia:   Continue PTA Toprol XL.      Morbid obesity:   BMI 39. Increased risk for all cause mortality.   - Diet and lifestyle modification recommended      Constipation  Benign abd exam. No obstructive sxs.   - senokot bid  -prn miralax/ supp  -encourage fluids     Marijuana use: Noted.   - Pt uninterested in cessation     Anxiety  -Gabapentin BID PRN.  At home he has it ordered TID PRN but uses it twice per day on average and he notes it does make him tired in the day. Declined emotional support, psychology/, etc.          5. Adjustment to disability:  Clinical psychology to eval and treat as indicated.  6. FEN: reg  7. Bowel: constipated monitor.   8. Bladder: continent  9. DVT Prophylaxis: on apixaban  10. GI Prophylaxis: none  11. Code: full  12. Disposition:  Goal for home  13. ELOS:   Move up  tentative discharge date to 12/31/21 after full day of therapies  14. Rehab prognosis:  fair  15. Follow up Appointments on Discharge: pcp, neurology.       Otilio Martinez MD  Department of Rehabilitation Medicine    Time Spent on this Encounter   I, Otilio Martinez, spent a total of 25 minutes face-to-face or managing the care of Sebastián Nogueira. Over 50% of my time on the unit was spent counseling the patient and coordinating care. See note for details.

## 2021-12-30 NOTE — PLAN OF CARE
"Discharge Planner Post-Acute Rehab PT:      Discharge Plan: home to apartment with 10 stairs to access apartment front door with L rail and 24 stairs to apartment with B rails; discussed discharge to parents home but pt reporting he cannot d/t strained relationship with them. Mixed mobility - primarily w/c based with intermittent amb using B forearm crutches.     Precautions: falls, DVT/PE     Current Status:  Bed Mobility: independent   Transfer: STS, B forearm crutches, mod I  Gait: amb, B forearm crutches, 165'x2, mod I  Stairs: 20x6\" steps, B rails + B forearm crutches, SBA; 6x6\" steps, L rail + R forearm crutch (keeping L forearm crutch around his L forearm), SBA up and CGA down. Pt performs descent better when descending bwd.    Mod I in room with B forearm crutches.     Assessment: Pt missed 15 minutes in pm, partly due to fatigue and partly due to therapist needing to talk to SW about pt's discharge plan.  Pt was asked this pm to ask a friend to pick him up tomorrow afternoon, and if possible, come in at 2:15 pm so the friend knows how to handle getting the wheelchair up the pt's stairs. Pt also needs shoes for outdoor mobility.   Pt states he will work on that.  Pt educated that he should not be driving after discharge from ARU, and pt stating \"there are a lot of things they are telling me not to do\".  Pt educated about need to protect feet due to sensory loss, and will cont training pt.       Other Barriers to Discharge (DME, Family Training, etc):   DME - needs B forearm crutches from [facility].  K4 w/c rental started with [email or web address]  delivered 12/30.     Family training - pt will be mod I with w/c and B forearm crutches at discharge, so no family training needed.  "

## 2021-12-30 NOTE — PROGRESS NOTES
Prior Authorization **APPROVED**    Authorization Effective Date: 12/30/2021  Authorization Expiration Date: 1/27/2022  Medication: Cyanocobalamin (B-12) 1000mcg/ml soln **APPROVED**  Approved Dose/Quantity: 1ml q7d  Reference #: CoverMyMeds Key: [id number]   Insurance Company: [employer] - Phone [phone] Fax [phone]  Expected CoPay: $0.00     CoPay Card Available: No    Foundation Assistance Needed: n/a  Which Pharmacy is filling the prescription (Not needed for infusion/clinic administered): [facility] [place], [place] - [address]  Pharmacy Notified: Yes  Patient Notified: Yes  Comments:  Plan limits to 1ml per 30 days.      Migdalia Booker CPhT  [place] Discharge Pharmacy Liaison  Pronouns: She/Her/Hers    [facility]  [address] [facility]  [address], [place] [address]   [email or web address]  [email or web address]   Phone: [phone]  Pager: [phone]  Fax: [phone]

## 2021-12-30 NOTE — PLAN OF CARE
FOCUS/GOAL  Medication management, Safety management, and Prevention of secondary complications    ASSESSMENT, INTERVENTIONS AND CONTINUING PLAN FOR GOAL:  Pt was A/O, able to use call light and make needs known. Denies pain, SOB, chest pain, N/V, nor dizziness. VSS. Mod I forearm crutches with transfers. On regular diet, thin liquids, takes medicines whole. Cont of BL, no BM this shift, LBM-12/28/21. To start on MAP tomorrow, instructions and med list given. Will continue with current POC.

## 2021-12-30 NOTE — PROGRESS NOTES
Discharge Planner Post-Acute Rehab OT:      Discharge Plan: alone, 3rd floor apartment - [address] and multiple flights of steps within (prefers not to discharge to parents home d/t strained relationship) HC vs OP     Precautions: falls, sensory      Current Status:  ADLs:    Mobility: mod IND with forearm crutches    Oral cares/Grooming: mod I with forearm crutchtes    UB Dressing: mod I    LB Dressing: mod IND with forearm crutches    Bathing: supervision transfer to extended tub bench w forearm crutches    Toileting: Mod IND with forearm crutches   IADLs: IND PTA, including driving. 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 Cognition appears to be at baseline     Assessment: Pt demo mod IND with kitchen mobility and simple meal prep with use of stovetop. Pt req'd SBA with transporting items with backpack while ambulating with forearm crutches; recommending assistance with grocery shopping and laundry upon discharge. Continued to educate pt on risks of driving, and recommendation of not driving upon discharge d/t impaired sensation and coordination. Provided pt with extended tub bench and sock aid through [facility] and set-up OP OT/PT through [facility]. Pt on track for discharge tomorrow following full day of therapies.      Other Barriers to Discharge (DME, Family Training, etc):   Pt lives alone in [facility] [place] - flight of stairs to enter the building and 4 flight of stairs to get to apartment that is on the 3rd floor. Pt has tub/shower combo with no grab bars.   Family training TBD - has some social support from brother and sister, strained relationship w parents  DME: extended tub bench, grab bar      Outcome Measures  -  strength (12/22/21) RUE 55, LUE 70    (Reassess week of d/c)     - Box and Block (12/22/21) - RUE 18, LUE 20    (Reassess week of d/c)     XCite stim unit for Activity Based Therapy. Skilled set up; determined appropriate FES parameters for each muscle group  based off of strong tetanic response of muscle test.  Used the following activities from the software library: Hand  Intervention and patient response: Pt completed 30 repetitions x1 sets with opposition with BUE's with writer guiding through transitional movements.

## 2021-12-30 NOTE — PROGRESS NOTES
Pt denied from the following HC agencies: [facility], [facility], [facility], [facility], [facility], [facility], [facility], [facility], and [facility]. Barrier to finding HC is pt insurance and staffing.     Pt will have to discharge home with OP therapy. Pt has transportation benefits with insurance. SW printed off information for those benefits and met with pt at bedside. Pt did not know he had those benefits, appreciative of the information and agreeable to doing OP due to difficulty finding HC. Pt does not want to complete metro mobility at this time since he has benefits with insurance. Pt denied any additional questions, concerns or needs from SW at this time.     SW notified PT and PA of plan to do OP and transport benefits. Therapist to set up OP and discuss further with pt.     No further SW needs.     Margareth Cruz [facility]   [facility]   Direct Phone: [phone]  I   Pager: [phone]  I  Fax: [phone]

## 2021-12-30 NOTE — PLAN OF CARE
FOCUS/GOAL  Medical management    ASSESSMENT, INTERVENTIONS AND CONTINUING PLAN FOR GOAL:  Pt is alert and oriented. No complaints of pain. Mod I in room with crutches. Continent of bladder using urinal. MAP filled. Appeared to be sleeping on rounds.

## 2021-12-31 ENCOUNTER — APPOINTMENT (OUTPATIENT)
Dept: OCCUPATIONAL THERAPY | Facility: CLINIC | Age: 26
End: 2021-12-31
Attending: PHYSICAL MEDICINE & REHABILITATION
Payer: COMMERCIAL

## 2021-12-31 ENCOUNTER — APPOINTMENT (OUTPATIENT)
Dept: PHYSICAL THERAPY | Facility: CLINIC | Age: 26
End: 2021-12-31
Attending: PHYSICAL MEDICINE & REHABILITATION
Payer: COMMERCIAL

## 2021-12-31 VITALS
TEMPERATURE: 97.7 F | HEART RATE: 85 BPM | BODY MASS INDEX: 35.92 KG/M2 | SYSTOLIC BLOOD PRESSURE: 125 MMHG | WEIGHT: 310.5 LBS | HEIGHT: 78 IN | DIASTOLIC BLOOD PRESSURE: 83 MMHG | RESPIRATION RATE: 18 BRPM | OXYGEN SATURATION: 95 %

## 2021-12-31 PROCEDURE — 250N000011 HC RX IP 250 OP 636: Performed by: PHYSICIAN ASSISTANT

## 2021-12-31 PROCEDURE — 250N000013 HC RX MED GY IP 250 OP 250 PS 637: Performed by: PHYSICIAN ASSISTANT

## 2021-12-31 PROCEDURE — 97530 THERAPEUTIC ACTIVITIES: CPT | Mod: GP | Performed by: PHYSICAL THERAPIST

## 2021-12-31 PROCEDURE — 97110 THERAPEUTIC EXERCISES: CPT | Mod: GO

## 2021-12-31 PROCEDURE — 99239 HOSP IP/OBS DSCHRG MGMT >30: CPT | Performed by: PHYSICAL MEDICINE & REHABILITATION

## 2021-12-31 PROCEDURE — 97116 GAIT TRAINING THERAPY: CPT | Mod: GP | Performed by: PHYSICAL THERAPIST

## 2021-12-31 PROCEDURE — 97535 SELF CARE MNGMENT TRAINING: CPT | Mod: GO

## 2021-12-31 RX ADMIN — FOLIC ACID 1 MG: 1 TABLET ORAL at 10:43

## 2021-12-31 RX ADMIN — THIAMINE HCL TAB 100 MG 100 MG: 100 TAB at 10:44

## 2021-12-31 RX ADMIN — CYANOCOBALAMIN 1000 MCG: 1000 INJECTION, SOLUTION INTRAMUSCULAR at 10:47

## 2021-12-31 RX ADMIN — METOPROLOL SUCCINATE 25 MG: 25 TABLET, EXTENDED RELEASE ORAL at 10:44

## 2021-12-31 RX ADMIN — MULTIPLE VITAMINS W/ MINERALS TAB 1 TABLET: TAB at 10:43

## 2021-12-31 RX ADMIN — APIXABAN 5 MG: 2.5 TABLET, FILM COATED ORAL at 10:44

## 2021-12-31 ASSESSMENT — ACTIVITIES OF DAILY LIVING (ADL)
ADLS_ACUITY_SCORE: 11

## 2021-12-31 NOTE — PLAN OF CARE
FOCUS/GOAL  Medical management    ASSESSMENT, INTERVENTIONS AND CONTINUING PLAN FOR GOAL:  Pt is alert and oriented. JESSICA in room. Can transfer himself to the wheelchair for mobility. Use urinal for voiding at night time. Seen sleeping during safety round checks.Discharging today.

## 2021-12-31 NOTE — PLAN OF CARE
FOCUS/GOAL  Bowel management, Bladder management, Medication management, and Medical management    ASSESSMENT, INTERVENTIONS AND CONTINUING PLAN FOR GOAL:  Pt has been continent of bladder this shift. LBM 12/30 per pt report. Educated pt on administration of cyanocobalamin injection. Pt stated he will be unable to administer the shot for himself due to weakness in his hands. He plans to educate his family and friends to give him the injection as needed. CN got approval of this plan from PMR PA. Pt verbalized understanding.     Reviewed discharge paperwork including medications, instructions, and follow-up appts with pt. He asked appropriate questions and verbalized understanding. Pt helped to the car with his belongings via w/c by writer and CNA. Discharged from unit around 1530.

## 2021-12-31 NOTE — DISCHARGE SUMMARY
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  Reported 2 week history of paresthesias and generalized weakness which started in upper extremities and progress to lower extremities.  Imaging revealed abnormal T2 signal on MRI C spine likely represents subacute combined degeneration in setting of alcohol and nitrous gas abuse.       REHABILITATION COURSE  Sebastián Nogueira was admitted to the [facility] acute inpatient rehabilitation unit on 12/21/21 where he participated in PT and OT for 3 90 minutes of each on a daily basis.  He had no medical complications while on the unit.  Functionally, he made excellent progress and at the time of discharge was Mod I for ambulation and ADLs with [employer] crutches, and was able to navigate 20 stairs for his home.  He will continue with outpatient PT and OT.  He has transportation benefits, and it was discussed with him NOT TO DRIVE at this time, and he can continue to work on return to driving tasks with outpatient OT.      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 B12 116.  MRI cervical with abnormal T2 signal is seen within the dorsal spinal cord extending from at least the C1-C2 level down through C5-C6. The signal abnormality in the spinal cord appears to be within inverted V-shaped on the axial sequences consistent with B12 deficiency, felt to be 2/2 to chronic nitrous oxide use, possible poor po intake 2/2 chronic etoh use.  copper level (WNL) , homocysteine (57.7) , methylmelonic acid (5.77)  seen by General Neurology.  Recieved B12 1000mcg every other day IM ( x 7 days) through 12/23   - continue  B12 1000mcg IM every week for 4 weeks   - f/u  B12 /CBC in 2 weeks and 4 weeks with PCP.   Can consider extending b12 IM tx to 3 months if neurologic improvement. Then start maintenance dose B12 1000mcg IM every month or 1000mcg SL daily.  -f/u neurology.   -stop all nitrous oxide- discussed care plan in detail with patient     Alcohol use disorder: Drank 1 L whiskey and 12 cans of beer over the past week. He has a history of alcohol withdrawal, but no withdrawal seizure. Hx of CD treatment, most recently at [facility] in May 2021.   - CD consulted, patient Not interested in inpatient CD treatment at this time.   - Folic acid, thiamine, MV daily  -PCP follow up     Hx of RLE DVT and submassive PE (10/2021): Diagnosed 10/31/21. Thought to be provoked by an airplaine flight  - Continue Eliquis   - Vascular Medicine referral per Cardiology at last appointment 12/2 -vascular medicine outpatient follow up      Dependent edema: R>L. Compression stockings started  Wear during day, off at night. Hx of DVT.      Biventricular heart failure, HFrEF   after subacute presentation of bilateral submassive pulmonary emboli. NYHA II, improving. EF was  mildly decreased 45-50% initially, improved to low normal 50% on recent echo. Possibly some component of alcohol use. Last seen by Cardiology 12/2/21.   - Cardiology follow-up in 6 months.   -f/u pcp     Sinus tachycardia:   Continue PTA Toprol XL.      Morbid obesity:   BMI 39. Increased risk for all cause mortality.   - Diet and lifestyle modification recommended      Constipation  Benign abd exam. No obstructive sxs.   - senokot bid  -prn miralax/ supp  -encourage fluids     Marijuana use: Noted.   - Pt uninterested in cessation      Anxiety  -Gabapentin BID PRN.  At home he has it ordered TID PRN but uses it twice per day on average and he notes it does make him tired in the day. Declined emotional support, psychology/, etc.    Follow up Appointments on Discharge: pcp, neurology, vascular medicine           DISCHARGE MEDICATIONS  Current Discharge Medication List      CONTINUE these medications which have CHANGED    Details   cyanocobalamin (CYANOCOBALAMIN) 1000 MCG/ML injection Inject 1 mL (1,000 mcg) into the muscle every 7 days  Qty: 10 mL, Refills: 0    Associated Diagnoses: Subacute combined degeneration of spinal cord (H)      folic acid (FOLVITE) 1 MG tablet Take 1 tablet (1 mg) by mouth daily  Qty: 30 tablet, Refills: 0    Associated Diagnoses: Alcohol dependence with intoxication with complication (H)      multivitamin w/minerals (THERA-VIT-M) tablet Take 1 tablet by mouth daily  Qty: 30 tablet, Refills: 0    Associated Diagnoses: Alcohol dependence with intoxication with complication (H)      thiamine (B-1) 100 MG tablet Take 1 tablet (100 mg) by mouth daily  Qty: 30 tablet, Refills: 0    Associated Diagnoses: Alcohol dependence with intoxication with complication (H)         CONTINUE these medications which have NOT CHANGED    Details   acetaminophen (TYLENOL) 325 MG tablet Take 2 tablets (650 mg) by mouth every 6 hours as needed for mild pain or other (and adjunct with moderate or severe pain  or per patient request)      apixaban ANTICOAGULANT (ELIQUIS) 5 MG tablet Take 1 tablet (5 mg) by mouth 2 times daily  Qty: 90 tablet, Refills: 3    Associated Diagnoses: Multiple subsegmental pulmonary emboli without acute cor pulmonale (H); LV dysfunction      gabapentin (NEURONTIN) 400 MG capsule Take 400 mg by mouth daily as needed (Anxiety)       metoprolol succinate ER (TOPROL-XL) 25 MG 24 hr tablet Take 1 tablet (25 mg) by mouth daily  Qty: 90 tablet, Refills: 3    Associated Diagnoses: LV dysfunction         STOP taking these medications       bisacodyl (DULCOLAX) 10 MG suppository Comments:   Reason for Stopping:         cloNIDine (CATAPRES) 0.1 MG tablet Comments:   Reason for Stopping:         magnesium hydroxide (MILK OF MAGNESIA) 400 MG/5ML suspension Comments:   Reason for Stopping:         ondansetron (ZOFRAN) 4 MG tablet Comments:   Reason for Stopping:         polyethylene glycol (MIRALAX) 17 g packet Comments:   Reason for Stopping:         senna-docusate (SENOKOT-S/PERICOLACE) 8.6-50 MG tablet Comments:   Reason for Stopping:                 DISCHARGE INSTRUCTIONS AND FOLLOW UP  Discharge Procedure Orders   Physical Therapy Referral   Standing Status: Future   Referral Priority: Routine Referral Type: Rehab Therapy Physical Therapy   Number of Visits Requested: 1     Occupational Therapy Referral   Standing Status: Future   Referral Priority: Routine Referral Type: Occupational Therapy   Number of Visits Requested: 1     Reason for your hospital stay   Order Comments: Rehabilitation after non-traumatic spinal cord injury due to B12 deficiency     Activity   Order Comments: Your activity upon discharge: activity as tolerated with forearm crutches and no driving at this time.  Outpatient therapies will help you work towards returning to drive.     Order Specific Question Answer Comments   Is discharge order? Yes      Adult [facility] Follow-up and recommended labs and tests   Order Comments: Follow up  with primary care provider, Ton López, within 7 days for hospital follow- up and regarding new diagnosis.  No follow up labs or test are needed.      Appointments on [place] and/or [facility] (with [facility] or [facility] provider or service). Call [phone] if you haven't heard regarding these appointments within 7 days of discharge.     When to contact your care team   Order Comments: Call your primary doctor if you have any of the following: Chest pain, fevers, chills, shortness of breath, increased weakness or falls, or any other symptom you may find concerning.     Diet   Order Comments: Follow this diet upon discharge: Regular Diet     Order Specific Question Answer Comments   Is discharge order? Yes           PHYSICAL EXAMINATION    Most recent Vital Signs:   Vitals:    12/29/21 0831 12/29/21 1607 12/30/21 0806 12/30/21 1608   BP: 128/72 115/65 129/76 122/66   BP Location: Left arm Right arm Right arm Right arm   Pulse: 74 88 63 82   Resp: 16 16 16 18   Temp: (!) 96.5  F (35.8  C) 97.5  F (36.4  C) (!) 95.9  F (35.5  C) 97.9  F (36.6  C)   TempSrc: Oral Oral Oral Oral   SpO2: 96% 96% 97% 96%   Weight:       Height:             GEN: NAD, obese  HEENT: NC/AT  PULM: Non-labored clear  on RA  CV: RRR, S1+S2, no m/r/g  ABD: Non-distended non tender, soft, BS+  EXT: +LE edema mild, no calf tenderness b/l  Neuro: Answers appropriately, follows commands. MMT 4/5 to b/l , 4/5 to b/l HF, otherwise 5/5 throughout.      35 minutes spent in discharge, including >50% in counseling and coordination of care, medication review and plan of care recommended on follow up.     Discharge summary was forwarded to Ton López (PCP) at the time of discharge, so as to bridge from hospital to outpatient care.     It was our pleasure to care for Sebastián Nogueira during this hospitalization. Please do not hesitate to contact me should there be questions regarding the hospital course or discharge plan.        Otilio Martinez,  MD  Department of Rehabilitation Medicine

## 2021-12-31 NOTE — DISCHARGE INSTRUCTIONS
Follow-up Appointments    -Follow up with primary care- follow up hospitalization  You are scheduled to see Dr. Galo Vizcarra on Monday, January 3rd, 2022 at 10:50am.    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 Ej Martinez on May 11th, 2022 at 3:30pm.    Address  [facility] Neu[facility]                          [facility]                          [address] [place] [address]  Phone   [phone]      -Follow up DVT with vascular medicine- referral placed 12/2/21  Please call the clinic at the number below to schedule an appointment.    Address  [address] [place] [address] [address] [place] [address]  Phone   [phone]      -Follow up heart failure with cardiology (6 months)  The clinic will reach out to you in late March or early April to schedule this follow-up    Address  [facility]                          [address] [place] [address] [address]                          [place] [address]  Phone   [phone]

## 2021-12-31 NOTE — PLAN OF CARE
Physical Therapy Discharge Summary    Reason for therapy discharge:    Discharged to home with outpatient therapy.    Progress towards therapy goal(s). See goals on Care Plan in [facility] electronic health record for goal details.  Goals met    Therapy recommendation(s):    Continued therapy is recommended.  Rationale/Recommendations:  Ongoing PT services recommended to progress strength and functional mobility. Has walker loaned from rehab unit as bariatric forearm crutches not available at this time. He has written script to get these from DME provider of his choice.

## 2021-12-31 NOTE — PLAN OF CARE
Occupational Therapy Discharge Summary    Reason for therapy discharge:    All goals and outcomes met, no further needs identified.    Progress towards therapy goal(s). See goals on Care Plan in [facility] electronic health record for goal details.  Goals met    Therapy recommendation(s):    Continued therapy is recommended.  Rationale/Recommendations:  Recommending OP OT to further improve strength, coordination and functional use of B hands.    Pt achieved significant gains in safety and independence with ADLs during ARU stay. Pt is now able to complete his full basic ADL routine with mod I using forearm crutches. Pt has been issued an extended tub bench and sock aid. Pt has been provided multiple HEPs to continue in home setting to further increase BUE strength and B hand coordination. Pt does not have in home therapy benefits, so OP OT is recommended for further gains.     Outcome Measures  -  strength (12/22/21) - R hand 55#, L hand 70#  - Repeat  strength (12/31/21) - R hand 64# (+9#), L hand 52# (-18#)      - Box and Block (12/22/21) - R hand 18, L hand 20  - Repeat Box and Block (12/31/21) - R hand 29 (+11), L hand 30 (+10)

## 2021-12-31 NOTE — PLAN OF CARE
PT: Time was spent discussing equipment needs with pt.  Pt wants to purchase heavy duty forearm cruttches on his own from ideally a medical supply store, or less ideally for him -from  [employer] (pt states he does not like [employer]) .  Pt given written sheets with options for crutches to purchase from [employer] for heavy duty options , and pt verbalizing undertsanding.   Pt also given a prescription for them if he chooses to go through a medical supply store.  Pt states he will borrow our forearm crutches  and return them when he gets the new heavy duty ones (since [facility]  does not have them to issue and cannot order them).  This writer attempted to find heavy duty forearm crutches from [facility] and [facility], but [facility] did not get back to PT, and the offices were all closed on 12/31.  Pt has been using our crutches, and states he will bring them back once he gets the heavy duty ones.

## 2022-01-01 ENCOUNTER — OFFICE VISIT (OUTPATIENT)
Dept: OTHER | Facility: CLINIC | Age: 27
End: 2022-01-01
Attending: INTERNAL MEDICINE
Payer: COMMERCIAL

## 2022-01-01 ENCOUNTER — HOSPITAL ENCOUNTER (OUTPATIENT)
Dept: CT IMAGING | Facility: CLINIC | Age: 27
Discharge: HOME | End: 2022-07-27
Attending: INTERNAL MEDICINE | Admitting: INTERNAL MEDICINE
Payer: COMMERCIAL

## 2022-01-01 ENCOUNTER — HOSPITAL ENCOUNTER (OUTPATIENT)
Dept: PHYSICAL THERAPY | Facility: CLINIC | Age: 27
Setting detail: THERAPIES SERIES
End: 2022-01-19
Attending: PHYSICAL MEDICINE & REHABILITATION
Payer: COMMERCIAL

## 2022-01-01 ENCOUNTER — HOSPITAL ENCOUNTER (OUTPATIENT)
Dept: CARDIOLOGY | Facility: CLINIC | Age: 27
Discharge: HOME | End: 2022-06-24
Attending: NURSE PRACTITIONER | Admitting: NURSE PRACTITIONER
Payer: COMMERCIAL

## 2022-01-01 ENCOUNTER — OFFICE VISIT (OUTPATIENT)
Dept: OTHER | Facility: CLINIC | Age: 27
End: 2022-01-01
Attending: NURSE PRACTITIONER
Payer: COMMERCIAL

## 2022-01-01 ENCOUNTER — DOCUMENTATION ONLY (OUTPATIENT)
Dept: PHYSICAL MEDICINE AND REHAB | Facility: CLINIC | Age: 27
End: 2022-01-01
Payer: COMMERCIAL

## 2022-01-01 ENCOUNTER — TELEPHONE (OUTPATIENT)
Dept: CARDIOLOGY | Facility: CLINIC | Age: 27
End: 2022-01-01

## 2022-01-01 ENCOUNTER — OFFICE VISIT (OUTPATIENT)
Dept: OTHER | Facility: CLINIC | Age: 27
End: 2022-01-01
Attending: PHYSICIAN ASSISTANT
Payer: COMMERCIAL

## 2022-01-01 ENCOUNTER — LAB (OUTPATIENT)
Dept: LAB | Facility: CLINIC | Age: 27
End: 2022-01-01
Attending: INTERNAL MEDICINE

## 2022-01-01 ENCOUNTER — HOSPITAL ENCOUNTER (EMERGENCY)
Facility: CLINIC | Age: 27
Discharge: HOME | End: 2022-04-24
Attending: EMERGENCY MEDICINE | Admitting: EMERGENCY MEDICINE
Payer: COMMERCIAL

## 2022-01-01 ENCOUNTER — ANCILLARY PROCEDURE (OUTPATIENT)
Dept: ULTRASOUND IMAGING | Facility: CLINIC | Age: 27
End: 2022-01-01
Attending: INTERNAL MEDICINE
Payer: COMMERCIAL

## 2022-01-01 ENCOUNTER — TELEPHONE (OUTPATIENT)
Dept: OTHER | Facility: CLINIC | Age: 27
End: 2022-01-01

## 2022-01-01 ENCOUNTER — OFFICE VISIT (OUTPATIENT)
Dept: FAMILY MEDICINE | Facility: CLINIC | Age: 27
End: 2022-01-01
Payer: COMMERCIAL

## 2022-01-01 ENCOUNTER — LAB (OUTPATIENT)
Dept: LAB | Facility: CLINIC | Age: 27
End: 2022-01-01
Attending: PHYSICIAN ASSISTANT
Payer: COMMERCIAL

## 2022-01-01 ENCOUNTER — TELEPHONE (OUTPATIENT)
Dept: FAMILY MEDICINE | Facility: CLINIC | Age: 27
End: 2022-01-01
Payer: COMMERCIAL

## 2022-01-01 ENCOUNTER — MEDICAL CORRESPONDENCE (OUTPATIENT)
Dept: HEALTH INFORMATION MANAGEMENT | Facility: CLINIC | Age: 27
End: 2022-01-01
Payer: COMMERCIAL

## 2022-01-01 ENCOUNTER — HOSPITAL ENCOUNTER (OUTPATIENT)
Dept: CARDIOLOGY | Facility: CLINIC | Age: 27
Discharge: HOME | End: 2022-09-19
Attending: PHYSICIAN ASSISTANT
Payer: COMMERCIAL

## 2022-01-01 ENCOUNTER — OFFICE VISIT (OUTPATIENT)
Dept: CARDIOLOGY | Facility: CLINIC | Age: 27
End: 2022-01-01
Payer: COMMERCIAL

## 2022-01-01 ENCOUNTER — OFFICE VISIT (OUTPATIENT)
Dept: PHYSICAL MEDICINE AND REHAB | Facility: CLINIC | Age: 27
End: 2022-01-01
Payer: COMMERCIAL

## 2022-01-01 VITALS
DIASTOLIC BLOOD PRESSURE: 80 MMHG | HEART RATE: 85 BPM | RESPIRATION RATE: 16 BRPM | OXYGEN SATURATION: 97 % | SYSTOLIC BLOOD PRESSURE: 119 MMHG

## 2022-01-01 VITALS
OXYGEN SATURATION: 96 % | HEART RATE: 102 BPM | WEIGHT: 307 LBS | TEMPERATURE: 98.3 F | SYSTOLIC BLOOD PRESSURE: 122 MMHG | BODY MASS INDEX: 35.48 KG/M2 | DIASTOLIC BLOOD PRESSURE: 86 MMHG

## 2022-01-01 VITALS
BODY MASS INDEX: 36.98 KG/M2 | WEIGHT: 315 LBS | DIASTOLIC BLOOD PRESSURE: 74 MMHG | SYSTOLIC BLOOD PRESSURE: 139 MMHG | HEART RATE: 104 BPM | TEMPERATURE: 96.4 F | OXYGEN SATURATION: 97 % | RESPIRATION RATE: 20 BRPM

## 2022-01-01 VITALS
WEIGHT: 306.6 LBS | DIASTOLIC BLOOD PRESSURE: 85 MMHG | SYSTOLIC BLOOD PRESSURE: 125 MMHG | BODY MASS INDEX: 35.47 KG/M2 | OXYGEN SATURATION: 97 % | HEART RATE: 99 BPM | HEIGHT: 78 IN

## 2022-01-01 VITALS
SYSTOLIC BLOOD PRESSURE: 124 MMHG | HEIGHT: 78 IN | BODY MASS INDEX: 34.92 KG/M2 | WEIGHT: 301.8 LBS | OXYGEN SATURATION: 97 % | DIASTOLIC BLOOD PRESSURE: 82 MMHG | HEART RATE: 103 BPM

## 2022-01-01 VITALS
HEART RATE: 102 BPM | WEIGHT: 302 LBS | HEIGHT: 78 IN | BODY MASS INDEX: 34.94 KG/M2 | DIASTOLIC BLOOD PRESSURE: 86 MMHG | SYSTOLIC BLOOD PRESSURE: 125 MMHG | OXYGEN SATURATION: 96 %

## 2022-01-01 VITALS
WEIGHT: 312 LBS | HEART RATE: 84 BPM | OXYGEN SATURATION: 98 % | DIASTOLIC BLOOD PRESSURE: 91 MMHG | BODY MASS INDEX: 36.06 KG/M2 | SYSTOLIC BLOOD PRESSURE: 148 MMHG

## 2022-01-01 VITALS
WEIGHT: 305.8 LBS | OXYGEN SATURATION: 97 % | HEART RATE: 111 BPM | SYSTOLIC BLOOD PRESSURE: 141 MMHG | DIASTOLIC BLOOD PRESSURE: 83 MMHG | HEIGHT: 78 IN | BODY MASS INDEX: 35.38 KG/M2

## 2022-01-01 DIAGNOSIS — E53.8 B12 DEFICIENCY: ICD-10-CM

## 2022-01-01 DIAGNOSIS — I26.99 PULMONARY EMBOLISM, UNSPECIFIED CHRONICITY, UNSPECIFIED PULMONARY EMBOLISM TYPE, UNSPECIFIED WHETHER ACUTE COR PULMONALE PRESENT (H): ICD-10-CM

## 2022-01-01 DIAGNOSIS — I51.9 LV DYSFUNCTION: ICD-10-CM

## 2022-01-01 DIAGNOSIS — F10.10 ALCOHOL ABUSE: ICD-10-CM

## 2022-01-01 DIAGNOSIS — F10.229 ALCOHOL DEPENDENCE WITH INTOXICATION WITH COMPLICATION (H): Primary | ICD-10-CM

## 2022-01-01 DIAGNOSIS — R07.9 CHEST PAIN, UNSPECIFIED TYPE: Primary | ICD-10-CM

## 2022-01-01 DIAGNOSIS — I26.99 PULMONARY EMBOLISM, UNSPECIFIED CHRONICITY, UNSPECIFIED PULMONARY EMBOLISM TYPE, UNSPECIFIED WHETHER ACUTE COR PULMONALE PRESENT (H): Primary | ICD-10-CM

## 2022-01-01 DIAGNOSIS — R07.9 CHEST PAIN, UNSPECIFIED TYPE: ICD-10-CM

## 2022-01-01 DIAGNOSIS — I26.94 MULTIPLE SUBSEGMENTAL PULMONARY EMBOLI WITHOUT ACUTE COR PULMONALE (H): Primary | ICD-10-CM

## 2022-01-01 DIAGNOSIS — E53.8 SUBACUTE COMBINED DEGENERATION OF SPINAL CORD (H): Primary | ICD-10-CM

## 2022-01-01 DIAGNOSIS — R73.9 ELEVATED RANDOM BLOOD GLUCOSE LEVEL: ICD-10-CM

## 2022-01-01 DIAGNOSIS — G32.0 SUBACUTE COMBINED DEGENERATION OF SPINAL CORD (H): Primary | ICD-10-CM

## 2022-01-01 DIAGNOSIS — I51.9 LV DYSFUNCTION: Primary | ICD-10-CM

## 2022-01-01 DIAGNOSIS — R20.2 PARESTHESIAS: ICD-10-CM

## 2022-01-01 DIAGNOSIS — F19.20 CHEMICAL DEPENDENCY (H): ICD-10-CM

## 2022-01-01 DIAGNOSIS — S23.41XA SPRAIN OF COSTOCHONDRAL JOINT, INITIAL ENCOUNTER: ICD-10-CM

## 2022-01-01 LAB
ALBUMIN SERPL-MCNC: 4.4 G/DL (ref 3.4–5)
ALP SERPL-CCNC: 74 U/L (ref 40–150)
ALT SERPL W P-5'-P-CCNC: 48 U/L (ref 0–70)
ANION GAP SERPL CALCULATED.3IONS-SCNC: 10 MMOL/L (ref 3–14)
ANION GAP SERPL CALCULATED.3IONS-SCNC: 12 MMOL/L (ref 3–14)
AST SERPL W P-5'-P-CCNC: 27 U/L (ref 0–45)
ATRIAL RATE - MUSE: 105 BPM
BASOPHILS # BLD AUTO: 0.1 10E3/UL (ref 0–0.2)
BASOPHILS NFR BLD AUTO: 1 %
BILIRUB SERPL-MCNC: 0.5 MG/DL (ref 0.2–1.3)
BUN SERPL-MCNC: 14 MG/DL (ref 7–30)
BUN SERPL-MCNC: 8 MG/DL (ref 7–30)
CALCIUM SERPL-MCNC: 8.9 MG/DL (ref 8.5–10.1)
CALCIUM SERPL-MCNC: 9.7 MG/DL (ref 8.5–10.1)
CHLORIDE BLD-SCNC: 103 MMOL/L (ref 94–109)
CHLORIDE BLD-SCNC: 105 MMOL/L (ref 94–109)
CO2 SERPL-SCNC: 22 MMOL/L (ref 20–32)
CO2 SERPL-SCNC: 23 MMOL/L (ref 20–32)
CREAT SERPL-MCNC: 0.8 MG/DL (ref 0.66–1.25)
CREAT SERPL-MCNC: 1.23 MG/DL (ref 0.66–1.25)
D DIMER PPP FEU-MCNC: <0.27 UG/ML FEU (ref 0–0.5)
DIASTOLIC BLOOD PRESSURE - MUSE: NORMAL MMHG
EOSINOPHIL # BLD AUTO: 0.1 10E3/UL (ref 0–0.7)
EOSINOPHIL NFR BLD AUTO: 2 %
ERYTHROCYTE [DISTWIDTH] IN BLOOD BY AUTOMATED COUNT: 13.2 % (ref 10–15)
ERYTHROCYTE [DISTWIDTH] IN BLOOD BY AUTOMATED COUNT: 13.4 % (ref 10–15)
GFR SERPL CREATININE-BSD FRML MDRD: 82 ML/MIN/1.73M2
GFR SERPL CREATININE-BSD FRML MDRD: >90 ML/MIN/1.73M2
GGT SERPL-CCNC: 124 U/L (ref 0–75)
GLUCOSE BLD-MCNC: 113 MG/DL (ref 70–99)
GLUCOSE BLD-MCNC: 132 MG/DL (ref 70–99)
HBA1C MFR BLD: 5.4 % (ref 0–5.6)
HCT VFR BLD AUTO: 40.8 % (ref 40–53)
HCT VFR BLD AUTO: 44 % (ref 40–53)
HGB BLD-MCNC: 14.2 G/DL (ref 13.3–17.7)
HGB BLD-MCNC: 15.3 G/DL (ref 13.3–17.7)
HOLD SPECIMEN: NORMAL
IMM GRANULOCYTES # BLD: 0 10E3/UL
IMM GRANULOCYTES NFR BLD: 0 %
INTERPRETATION ECG - MUSE: NORMAL
LVEF ECHO: NORMAL
LVEF ECHO: NORMAL
LYMPHOCYTES # BLD AUTO: 2.9 10E3/UL (ref 0.8–5.3)
LYMPHOCYTES NFR BLD AUTO: 41 %
MCH RBC QN AUTO: 32.4 PG (ref 26.5–33)
MCH RBC QN AUTO: 32.7 PG (ref 26.5–33)
MCHC RBC AUTO-ENTMCNC: 34.8 G/DL (ref 31.5–36.5)
MCHC RBC AUTO-ENTMCNC: 34.8 G/DL (ref 31.5–36.5)
MCV RBC AUTO: 93 FL (ref 78–100)
MCV RBC AUTO: 94 FL (ref 78–100)
MONOCYTES # BLD AUTO: 0.7 10E3/UL (ref 0–1.3)
MONOCYTES NFR BLD AUTO: 10 %
NEUTROPHILS # BLD AUTO: 3.3 10E3/UL (ref 1.6–8.3)
NEUTROPHILS NFR BLD AUTO: 46 %
NRBC # BLD AUTO: 0 10E3/UL
NRBC BLD AUTO-RTO: 0 /100
P AXIS - MUSE: 48 DEGREES
PLATELET # BLD AUTO: 240 10E3/UL (ref 150–450)
PLATELET # BLD AUTO: 297 10E3/UL (ref 150–450)
POTASSIUM BLD-SCNC: 3.8 MMOL/L (ref 3.4–5.3)
POTASSIUM BLD-SCNC: 3.9 MMOL/L (ref 3.4–5.3)
PR INTERVAL - MUSE: 138 MS
PROT SERPL-MCNC: 7.7 G/DL (ref 6.8–8.8)
QRS DURATION - MUSE: 84 MS
QT - MUSE: 324 MS
QTC - MUSE: 428 MS
R AXIS - MUSE: 33 DEGREES
RBC # BLD AUTO: 4.34 10E6/UL (ref 4.4–5.9)
RBC # BLD AUTO: 4.72 10E6/UL (ref 4.4–5.9)
SODIUM SERPL-SCNC: 136 MMOL/L (ref 133–144)
SODIUM SERPL-SCNC: 139 MMOL/L (ref 133–144)
SYSTOLIC BLOOD PRESSURE - MUSE: NORMAL MMHG
T AXIS - MUSE: 16 DEGREES
TROPONIN I SERPL HS-MCNC: <3 NG/L
VENTRICULAR RATE- MUSE: 105 BPM
VIT B12 SERPL-MCNC: 291 PG/ML (ref 193–986)
WBC # BLD AUTO: 5.9 10E3/UL (ref 4–11)
WBC # BLD AUTO: 7.1 10E3/UL (ref 4–11)

## 2022-01-01 PROCEDURE — 85025 COMPLETE CBC W/AUTO DIFF WBC: CPT | Performed by: EMERGENCY MEDICINE

## 2022-01-01 PROCEDURE — 84484 ASSAY OF TROPONIN QUANT: CPT | Performed by: EMERGENCY MEDICINE

## 2022-01-01 PROCEDURE — 97112 NEUROMUSCULAR REEDUCATION: CPT | Mod: GP | Performed by: PHYSICAL THERAPIST

## 2022-01-01 PROCEDURE — 99214 OFFICE O/P EST MOD 30 MIN: CPT | Performed by: PHYSICIAN ASSISTANT

## 2022-01-01 PROCEDURE — 250N000011 HC RX IP 250 OP 636: Performed by: INTERNAL MEDICINE

## 2022-01-01 PROCEDURE — G0463 HOSPITAL OUTPT CLINIC VISIT: HCPCS

## 2022-01-01 PROCEDURE — 93325 DOPPLER ECHO COLOR FLOW MAPG: CPT

## 2022-01-01 PROCEDURE — 93005 ELECTROCARDIOGRAM TRACING: CPT

## 2022-01-01 PROCEDURE — 999N000208 ECHOCARDIOGRAM COMPLETE

## 2022-01-01 PROCEDURE — 82607 VITAMIN B-12: CPT | Performed by: PHYSICIAN ASSISTANT

## 2022-01-01 PROCEDURE — 82977 ASSAY OF GGT: CPT | Performed by: PHYSICIAN ASSISTANT

## 2022-01-01 PROCEDURE — 36415 COLL VENOUS BLD VENIPUNCTURE: CPT | Performed by: PHYSICIAN ASSISTANT

## 2022-01-01 PROCEDURE — 99214 OFFICE O/P EST MOD 30 MIN: CPT | Performed by: INTERNAL MEDICINE

## 2022-01-01 PROCEDURE — 83036 HEMOGLOBIN GLYCOSYLATED A1C: CPT | Performed by: PHYSICIAN ASSISTANT

## 2022-01-01 PROCEDURE — 85379 FIBRIN DEGRADATION QUANT: CPT

## 2022-01-01 PROCEDURE — 255N000002 HC RX 255 OP 636: Performed by: NURSE PRACTITIONER

## 2022-01-01 PROCEDURE — 80048 BASIC METABOLIC PNL TOTAL CA: CPT | Performed by: EMERGENCY MEDICINE

## 2022-01-01 PROCEDURE — 93325 DOPPLER ECHO COLOR FLOW MAPG: CPT | Mod: 26 | Performed by: INTERNAL MEDICINE

## 2022-01-01 PROCEDURE — 71275 CT ANGIOGRAPHY CHEST: CPT

## 2022-01-01 PROCEDURE — 99214 OFFICE O/P EST MOD 30 MIN: CPT | Performed by: NURSE PRACTITIONER

## 2022-01-01 PROCEDURE — 99214 OFFICE O/P EST MOD 30 MIN: CPT | Mod: 25 | Performed by: PHYSICIAN ASSISTANT

## 2022-01-01 PROCEDURE — 93321 DOPPLER ECHO F-UP/LMTD STD: CPT | Mod: 26 | Performed by: INTERNAL MEDICINE

## 2022-01-01 PROCEDURE — 36415 COLL VENOUS BLD VENIPUNCTURE: CPT | Performed by: EMERGENCY MEDICINE

## 2022-01-01 PROCEDURE — 85027 COMPLETE CBC AUTOMATED: CPT | Performed by: PHYSICIAN ASSISTANT

## 2022-01-01 PROCEDURE — 99284 EMERGENCY DEPT VISIT MOD MDM: CPT | Mod: 25

## 2022-01-01 PROCEDURE — 99213 OFFICE O/P EST LOW 20 MIN: CPT | Performed by: PHYSICAL MEDICINE & REHABILITATION

## 2022-01-01 PROCEDURE — 80053 COMPREHEN METABOLIC PANEL: CPT | Performed by: PHYSICIAN ASSISTANT

## 2022-01-01 PROCEDURE — 96127 BRIEF EMOTIONAL/BEHAV ASSMT: CPT | Performed by: PHYSICIAN ASSISTANT

## 2022-01-01 PROCEDURE — 93970 EXTREMITY STUDY: CPT | Performed by: SURGERY

## 2022-01-01 PROCEDURE — 36415 COLL VENOUS BLD VENIPUNCTURE: CPT

## 2022-01-01 PROCEDURE — 99205 OFFICE O/P NEW HI 60 MIN: CPT | Performed by: INTERNAL MEDICINE

## 2022-01-01 PROCEDURE — 250N000013 HC RX MED GY IP 250 OP 250 PS 637: Performed by: EMERGENCY MEDICINE

## 2022-01-01 PROCEDURE — 250N000009 HC RX 250: Performed by: INTERNAL MEDICINE

## 2022-01-01 PROCEDURE — 93306 TTE W/DOPPLER COMPLETE: CPT | Mod: 26 | Performed by: INTERNAL MEDICINE

## 2022-01-01 PROCEDURE — 255N000002 HC RX 255 OP 636: Performed by: PHYSICIAN ASSISTANT

## 2022-01-01 PROCEDURE — 85379 FIBRIN DEGRADATION QUANT: CPT | Performed by: EMERGENCY MEDICINE

## 2022-01-01 PROCEDURE — 90471 IMMUNIZATION ADMIN: CPT | Performed by: PHYSICIAN ASSISTANT

## 2022-01-01 PROCEDURE — 90715 TDAP VACCINE 7 YRS/> IM: CPT | Performed by: PHYSICIAN ASSISTANT

## 2022-01-01 PROCEDURE — 96374 THER/PROPH/DIAG INJ IV PUSH: CPT

## 2022-01-01 PROCEDURE — 250N000009 HC RX 250: Performed by: EMERGENCY MEDICINE

## 2022-01-01 PROCEDURE — 93308 TTE F-UP OR LMTD: CPT | Mod: 26 | Performed by: INTERNAL MEDICINE

## 2022-01-01 RX ORDER — IOPAMIDOL 755 MG/ML
83 INJECTION, SOLUTION INTRAVASCULAR ONCE
Status: COMPLETED | OUTPATIENT
Start: 2022-01-01 | End: 2022-01-01

## 2022-01-01 RX ADMIN — IOPAMIDOL 83 ML: 755 INJECTION, SOLUTION INTRAVENOUS at 14:05

## 2022-01-01 RX ADMIN — HUMAN ALBUMIN MICROSPHERES AND PERFLUTREN 9 ML: 10; .22 INJECTION, SOLUTION INTRAVENOUS at 09:29

## 2022-01-01 RX ADMIN — FAMOTIDINE 20 MG: 10 INJECTION, SOLUTION INTRAVENOUS at 01:53

## 2022-01-01 RX ADMIN — LIDOCAINE HYDROCHLORIDE 30 ML: 20 SOLUTION ORAL; TOPICAL at 01:53

## 2022-01-01 RX ADMIN — HUMAN ALBUMIN MICROSPHERES AND PERFLUTREN 9 ML: 10; .22 INJECTION, SOLUTION INTRAVENOUS at 09:33

## 2022-01-01 RX ADMIN — SODIUM CHLORIDE 100 ML: 900 INJECTION INTRAVENOUS at 14:05

## 2022-01-01 ASSESSMENT — PAIN SCALES - GENERAL
PAINLEVEL: MILD PAIN (3)
PAINLEVEL: NO PAIN (0)

## 2022-01-01 ASSESSMENT — ENCOUNTER SYMPTOMS
FEVER: 0
COUGH: 1
SORE THROAT: 0
ABDOMINAL PAIN: 0
SHORTNESS OF BREATH: 1

## 2022-01-01 ASSESSMENT — PATIENT HEALTH QUESTIONNAIRE - PHQ9
SUM OF ALL RESPONSES TO PHQ QUESTIONS 1-9: 15
10. IF YOU CHECKED OFF ANY PROBLEMS, HOW DIFFICULT HAVE THESE PROBLEMS MADE IT FOR YOU TO DO YOUR WORK, TAKE CARE OF THINGS AT HOME, OR GET ALONG WITH OTHER PEOPLE: VERY DIFFICULT

## 2022-01-02 DIAGNOSIS — Z71.89 OTHER SPECIFIED COUNSELING: ICD-10-CM

## 2022-01-03 ENCOUNTER — PATIENT OUTREACH (OUTPATIENT)
Dept: CARE COORDINATION | Facility: CLINIC | Age: 27
End: 2022-01-03

## 2022-01-03 NOTE — PROGRESS NOTES
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  Reported 2 week history of paresthesias and generalized weakness which started in upper extremities and progress to lower extremities.  Imaging revealed abnormal T2 signal on MRI C spine likely represents subacute combined degeneration in setting of alcohol and nitrous gas abuse.     ASSESSMENT      Enrollment  Primary Care Care Coordination Status: Not a Candidate    Discharge Assessment  How are your symptoms? (Red Flag symptoms escalate to triage hotline per guidelines): Unchanged  Do you feel your condition is stable enough to be safe at home until your provider visit?: Yes  Does the patient have their discharge instructions? : Yes  Does the patient have questions regarding their discharge instructions? : No  Were you started on any new medications or were there changes to any of your previous medications? : No  Does the patient have all of their medications?: Yes  Do you have questions regarding any of your medications? : No  Do you have all of your  needed medical supplies or equipment (DME)?  (i.e. oxygen tank, CPAP, cane, etc.): No - What equipment or supplies are needed? (Wrking on it, to get it.)  Discharge follow-up appointment scheduled within 14 calendar days? : Yes  Discharge Follow Up Appointment Date: 01/07/22  Discharge Follow Up Appointment Scheduled with?: Specialty Care Provider    Post-op ([facility] Only)  If the patient had a surgery or procedure, do they have any questions for a nurse?: No         PLAN                                                      Outpatient Plan:     Follow up Appointments on Discharge: pcp, neurology, vascular medicine    Future Appointments   Date Time Provider Department Center   1/7/2022 10:30 AM Galo Vizcarra MD [id number]   1/12/2022  8:30 AM Naila Zhao, PT [facility] [address]   1/26/2022 10:15 AM Minerva Cardenas, OT [facility] [address]   5/11/2022  3:30 PM jE Martinez MD [facility]         For any urgent concerns, please contact our 24 hour nurse triage line: [phone] ([phone])           JAMES Higuera  [phone]  Connected [facility]

## 2022-01-07 ENCOUNTER — OFFICE VISIT (OUTPATIENT)
Dept: FAMILY MEDICINE | Facility: CLINIC | Age: 27
End: 2022-01-07
Payer: COMMERCIAL

## 2022-01-07 VITALS
OXYGEN SATURATION: 97 % | DIASTOLIC BLOOD PRESSURE: 82 MMHG | HEART RATE: 77 BPM | BODY MASS INDEX: 36.63 KG/M2 | WEIGHT: 315 LBS | SYSTOLIC BLOOD PRESSURE: 120 MMHG

## 2022-01-07 DIAGNOSIS — F10.10 ALCOHOL ABUSE: ICD-10-CM

## 2022-01-07 DIAGNOSIS — T41.0X1S: ICD-10-CM

## 2022-01-07 DIAGNOSIS — E53.8 SUBACUTE COMBINED DEGENERATION OF SPINAL CORD (H): ICD-10-CM

## 2022-01-07 DIAGNOSIS — G32.0 SUBACUTE COMBINED DEGENERATION OF SPINAL CORD (H): ICD-10-CM

## 2022-01-07 DIAGNOSIS — E53.8 VITAMIN B12 DEFICIENCY (NON ANEMIC): Primary | ICD-10-CM

## 2022-01-07 PROCEDURE — 99203 OFFICE O/P NEW LOW 30 MIN: CPT | Mod: 25 | Performed by: FAMILY MEDICINE

## 2022-01-07 PROCEDURE — 96372 THER/PROPH/DIAG INJ SC/IM: CPT | Performed by: FAMILY MEDICINE

## 2022-01-07 RX ORDER — CYANOCOBALAMIN 1000 UG/ML
1000 INJECTION, SOLUTION INTRAMUSCULAR; SUBCUTANEOUS ONCE
Status: COMPLETED | OUTPATIENT
Start: 2022-01-07 | End: 2022-01-07

## 2022-01-07 RX ADMIN — CYANOCOBALAMIN 1000 MCG: 1000 INJECTION, SOLUTION INTRAMUSCULAR; SUBCUTANEOUS at 10:53

## 2022-01-09 NOTE — PROGRESS NOTES
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 (Red Flag symptoms escalate to triage hotline per guidelines) Unchanged   Do you feel your condition is stable enough to be safe at home until your provider visit? Yes   Does the patient have their discharge instructions?  Yes   Does the patient have questions regarding their discharge instructions?  No   Were you started on any new medications or were there changes to any of your previous medications?  No   Does the patient have all of their medications? Yes   Do you have questions regarding any of your medications?  No   Do you have all of your needed medical supplies or equipment (DME)?  (i.e. oxygen tank, CPAP, cane, etc.) No - What equipment or supplies are needed?   Discharge follow-up appointment scheduled within 14 calendar days?  Yes   Discharge Follow Up Appointment Date 1/7/2022   Discharge Follow Up Appointment Scheduled with? Specialty Care Provider     Hospital Follow-up Visit:    Hospital/Nursing Home/IP Rehab Facility: [facility]  Date of Admission: 12/16/2021  Date of Discharge: 12/21/2021  Reason(s) for Admission: myelopathy, alcohol abuse      Was your hospitalization related to COVID-19? No   Problems taking medications regularly:  None  Medication changes since discharge: None  Problems adhering to non-medication therapy:  None    Summary of hospitalization:  [facility]  [place] hospital discharge summary reviewed  Diagnostic Tests/Treatments reviewed.  Follow up needed: none  Other Healthcare Providers Involved in Patient s Care:         None  Update since discharge: improved. Post Discharge Medication Reconciliation: discharge medications reconciled, continue medications without change.  Plan of care communicated with patient            Assessment & Plan     Vitamin B12 deficiency (non anemic)    We can set up shot for him today.  It is a bit frustrating that this patient was assigned to do his hospital follow-up so far away from his home as we certainly will not be able to provide great care for him because he has to take an uber wherever he goes and is going to cost him almost $100 for his roundtrip Uber today.  We would hope that he could get set up with the [facility] or somewhere closer to his house and they could carry on treatment for him in the future because he really should never have been set up for hospital follow-up so far away from his home it is unclear to me why this happened.  - cyanocobalamin injection 1,000 mcg    Poisoning by nitrous oxide, accidental or unintentional, sequela    He tells me that he has not used any nitrous since he left the hospital.  We talked about this being essential to his overall health and that he is committing himself to not do that anymore.    Subacute combined degeneration of spinal cord (H)    He has follow-up scheduled with neurology as well.  Hopefully with the replacement of his B12 and thiamine another necessary nutrients and hopefully the fact that he can try to decrease and stop the amount of alcohol that he is taking again but he can reverse this a bit and doing well.    Alcohol abuse    Again we talked about resources for him trying to find something closer to his home so that he can be successful with his alcohol treatment.      Review of external notes as documented elsewhere in note  Review of the result(s) of each  "unique test - labs  Independent interpretation of a test performed by another physician/other qualified health care professional (not separately reported) - labs, other tests  Ordering of each unique test  Prescription drug management         BMI:   Estimated body mass index is 36.63 kg/m  as calculated from the following:    Height as of 12/28/21: 1.981 m (6' 6\").    Weight as of this encounter: 143.8 kg (317 lb).           No follow-ups on file.    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 (Red Flag symptoms escalate to triage hotline per guidelines) Unchanged   Do you feel your condition is stable enough to be safe at home until your provider visit? Yes   Does the patient have their discharge instructions?  Yes   Does the patient have questions regarding their discharge instructions?  No   Were you started on any new medications or were there changes to any of your previous medications?  No   Does the patient have all of their medications? Yes   Do you have questions regarding any of your medications?  No   Do you have all of your needed medical supplies or equipment (DME)?  (i.e. oxygen tank, CPAP, cane, etc.) No - What equipment or supplies are needed?   Discharge follow-up appointment scheduled " within 14 calendar days?  Yes   Discharge Follow Up Appointment Date 1/7/2022   Discharge Follow Up Appointment Scheduled with? Specialty Care Provider       Review of Systems   Constitutional, HEENT, cardiovascular, pulmonary, GI, , musculoskeletal, neuro, skin, endocrine and psych systems are negative, except as otherwise noted.      Objective    /82 (BP Location: Left arm, Patient Position: Sitting, Cuff Size: Thigh)   Pulse 77   Wt 143.8 kg (317 lb)   SpO2 97%   BMI 36.63 kg/m    Body mass index is 36.63 kg/m .  Physical Exam   GENERAL: healthy, alert and no distress  NECK: no adenopathy, no asymmetry, masses, or scars and thyroid normal to palpation  RESP: lungs clear to auscultation - no rales, rhonchi or wheezes  CV: regular rate and rhythm, normal S1 S2, no S3 or S4, no murmur, click or rub, no peripheral edema and peripheral pulses strong  ABDOMEN: soft, nontender, no hepatosplenomegaly, no masses and bowel sounds normal  MS: no gross musculoskeletal defects noted, no edema    No results found for any visits on 01/07/22.

## 2022-01-12 ENCOUNTER — HOSPITAL ENCOUNTER (OUTPATIENT)
Dept: PHYSICAL THERAPY | Facility: CLINIC | Age: 27
Setting detail: THERAPIES SERIES
End: 2022-01-12
Attending: PHYSICAL MEDICINE & REHABILITATION
Payer: COMMERCIAL

## 2022-01-12 DIAGNOSIS — G99.2: ICD-10-CM

## 2022-01-12 DIAGNOSIS — E53.8: ICD-10-CM

## 2022-01-12 DIAGNOSIS — R26.89 IMPAIRMENT OF BALANCE: ICD-10-CM

## 2022-01-12 PROCEDURE — 97112 NEUROMUSCULAR REEDUCATION: CPT | Mod: GP

## 2022-01-12 PROCEDURE — 97161 PT EVAL LOW COMPLEX 20 MIN: CPT | Mod: GP

## 2022-01-12 NOTE — PROGRESS NOTES
01/12/22 0800   Quick Adds   Type of Visit Initial OP PT Evaluation   General Information   Start of Care Date 01/12/22   Referring Physician Dr. 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 MRI positive for abnormal signaling and T2. Additional PMH includes polysubstance abuse, cardiomyopathy with reduced ejection fraction, hyperglyceridemia, sinus tachycardia, edema, and anxiety. Patient discharged from ARU on 12/31/2021 to living independently. Patient discharged with front wheeled walker due to lack of access to loftstand crutches but patient reports immediately using single point cane instead of front wheeled walker. Patient denies any current exercise, working on increasing activity through slowly cleaning apartment and increasing cooking frequency. Receiving assist from family with errands due to current recommendation to not drive.   Prior level of function comment Patient reports decreased activity level over past 10 years and frequent falls which he attributes both to ETOH use. No previous balance difficulty. Driving. Able to complete errands and work without difficulty. 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    Current Community  Support Family/friend caregiver;Transportation service  (sister & dad nearby, using uber but access to transportation)   Patient role/Employment history Unemployed   Living environment Apartment/condo   Home/Community Accessibility Comments Requires 15-20 stairs to get to building and then 3 more flights within the building. Avoids using as often as possible.   Current Assistive Devices Walker;Standard Cane   Assistive Devices Comments Was given recommendation for FWW after ARU but patient declines using and is currently using single point cane. Patient uses no AD when in apartment.   Patient/Family Goals Statement return to PLOF, improved balance   Fall Risk Screen   Fall screen completed by PT   Have you fallen 2 or more times in the past year? Yes   Have you fallen and had an injury in the past year? Yes  (bruises)   Timed Up and Go score (seconds) 17.87   Is patient a fall risk? Yes;Department fall risk interventions implemented   Abuse Screen (yes response referral indicated)   Feels Unsafe at Home or Work/School no   Feels Threatened by Someone no   Does Anyone Try to Keep You From Having Contact with Others or Doing Things Outside Your Home? no   Physical Signs of Abuse Present no   Pain   Patient currently in pain Yes   Pain location mid to lower back   Pain rating 3/10   Pain description Ache   Pain description comment constant discomfort   Vitals Signs   Heart Rate 90   SpO2 97   Blood Pressure 138/81   Vital Signs Comments After evaluation and treatment:  BPM, SpO2 95%, /105 mmHg on L arm    Cognitive Status Examination   Level of Consciousness lethargic/somnolent   Follows Commands and Answers Questions 100% of the time   Integumentary   Integumentary No deficits were identified   Posture   Posture Comments Maintained hip flexion, able to be corrected with cuing. protracted shoulders   Range of Motion (ROM)   ROM Comment ROM within functional limits, no formal testing   Strength   Strength  Comments LE MMT screen B hip flexion, knee flexion/extension, ankle dorsiflexion 5/5   Bed Mobility   Bed Mobility Comments IND in home, not assessed in clinic   Transfer Skills   Transfer Comments sit<>stand IND   Gait   Gait Comments Gait speed: 0.75 m/s with single point cane, 0.64 m/s without AD. Ambulation significant for maintained hip flexion, guarded UE posture, wide base of support and shortened step lengths. Increased step length and reciprocal arm swing when ambulating with single point cane.    Gait Special Tests Functional Gait Assessment Score out of 30   Score out of 30 13   Comments Completed without AD   Balance   Balance Comments stands in wide base of support without AD    Balance Special Tests Timed Up and Go   Seconds 17.87 Seconds   Comments no AD   Balance Special Tests Sit to Stand Reps in 30 Seconds   Reps in 30 seconds 7   Sensory Examination   Sensory Perception Comments parathesia reports in hands/fingers, not formalled assessed   Coordination   Coordination Comments Difficulty increasing significant speed with toe taps and alternating finger to nose   Muscle Tone   Muscle Tone no deficits were identified   Modality Interventions   Planned Modality Interventions Comments Per therapist discretion   Planned Therapy Interventions   Planned Therapy Interventions balance training;gait training;motor coordination training;neuromuscular re-education;strengthening   Clinical Impression   Criteria for Skilled Therapeutic Interventions Met yes, treatment indicated   PT Diagnosis Impaired functional mobility with increased falls risk   Influenced by the following impairments static/dynamic balance impairments, weakness, coordination defecits   Functional limitations due to impairments ambulation, stairs, standing functional tasks   Clinical Presentation Stable/Uncomplicated   Clinical Presentation Rationale clinical judgement   Clinical Decision Making (Complexity) Low complexity   Therapy Frequency  1 time/week   Predicted Duration of Therapy Intervention (days/wks) 10 weeks  (Decrease frequency if indicated during POC)   Risk & Benefits of therapy have been explained Yes   Patient, Family & other staff in agreement with plan of care Yes   Clinical Impression Comments Patient presents with static and dynamic balance impairments with accopanied weakness that currently impact gait mechanics, resulting in requiring assistive device with ongoing fall risk. Patient with independent previous level of function in home and community, currently requiring increased assist with meal management and transportation and with increased difficulty completing independent tasks. Patient would benefit from skilled therapy to address impairments, decrease falls risk, and develop plan to return patient towards prior level of function in home and community.    Education Assessment   Preferred Learning Style Listening;Reading;Demonstration;Pictures/video   Barriers to Learning No barriers   Goal 1   Goal Identifier LE strength - 30 second sit to stand   Goal Description Patient to improve in LE strength and power evidenced by clinically signficant improvement on 30 second sit to stand to 10 rep or greater in order to have greater safety and performance on transfers in home and in/out of cars and greater safety with stair completion.    Goal Progress baseline: 7 rep   Target Date 03/23/22   Goal 2   Goal Identifier Gait speed   Goal Description Patient to be able to ambulate >1.1 m/s without assistive device to safely return to community ambulation without assistive device.   Goal Progress baseline: 0.64 m/s without assistive device   Target Date 03/23/22   Goal 3   Goal Identifier Dynamic balance - FGA   Goal Description Patient to be able to score >22/30 on FGA to display decreased fall risk and improved safety navigating around home without assistive device.   Goal Progress baseline: 13/30   Target Date 03/23/22   Goal 4   Goal  Identifier HEP   Goal Description Patient to be confident in HEP with correct mechanics to independently manage impairments upon discharge from skilled therapy.   Goal Progress baseline: not completing regular exercise   Target Date 03/23/22   Total Evaluation Time   PT Carolyne, Low Complexity Minutes (75068) 40

## 2022-01-12 NOTE — PROGRESS NOTES
[facility]                                                                                   OUTPATIENT PHYSICAL THERAPY FUNCTIONAL EVALUATION  PLAN OF TREATMENT FOR OUTPATIENT REHABILITATION  (COMPLETE FOR INITIAL CLAIMS ONLY)  Patient's Last Name, First Name, M.I.  YOB: 1995  Sebastián Nogueira     Provider's Name   [facility]   Medical Record No.  [id number]     Start of Care Date:  01/12/22   Onset Date:  12/16/21   Type:     _X__PT   ____OT  ____SLP Medical Diagnosis:  myelopathy due to vitamin B12 deficiency     PT Diagnosis:  Impaired functional mobility with increased falls risk Visits from SOC:  1                              __________________________________________________________________________________  Plan of Treatment/Functional Goals:  balance training,gait training,motor coordination training,neuromuscular re-education,strengthening           GOALS  LE strength - 30 second sit to stand  Patient to improve in LE strength and power evidenced by clinically signficant improvement on 30 second sit to stand to 10 rep or greater in order to have greater safety and performance on transfers in home and in/out of cars and greater safety with stair completion.   03/23/22    Gait speed  Patient to be able to ambulate >1.1 m/s without assistive device to safely return to community ambulation without assistive device.  03/23/22    Dynamic balance - FGA  Patient to be able to score >22/30 on FGA to display decreased fall risk and improved safety navigating around home without assistive device.  03/23/22    HEP  Patient to be confident in HEP with correct mechanics to independently manage impairments upon discharge from skilled therapy.  03/23/22                                                Therapy Frequency:  1 time/week   Predicted Duration of Therapy  Intervention:  10 weeks (Decrease frequency if indicated during POC)    Naila Zhao, PT                                    I CERTIFY THE NEED FOR THESE SERVICES FURNISHED UNDER        THIS PLAN OF TREATMENT AND WHILE UNDER MY CARE     (Physician co-signature of this document indicates review and certification of the therapy plan).                Certification Date From:  01/12/22   Certification Date To:  04/12/22    Referring Provider:  Dr. Martinez    Initial Assessment  See Epic Evaluation- Start of Care Date: 01/12/22

## 2022-01-12 NOTE — PROGRESS NOTES
01/12/22 0900   Signing Clinician's Name / Credentials   Signing clinician's name / credentials Naila Zhao, PT, DPT   Functional Gait Assessment (PAMELA Metcalf, SHAINA Dowell, et al. (2004))   1. GAIT LEVEL SURFACE 1  (8.72 seconds)   2. CHANGE IN GAIT SPEED 2   3. GAIT WITH HORIZONTAL HEAD TURNS 2   4. GAIT WITH VERTICAL HEAD TURNS 1   5. GAIT AND PIVOT TURN 3   6. STEP OVER OBSTACLE 0  (requires UE support)   7. GAIT WITH NARROW BASE OF SUPPORT 0   8. GAIT WITH EYES CLOSED 1  (11.37 seconds)   9. AMBULATING BACKWARDS 2   10. STEPS 1  (reciprocal when ascending, step-to when descending)   Total Functional Gait Assessment Score   TOTAL SCORE: (MAXIMUM SCORE 30) 13   Functional Gait Assessment (FGA): The FGA assesses postural stability during various walking tasks.   Gait assistive device used: None    Patient Score: 13/30  Scores of <22/30 have been correlated with predicting falls in community-dwelling older adults according to Dixon & Ramses 2010.   Scores of <18/30 have been correlated with increased risk for falls in patients with Parkinsons Disease according to Moe Mcneill Lackey et al 2014.  Minimal Detectable Change for patients with acute/chronic stroke = 4.2 according to Thidheeraj & Ritschel 2009  Minimal Detectable Change for patients with vestibular disorder = 8 according to Dixon & Ramses 2010    Assessment (rationale for performing, application to patient s function & care plan): Completed to assess for fall risk and dynamic balance when ambulating without assistive device. Patient displayed increased fall risk indicative to use assistive device to improve safety and to participate in skilled therapy to address balance impairments.

## 2022-01-12 NOTE — PROGRESS NOTES
01/12/22 0800   Quick Adds   Quick Adds Certification   Type of Visit Initial OP PT Evaluation   General Information   Start of Care Date 01/12/22   Referring Physician Dr. 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 MRI positive for abnormal signaling and T2. Additional PMH includes polysubstance abuse, cardiomyopathy with reduced ejection fraction, hyperglyceridemia, sinus tachycardia, edema, and anxiety. Patient discharged from ARU on 12/31/2021 to living independently. Patient discharged with front wheeled walker due to lack of access to loftstand crutches but patient reports immediately using single point cane instead of front wheeled walker. Patient denies any current exercise, working on increasing activity through slowly cleaning apartment and increasing cooking frequency. Receiving assist from family with errands due to current recommendation to not drive.   Prior level of function comment Patient reports decreased activity level over past 10 years and frequent falls which he attributes both to ETOH use. No previous balance difficulty. Driving. Able to complete errands and work without difficulty. 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    Current Community Support Family/friend caregiver;Transportation service  (sister & dad nearby, using uber but access to transportation)   Patient role/Employment history Unemployed   Living environment Apartment/condo   Home/Community Accessibility Comments Requires 15-20 stairs to get to building and then 3 more flights within the building. Avoids using as often as possible.   Current Assistive Devices Walker;Standard Cane   Assistive Devices Comments Was given recommendation for FWW after ARU but patient declines using and is currently using single point cane. Patient uses no AD when in apartment.   Patient/Family Goals Statement return to PLOF, improved balance   Fall Risk Screen   Fall screen completed by PT   Have you fallen 2 or more times in the past year? Yes   Have you fallen and had an injury in the past year? Yes  (bruises)   Timed Up and Go score (seconds) 17.87   Is patient a fall risk? Yes;Department fall risk interventions implemented   Abuse Screen (yes response referral indicated)   Feels Unsafe at Home or Work/School no   Feels Threatened by Someone no   Does Anyone Try to Keep You From Having Contact with Others or Doing Things Outside Your Home? no   Physical Signs of Abuse Present no   Pain   Patient currently in pain Yes   Pain location mid to lower back   Pain rating 3/10   Pain description Ache   Pain description comment constant discomfort   Vitals Signs   Heart Rate 90   SpO2 97   Blood Pressure 138/81   Vital Signs Comments After evaluation and treatment:  BPM, SpO2 95%, /105 mmHg on L arm    Cognitive Status Examination   Level of Consciousness lethargic/somnolent   Follows Commands and Answers Questions 100% of the time   Integumentary   Integumentary No deficits were identified   Posture   Posture Comments Maintained hip flexion, able to be corrected with cuing. protracted shoulders   Range of Motion (ROM)   ROM Comment ROM within functional limits, no formal  testing   Strength   Strength Comments LE MMT screen B hip flexion, knee flexion/extension, ankle dorsiflexion 5/5   Bed Mobility   Bed Mobility Comments IND in home, not assessed in clinic   Transfer Skills   Transfer Comments sit<>stand IND   Gait   Gait Comments Gait speed: 0.75 m/s with single point cane, 0.64 m/s without AD. Ambulation significant for maintained hip flexion, guarded UE posture, wide base of support and shortened step lengths. Increased step length and reciprocal arm swing when ambulating with single point cane.    Gait Special Tests Functional Gait Assessment Score out of 30   Score out of 30 13   Comments Completed without AD   Balance   Balance Comments stands in wide base of support without AD    Balance Special Tests Timed Up and Go   Seconds 17.87 Seconds   Comments no AD   Balance Special Tests Sit to Stand Reps in 30 Seconds   Reps in 30 seconds 7   Sensory Examination   Sensory Perception Comments parathesia reports in hands/fingers, not formalled assessed   Coordination   Coordination Comments Difficulty increasing significant speed with toe taps and alternating finger to nose   Muscle Tone   Muscle Tone no deficits were identified   Modality Interventions   Planned Modality Interventions Comments Per therapist discretion   Planned Therapy Interventions   Planned Therapy Interventions balance training;gait training;motor coordination training;neuromuscular re-education;strengthening   Clinical Impression   Criteria for Skilled Therapeutic Interventions Met yes, treatment indicated   PT Diagnosis Impaired functional mobility with increased falls risk   Influenced by the following impairments static/dynamic balance impairments, weakness, coordination defecits   Functional limitations due to impairments ambulation, stairs, standing functional tasks   Clinical Presentation Stable/Uncomplicated   Clinical Presentation Rationale clinical judgement   Clinical Decision Making (Complexity) Low  complexity   Therapy Frequency 1 time/week   Predicted Duration of Therapy Intervention (days/wks) 10 weeks  (Decrease frequency if indicated during POC)   Risk & Benefits of therapy have been explained Yes   Patient, Family & other staff in agreement with plan of care Yes   Clinical Impression Comments Patient presents with static and dynamic balance impairments with accopanied weakness that currently impact gait mechanics, resulting in requiring assistive device with ongoing fall risk. Patient with independent previous level of function in home and community, currently requiring increased assist with meal management and transportation and with increased difficulty completing independent tasks. Patient would benefit from skilled therapy to address impairments, decrease falls risk, and develop plan to return patient towards prior level of function in home and community.    Education Assessment   Preferred Learning Style Listening;Reading;Demonstration;Pictures/video   Barriers to Learning No barriers   Goal 1   Goal Identifier LE strength - 30 second sit to stand   Goal Description Patient to improve in LE strength and power evidenced by clinically signficant improvement on 30 second sit to stand to 10 rep or greater in order to have greater safety and performance on transfers in home and in/out of cars and greater safety with stair completion.    Goal Progress baseline: 7 rep   Target Date 03/23/22   Goal 2   Goal Identifier Gait speed   Goal Description Patient to be able to ambulate >1.1 m/s without assistive device to safely return to community ambulation without assistive device.   Goal Progress baseline: 0.64 m/s without assistive device   Target Date 03/23/22   Goal 3   Goal Identifier Dynamic balance - FGA   Goal Description Patient to be able to score >22/30 on FGA to display decreased fall risk and improved safety navigating around home without assistive device.   Goal Progress baseline: 13/30   Target  Date 03/23/22   Goal 4   Goal Identifier HEP   Goal Description Patient to be confident in HEP with correct mechanics to independently manage impairments upon discharge from skilled therapy.   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  (Physician co-signature of this document indicates review and certification of the therapy plan).

## 2022-03-17 NOTE — PROGRESS NOTES
Please remind patients that providers are given 3-5 business days to complete and return forms.        Form type: [facility] Wheelchair order      Date form received:   3/14/22     Date form completed by Physician:  3/16/22     How was form returned to patient (mailed, faxed, or at  for patient to ):  FAXED TO Emily Calloway at [phone]     Date form mailed/faxed/left at  for patient and sent to HIM for scanning:  3/17/22     Once form is left for patient, faxed, or mailed PCS will then close the documentation only encounter.    Opal Anne, EMT  3/17/22

## 2022-04-24 NOTE — ED PROVIDER NOTES
History   Chief Complaint:  No chief complaint on file.       DIMITRIOS Nogueira is a 26 year old male with history of alcohol abuse, pulmonary embolism, and DVT who presents with chest pain. The patient reports intermittent chest pain for the past 4-5 days, with associated mild cough and shortness of breath. He had a DVT in October and has been taking Eliquis since then. He also notes some congestion. Denies abdominal pain, sore throat, and fever. Some alcohol use but none over the past few days.       Review of Systems   Constitutional: Negative for fever.   HENT: Positive for congestion. Negative for sore throat.    Respiratory: Positive for cough and shortness of breath.    Cardiovascular: Positive for chest pain.   Gastrointestinal: Negative for abdominal pain.   All other systems reviewed and are negative.      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   Head: No signs of trauma.   CV: Normal rate and regular rhythm.    Resp: Effort normal and breath sounds normal. No respiratory distress.   GI: Soft. There is no tenderness.  No rebound or guarding.  Normal bowel sounds.  No CVA tenderness.  MSK: Normal range of motion. no edema. No Calf tenderness.  Neuro: The patient is alert and oriented. Speech  normal.  Skin: Skin is warm and dry. No rash noted.   Psych: normal mood and affect. behavior is normal.       Emergency Department Course   ECG  ECG obtained at 2147, ECG read at 2150  Sinus tachycardia  Minimal voltage criteria for LVH, may be normal variant  Borderline ECG   No significant change as compared to prior, dated 12/16/21.  Rate 105 bpm. NC interval 138 ms. QRS duration 84 ms. QT/QTc 324/428 ms. P-R-T axes 48 33 16.     Laboratory:  Labs Ordered and Resulted from Time of ED Arrival to Time of ED Departure   BASIC METABOLIC PANEL - Abnormal       Result Value    Sodium 136      Potassium 3.9      Chloride 103      Carbon Dioxide (CO2) 23      Anion Gap 10      Urea Nitrogen 14      Creatinine 1.23      Calcium 8.9      Glucose 132 (*)     GFR Estimate 82     CBC WITH PLATELETS AND DIFFERENTIAL - Abnormal    WBC Count 7.1      RBC Count 4.34 (*)     Hemoglobin 14.2      Hematocrit 40.8      MCV 94      MCH 32.7      MCHC 34.8      RDW 13.2      Platelet Count 240      % Neutrophils 46      % Lymphocytes 41      % Monocytes 10      % Eosinophils 2      % Basophils 1      % Immature Granulocytes 0      NRBCs per 100 WBC 0      Absolute Neutrophils 3.3      Absolute Lymphocytes 2.9      Absolute Monocytes 0.7      Absolute Eosinophils 0.1      Absolute Basophils 0.1      Absolute Immature Granulocytes 0.0      Absolute NRBCs 0.0     TROPONIN I - Normal    Troponin I High Sensitivity <3     D DIMER QUANTITATIVE - Normal    D-Dimer Quantitative <0.27         Emergency Department Course:     Reviewed:  I reviewed nursing notes, vitals, past medical history and Care Everywhere    Assessments:  0133 I obtained history and examined the patient as noted above.    0245 I rechecked the patient and explained findings.    Interventions:  0153 Pepcid 20 mg IV    0153 GI Cocktail 30 mL PO    Disposition:  The patient was discharged to home.     Impression & Plan     Medical Decision Making:  Sebastián Nogueira is a 26 year  old gentleman who presents due to chest pain.  States over the last number of days he has had some chest discomfort.  EKG was obtained that was reassuring and blood work including a troponin and D-dimer were obtained that were negative.  Patient does have a history of pulmonary embolism and is on Eliquis.  He states he has been regularly taking his medication.  Patient was given Pepcid and GI cocktail and did have some improvement of his symptoms.  Given the reassuring work-up I felt he is appropriate for discharge home recommended follow-up with his primary care doctor.        Diagnosis:    ICD-10-CM    1. Chest pain, unspecified type  R07.9        Discharge Medications:  New Prescriptions    No medications on file       Scribe Disclosure:  I, Tomer Vitale, am serving as a scribe at 1:26 AM on 4/24/2022 to document services personally performed by Jaguar Goldberg MD based on my observations and the provider's statements to me.         Jaguar Goldberg MD  04/24/22 0697

## 2022-04-24 NOTE — ED TRIAGE NOTES
Chest pain started 4-5 days ago. DVT in Oct 21, still taking blood thinners. Slight SOB. Pain comes and goes, worse when active. Pt has hx of PE.

## 2022-05-03 NOTE — PROGRESS NOTES
"  Assessment & Plan     Chest pain, unspecified type  Sprain of costochondral joint, initial encounter  Chest tenderness to palpation. Exam normal. Discussed use of tylenol for discomfort. Return to clinic if worsening.    Elevated random blood glucose level  Rule out diabetes.  - Hemoglobin A1c; Future  - Hemoglobin A1c    Chemical dependency (H)  Alcohol abuse  Persistent alcohol use, nitrous oxide use. Verbalizes wanting to stop, has done treatment centers in the past. Referral placed for substance abuse assistance through mental health. Labs today.  - Adult Mental Health  Referral; Future  - Vitamin B12  - Comprehensive metabolic panel (BMP + Alb, Alk Phos, ALT, AST, Total. Bili, TP)  - CBC with platelets  - GGT             BMI:   Estimated body mass index is 35.48 kg/m  as calculated from the following:    Height as of 12/28/21: 1.981 m (6' 6\").    Weight as of this encounter: 139.3 kg (307 lb).   Weight management plan: Discussed healthy diet and exercise guidelines    Depression Screening Follow Up    PHQ 5/3/2022   PHQ-9 Total Score 15   Q9: Thoughts of better off dead/self-harm past 2 weeks Several days   F/U: Thoughts of suicide or self-harm Yes   F/U: Self harm-plan No   F/U: Self-harm action No   F/U: Safety concerns No   Some encounter information is confidential and restricted. Go to Review Flowsheets activity to see all data.                 Follow Up      Follow Up Actions Taken  Crisis resource information provided in the After Visit Summary  Mental Health Referral placed    Discussed the following ways the patient can remain in a safe environment:  be around others      Return in about 4 weeks (around 5/31/2022) for Follow up, worsening symptoms or failure to improve..    Kelley Sales PA-C  [facility] GITA Lowery is a 26 year old who presents for the following health issues     History of Present Illness       Mental Health Follow-up:                "     Today's PHQ-9         PHQ-9 Total Score: 15  PHQ-9 Q9 Thoughts of better off dead/self-harm past 2 weeks :   (P) Several days  Thoughts of suicide or self harm: (P) Yes  Self-harm Plan:   (P) No  Self-harm Action:     (P) No  Safety concerns for self or others: (P) No    How difficult have these problems made it for you to do your work, take care of things at home, or get along with other people: Very difficult        Reason for visit:  I was told to. I have chronic pulmonary embolisms and blood clotting as well as chest pain as well as b12 deficiency  Symptom onset:  More than a month  Symptoms include:  Chest pain numbness  Symptom intensity:  Moderate  Symptom progression:  Worsening  Had these symptoms before:  Yes  Has tried/received treatment for these symptoms:  Yes  Previous treatment was successful:  No    He eats 2-3 servings of fruits and vegetables daily.He consumes 3 sweetened beverage(s) daily.He exercises with enough effort to increase his heart rate 10 to 19 minutes per day.  He exercises with enough effort to increase his heart rate 3 or less days per week. He is missing 1 dose(s) of medications per week.  He is not taking prescribed medications regularly due to remembering to take.       ED/UC Followup:    Facility:  [facility]  Date of visit: 4/24/22  Reason for visit: Chest pain  Current Status: Continues to have discomfort. Patient said he has DVT and pulmonary embolism     Left sided chest pain - hurts more when he is laying on his left side. Doesn't get worse with activity. Sometimes has it when he is sitting.     Drinking alcohol every day. Has used nitrous a few times in the last month.   Not working. Reports he can't stand long periods, lift anything. 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 Not doing physical therapy.                Review of Systems   Constitutional, HEENT, cardiovascular, pulmonary, GI, , musculoskeletal, neuro, skin, endocrine and psych systems are  negative, except as otherwise noted.      Objective    /86 (BP Location: Right arm, Patient Position: Chair, Cuff Size: Adult Large)   Pulse 102   Temp 98.3  F (36.8  C) (Oral)   Wt 139.3 kg (307 lb)   SpO2 96%   BMI 35.48 kg/m    Body mass index is 35.48 kg/m .  Physical Exam   GENERAL: healthy, alert and no distress  NECK: no adenopathy, no asymmetry, masses, or scars and thyroid normal to palpation  RESP: lungs clear to auscultation - no rales, rhonchi or wheezes  CV: regular rate and rhythm, normal S1 S2, no S3 or S4, no murmur, click or rub, no peripheral edema and peripheral pulses strong  ABDOMEN: soft, nontender, no hepatosplenomegaly, no masses and bowel sounds normal  MS: left costochondral tenderness to palpation

## 2022-05-04 NOTE — TELEPHONE ENCOUNTER
Called and spoke with patient.  Informed him of the provider's recommendations.  Patient verbalized understanding and has no further questions or concerns at this time.

## 2022-05-04 NOTE — TELEPHONE ENCOUNTER
Please call with results from 5/4 visit.       Vitamin B12 is increased into the normal range. I would recommend taking a daily multivitamin. Discontinue nitrous oxide use and this will stay in the normal range.   Follow up with Dr Martinez next Wednesday for repeat evaluation of your myelopathy symptoms.     Diabetes screen is negative.  Blood count also normal.  Kidney/liver labs are normal.  GGT was elevated as it has been in the past. This is due to alcohol use. Continue to work on ceasing all alcohol intake.     Follow up with cardiology as previously planned - visit is on 6/15.    Kelley Sales PA-C

## 2022-05-11 NOTE — PROGRESS NOTES
"[facility] PM&R Clinic - Follow Up Patient Note   Sebastián Nogueira  [id number]  Date of Evaluation: 5/11/2022  HPI:  Sebastián Nogueira is a 27 y/o M with a PMH of polysubstance abuse, PE/DVT diagnosed 10/31/21, and cardiomyopathy with reduced EF who presents to the PM&R clinic today for follow up from his inpatient rehabilitation stay due to a non-traumatic spinal cord injury due to B12 deficiency.  He initially presented to the hospital on 12/16/21 with a two week history of paresthesias and generalized weakness, with MRI imaging demonstrating abnormal T2 signal at the dorsal cervical spinal cord.  It was thought this likely represented subacute combined degeneration in setting of alcohol and nitrous gas abuse.  He was subsequently admitted to the [place] acute inpatient rehabilitation unit from 12/21 to 12/31/21, and at the time of discharge he was Mod I for ambulation and ADLs with [place] crutches, and was able to navigate 20 stairs which was required to be home.    Since discharge home, Sebastián reports having \"ups and downs\", but overall feels he has been getting better.  He has now progressed to not requiring any assistive device for ambulation.  He had some falls, mostly early after discharge, but thankfully reports no major injuries.  He continues to have parasthesias of the hands and feet but do not effect him from a functional aspect.  Denies any other pain.  Had 1-2 PT sessions, however was unable to get rides and stopped attending, and says he is doing a home exercise program.  He did not attend any OT sessions.  Denies any problems with bowel or bladder, and denies any spasm or spasticity.  He is independent with ADLs.  He reports he has used Nitrous Oxide, but has cut down significantly and is trying to quit.      PMH:  Past Medical History:   Diagnosis Date     Alcohol abuse      DVT (deep venous thrombosis) (H)      Hypertriglyceridemia      Marijuana use      Pulmonary embolism (H)  "       PSH:  Past Surgical History:   Procedure Laterality Date     APPENDECTOMY         Allergies:  Allergies   Allergen Reactions     Naltrexone      High feel  - ill effect        Medications:  Current Outpatient Medications   Medication     apixaban ANTICOAGULANT (ELIQUIS) 5 MG tablet     gabapentin (NEURONTIN) 400 MG capsule     multivitamin w/minerals (THERA-VIT-M) tablet     cyanocobalamin (CYANOCOBALAMIN) 1000 MCG/ML injection     folic acid (FOLVITE) 1 MG tablet     metoprolol succinate ER (TOPROL-XL) 25 MG 24 hr tablet     thiamine (B-1) 100 MG tablet     No current facility-administered medications for this visit.       Social History:  Social History     Socioeconomic History     Marital status: Single     Spouse name: Not on file     Number of children: Not on file     Years of education: Not on file     Highest education level: Not on file   Occupational History     Not on file   Tobacco Use     Smoking status: Former Smoker     Smokeless tobacco: Never Used   Substance and Sexual Activity     Alcohol use: Yes     Comment: down to just rarely     Drug use: Yes     Types: Nitrous oxide     Comment: has not had any since 12/24/2021     Sexual activity: Not Currently   Other Topics Concern     Parent/sibling w/ CABG, MI or angioplasty before 65F 55M? Not Asked   Social History Narrative     Not on file     Social Determinants of Health     Financial Resource Strain: Not on file   Food Insecurity: Not on file   Transportation Needs: Not on file   Physical Activity: Not on file   Stress: Not on file   Social Connections: Not on file   Intimate Partner Violence: Not on file   Housing Stability: Not on file       Review of Systems - History obtained from chart review and the patient  General ROS: negative for - chills or fever  Psychological ROS: positive for - polysubstance use  Respiratory ROS: no cough, shortness of breath, or wheezing  Cardiovascular ROS: positive for - Recent ED visit for chest  pain  Gastrointestinal ROS: no abdominal pain, change in bowel habits, or black or bloody stools  Genito-Urinary ROS: no dysuria, trouble voiding, or hematuria  Musculoskeletal ROS: negative for - muscular weakness  Neurological ROS: positive for - parasthesias in hands and feet  Dermatological ROS: positive for rash  Functional Hx:  As per HPI    Physical Exam:  /80   Pulse 85   Resp 16   SpO2 97%     Gen: NAD  Skin: No rashes noted  HEENT: NC/AT  Pulm: Non-labored breathing  GI: Soft, NT/ND  Ext: No LE Edema, no calf tenderness  Neuro: AAOx3, follows commands, answers appropriately  MMT 5/5 to b/l upper and lower extremities  Gait: Normal  No spasticity noted in upper or lower extremities    Imaging:  MRI C-Spine (12/16/21)  IMPRESSION:    1. Abnormal T2 signal within the dorsal cervical spinal cord as detailed above. This most likely represents subacute combined degeneration. Clinical correlation is recommended.  2. No significant degenerative changes in the cervical spine. No spinal canal or neural foraminal narrowing.    Assessment:  Sebastián Nogueira is a 27 y/o M with a PMH of polysubstance abuse, PE/DVT diagnosed 10/31/21, and cardiomyopathy with reduced EF who presents to the PM&R clinic today for follow up from his inpatient rehabilitation stay due to a non-traumatic spinal cord injury due to B12 deficiency.     Recommendations:  -I am glad to see Mr. Nogueira continues to improve after discharge from the ARU.  He has now progressed to not requiring any assistive devices and although he has stopped attending PT, I encouraged him to at least continue with his home exercise program.  -Encouraged cessation of illicit substances.  Discussed with him that if he continues, he would risk further damage to his spinal cord, which could be permanent.  -No issues with bowel, bladder, or spasticity that would warrant any intervention  -Otherwise no specific concerns at this time, and he may follow up on an as  needed basis only.  Otilio Martinez MD  Department of Rehabilitation Medicine    I, Otilio Martinez, spent a total of 20 minutes on the day of visit managing the care of Sebastián Nogueira including chart review, documentation, counseling, education, and coordination of care.  Over 50% of my time was spent face to face with the patient.  See note for further details.

## 2022-06-15 NOTE — PATIENT INSTRUCTIONS
Thanks for participating in a office visit with the [facility] clinic today.     Reviewed results of last echocardiogram  Recommend limited echo for reassessment of LV function.   Follow up with vascular medicine as previously recommended for duration of anticoagulation  Stop alcohol   Encourage stopping Mariajuana use.       Please call my nurse at  [phone] with any questions or concerns.    Scheduling phone number: [phone]  Reminder: Please bring in all current medications, over the counter supplements and vitamin bottles to your next appointment.

## 2022-06-15 NOTE — TELEPHONE ENCOUNTER
Pt referred to [facility] by Randa Cantu APRN CNP for pulmonary embolism.    Patient has imaging in [facility]    Pt needs to be scheduled for consult with vascular medicine or Diana JOSEPH.  Will route to scheduling to coordinate an appointment at next available.    Appointment note: Pt referred to [facility] by Randa Cantu APRN CNP for pulmonary embolism.       Penny BELLO, RN    [facility]  Office: [phone]  Fax: [phone]

## 2022-06-15 NOTE — LETTER
6/15/2022    Galo Vizcarra MD  [address] [facility] [address]    RE: Sebastián Nogueira       Dear Colleague,     I had the pleasure of seeing Sebastián Nogueira in the [facility].  Cardiology Clinic Progress Note  Sebastián Nogueira MRN# [id number]   YOB: 1995 Age: 26 year old     Reason For Visit:  6 month follow up   Primary Cardiologist:   Dr. Cano           History of Presenting Illness:      Sebastián Nogueira is a pleasant 26 year old patient who carries a past medical history significant for biventricular heart failure, HFrEF, bilateral pulmonary emboli, RLE DVT on anticoagulation, alcohol abuse, nitrous oxide abuse, and morbid obesity.     Over the last 6 months, he has had multiple hospital admission.  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  MRI C-spine likely represents subacute combined degeneration in the setting of alcohol and nitrous gas abuse. He was started on B 12 injections and recommended for follow up outpatient. In April, he presented to the ER with chest pain. Fortunately, his EKG showed no acute ischemic changes, D-Dimer and troponin were negative. He was given a GI cocktail and PPI with improvement in symptoms.     He returns to the office today for a 6-month cardiology follow-up.  He is feeling well on a cardiac standpoint, denies chest pain, shortness of breath, palpitations, PND, orthopnea, presyncope, or syncope.  His primary complaint is right lower ankle pain and swelling, unchanged for months. In review of last OV, he was recommended for vascular referral to weigh in on the duration of anticoagulation. He was unaware of this and not had follow up.     Last echocardiogram showed a mildly reduced ejection fraction estimated at 50% (mildly improved from previous 45%), mild global hypokinesis of the LV, mild to moderately dilated RV, and no significant  valvular disease.  At his last office visit, he was initiated on low-dose metoprolol which he has since discontinued.    Blood pressure is normal at 124/82  Last BMP and CBC were unremarkable  Lipid panel 9/2021 showed total cholesterol 180, HDL 68, and triglycerides of 588  BMI 34.88    He does not engage in any routine exercise with the exception of minimal walking when taking his dog out.  He continues to drink heavily on a daily basis.  He smokes approximately 10 marijuana joints per day  He no longer uses nitrous oxide                     Assessment and Plan:     Sebastián Nogueira is a pleasant 26 year old patient who carries a past medical history significant for biventricular heart failure, HFrEF, bilateral pulmonary emboli, RLE DVT on anticoagulation, alcohol abuse, nitrous oxide abuse, and morbid obesity.  Overall, he is feeling well on a cardiac standpoint.  Unfortunately, he elected to stop metoprolol and states he feels better without it.  He has remained compliant with Eliquis.  I recommend a limited echocardiogram to reassess LV function.  I have placed a vascular referral to have them weigh in on duration of anticoagulation.  I encouraged abstinence of alcohol and marijuana use.  I encouraged exercise, weight loss, and heart healthy diet.  More recommendations following results of limited echo.             Thank you for allowing me to participate in this delightful patient's care.        LISA Azevedo CNP         Review of Systems:     Review of Systems:  Skin:  not assessed     Eyes:  not assessed    ENT:  not assessed    Respiratory:  Positive for dyspnea on exertion  Cardiovascular:    Positive for;chest pain;edema  Gastroenterology: not assessed    Genitourinary:  not assessed    Musculoskeletal:  not assessed    Neurologic:  not assessed    Psychiatric:  not assessed    Heme/Lymph/Imm:  not assessed    Endocrine:  Positive for night sweats              Physical Exam:     GEN:  In general,  this is a obese male in no acute distress.  HEENT:  Pupils equal, round. Sclerae nonicteric. Clear oropharynx. Mucous membranes moist.  NECK: Supple, no masses appreciated. Trachea midline.  No JVD   C/V:  Regular rate and rhythm, no murmur, rub or gallop. No S3 or RV heave.   RESP: Respirations are unlabored. No use of accessory muscles. Clear to auscultation bilaterally without wheezing, rales, or rhonchi.  GI: Abdomen soft, nontender, nondistended. No HSM appreciated.   EXTREM: Right ankle edema. No cyanosis or clubbing.  NEURO: Alert and oriented, cooperative.  No obvious focal deficits.   PSYCH: Normal affect.  SKIN: Warm and dry. No rashes or petechiae appreciated.          Past Medical History:     Past Medical History:   Diagnosis Date     Alcohol abuse      DVT (deep venous thrombosis) (H)      Hypertriglyceridemia      Marijuana use      Pulmonary embolism (H)               Past Surgical History:     Past Surgical History:   Procedure Laterality Date     APPENDECTOMY                Allergies:   Naltrexone       Data:   All laboratory data reviewed:    LAST CHOLESTEROL:  Lab Results   Component Value Date    CHOL 180 09/05/2021     Lab Results   Component Value Date    HDL 68 09/05/2021     Lab Results   Component Value Date    LDL  09/05/2021      Comment:      Cannot estimate LDL when triglyceride exceeds 400 mg/dL  Age 0-19 years:  Desirable: 0-110 mg/dL   Borderline high: 110-129 mg/dL   High: >= 130 mg/dL    Age 20 years and older:  Desirable: <100mg/dL  Above desirable: 100-129 mg/dL   Borderline high: 130-159 mg/dL   High: 160-189 mg/dL   Very high: >= 190 mg/dL     Lab Results   Component Value Date    TRIG 588 09/05/2021     No results found for: CHOLHDLRATIO    LAST BMP:  Lab Results   Component Value Date     05/03/2022     04/26/2021      Lab Results   Component Value Date    POTASSIUM 3.8 05/03/2022    POTASSIUM 3.4 04/26/2021     Lab Results   Component Value Date    CHLORIDE 105  05/03/2022    CHLORIDE 101 04/26/2021     Lab Results   Component Value Date    MARILYN 9.7 05/03/2022    MARILYN 8.7 04/26/2021     Lab Results   Component Value Date    CO2 22 05/03/2022    CO2 27 04/26/2021     Lab Results   Component Value Date    BUN 8 05/03/2022    BUN 7 04/26/2021     Lab Results   Component Value Date    CR 0.80 05/03/2022    CR 0.77 04/26/2021     Lab Results   Component Value Date     05/03/2022     04/26/2021       LAST CBC:  Lab Results   Component Value Date    WBC 5.9 05/03/2022    WBC 4.7 04/26/2021     Lab Results   Component Value Date    RBC 4.72 05/03/2022    RBC 4.69 04/26/2021     Lab Results   Component Value Date    HGB 15.3 05/03/2022    HGB 14.8 04/26/2021     Lab Results   Component Value Date    HCT 44.0 05/03/2022    HCT 42.5 04/26/2021     Lab Results   Component Value Date    MCV 93 05/03/2022    MCV 91 04/26/2021     Lab Results   Component Value Date    MCH 32.4 05/03/2022    MCH 31.6 04/26/2021     Lab Results   Component Value Date    MCHC 34.8 05/03/2022    MCHC 34.8 04/26/2021     Lab Results   Component Value Date    RDW 13.4 05/03/2022    RDW 12.8 04/26/2021     Lab Results   Component Value Date     05/03/2022     04/26/2021       Thank you for allowing me to participate in the care of your patient.      Sincerely,     LISA Azevedo [facility]  cc:   Darlene Cano MD  [id number] [address]

## 2022-06-15 NOTE — PROGRESS NOTES
Cardiology Clinic Progress Note  Sebastián Nogueira MRN# [id number]   YOB: 1995 Age: 26 year old     Reason For Visit:  6 month follow up   Primary Cardiologist:   Dr. Cano           History of Presenting Illness:      Sebastián Nogueira is a pleasant 26 year old patient who carries a past medical history significant for biventricular heart failure, HFrEF, bilateral pulmonary emboli, RLE DVT on anticoagulation, alcohol abuse, nitrous oxide abuse, and morbid obesity.     Over the last 6 months, he has had multiple hospital admission.  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  MRI C-spine likely represents subacute combined degeneration in the setting of alcohol and nitrous gas abuse. He was started on B 12 injections and recommended for follow up outpatient. In April, he presented to the ER with chest pain. Fortunately, his EKG showed no acute ischemic changes, D-Dimer and troponin were negative. He was given a GI cocktail and PPI with improvement in symptoms.     He returns to the office today for a 6-month cardiology follow-up.  He is feeling well on a cardiac standpoint, denies chest pain, shortness of breath, palpitations, PND, orthopnea, presyncope, or syncope.  His primary complaint is right lower ankle pain and swelling, unchanged for months. In review of last OV, he was recommended for vascular referral to weigh in on the duration of anticoagulation. He was unaware of this and not had follow up.     Last echocardiogram showed a mildly reduced ejection fraction estimated at 50% (mildly improved from previous 45%), mild global hypokinesis of the LV, mild to moderately dilated RV, and no significant valvular disease.  At his last office visit, he was initiated on low-dose metoprolol which he has since discontinued.    Blood pressure is normal at 124/82  Last BMP and CBC were unremarkable  Lipid panel  9/2021 showed total cholesterol 180, HDL 68, and triglycerides of 588  BMI 34.88    He does not engage in any routine exercise with the exception of minimal walking when taking his dog out.  He continues to drink heavily on a daily basis.  He smokes approximately 10 marijuana joints per day  He no longer uses nitrous oxide                     Assessment and Plan:     Sebastián Nogueira is a pleasant 26 year old patient who carries a past medical history significant for biventricular heart failure, HFrEF, bilateral pulmonary emboli, RLE DVT on anticoagulation, alcohol abuse, nitrous oxide abuse, and morbid obesity.  Overall, he is feeling well on a cardiac standpoint.  Unfortunately, he elected to stop metoprolol and states he feels better without it.  He has remained compliant with Eliquis.  I recommend a limited echocardiogram to reassess LV function.  I have placed a vascular referral to have them weigh in on duration of anticoagulation.  I encouraged abstinence of alcohol and marijuana use.  I encouraged exercise, weight loss, and heart healthy diet.  More recommendations following results of limited echo.             Thank you for allowing me to participate in this delightful patient's care.        LISA Azevedo CNP         Review of Systems:     Review of Systems:  Skin:  not assessed     Eyes:  not assessed    ENT:  not assessed    Respiratory:  Positive for dyspnea on exertion  Cardiovascular:    Positive for;chest pain;edema  Gastroenterology: not assessed    Genitourinary:  not assessed    Musculoskeletal:  not assessed    Neurologic:  not assessed    Psychiatric:  not assessed    Heme/Lymph/Imm:  not assessed    Endocrine:  Positive for night sweats              Physical Exam:     GEN:  In general, this is a obese male in no acute distress.  HEENT:  Pupils equal, round. Sclerae nonicteric. Clear oropharynx. Mucous membranes moist.  NECK: Supple, no masses appreciated. Trachea midline.  No JVD   C/V:   Regular rate and rhythm, no murmur, rub or gallop. No S3 or RV heave.   RESP: Respirations are unlabored. No use of accessory muscles. Clear to auscultation bilaterally without wheezing, rales, or rhonchi.  GI: Abdomen soft, nontender, nondistended. No HSM appreciated.   EXTREM: Right ankle edema. No cyanosis or clubbing.  NEURO: Alert and oriented, cooperative.  No obvious focal deficits.   PSYCH: Normal affect.  SKIN: Warm and dry. No rashes or petechiae appreciated.          Past Medical History:     Past Medical History:   Diagnosis Date     Alcohol abuse      DVT (deep venous thrombosis) (H)      Hypertriglyceridemia      Marijuana use      Pulmonary embolism (H)               Past Surgical History:     Past Surgical History:   Procedure Laterality Date     APPENDECTOMY                Allergies:   Naltrexone       Data:   All laboratory data reviewed:    LAST CHOLESTEROL:  Lab Results   Component Value Date    CHOL 180 09/05/2021     Lab Results   Component Value Date    HDL 68 09/05/2021     Lab Results   Component Value Date    LDL  09/05/2021      Comment:      Cannot estimate LDL when triglyceride exceeds 400 mg/dL  Age 0-19 years:  Desirable: 0-110 mg/dL   Borderline high: 110-129 mg/dL   High: >= 130 mg/dL    Age 20 years and older:  Desirable: <100mg/dL  Above desirable: 100-129 mg/dL   Borderline high: 130-159 mg/dL   High: 160-189 mg/dL   Very high: >= 190 mg/dL     Lab Results   Component Value Date    TRIG 588 09/05/2021     No results found for: CHOLHDLRATIO    LAST BMP:  Lab Results   Component Value Date     05/03/2022     04/26/2021      Lab Results   Component Value Date    POTASSIUM 3.8 05/03/2022    POTASSIUM 3.4 04/26/2021     Lab Results   Component Value Date    CHLORIDE 105 05/03/2022    CHLORIDE 101 04/26/2021     Lab Results   Component Value Date    MARILYN 9.7 05/03/2022    MARILYN 8.7 04/26/2021     Lab Results   Component Value Date    CO2 22 05/03/2022    CO2 27 04/26/2021      Lab Results   Component Value Date    BUN 8 05/03/2022    BUN 7 04/26/2021     Lab Results   Component Value Date    CR 0.80 05/03/2022    CR 0.77 04/26/2021     Lab Results   Component Value Date     05/03/2022     04/26/2021       LAST CBC:  Lab Results   Component Value Date    WBC 5.9 05/03/2022    WBC 4.7 04/26/2021     Lab Results   Component Value Date    RBC 4.72 05/03/2022    RBC 4.69 04/26/2021     Lab Results   Component Value Date    HGB 15.3 05/03/2022    HGB 14.8 04/26/2021     Lab Results   Component Value Date    HCT 44.0 05/03/2022    HCT 42.5 04/26/2021     Lab Results   Component Value Date    MCV 93 05/03/2022    MCV 91 04/26/2021     Lab Results   Component Value Date    MCH 32.4 05/03/2022    MCH 31.6 04/26/2021     Lab Results   Component Value Date    MCHC 34.8 05/03/2022    MCHC 34.8 04/26/2021     Lab Results   Component Value Date    RDW 13.4 05/03/2022    RDW 12.8 04/26/2021     Lab Results   Component Value Date     05/03/2022     04/26/2021

## 2022-07-11 NOTE — PROGRESS NOTES
INITIAL VASCULAR MEDICINE ASSESSMENT  REFERRAL SOURCE: Randa Cantu APRN CNP  REASON FOR CONSULT:  for pulmonary embolism    HPI: Sebastián Nogueira is a 26 year old morbidly obese ( Body mass index is 35.34 kg/m .) polysubstance abusing (EtOH, nitrous oxide, cocaine) male who on 10/31/2021 presented with his first lifetime VTE of a provoked DVT and submassive PE w/ evidence of right heart strain seen at that time. He received a COVID vaccination four months previously. His VTE event was provoked by his weight combined with immobility prior to his flight to [place]. His sxs began while in [place]. He went to [facility], and a LE duplex revealed no DVT so he was not ACd. Sxs persisted, so he went to the ED at [facility] on 10[place]3/[address]28. He was found to have RLE occlusive DVT extending from posterior tibial and peroneal veins in the calf throughout the popliteal vein, distal and mid femoral vein. Occlusive superficial thrombus was also noted in the right greater saphenous vein in the mid calf and in lesser saphenous vein at the popliteal junction. His PE protocol CT of the chest revealed extensive acute emboli within the distal right main pulmonary artery and extending into the segmental and subsegmental vessels of the right upper and lower lobes. Additional emboli within the proximal segmental vessels to the left upper and lower lobes. He was HD and respiratorily stable so no PA thrombectomy was undertaken, and no filter was placed. He was ACd intiially with IV UFH gtt and eventually converted to oral apixaban. He has been compliantly therapeutically ACd for 9 months and presents at Randa Cantu's request to ascertain if he can go off of AC. No recent d dimer, LE duplex done. Recent TTE revealed RVSP could not be approximated due to inadequate tricuspid regurgitation (even with contrast). A comment on that TTE report was made to refer for pulmonary artery thrombectomy. He feels generally poor due to problems with  B12 deficiency. 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 He has not abstained from alcohol. He drinks a pint to fifth of vodka daily still despite having been told to stop drinking due to EtOHic CMO. His EF has recovered minimally. .     Review Of Systems  Skin: negative  Eyes: negative  Ears/Nose/Throat: negative  Respiratory: No shortness of breath, dyspnea on exertion, cough, or hemoptysis  Cardiovascular: negative  Gastrointestinal: negative  Genitourinary: negative  Musculoskeletal: negative  Neurologic: negative  Psychiatric: negative  Hematologic/Lymphatic/Immunologic: negative  Endocrine: negative      PAST MEDICAL HISTORY:                  Past Medical History:   Diagnosis Date     Alcohol abuse      DVT (deep venous thrombosis) (H)      Hypertriglyceridemia      Marijuana use      Pulmonary embolism (H)        PAST SURGICAL HISTORY:                  Past Surgical History:   Procedure Laterality Date     APPENDECTOMY         CURRENT MEDICATIONS:                  Current Outpatient Medications   Medication Sig Dispense Refill     apixaban ANTICOAGULANT (ELIQUIS) 5 MG tablet Take 1 tablet (5 mg) by mouth 2 times daily 180 tablet 3     gabapentin (NEURONTIN) 400 MG capsule Take 400 mg by mouth daily as needed (Anxiety)        multivitamin w/minerals (THERA-VIT-M) tablet Take 1 tablet by mouth daily 30 tablet 0       ALLERGIES:                  Allergies   Allergen Reactions     Naltrexone      High feel  - ill effect        SOCIAL HISTORY:                  Social History     Socioeconomic History     Marital status: Single     Spouse name: Not on file     Number of children: Not on file     Years of education: Not on file     Highest education level: Not on file   Occupational History     Not on file   Tobacco Use     Smoking status: Never Smoker     Smokeless tobacco: Never Used     Tobacco comment: smokes maryjuana daily and  e-cigarettes if someone else has one but not regularly   Substance and Sexual Activity     Alcohol use: Yes     Comment: 1 pint and three beers a day     Drug use: Yes     Types: Nitrous oxide, Marijuana     Comment: has not had any since 2021     Sexual activity: Not Currently   Other Topics Concern     Parent/sibling w/ CABG, MI or angioplasty before 65F 55M? Not Asked   Social History Narrative     Not on file     Social Determinants of Health     Financial Resource Strain: Not on file   Food Insecurity: Not on file   Transportation Needs: Not on file   Physical Activity: Not on file   Stress: Not on file   Social Connections: Not on file   Intimate Partner Violence: Not on file   Housing Stability: Not on file       FAMILY HISTORY:                   Family History   Problem Relation Age of Onset     Dementia Paternal Grandfather            Physical exam Reveals:    O/P: WNL  HEENT: WNL  NECK: No JVD, thyromegaly, or lymphadenopathy  HEART: RR, tachycardic, no murmurs, gallops, or rubs  LUNGS: CTA bilaterally without rales, wheezes, or rhonchi  GI: NABS, nondistended, nontender, soft  EXT:without cyanosis, clubbing, trace edema; gaiter area discoloration RLE, no obvious varicosities. Continued hair growth on shins.  NEURO: nonfocal  : no flank tenderness          [facility]  [facility]  [address]  [place] [address]  Phone [phone]  Fax [phone]     Name: STACEY CHAMPION  MRN: [id number]  : 1995  Study Date: 2022 09:05 AM  Age: 26 yrs  Gender: Male  Patient Location: [facility]  Reason For Study: LV dysfunction  Ordering Physician: ISRAEL STARKS  Referring Physician: ISRAEL STARKS  Performed By: Radha Ca RDCS     BSA: 2.7 m2  Height: 78 in  Weight: 301 lb  HR: 76  BP: 136/98 mmHg  ______________________________________________________________________________  Procedure  Limited Echo  Adult. Optison (NDC #[id number]) given intravenously.     ______________________________________________________________________________  Interpretation Summary     The left ventricle is normal in size.  Left ventricular systolic function is borderline reduced.  The visual ejection fraction is 50-55%.  The right ventricle is moderately dilated.  Mildly decreased right ventricular systolic function  Right ventricular systolic pressure could not be approximated due to  inadequate tricuspid regurgitation (even with contrast).     Likely no change from previous echo 21. Consider referral to Dr. Gonzales  for consideration of pulmonary artery thrombectomy.  ______________________________________________________________________________  Left Ventricle  The left ventricle is normal in size. Left ventricular systolic function is  borderline reduced. The visual ejection fraction is 50-55%.     Right Ventricle  The right ventricle is moderately dilated. Mildly decreased right ventricular  systolic function.     Tricuspid Valve  No tricuspid regurgitation. Right ventricular systolic pressure could not be  approximated due to inadequate tricuspid regurgitation.  ______________________________________________________________________________  MMode/2D Measurements & Calculations  LVIDd: 5.2 cm  LVIDs: 4.0 cm  LVPWd: 0.98 cm  FS: 22.9 %  RWT: 0.38     Doppler Measurements & Calculations  TR max montrell: 191.0 cm/sec  TR max P.6 mmHg     ______________________________________________________________________________  Report approved by: Maryjo Merida 2022 11:20 AM                  EXAM: CT ABDOMEN PELVIS W CONTRAST  LOCATION: [facility]  DATE/TIME: 12/10/2021 6:41 PM     INDICATION: Abdominal distension history of PE  COMPARISON: Chest CT 2021.  TECHNIQUE: CT scan of the abdomen and pelvis was performed following injection of IV contrast. Multiplanar reformats were  obtained. Dose reduction techniques were used.  CONTRAST: 135mL of isovue 370     FINDINGS:   LOWER CHEST: Small amount of residual pulmonary embolism in the right lower lobe, significantly decreased.     HEPATOBILIARY: Normal.     PANCREAS: Normal.     SPLEEN: Normal.     ADRENAL GLANDS: Normal.     KIDNEYS/BLADDER: Normal.     BOWEL: No free air, free fluid, or inflammatory change. No bowel wall thickening or abnormal enhancement. No evidence for obstruction. Appendix not identified.     LYMPH NODES: Normal.     VASCULATURE: No evidence for pelvic venous thrombosis or IVC thrombus.     PELVIC ORGANS: Normal.     MUSCULOSKELETAL: Normal.                                                                      IMPRESSION:   1.  No acute abnormality in the abdomen or pelvis. No evidence for deep pelvic vein or IVC thrombus.  2.  Mild residual pulmonary embolism in the right lower lobe, significantly decreased compared to 11/20/2021 exam.      EXAM: US LOWER EXTREMITY VENOUS DUPLEX BILATERAL  LOCATION: [facility]  DATE/TIME: 12/10/2021 6:24 PM     INDICATION: worsening right leg swelling, left leg swelling  COMPARISON: 10/31/2021  TECHNIQUE: Venous Duplex ultrasound of bilateral lower extremities with and without compression, augmentation and duplex. Color flow and spectral Doppler with waveform analysis performed.     FINDINGS: Exam includes the common femoral, femoral, popliteal veins as well as segmentally visualized deep calf veins and greater saphenous vein.      RIGHT: Deep venous thrombosis is present within the distal femoral vein, popliteal vein, posterior tibial veins, as well as the peroneal veins. Thrombus is also present within the lesser saphenous veins of the mid and distal calf. No popliteal cyst.     LEFT: No deep vein thrombosis. No superficial thrombophlebitis. No popliteal cyst.                                                                       IMPRESSION:  1.  Chronic or recurrent deep venous thrombosis within the right lower extremity from lower thigh to lower calf.        [facility]  [facility]  [address]  [place] [address]  Phone [phone]  Fax [phone]     Name: STACEY CHAMPION  MRN: [id number]  : 1995  Study Date: 2021 09:05 AM  Age: 26 yrs  Gender: Male  Patient Location: [facility]  Reason For Study: Multiple subsegmental pulmonary emboli without acute cor  pulmona  Ordering Physician: ISRAEL STARKS  Referring Physician: ISRAEL STARKS  Performed By: Radha Ca RDCS     BSA: 2.7 m2  Height: 78 in  Weight: 316 lb  HR: 79  BP: 151/83 mmHg  ______________________________________________________________________________  Procedure  Limited Echo Adult. Optison (NDC #[id number]) given intravenously.     ______________________________________________________________________________  Interpretation Summary     Left ventricular systolic function is mildly reduced. The visual ejection  fraction is estimated at 50%. Mild global hypokinesis of the left ventricle.  Right ventricle is mild to moderately dilated. The right ventricular systolic  function is mildly reduced.     This study was compared to a TTE from 2021. Global LV function has mildly  improved, global RV function is stable.  ______________________________________________________________________________  Left Ventricle  Left ventricular systolic function is mildly reduced. The visual ejection  fraction is estimated at 50%. There is mild global hypokinesia of the left  ventricle.     Right Ventricle  The right ventricle is mild to moderately dilated. The right ventricular  systolic function is mildly reduced.     Mitral Valve  The mitral valve is normal in structure and function.     Tricuspid Valve  The tricuspid valve is not well visualized, but is grossly normal.  Right  ventricular systolic pressure could not be approximated due to inadequate  tricuspid regurgitation.     Vessels  The inferior vena cava was normal in size with preserved respiratory  variability.     ______________________________________________________________________________  MMode/2D Measurements & Calculations  IVSd: 1.1 cm  LVIDd: 5.6 cm  LVIDs: 4.2 cm  LVPWd: 0.99 cm  FS: 24.1 %  LV mass(C)d: 225.0 grams  LV mass(C)dI: 82.1 grams/m2     RWT: 0.35     ______________________________________________________________________________  Report approved by: Maryjo Luis 11/30/2021 10:26 AM              EXAM: CT CHEST WITH CONTRAST - PULMONARY EMBOLISM PROTOCOL   LOCATION: [facility]  DATE/TIME: 11/20/2021 1:54 AM     INDICATION: Chest pain. Recent diagnosis of pulmonary embolus.  COMPARISON: 10/31/2021.     TECHNIQUE: CT angiogram chest during pulmonary arterial phase injection IV contrast. Dose reduction techniques were used.   CONTRAST: 77 mL Isovue-370     FINDINGS:     ANGIOGRAM CHEST: Eccentric filling defects within the right main pulmonary artery and extending into the adjacent lobar arteries and a few segmental and subsegmental pulmonary arteries. No filling defects in the left pulmonary arteries. The thoracic   aorta is normal in caliber without dissection.     LUNGS AND PLEURA: A very small patchy opacity in the posteromedial aspect of the right lung base (series 6 image 224), slightly decreased in prominence since the prior study. The lungs are otherwise clear.     MEDIASTINUM/AXILLAE: Unremarkable.     CORONARY ARTERY CALCIFICATION: Absent.     MUSCULOSKELETAL: No acute findings.     UPPER ABDOMEN: Unremarkable.                                                                      IMPRESSION:   1.  Pulmonary emboli within the right main pulmonary artery and extending into the adjacent lobar arteries and a few segmental and subsegmental pulmonary  arteries. These were also present on the comparison study dated 10/31/2021 and have mildly decreased   in extent since that study. A few pulmonary emboli that were within the left lung on the comparison study are no longer visualized. No convincing new pulmonary embolus.  2.  A small patchy opacity in the right lung base, slightly decreased since the comparison study. This likely represents interval maturation of a small pulmonary infarct.  3.  No other findings suspicious for active pulmonary disease.         [facility]  Echocardiography Laboratory  [address] [place] [address]     Name: STACEY CHAMPION  MRN: [id number]  : 1995  Study Date: 2021 07:56 AM  Age: 26 yrs  Gender: Male  Patient Location: [facility]  Reason For Study: Pulmonary Emboli  Ordering Physician: NIKKY CARREON  Referring Physician: Ton López  Performed By: Mariana Diana     BSA: 2.6 m2  Height: 78 in  Weight: 288 lb  HR: 92  BP: 108/86 mmHg  ______________________________________________________________________________  Procedure  Complete Portable Echo Adult. Optison (NDC #[id number]) given intravenously.  ______________________________________________________________________________  Interpretation Summary     Left ventricular systolic function is mildly reduced.  The visual ejection fraction is 45-50%. Traced EF 45%.  There is mild global hypokinesia of the left ventricle.  Mildly decreased right ventricular systolic function  The right ventricle is mildly dilated.  No significant valve dysfunction.  Unable to estimate PA systolic pressure.  The inferior vena cava was normal in size with preserved respiratory  variability.  There is no pericardial effusion.     No prior for comparison.  ______________________________________________________________________________  Left Ventricle  The left ventricle is normal in size. There is mild concentric left  ventricular hypertrophy. The visual  ejection fraction is 45-50%. Left  ventricular systolic function is mildly reduced. Left ventricular diastolic  function is normal. Biplane LVEF is 45%. There is mild global hypokinesia of  the left ventricle.     Right Ventricle  The right ventricle is mildly dilated. Mildly decreased right ventricular  systolic function.     Atria  Normal left atrial size. Right atrial size is normal.     Mitral Valve  There is mild mitral annular calcification.     Tricuspid Valve  The tricuspid valve is not well visualized, but is grossly normal. Right  ventricular systolic pressure could not be approximated due to inadequate  tricuspid regurgitation. There is mild (1+) tricuspid regurgitation.     Aortic Valve  The aortic valve is normal in structure and function.     Pulmonic Valve  The pulmonic valve is normal in structure and function. There is mild (1+)  pulmonic valvular regurgitation.     Vessels  The aortic root is normal size. Normal size ascending aorta. The inferior vena  cava was normal in size with preserved respiratory variability.     Pericardium  There is no pericardial effusion.     ______________________________________________________________________________  MMode/2D Measurements & Calculations  IVSd: 1.0 cm  LVIDd: 5.4 cm  LVIDs: 4.2 cm  LVPWd: 1.0 cm  FS: 21.9 %  LV mass(C)d: 208.6 grams  LV mass(C)dI: 79.1 grams/m2     Ao root diam: 3.7 cm  LA dimension: 3.6 cm  asc Aorta Diam: 3.2 cm  LA/Ao: 0.98  LA Volume (BP): 71.5 ml  LA Volume Index (BP): 27.1 ml/m2  RWT: 0.38  TAPSE: 1.7 cm     Doppler Measurements & Calculations  MV E max montrell: 59.5 cm/sec  MV A max montrell: 46.0 cm/sec  MV E/A: 1.3     MV dec time: 0.19 sec  E/E' av.5  Lateral E/e': 4.3  Medial E/e': 6.8     ______________________________________________________________________________  Report approved by: Maryjo Freeman 2021 10:28 AM                EXAM: CT CHEST PULMONARY EMBOLISM W CONTRAST  LOCATION: [facility]  DATE/TIME: 10/31/2021 5:18 PM     INDICATION: PE suspected, high prob. Leg swelling.  COMPARISON: None.  TECHNIQUE: CT chest pulmonary angiogram during arterial phase injection of IV contrast. Multiplanar reformats and MIP reconstructions were performed. Dose reduction techniques were used.   CONTRAST: 77 ml Isovue 370     FINDINGS:  ANGIOGRAM CHEST: Extensive acute emboli within the distal right main pulmonary artery and extending into the segmental and subsegmental vessels of the right upper and lower lobes. Additional emboli within the proximal segmental vessels to the left upper   and lower lobes. The right ventricle/left ventricle ratio is slightly greater than 1.0 suggesting right heart strain. Thoracic aorta is negative for dissection.     LUNGS AND PLEURA: Groundglass opacities within the right upper lobe and right middle lobe. Airspace/groundglass opacity within the right lower lobe posteriorly     MEDIASTINUM/AXILLAE: Prominent mediastinal fat with small scattered lymph nodes.     CORONARY ARTERY CALCIFICATION: None.     UPPER ABDOMEN: Normal.     MUSCULOSKELETAL: Normal.                                                                      IMPRESSION:  1.  Extensive, acute bilateral pulmonary emboli with right heart strain. IR consultation for possible embolectomy recommended.  2.  Airspace opacity right base posteriorly could represent a pulmonary infarct versus pneumonia.  3.  Groundglass opacity right upper lobe concerning for pneumonia. Covid 19 possible.  4.  Results discussed with Dr. Hailey Manzo on 10/31/2021 at 6:20 PM.      EXAM: US LOWER EXTREMITY VENOUS DUPLEX RIGHT  LOCATION: [facility]  DATE/TIME: 10/31/2021 4:10 PM     INDICATION: Right leg/calf pain and swelling.  COMPARISON: None.  TECHNIQUE: Venous Duplex ultrasound of the right lower extremity with and without compression, augmentation and duplex. Color flow and  spectral Doppler with waveform analysis performed.     FINDINGS: Exam includes the common femoral, femoral, popliteal, and contralateral common femoral veins as well as segmentally visualized deep calf veins and greater saphenous vein.      RIGHT: Occlusive DVT extending from posterior tibial and peroneal veins in the calf throughout the popliteal vein, distal and mid femoral vein. Occlusive superficial thrombus noted in the right greater saphenous vein in the mid calf and in lesser   saphenous vein at the popliteal junction. No popliteal cyst.                                                                      IMPRESSION:  1.  Occlusive DVT and superficial thrombus as described.     Results called to Hailey Manzo at 4:57 PM.        A/P:      (I26.99) Pulmonary embolism, unspecified chronicity, unspecified pulmonary embolism type, unspecified whether acute cor pulmonale present (H)  Comment: first lifetime event provoked by inactivity, obesity, possibly due to a contributory component form COVID vaccination , dehydration and poor nutrition due to polysubstance abuse. Check the below. Maintain Eliquis for now until able to bee cassandra in f/u after the below. Persistent RV dysfunction could be due to continued high level of ETOH intake. Await below imaging.  Plan: CT Chest Pulmonary Embolism w Contrast, D dimer        quantitative, US Venous Competency Bilateral         63 minutes total medical care on today's date including pre and post charting review of multiple images above, chart review, interview and exam of the patient, formulation of above plan , communication of said plan to the patient, who was also advised to limit if not entirely abstain form ETOH.

## 2022-07-11 NOTE — PROGRESS NOTES
"Patient is here to discuss consult.    BP (!) 141/83 (BP Location: Left arm, Patient Position: Chair, Cuff Size: Adult Regular)   Pulse 111   Ht 6' 6\" (1.981 m)   Wt 305 lb 12.8 oz (138.7 kg)   SpO2 97%   BMI 35.34 kg/m      Questions patient would like addressed today are: N/A.    Refills are needed: N/A    Has homecare services and agency name:  Gela Barroso  "

## 2022-07-12 NOTE — TELEPHONE ENCOUNTER
Follow-up to 7/11/22        Non-fasting labs    PE protocol CT chest    BLE Venous competency ultrasound    In clinic visit after 8/3/22.

## 2022-07-13 NOTE — TELEPHONE ENCOUNTER
Per Dr. Cano pt to follow up with Dr. Gonzales due history of PE, RV dysfunction and concern for CTEPH. Called pt, no answer. Left detailed VM with recommendation and advised will have scheduling call pt to set up. Left call back number for Team 1 if any questions. Order placed and message sent to scheduling.

## 2022-07-14 NOTE — TELEPHONE ENCOUNTER
Call 2 of 2. Left voicemail with instructions for patient to call back to schedule their appointment(s)    July 14, 2022 , 9:49 AM

## 2022-07-18 NOTE — TELEPHONE ENCOUNTER
Future Appointments   Date Time Provider Department Center   7/27/2022 11:00 AM [id number] [facility]   7/27/2022  1:15 PM CS LAB [facility]   7/27/2022  2:00 PM SHCT1 [facility]   8/11/2022  9:40 AM Jarvis Mariano MD [facility]   8/26/2022  8:45 AM Lisa Gonzales MD [facility]

## 2022-08-11 NOTE — PROGRESS NOTES
HPI: Sebastián Nogueira is a 26 year old morbidly obese ( Body mass index is 35.34 kg/m .) polysubstance abusing (EtOH, nitrous oxide, cocaine) male who on 10/31/2021 presented with his first lifetime VTE of a provoked DVT and submassive PE w/ evidence of right heart strain seen at that time. He received a COVID vaccination four months previously. His VTE event was provoked by his weight combined with immobility prior to his flight to [place]. His sxs began while in [place]. He went to [facility], and a LE duplex revealed no DVT so he was not ACd. Sxs persisted, so he went to the ED at [facility] on 10/3/77464. He was found to have RLE occlusive DVT extending from posterior tibial and peroneal veins in the calf throughout the popliteal vein, distal and mid femoral vein. Occlusive superficial thrombus was also noted in the right greater saphenous vein in the mid calf and in lesser saphenous vein at the popliteal junction. His PE protocol CT of the chest revealed extensive acute emboli within the distal right main pulmonary artery and extending into the segmental and subsegmental vessels of the right upper and lower lobes. Additional emboli within the proximal segmental vessels to the left upper and lower lobes. He was HD and respiratorily stable so no PA thrombectomy was undertaken, and no filter was placed. He was ACd intiially with IV UFH gtt and eventually converted to oral apixaban. He has been compliantly therapeutically ACd for 9 months and presents at Randa Alondra's request to ascertain if he can go off of AC. No recent d dimer, LE duplex done. Recent TTE revealed RVSP could not be approximated due to inadequate tricuspid regurgitation (even with contrast). A comment on that TTE report was made to refer for pulmonary artery thrombectomy. He feels generally poor due to problems with B12 deficiency. 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 He has not abstained from alcohol. He drinks a pint to fifth of vodka daily still despite having been told to stop drinking due to EtOHic CMO. His EF has recovered minimally.     PE has resorbed. D dimer is normal. Venous competency study revealed no evidence of bilateral LE DVT. He has Rt CFV, FV, PV, GSV, SSV, VV, t GSV, GV incompetence. No incompetent perforators were appreciated bilaterally.   .      Review Of Systems  Skin: negative  Eyes: negative  Ears/Nose/Throat: negative  Respiratory: No shortness of breath, dyspnea on exertion, cough, or hemoptysis  Cardiovascular: negative  Gastrointestinal: negative  Genitourinary: negative  Musculoskeletal: negative  Neurologic: negative  Psychiatric: negative  Hematologic/Lymphatic/Immunologic: negative  Endocrine: negative        PAST MEDICAL HISTORY:                  Past Medical History        Past Medical History:   Diagnosis Date     Alcohol abuse       DVT (deep venous thrombosis) (H)       Hypertriglyceridemia       Marijuana use       Pulmonary embolism (H)              PAST SURGICAL HISTORY:                  Past Surgical History         Past Surgical History:   Procedure Laterality Date     APPENDECTOMY                CURRENT MEDICATIONS:                  Current Outpatient Prescriptions          Current Outpatient Medications   Medication Sig Dispense Refill     apixaban ANTICOAGULANT (ELIQUIS) 5 MG tablet Take 1 tablet (5 mg) by mouth 2 times daily 180 tablet 3     gabapentin (NEURONTIN) 400 MG capsule Take 400 mg by mouth daily as needed (Anxiety)          multivitamin w/minerals (THERA-VIT-M) tablet Take 1 tablet by mouth daily 30 tablet 0            ALLERGIES:                        Allergies   Allergen Reactions     Naltrexone         High feel  - ill effect          SOCIAL HISTORY:                  Social History   Social History            Socioeconomic History     Marital status: Single       Spouse name: Not on file     Number of  children: Not on file     Years of education: Not on file     Highest education level: Not on file   Occupational History     Not on file   Tobacco Use     Smoking status: Never Smoker     Smokeless tobacco: Never Used     Tobacco comment: smokes maryjuana daily and e-cigarettes if someone else has one but not regularly   Substance and Sexual Activity     Alcohol use: Yes       Comment: 1 pint and three beers a day     Drug use: Yes       Types: Nitrous oxide, Marijuana       Comment: has not had any since 2021     Sexual activity: Not Currently   Other Topics Concern     Parent/sibling w/ CABG, MI or angioplasty before 65F 55M? Not Asked   Social History Narrative     Not on file      Social Determinants of Health      Financial Resource Strain: Not on file   Food Insecurity: Not on file   Transportation Needs: Not on file   Physical Activity: Not on file   Stress: Not on file   Social Connections: Not on file   Intimate Partner Violence: Not on file   Housing Stability: Not on file            FAMILY HISTORY:                   Family History         Family History   Problem Relation Age of Onset     Dementia Paternal Grandfather                    Physical exam Reveals:     O/P: WNL  HEENT: WNL  NECK: No JVD, thyromegaly, or lymphadenopathy  HEART: RR, tachycardic, no murmurs, gallops, or rubs  LUNGS: CTA bilaterally without rales, wheezes, or rhonchi  GI: NABS, nondistended, nontender, soft  EXT:without cyanosis, clubbing, trace edema; gaiter area discoloration RLE, no obvious varicosities. Continued hair growth on shins.  NEURO: nonfocal  : no flank tenderness        Component      Latest Ref Rng & Units 2022   D-Dimer Quantitative      0.00 - 0.50 ug/mL FEU <0.27     Name:  Sebastián Nogueira                                             Patient ID: [id number]  Date: 2022                                                     : 1995  Sex:  male                                                                    Examined by: FLACO Naidu RVT  Age:  26 year old                                                         Reading MD: BRANDO Arredondo     INDICATION:  Bilateral lower extremity swelling with a history of DVT.      EXAM TYPE  BILATERAL LOWER EXTREMITY VENOUS DUPLEX FOR VENOUS INSUFFICIENCY  TECHNICAL SUMMARY     A duplex ultrasound study using color flow was performed, to evaluate the bilateral lower extremity veins for valvular incompetence with the patient in a steep reversed trendelenberg.      RIGHT:     The deep veins demonstrate phasic flow, compress and respond to augmentations.  There is no DVT.  The common femoral vein, proximal femoral vein, and popliteal veins are incompetent and free of thrombus. The remaining deep veins are competent and free of thrombus.      The GSV demonstrates phasic flow, compresses and responds to augmentations from the saphenofemoral junction to the ankle with no evidence of thrombus. The great saphenous vein measures 7.4 mm at the saphenofemoral junction, 6.8 mm at the proximal thigh and 7.2 mm at the knee. The GSV is incompetent from SFJ to Proximal Thigh, with the greatest reflux time of 1501 milliseconds.  The GSV gives rise to multiple incompetent varicose veins, the largest measures 4.8 mm off the Knee that courses Posteromedial with a reflux time of 2186 milliseconds.      The AASV is not visualized.      The Giacomini vein is absent.     The SSV demonstrates phasic flow, compresses and responds to augmentations from the popliteal space to the ankle.  No thrombus is seen. The saphenopopliteal junction is absent. The SSV is incompetent at the Proximal Calf and the Distal Calf with a reflux time of 814 milliseconds.  The SSV gives rise to a varicose branch measuring 4.8 mm off the Mid Calf that courses Anteromedial with a reflux time of 2186 milliseconds and appears to connect with the GSV.        Perforators: there is no evidence of incompetent  veins at any level.      LEFT:     The deep veins demonstrate phasic flow, compress and respond to augmentations.  There is no  DVT.  The common femoral vein is incompetent and free of thrombus. The remaining deep veins are competent and free of thrombus.      The GSV demonstrates phasic flow, compresses and responds to augmentations from the saphenofemoral junction to the ankle with no evidence of reflux or thrombus. The great saphenous vein measures 7.8 mm at the saphenofemoral junction, 5.9 mm at the proximal thigh and 6.0 mm at the knee. The GSV is incompetent from SFJ to Proximal Calf, with the greatest reflux time of 6104 milliseconds.  The GSV gives rise to a varicose branch measuring 3.9 mm off the  Proximal Calf that courses Medial with a reflux time of 3052 milliseconds.        The AASV is not visualized.     The Giacomini vein is incompetent ( 1.9 mm) communicating with the small saphenous vein at the knee level. The Giacomini vein is incompetent at the distal thigh with a reflux time of 1666 milliseconds and dives deep in the mid thigh.     The SSV demonstrates phasic flow, compresses and responds to augmentations from the popliteal space to the ankle.  No reflux or thrombus is seen. The saphenopopliteal junction is competent ( 4.4 mm).      Perforators: there is no evidence of incompetent  veins at any level.         FINAL SUMMARY:  1.  No evidence of bilateral lower extremity deep vein thrombus.  2.  Right common femoral, proximal femoral and popliteal vein incompetence  3.  Left common femoral vein incompetence  4.  Right saphenofemoral junction and proximal great saphenous vein incompetence  5.  Right small saphenous vein incompetence  6.  Incompetent right great saphenous and small saphenous vein varicosities  7.  Significant incompetent left great saphenous vein with 3.9 mm diameter incompetent vein branch coursing from the  calf segment  8.  Left Vein of Giacomini incompetence  9.  No incompetent perforators appreciated     C Anna Arredondo MD, FACS  .             .           The time of incompetence is greater than 500 milliseconds in superficial and  veins and greater than 1000 milliseconds in deep veins.           CT CHEST PULMONARY EMBOLISM WITH CONTRAST 2022 2:13 PM     CLINICAL HISTORY: Evaluate for absence of previously noted pulmonary  embolus. Pulmonary embolism, unspecified chronicity, unspecified  pulmonary embolism type, unspecified whether acute cor pulmonale  present (H).     TECHNIQUE: CT angiogram chest during arterial phase injection IV  contrast. 2D and 3D MIP reconstructions were performed by the CT  technologist. Dose reduction techniques were used.   CONTRAST: 83ML isovue 370.     COMPARISON: 2021.     FINDINGS:  ANGIOGRAM CHEST: Pulmonary arteries are normal caliber and negative  for pulmonary emboli. Thoracic aorta is negative for dissection. No CT  evidence of right heart strain.     LUNGS AND PLEURA: The lungs are clear. No pleural effusion.     MEDIASTINUM/AXILLAE: No lymphadenopathy. No coronary artery  calcification.     UPPER ABDOMEN: Negative.     MUSCULOSKELETAL: Normal.                                                                      IMPRESSION: Normal examination. Previously seen pulmonary emboli have  resolved.     TRENTON MCNALLY MD      [facility]  [facility]  [address]  [address]  [place] [address]  Phone [phone]  Fax [phone]     Name: STACEY CHAMPION  MRN: [id number]  : 1995  Study Date: 2022 09:05 AM  Age: 26 yrs  Gender: Male  Patient Location: [facility]  Reason For Study: LV dysfunction  Ordering Physician: ISRAEL STARKS  Referring Physician: ISRAEL STARKS  Performed By: Radha Ca RDCS     BSA: 2.7 m2  Height: 78 in  Weight: 301 lb  HR: 76  BP:  136/98 mmHg  ______________________________________________________________________________  Procedure  Limited Echo Adult. Optison (NDC #[id number]) given intravenously.     ______________________________________________________________________________  Interpretation Summary     The left ventricle is normal in size.  Left ventricular systolic function is borderline reduced.  The visual ejection fraction is 50-55%.  The right ventricle is moderately dilated.  Mildly decreased right ventricular systolic function  Right ventricular systolic pressure could not be approximated due to  inadequate tricuspid regurgitation (even with contrast).     Likely no change from previous echo 21. Consider referral to Dr. Gonzales  for consideration of pulmonary artery thrombectomy.  ______________________________________________________________________________  Left Ventricle  The left ventricle is normal in size. Left ventricular systolic function is  borderline reduced. The visual ejection fraction is 50-55%.     Right Ventricle  The right ventricle is moderately dilated. Mildly decreased right ventricular  systolic function.     Tricuspid Valve  No tricuspid regurgitation. Right ventricular systolic pressure could not be  approximated due to inadequate tricuspid regurgitation.  ______________________________________________________________________________  MMode/2D Measurements & Calculations  LVIDd: 5.2 cm  LVIDs: 4.0 cm  LVPWd: 0.98 cm  FS: 22.9 %  RWT: 0.38     Doppler Measurements & Calculations  TR max montrell: 191.0 cm/sec  TR max P.6 mmHg     ______________________________________________________________________________  Report approved by: Maryjo Merida 2022 11:20 AM                       EXAM: CT ABDOMEN PELVIS W CONTRAST  LOCATION: [facility]  DATE/TIME: 12/10/2021 6:41 PM     INDICATION: Abdominal distension history of PE  COMPARISON: Chest CT  11/20/2021.  TECHNIQUE: CT scan of the abdomen and pelvis was performed following injection of IV contrast. Multiplanar reformats were obtained. Dose reduction techniques were used.  CONTRAST: 135mL of isovue 370     FINDINGS:   LOWER CHEST: Small amount of residual pulmonary embolism in the right lower lobe, significantly decreased.     HEPATOBILIARY: Normal.     PANCREAS: Normal.     SPLEEN: Normal.     ADRENAL GLANDS: Normal.     KIDNEYS/BLADDER: Normal.     BOWEL: No free air, free fluid, or inflammatory change. No bowel wall thickening or abnormal enhancement. No evidence for obstruction. Appendix not identified.     LYMPH NODES: Normal.     VASCULATURE: No evidence for pelvic venous thrombosis or IVC thrombus.     PELVIC ORGANS: Normal.     MUSCULOSKELETAL: Normal.                                                                      IMPRESSION:   1.  No acute abnormality in the abdomen or pelvis. No evidence for deep pelvic vein or IVC thrombus.  2.  Mild residual pulmonary embolism in the right lower lobe, significantly decreased compared to 11/20/2021 exam.        EXAM: US LOWER EXTREMITY VENOUS DUPLEX BILATERAL  LOCATION: [facility]  DATE/TIME: 12/10/2021 6:24 PM     INDICATION: worsening right leg swelling, left leg swelling  COMPARISON: 10/31/2021  TECHNIQUE: Venous Duplex ultrasound of bilateral lower extremities with and without compression, augmentation and duplex. Color flow and spectral Doppler with waveform analysis performed.     FINDINGS: Exam includes the common femoral, femoral, popliteal veins as well as segmentally visualized deep calf veins and greater saphenous vein.      RIGHT: Deep venous thrombosis is present within the distal femoral vein, popliteal vein, posterior tibial veins, as well as the peroneal veins. Thrombus is also present within the lesser saphenous veins of the mid and distal calf. No popliteal cyst.     LEFT: No deep vein  thrombosis. No superficial thrombophlebitis. No popliteal cyst.                                                                      IMPRESSION:  1.  Chronic or recurrent deep venous thrombosis within the right lower extremity from lower thigh to lower calf.           [facility]  [facility]  [address]  [place] [address]  Phone [phone]  Fax [phone]     Name: STACEY CHAMPION  MRN: [id number]  : 1995  Study Date: 2021 09:05 AM  Age: 26 yrs  Gender: Male  Patient Location: [facility]  Reason For Study: Multiple subsegmental pulmonary emboli without acute cor  pulmona  Ordering Physician: ISRAEL STARKS  Referring Physician: ISRAEL STARKS  Performed By: Radha Ca RDCS     BSA: 2.7 m2  Height: 78 in  Weight: 316 lb  HR: 79  BP: 151/83 mmHg  ______________________________________________________________________________  Procedure  Limited Echo Adult. Optison (NDC #[id number]) given intravenously.     ______________________________________________________________________________  Interpretation Summary     Left ventricular systolic function is mildly reduced. The visual ejection  fraction is estimated at 50%. Mild global hypokinesis of the left ventricle.  Right ventricle is mild to moderately dilated. The right ventricular systolic  function is mildly reduced.     This study was compared to a TTE from 2021. Global LV function has mildly  improved, global RV function is stable.  ______________________________________________________________________________  Left Ventricle  Left ventricular systolic function is mildly reduced. The visual ejection  fraction is estimated at 50%. There is mild global hypokinesia of the left  ventricle.     Right Ventricle  The right ventricle is mild to moderately dilated. The right ventricular  systolic function is mildly reduced.     Mitral Valve  The mitral valve  is normal in structure and function.     Tricuspid Valve  The tricuspid valve is not well visualized, but is grossly normal. Right  ventricular systolic pressure could not be approximated due to inadequate  tricuspid regurgitation.     Vessels  The inferior vena cava was normal in size with preserved respiratory  variability.     ______________________________________________________________________________  MMode/2D Measurements & Calculations  IVSd: 1.1 cm  LVIDd: 5.6 cm  LVIDs: 4.2 cm  LVPWd: 0.99 cm  FS: 24.1 %  LV mass(C)d: 225.0 grams  LV mass(C)dI: 82.1 grams/m2     RWT: 0.35     ______________________________________________________________________________  Report approved by: Maryjo Luis 11/30/2021 10:26 AM                 EXAM: CT CHEST WITH CONTRAST - PULMONARY EMBOLISM PROTOCOL   LOCATION: [facility]  DATE/TIME: 11/20/2021 1:54 AM     INDICATION: Chest pain. Recent diagnosis of pulmonary embolus.  COMPARISON: 10/31/2021.     TECHNIQUE: CT angiogram chest during pulmonary arterial phase injection IV contrast. Dose reduction techniques were used.   CONTRAST: 77 mL Isovue-370     FINDINGS:     ANGIOGRAM CHEST: Eccentric filling defects within the right main pulmonary artery and extending into the adjacent lobar arteries and a few segmental and subsegmental pulmonary arteries. No filling defects in the left pulmonary arteries. The thoracic   aorta is normal in caliber without dissection.     LUNGS AND PLEURA: A very small patchy opacity in the posteromedial aspect of the right lung base (series 6 image 224), slightly decreased in prominence since the prior study. The lungs are otherwise clear.     MEDIASTINUM/AXILLAE: Unremarkable.     CORONARY ARTERY CALCIFICATION: Absent.     MUSCULOSKELETAL: No acute findings.     UPPER ABDOMEN: Unremarkable.                                                                      IMPRESSION:   1.  Pulmonary emboli within the  right main pulmonary artery and extending into the adjacent lobar arteries and a few segmental and subsegmental pulmonary arteries. These were also present on the comparison study dated 10/31/2021 and have mildly decreased   in extent since that study. A few pulmonary emboli that were within the left lung on the comparison study are no longer visualized. No convincing new pulmonary embolus.  2.  A small patchy opacity in the right lung base, slightly decreased since the comparison study. This likely represents interval maturation of a small pulmonary infarct.  3.  No other findings suspicious for active pulmonary disease.          [facility]  Echocardiography Laboratory  [address] [place] [address]     Name: STACEY CHAMPION  MRN: [id number]  : 1995  Study Date: 2021 07:56 AM  Age: 26 yrs  Gender: Male  Patient Location: [facility]  Reason For Study: Pulmonary Emboli  Ordering Physician: NIKKY CARREON  Referring Physician: Ton López  Performed By: Mariana Diana     BSA: 2.6 m2  Height: 78 in  Weight: 288 lb  HR: 92  BP: 108/86 mmHg  ______________________________________________________________________________  Procedure  Complete Portable Echo Adult. Optison (NDC #[id number]) given intravenously.  ______________________________________________________________________________  Interpretation Summary     Left ventricular systolic function is mildly reduced.  The visual ejection fraction is 45-50%. Traced EF 45%.  There is mild global hypokinesia of the left ventricle.  Mildly decreased right ventricular systolic function  The right ventricle is mildly dilated.  No significant valve dysfunction.  Unable to estimate PA systolic pressure.  The inferior vena cava was normal in size with preserved respiratory  variability.  There is no pericardial effusion.     No prior for comparison.  ______________________________________________________________________________  Left  Ventricle  The left ventricle is normal in size. There is mild concentric left  ventricular hypertrophy. The visual ejection fraction is 45-50%. Left  ventricular systolic function is mildly reduced. Left ventricular diastolic  function is normal. Biplane LVEF is 45%. There is mild global hypokinesia of  the left ventricle.     Right Ventricle  The right ventricle is mildly dilated. Mildly decreased right ventricular  systolic function.     Atria  Normal left atrial size. Right atrial size is normal.     Mitral Valve  There is mild mitral annular calcification.     Tricuspid Valve  The tricuspid valve is not well visualized, but is grossly normal. Right  ventricular systolic pressure could not be approximated due to inadequate  tricuspid regurgitation. There is mild (1+) tricuspid regurgitation.     Aortic Valve  The aortic valve is normal in structure and function.     Pulmonic Valve  The pulmonic valve is normal in structure and function. There is mild (1+)  pulmonic valvular regurgitation.     Vessels  The aortic root is normal size. Normal size ascending aorta. The inferior vena  cava was normal in size with preserved respiratory variability.     Pericardium  There is no pericardial effusion.     ______________________________________________________________________________  MMode/2D Measurements & Calculations  IVSd: 1.0 cm  LVIDd: 5.4 cm  LVIDs: 4.2 cm  LVPWd: 1.0 cm  FS: 21.9 %  LV mass(C)d: 208.6 grams  LV mass(C)dI: 79.1 grams/m2     Ao root diam: 3.7 cm  LA dimension: 3.6 cm  asc Aorta Diam: 3.2 cm  LA/Ao: 0.98  LA Volume (BP): 71.5 ml  LA Volume Index (BP): 27.1 ml/m2  RWT: 0.38  TAPSE: 1.7 cm     Doppler Measurements & Calculations  MV E max montrell: 59.5 cm/sec  MV A max montrell: 46.0 cm/sec  MV E/A: 1.3     MV dec time: 0.19 sec  E/E' av.5  Lateral E/e': 4.3  Medial E/e': 6.8     ______________________________________________________________________________  Report approved by: Maryjo Freeman  11/01/2021 10:28 AM                    EXAM: CT CHEST PULMONARY EMBOLISM W CONTRAST  LOCATION: [facility]  DATE/TIME: 10/31/2021 5:18 PM     INDICATION: PE suspected, high prob. Leg swelling.  COMPARISON: None.  TECHNIQUE: CT chest pulmonary angiogram during arterial phase injection of IV contrast. Multiplanar reformats and MIP reconstructions were performed. Dose reduction techniques were used.   CONTRAST: 77 ml Isovue 370     FINDINGS:  ANGIOGRAM CHEST: Extensive acute emboli within the distal right main pulmonary artery and extending into the segmental and subsegmental vessels of the right upper and lower lobes. Additional emboli within the proximal segmental vessels to the left upper   and lower lobes. The right ventricle/left ventricle ratio is slightly greater than 1.0 suggesting right heart strain. Thoracic aorta is negative for dissection.     LUNGS AND PLEURA: Groundglass opacities within the right upper lobe and right middle lobe. Airspace/groundglass opacity within the right lower lobe posteriorly     MEDIASTINUM/AXILLAE: Prominent mediastinal fat with small scattered lymph nodes.     CORONARY ARTERY CALCIFICATION: None.     UPPER ABDOMEN: Normal.     MUSCULOSKELETAL: Normal.                                                                      IMPRESSION:  1.  Extensive, acute bilateral pulmonary emboli with right heart strain. IR consultation for possible embolectomy recommended.  2.  Airspace opacity right base posteriorly could represent a pulmonary infarct versus pneumonia.  3.  Groundglass opacity right upper lobe concerning for pneumonia. Covid 19 possible.  4.  Results discussed with Dr. Hailey Manzo on 10/31/2021 at 6:20 PM.        EXAM: US LOWER EXTREMITY VENOUS DUPLEX RIGHT  LOCATION: [facility]  DATE/TIME: 10/31/2021 4:10 PM     INDICATION: Right leg/calf pain and swelling.  COMPARISON:  None.  TECHNIQUE: Venous Duplex ultrasound of the right lower extremity with and without compression, augmentation and duplex. Color flow and spectral Doppler with waveform analysis performed.     FINDINGS: Exam includes the common femoral, femoral, popliteal, and contralateral common femoral veins as well as segmentally visualized deep calf veins and greater saphenous vein.      RIGHT: Occlusive DVT extending from posterior tibial and peroneal veins in the calf throughout the popliteal vein, distal and mid femoral vein. Occlusive superficial thrombus noted in the right greater saphenous vein in the mid calf and in lesser   saphenous vein at the popliteal junction. No popliteal cyst.                                                                      IMPRESSION:  1.  Occlusive DVT and superficial thrombus as described.     Results called to Hailey Manzo at 4:57 PM.           A/P:        (I26.99) Pulmonary embolism, unspecified chronicity, unspecified pulmonary embolism type, unspecified whether acute cor pulmonale present (H)  Comment: First lifetime event provoked by inactivity, obesity, possibly due to a contributory component form COVID vaccination , dehydration and poor nutrition due to polysubstance abuse. PE has resorbed. D dimer is normal. Venous competency study revealed no evidence of bilateral LE DVT. He has Rt CFV, FV, PV, GSV, SSV, VV, t GSV, GV incompetence. No incompetent perforators were appreciated bilaterally. . Maintain Eliquis for now until able to bee cassandra in f/u after the below. Persistent RV dysfunction could be due to continued high level of ETOH intake. Await below imaging.  Plan: Thigh high compression hosiery. We discussed cessation of Eliquis. Given the above findings, he could go off of Eliquis presently. He wnts to stop it. Recheck d dimer one month from now off of Eliquis, See Diana Castro to consider remaining off of AC based upon those results.            33 minutes medical care on  today's date.

## 2022-08-11 NOTE — PROGRESS NOTES
"Patient is here to discuss follow up.    /86 (BP Location: Right arm, Patient Position: Chair, Cuff Size: Adult Large)   Pulse 102   Ht 6' 6\" (1.981 m)   Wt 302 lb (137 kg)   SpO2 96%   BMI 34.90 kg/m      Questions patient would like addressed today are: N/A.    Refills are needed: No    Has homecare services and agency name:  Gela Barroso  "

## 2022-09-13 NOTE — PROGRESS NOTES
"Patient is here to discuss follow up.    /85 (BP Location: Right arm, Patient Position: Chair, Cuff Size: Adult Large)   Pulse 99   Ht 6' 6\" (1.981 m)   Wt 306 lb 9.6 oz (139.1 kg)   SpO2 97%   BMI 35.43 kg/m      Questions patient would like addressed today are: N/A.    Refills are needed: N/A    Has homecare services and agency name:  Gela Barroso  "

## 2022-09-13 NOTE — PATIENT INSTRUCTIONS
Get labs drawn (D.dimer).     2.  Get a repeat echocardiogram.     3.  Follow-up with Dr. Mariano when all of this is completed.     4.  Get and wear compression socks.     5.  Cut back on alcohol.    6.  Start aspirin 81 mg daily.     7.  Call us with any questions or concerns ([phone]).

## 2022-09-13 NOTE — PROGRESS NOTES
[facility]  VASCULAR MEDICINE FOLLOW-UP VISIT      PRIMARY HEALTH CARE PROVIDER:  Galo Vizcarra    REASON FOR VISIT:  Follow-up DVT/PE      HPI: Sebastián Nogueira is a very pleasant 26 year old morbidly obese (BMI is 34.9 kg/m .) polysubstance abusing (EtOH, nitrous oxide, cocaine) male who on 10/31/2021 presented with his first lifetime VTE of a provoked DVT and submassive PE w/ evidence of right heart strain seen at that time. He received a COVID vaccination four months previously. His VTE event was provoked by his weight combined with immobility prior to his flight to [place]. His sxs began while in [place]. He went to [facility], and a LE duplex revealed no DVT so he was not anticoagulated. Sxs persisted, so he went to the ED at [facility] on 10/31/2021. He was found to have RLE occlusive DVT extending from posterior tibial and peroneal veins in the calf throughout the popliteal vein, distal and mid femoral vein. Occlusive superficial thrombus was also noted in the right greater saphenous vein in the mid calf and in lesser saphenous vein at the popliteal junction. His PE protocol CT of the chest revealed extensive acute emboli within the distal right main pulmonary artery and extending into the segmental and subsegmental vessels of the right upper and lower lobes. Additional emboli within the proximal segmental vessels to the left upper and lower lobes. He was HD and respiratorily stable so no PA thrombectomy was undertaken, and no filter was placed despite having right heart strain on CT. He was ACd initially with IV UFH gtt and eventually converted to oral apixaban.     He had been compliantly therapeutically anticoagulated for 9 months. D.dimer on 7/27/22 was normal. TTE in 6/2022 revealed RVSP could not be approximated due to inadequate tricuspid regurgitation (even with contrast). A comment on that TTE report was made to refer for pulmonary artery thrombectomy. He feels generally poor due  to problems with B12 deficiency. 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 He has not abstained from alcohol. He still drinks a pint to fifth of vodka daily still despite having been told to stop drinking due to EtOHic CMO. His EF has recovered minimally.      PE has resorbed. D dimer is normal. Venous competency study revealed no evidence of bilateral LE DVT. He has Rt CFV, FV, PV, GSV, SSV, VV, t GSV, GV incompetence. No incompetent perforators were appreciated bilaterally. Eliquis was stopped on 8/11/22. He continues to feel well. No new signs/symptoms of DVT/PE. Still drinking a significant amount. Denies any recent drug use the past 9 months. He was supposed to get a repeat d.dimer lab drawn yesterday and follow-up today to review results. However, he got confused and has not had his blood drawn yet.       PAST MEDICAL HISTORY  Past Medical History:   Diagnosis Date     Alcohol abuse      DVT (deep venous thrombosis) (H)      Hypertriglyceridemia      Marijuana use      Pulmonary embolism (H)        PAST SURGICAL HISTORY:  Past Surgical History:   Procedure Laterality Date     APPENDECTOMY           CURRENT MEDICATIONS  gabapentin (NEURONTIN) 400 MG capsule, Take 400 mg by mouth daily as needed (Anxiety)   multivitamin w/minerals (THERA-VIT-M) tablet, Take 1 tablet by mouth daily    No current facility-administered medications on file prior to visit.      ALLERGIES     Allergies   Allergen Reactions     Naltrexone      High feel  - ill effect        FAMILY HISTORY  Family History   Problem Relation Age of Onset     Dementia Paternal Grandfather        SOCIAL HISTORY  Social History     Socioeconomic History     Marital status: Single     Spouse name: Not on file     Number of children: Not on file     Years of education: Not on file     Highest education level: Not on file   Occupational History     Not on file   Tobacco Use      "Smoking status: Never Smoker     Smokeless tobacco: Never Used     Tobacco comment: smokes maryjuana daily and e-cigarettes if someone else has one but not regularly   Substance and Sexual Activity     Alcohol use: Yes     Comment: 1 pint and three beers a day     Drug use: Yes     Types: Nitrous oxide, Marijuana     Comment: has not had any since 12/24/2021       ROS:   Complete ROS negative other than what is stated in HPI.     EXAM:  /85 (BP Location: Right arm, Patient Position: Chair, Cuff Size: Adult Large)   Pulse 99   Ht 1.981 m (6' 6\")   Wt 139.1 kg (306 lb 9.6 oz)   SpO2 97%   BMI 35.43 kg/m    In general, the patient is a pleasant male in no apparent distress.    HEENT: NC/AT.  PERRLA.  EOMI.  Sclerae white, not injected.    Heart: RRR. Normal S1, S2 splits physiologically. No murmur, rub, click, or gallop.   Lungs: CTA.  No ronchi, wheezes, rales.    Abdomen: Soft, nontender, nondistended.   Extremities: No clubbing, cyanosis. Mild swelling RLE. Some chronic venous stasis changes noted. No wounds.     Labs:  LIPID RESULTS:  Lab Results   Component Value Date    CHOL 180 09/05/2021    HDL 68 09/05/2021    LDL  09/05/2021      Comment:      Cannot estimate LDL when triglyceride exceeds 400 mg/dL  Age 0-19 years:  Desirable: 0-110 mg/dL   Borderline high: 110-129 mg/dL   High: >= 130 mg/dL    Age 20 years and older:  Desirable: <100mg/dL  Above desirable: 100-129 mg/dL   Borderline high: 130-159 mg/dL   High: 160-189 mg/dL   Very high: >= 190 mg/dL    TRIG 588 (H) 09/05/2021       LIVER ENZYME RESULTS:  Lab Results   Component Value Date    AST 27 05/03/2022    AST 32 04/26/2021    ALT 48 05/03/2022    ALT 38 04/26/2021       CBC RESULTS:  Lab Results   Component Value Date    WBC 5.9 05/03/2022    WBC 4.7 04/26/2021    RBC 4.72 05/03/2022    RBC 4.69 04/26/2021    HGB 15.3 05/03/2022    HGB 14.8 04/26/2021    HCT 44.0 05/03/2022    HCT 42.5 04/26/2021    MCV 93 05/03/2022    MCV 91 04/26/2021    " MCH 32.4 05/03/2022    MCH 31.6 04/26/2021    MCHC 34.8 05/03/2022    MCHC 34.8 04/26/2021    RDW 13.4 05/03/2022    RDW 12.8 04/26/2021     05/03/2022     04/26/2021       BMP RESULTS:  Lab Results   Component Value Date     05/03/2022     04/26/2021    POTASSIUM 3.8 05/03/2022    POTASSIUM 3.4 04/26/2021    CHLORIDE 105 05/03/2022    CHLORIDE 101 04/26/2021    CO2 22 05/03/2022    CO2 27 04/26/2021    ANIONGAP 12 05/03/2022    ANIONGAP 12 04/26/2021     (H) 05/03/2022     (H) 04/26/2021    BUN 8 05/03/2022    BUN 7 04/26/2021    CR 0.80 05/03/2022    CR 0.77 04/26/2021    GFRESTIMATED >90 05/03/2022    GFRESTIMATED >90 04/26/2021    GFRESTBLACK >90 04/26/2021    MARILYN 9.7 05/03/2022    MARILYN 8.7 04/26/2021        A1C RESULTS:  Lab Results   Component Value Date    A1C 5.4 05/03/2022       THYROID RESULTS:  Lab Results   Component Value Date    TSH 3.58 12/16/2021    TSH 0.55 04/26/2021         Procedures:   Procedure  Limited Echo Adult. Optison ([facility]C #[id number]) given intravenously 6/24/22     ______________________________________________________________________________  Interpretation Summary     The left ventricle is normal in size.  Left ventricular systolic function is borderline reduced.  The visual ejection fraction is 50-55%.  The right ventricle is moderately dilated.  Mildly decreased right ventricular systolic function  Right ventricular systolic pressure could not be approximated due to  inadequate tricuspid regurgitation (even with contrast).     Likely no change from previous echo 11/30/21. Consider referral to Dr. Gonzales  for consideration of pulmonary artery thrombectomy.  ______________________________________________________________________________  Left Ventricle  The left ventricle is normal in size. Left ventricular systolic function is  borderline reduced. The visual ejection fraction is 50-55%.     Right Ventricle  The right ventricle is moderately  dilated. Mildly decreased right ventricular  systolic function.     Tricuspid Valve  No tricuspid regurgitation. Right ventricular systolic pressure could not be  approximated due to inadequate tricuspid regurgitation.      CT CHEST PULMONARY EMBOLISM WITH CONTRAST July 27, 2022 2:13 PM     CLINICAL HISTORY: Evaluate for absence of previously noted pulmonary  embolus. Pulmonary embolism, unspecified chronicity, unspecified  pulmonary embolism type, unspecified whether acute cor pulmonale  present (H).     TECHNIQUE: CT angiogram chest during arterial phase injection IV  contrast. 2D and 3D MIP reconstructions were performed by the CT  technologist. Dose reduction techniques were used.   CONTRAST: 83ML isovue 370.     COMPARISON: 11/20/2021.     FINDINGS:  ANGIOGRAM CHEST: Pulmonary arteries are normal caliber and negative  for pulmonary emboli. Thoracic aorta is negative for dissection. No CT  evidence of right heart strain.     LUNGS AND PLEURA: The lungs are clear. No pleural effusion.     MEDIASTINUM/AXILLAE: No lymphadenopathy. No coronary artery  calcification.     UPPER ABDOMEN: Negative.     MUSCULOSKELETAL: Normal.                                                                      IMPRESSION: Normal examination. Previously seen pulmonary emboli have  Resolved.        EXAM TYPE  BILATERAL LOWER EXTREMITY VENOUS DUPLEX FOR VENOUS INSUFFICIENCY  TECHNICAL SUMMARY 7/27/22     A duplex ultrasound study using color flow was performed, to evaluate the bilateral lower extremity veins for valvular incompetence with the patient in a steep reversed trendelenberg.      RIGHT:     The deep veins demonstrate phasic flow, compress and respond to augmentations.  There is no DVT.  The common femoral vein, proximal femoral vein, and popliteal veins are incompetent and free of thrombus. The remaining deep veins are competent and free of thrombus.      The GSV demonstrates phasic flow, compresses and responds to augmentations  from the saphenofemoral junction to the ankle with no evidence of thrombus. The great saphenous vein measures 7.4 mm at the saphenofemoral junction, 6.8 mm at the proximal thigh and 7.2 mm at the knee. The GSV is incompetent from SFJ to Proximal Thigh, with the greatest reflux time of 1501 milliseconds.  The GSV gives rise to multiple incompetent varicose veins, the largest measures 4.8 mm off the Knee that courses Posteromedial with a reflux time of 2186 milliseconds.      The AASV is not visualized.      The Giacomini vein is absent.     The SSV demonstrates phasic flow, compresses and responds to augmentations from the popliteal space to the ankle.  No thrombus is seen. The saphenopopliteal junction is absent. The SSV is incompetent at the Proximal Calf and the Distal Calf with a reflux time of 814 milliseconds.  The SSV gives rise to a varicose branch measuring 4.8 mm off the Mid Calf that courses Anteromedial with a reflux time of 2186 milliseconds and appears to connect with the GSV.       Perforators: there is no evidence of incompetent  veins at any level.      LEFT:     The deep veins demonstrate phasic flow, compress and respond to augmentations.  There is no  DVT.  The common femoral vein is incompetent and free of thrombus. The remaining deep veins are competent and free of thrombus.      The GSV demonstrates phasic flow, compresses and responds to augmentations from the saphenofemoral junction to the ankle with no evidence of reflux or thrombus. The great saphenous vein measures 7.8 mm at the saphenofemoral junction, 5.9 mm at the proximal thigh and 6.0 mm at the knee. The GSV is incompetent from SFJ to Proximal Calf, with the greatest reflux time of 6104 milliseconds.  The GSV gives rise to a varicose branch measuring 3.9 mm off the  Proximal Calf that courses Medial with a reflux time of 3052 milliseconds.        The AASV is not visualized.     The Giacomini vein is incompetent ( 1.9 mm)  communicating with the small saphenous vein at the knee level. The Giacomini vein is incompetent at the distal thigh with a reflux time of 1666 milliseconds and dives deep in the mid thigh.     The SSV demonstrates phasic flow, compresses and responds to augmentations from the popliteal space to the ankle.  No reflux or thrombus is seen. The saphenopopliteal junction is competent ( 4.4 mm).      Perforators: there is no evidence of incompetent  veins at any level.         FINAL SUMMARY:  1.  No evidence of bilateral lower extremity deep vein thrombus.  2.  Right common femoral, proximal femoral and popliteal vein incompetence  3.  Left common femoral vein incompetence  4.  Right saphenofemoral junction and proximal great saphenous vein incompetence  5.  Right small saphenous vein incompetence  6.  Incompetent right great saphenous and small saphenous vein varicosities  7.  Significant incompetent left great saphenous vein with 3.9 mm diameter incompetent vein branch coursing from the calf segment  8.  Left Vein of Giacomini incompetence  9.  No incompetent perforators appreciated      Assessment and Plan:   Pulmonary embolism and RLE DVT 10/31/21    Comment:   -First lifetime event provoked by inactivity, obesity, possibly due to a contributory component from COVID vaccination , dehydration and poor nutrition due to polysubstance abuse.   -PE has resorbed.   -D dimer was normal. Venous competency study revealed no evidence of bilateral LE DVT. He has Rt CFV, FV, PV, GSV, SSV, VV, t GSV, GV incompetence. No incompetent perforators were appreciated bilaterally.   -Persistent RV dysfunction could be due to high level of ETOH intake.  -Eliquis was stopped one month ago. No new signs/symptoms of DVT/PE.     Recommendations:   -Start aspirin 81 mg daily.   -Get thigh-high compression socks and wear them during the day- he has not yet worn these.   -Get a d.dimer drawn - he was supposed to have done this but got  confused on dates. This will be scheduled for him.   -Repeat an echocardiogram of his heart.   -Advised that he stop drinking alcohol and abstain from drugs.   -Follow-up with Dr. Mariano after echocardiogram is completed and d.dimer is drawn.             Diana Castro PA-C      35 minutes spent on the date of the encounter doing chart review, history and exam, documentation, and further activities as noted above.

## 2022-09-26 NOTE — PROGRESS NOTES
[facility]  VASCULAR MEDICINE FOLLOW-UP VISIT        PRIMARY HEALTH CARE PROVIDER:  Galo Vizcarra     REASON FOR VISIT:  Follow-up DVT/PE        HPI: Sebastián Nogueira is a very pleasant 26 year old morbidly obese (BMI is 34.9 kg/m .) polysubstance abusing (EtOH, nitrous oxide, cocaine) male who on 10/31/2021 presented with his first lifetime VTE of a provoked DVT and submassive PE w/ evidence of right heart strain seen at that time. He received a COVID vaccination four months previously. His VTE event was provoked by his weight combined with immobility prior to his flight to [place]. His sxs began while in [place]. He went to [facility], and a LE duplex revealed no DVT so he was not anticoagulated. Sxs persisted, so he went to the ED at [facility] on 10/31/2021. He was found to have RLE occlusive DVT extending from posterior tibial and peroneal veins in the calf throughout the popliteal vein, distal and mid femoral vein. Occlusive superficial thrombus was also noted in the right greater saphenous vein in the mid calf and in lesser saphenous vein at the popliteal junction. His PE protocol CT of the chest revealed extensive acute emboli within the distal right main pulmonary artery and extending into the segmental and subsegmental vessels of the right upper and lower lobes. Additional emboli within the proximal segmental vessels to the left upper and lower lobes. He was HD and respiratorily stable so no PA thrombectomy was undertaken, and no filter was placed despite having right heart strain on CT. He was ACd initially with IV UFH gtt and eventually converted to oral apixaban.      He had been compliantly therapeutically anticoagulated for 9 months. D.dimer on 7/27/22 was normal. TTE in 6/2022 revealed RVSP could not be approximated due to inadequate tricuspid regurgitation (even with contrast). A comment on that TTE report was made to refer for pulmonary artery thrombectomy. He feels  generally poor due to problems with B12 deficiency. 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 He has not abstained from alcohol. He still drinks a pint to fifth of vodka daily still despite having been told to stop drinking due to EtOHic CMO. His EF has recovered minimally.      PE has resorbed. D dimer is normal. Venous competency study revealed no evidence of bilateral LE DVT. He has Rt CFV, FV, PV, GSV, SSV, VV, t GSV, GV incompetence. No incompetent perforators were appreciated bilaterally. Eliquis was stopped on 8/11/22. He continues to feel well. No new signs/symptoms of DVT/PE. Still drinking a significant amount. Denies any recent drug use the past 9 months. He was supposed to get a repeat d.dimer lab drawn yesterday and follow-up today to review results. However, he got confused and has not had his blood drawn yet.         PAST MEDICAL HISTORY  Past Medical History        Past Medical History:   Diagnosis Date     Alcohol abuse       DVT (deep venous thrombosis) (H)       Hypertriglyceridemia       Marijuana use       Pulmonary embolism (H)              PAST SURGICAL HISTORY:  Past Surgical History         Past Surgical History:   Procedure Laterality Date     APPENDECTOMY                   CURRENT MEDICATIONS  gabapentin (NEURONTIN) 400 MG capsule, Take 400 mg by mouth daily as needed (Anxiety)   multivitamin w/minerals (THERA-VIT-M) tablet, Take 1 tablet by mouth daily     No current facility-administered medications on file prior to visit.        ALLERGIES           Allergies   Allergen Reactions     Naltrexone         High feel  - ill effect          FAMILY HISTORY  Family History         Family History   Problem Relation Age of Onset     Dementia Paternal Grandfather              SOCIAL HISTORY  Social History            Socioeconomic History     Marital status: Single       Spouse name: Not on file     Number of  "children: Not on file     Years of education: Not on file     Highest education level: Not on file   Occupational History     Not on file   Tobacco Use     Smoking status: Never Smoker     Smokeless tobacco: Never Used     Tobacco comment: smokes maryjuana daily and e-cigarettes if someone else has one but not regularly   Substance and Sexual Activity     Alcohol use: Yes       Comment: 1 pint and three beers a day     Drug use: Yes       Types: Nitrous oxide, Marijuana       Comment: has not had any since 12/24/2021         ROS:   Complete ROS negative other than what is stated in HPI.      EXAM:  /85 (BP Location: Right arm, Patient Position: Chair, Cuff Size: Adult Large)   Pulse 99   Ht 1.981 m (6' 6\")   Wt 139.1 kg (306 lb 9.6 oz)   SpO2 97%   BMI 35.43 kg/m    In general, the patient is a pleasant male in no apparent distress.    HEENT: NC/AT.  PERRLA.  EOMI.  Sclerae white, not injected.    Heart: RRR. Normal S1, S2 splits physiologically. No murmur, rub, click, or gallop.   Lungs: CTA.  No ronchi, wheezes, rales.    Abdomen: Soft, nontender, nondistended.   Extremities: No clubbing, cyanosis. Mild swelling RLE. Some chronic venous stasis changes noted. No wounds.      Labs:  LIPID RESULTS:          Lab Results   Component Value Date     CHOL 180 09/05/2021     HDL 68 09/05/2021     LDL   09/05/2021         Comment:         Cannot estimate LDL when triglyceride exceeds 400 mg/dL  Age 0-19 years:  Desirable: 0-110 mg/dL   Borderline high: 110-129 mg/dL   High: >= 130 mg/dL     Age 20 years and older:  Desirable: <100mg/dL  Above desirable: 100-129 mg/dL   Borderline high: 130-159 mg/dL   High: 160-189 mg/dL   Very high: >= 190 mg/dL     TRIG 588 (H) 09/05/2021         LIVER ENZYME RESULTS:        Lab Results   Component Value Date     AST 27 05/03/2022     AST 32 04/26/2021     ALT 48 05/03/2022     ALT 38 04/26/2021         CBC RESULTS:        Lab Results   Component Value Date     WBC 5.9 " 05/03/2022     WBC 4.7 04/26/2021     RBC 4.72 05/03/2022     RBC 4.69 04/26/2021     HGB 15.3 05/03/2022     HGB 14.8 04/26/2021     HCT 44.0 05/03/2022     HCT 42.5 04/26/2021     MCV 93 05/03/2022     MCV 91 04/26/2021     MCH 32.4 05/03/2022     MCH 31.6 04/26/2021     MCHC 34.8 05/03/2022     MCHC 34.8 04/26/2021     RDW 13.4 05/03/2022     RDW 12.8 04/26/2021      05/03/2022      04/26/2021         BMP RESULTS:        Lab Results   Component Value Date      05/03/2022      04/26/2021     POTASSIUM 3.8 05/03/2022     POTASSIUM 3.4 04/26/2021     CHLORIDE 105 05/03/2022     CHLORIDE 101 04/26/2021     CO2 22 05/03/2022     CO2 27 04/26/2021     ANIONGAP 12 05/03/2022     ANIONGAP 12 04/26/2021      (H) 05/03/2022      (H) 04/26/2021     BUN 8 05/03/2022     BUN 7 04/26/2021     CR 0.80 05/03/2022     CR 0.77 04/26/2021     GFRESTIMATED >90 05/03/2022     GFRESTIMATED >90 04/26/2021     GFRESTBLACK >90 04/26/2021     MARILYN 9.7 05/03/2022     MARILYN 8.7 04/26/2021         A1C RESULTS:        Lab Results   Component Value Date     A1C 5.4 05/03/2022         THYROID RESULTS:        Lab Results   Component Value Date     TSH 3.58 12/16/2021     TSH 0.55 04/26/2021            Procedures:   Procedure  Limited Echo Adult. Optison (NDC #[id number]) given intravenously 6/24/22     ______________________________________________________________________________  Interpretation Summary     The left ventricle is normal in size.  Left ventricular systolic function is borderline reduced.  The visual ejection fraction is 50-55%.  The right ventricle is moderately dilated.  Mildly decreased right ventricular systolic function  Right ventricular systolic pressure could not be approximated due to  inadequate tricuspid regurgitation (even with contrast).     Likely no change from previous echo 11/30/21. Consider referral to Dr. Gonzales  for consideration of pulmonary artery  thrombectomy.  ______________________________________________________________________________  Left Ventricle  The left ventricle is normal in size. Left ventricular systolic function is  borderline reduced. The visual ejection fraction is 50-55%.     Right Ventricle  The right ventricle is moderately dilated. Mildly decreased right ventricular  systolic function.     Tricuspid Valve  No tricuspid regurgitation. Right ventricular systolic pressure could not be  approximated due to inadequate tricuspid regurgitation.        CT CHEST PULMONARY EMBOLISM WITH CONTRAST July 27, 2022 2:13 PM     CLINICAL HISTORY: Evaluate for absence of previously noted pulmonary  embolus. Pulmonary embolism, unspecified chronicity, unspecified  pulmonary embolism type, unspecified whether acute cor pulmonale  present (H).     TECHNIQUE: CT angiogram chest during arterial phase injection IV  contrast. 2D and 3D MIP reconstructions were performed by the CT  technologist. Dose reduction techniques were used.   CONTRAST: 83ML isovue 370.     COMPARISON: 11/20/2021.     FINDINGS:  ANGIOGRAM CHEST: Pulmonary arteries are normal caliber and negative  for pulmonary emboli. Thoracic aorta is negative for dissection. No CT  evidence of right heart strain.     LUNGS AND PLEURA: The lungs are clear. No pleural effusion.     MEDIASTINUM/AXILLAE: No lymphadenopathy. No coronary artery  calcification.     UPPER ABDOMEN: Negative.     MUSCULOSKELETAL: Normal.                                                                      IMPRESSION: Normal examination. Previously seen pulmonary emboli have  Resolved.           EXAM TYPE  BILATERAL LOWER EXTREMITY VENOUS DUPLEX FOR VENOUS INSUFFICIENCY  TECHNICAL SUMMARY 7/27/22     A duplex ultrasound study using color flow was performed, to evaluate the bilateral lower extremity veins for valvular incompetence with the patient in a steep reversed trendelenberg.      RIGHT:     The deep veins demonstrate phasic  flow, compress and respond to augmentations.  There is no DVT.  The common femoral vein, proximal femoral vein, and popliteal veins are incompetent and free of thrombus. The remaining deep veins are competent and free of thrombus.      The GSV demonstrates phasic flow, compresses and responds to augmentations from the saphenofemoral junction to the ankle with no evidence of thrombus. The great saphenous vein measures 7.4 mm at the saphenofemoral junction, 6.8 mm at the proximal thigh and 7.2 mm at the knee. The GSV is incompetent from SFJ to Proximal Thigh, with the greatest reflux time of 1501 milliseconds.  The GSV gives rise to multiple incompetent varicose veins, the largest measures 4.8 mm off the Knee that courses Posteromedial with a reflux time of 2186 milliseconds.      The AASV is not visualized.      The Giacomini vein is absent.     The SSV demonstrates phasic flow, compresses and responds to augmentations from the popliteal space to the ankle.  No thrombus is seen. The saphenopopliteal junction is absent. The SSV is incompetent at the Proximal Calf and the Distal Calf with a reflux time of 814 milliseconds.  The SSV gives rise to a varicose branch measuring 4.8 mm off the Mid Calf that courses Anteromedial with a reflux time of 2186 milliseconds and appears to connect with the GSV.       Perforators: there is no evidence of incompetent  veins at any level.      LEFT:     The deep veins demonstrate phasic flow, compress and respond to augmentations.  There is no  DVT.  The common femoral vein is incompetent and free of thrombus. The remaining deep veins are competent and free of thrombus.      The GSV demonstrates phasic flow, compresses and responds to augmentations from the saphenofemoral junction to the ankle with no evidence of reflux or thrombus. The great saphenous vein measures 7.8 mm at the saphenofemoral junction, 5.9 mm at the proximal thigh and 6.0 mm at the knee. The GSV is  incompetent from SFJ to Proximal Calf, with the greatest reflux time of 6104 milliseconds.  The GSV gives rise to a varicose branch measuring 3.9 mm off the  Proximal Calf that courses Medial with a reflux time of 3052 milliseconds.        The AASV is not visualized.     The Giacomini vein is incompetent ( 1.9 mm) communicating with the small saphenous vein at the knee level. The Giacomini vein is incompetent at the distal thigh with a reflux time of 1666 milliseconds and dives deep in the mid thigh.     The SSV demonstrates phasic flow, compresses and responds to augmentations from the popliteal space to the ankle.  No reflux or thrombus is seen. The saphenopopliteal junction is competent ( 4.4 mm).      Perforators: there is no evidence of incompetent  veins at any level.         FINAL SUMMARY:  1.  No evidence of bilateral lower extremity deep vein thrombus.  2.  Right common femoral, proximal femoral and popliteal vein incompetence  3.  Left common femoral vein incompetence  4.  Right saphenofemoral junction and proximal great saphenous vein incompetence  5.  Right small saphenous vein incompetence  6.  Incompetent right great saphenous and small saphenous vein varicosities  7.  Significant incompetent left great saphenous vein with 3.9 mm diameter incompetent vein branch coursing from the calf segment  8.  Left Vein of Giacomini incompetence  9.  No incompetent perforators appreciated           [facility]  [address]  [place] [address]  Phone [phone]  Fax [phone]     Name: STACEY CHAMPION  MRN: [id number]  : 1995  Study Date: 2022 08:57 AM  Age: 26 yrs  Gender: Male  Patient Location: [facility]  Reason For Study: Pulmonary embolism, unspecified chronicity, unspecified  pulmonar  Ordering Physician: KRISTOPHER SPARKS  Referring Physician: KRISTOPHER SPARKS  Performed By:  Randa Howard     BSA: 2.7 m2  Height: 78 in  Weight: 306 lb  HR: 75  BP: 140/100 mmHg  ______________________________________________________________________________  Procedure  Complete Echo Adult. Optison (NDC #[id number]) given intravenously.     ______________________________________________________________________________  Interpretation Summary     1. The left ventricle is normal in size. The visual ejection fraction is  estimated at 50%.  2. The right ventricle is normal in structure, function and size.  3. No valve disease.     Echo 6-24-22 showed EF 50-55%, moderate RV dilation with mild dysfunction.  ______________________________________________________________________________  Left Ventricle  The left ventricle is normal in size. There is normal left ventricular wall  thickness. The visual ejection fraction is estimated at 50%. Left ventricular  diastolic function is normal. Normal left ventricular wall motion.     Right Ventricle  The right ventricle is normal in structure, function and size.     Atria  Normal left atrial size. Right atrial size is normal. There is no atrial shunt  seen.     Mitral Valve  The mitral valve is normal in structure and function.     Tricuspid Valve  The tricuspid valve is normal in structure and function. Right ventricular  systolic pressure could not be approximated due to inadequate tricuspid  regurgitation.     Aortic Valve  The aortic valve is normal in structure and function.     Pulmonic Valve  The pulmonic valve is normal in structure and function.     Vessels  Normal ascending, transverse (arch), and descending aorta. The inferior vena  cava was normal in size with preserved respiratory variability.     Pericardium  There is no pericardial effusion.     Rhythm  Sinus rhythm was noted.     ______________________________________________________________________________  MMode/2D Measurements & Calculations  IVSd: 0.98 cm  LVIDd: 4.3 cm  LVIDs: 3.2 cm  LVPWd: 1.1  cm  FS: 25.2 %  LV mass(C)d: 153.2 grams  LV mass(C)dI: 56.6 grams/m2  Ao root diam: 3.5 cm  LA dimension: 4.1 cm     asc Aorta Diam: 3.1 cm  LA/Ao: 1.2  LVOT diam: 2.6 cm  LVOT area: 5.2 cm2  LA Volume (BP): 72.6 ml  LA Volume Index (BP): 26.9 ml/m2  RWT: 0.52     Doppler Measurements & Calculations  MV E max montrell: 57.9 cm/sec  MV A max montrell: 66.2 cm/sec  MV E/A: 0.88  MV dec slope: 373.9 cm/sec2  MV dec time: 0.15 sec  PA acc time: 0.11 sec     E/E' av.1  Lateral E/e': 4.8  Medial E/e': 7.3     ______________________________________________________________________________  Report approved by: Maryjo Cunningham 2022 10:47 AM             Assessment and Plan:   Pulmonary embolism and RLE DVT 10/31/21     Comment:   -First lifetime event provoked by inactivity, obesity, possibly due to a contributory component from COVID vaccination , dehydration and poor nutrition due to polysubstance abuse.   -PE has resorbed.   -D dimer was normal. Venous competency study revealed no evidence of bilateral LE DVT. He has Rt CFV, FV, PV, GSV, SSV, VV, t GSV, GV incompetence. No incompetent perforators were appreciated bilaterally.   -Persistent RV dysfunction was thought to be due to high level of ETOH intake. RV function has recovered despite high continued EtOH intake  -Eliquis was stopped two months ago. No new signs/symptoms of DVT/PE. D dimer remains normal.  -He is not wearing thigh-high compression socks     Recommendations:   -Contiue aspirin 81 mg daily.   -Wear thigh-high compression socks.   -Advised that he stop drinking alcohol and abstain from drugs.   -Follow-up with me prn only in the future              35 minutes spent on the date of the encounter doing chart review, history and exam, documentation, and further activities as noted above.

## 2022-09-26 NOTE — PROGRESS NOTES
[facility]        Patient is here for a  follow up.     Pt is currently taking Aspirin.    BP (!) 148/91 (BP Location: Right arm, Patient Position: Chair, Cuff Size: Adult Large)   Pulse 84   Wt 312 lb (141.5 kg)   SpO2 98%   BMI 36.06 kg/m      The provider has been notified that the patient has no concerns.     Questions patient would like addressed today are: N/A.    Refills are needed: N/A    Has homecare services and agency name:  Gela Chase MA

## 2023-01-01 ENCOUNTER — TELEPHONE (OUTPATIENT)
Dept: BEHAVIORAL HEALTH | Facility: CLINIC | Age: 28
End: 2023-01-01

## 2023-01-01 ENCOUNTER — APPOINTMENT (OUTPATIENT)
Dept: CT IMAGING | Facility: CLINIC | Age: 28
End: 2023-01-01
Attending: FAMILY MEDICINE
Payer: COMMERCIAL

## 2023-01-01 ENCOUNTER — HOSPITAL ENCOUNTER (INPATIENT)
Facility: CLINIC | Age: 28
LOS: 2 days | Discharge: HOME | End: 2023-01-13
Attending: FAMILY MEDICINE | Admitting: PSYCHIATRY & NEUROLOGY
Payer: COMMERCIAL

## 2023-01-01 ENCOUNTER — PATIENT OUTREACH (OUTPATIENT)
Dept: CARE COORDINATION | Facility: CLINIC | Age: 28
End: 2023-01-01

## 2023-01-01 VITALS
OXYGEN SATURATION: 98 % | RESPIRATION RATE: 16 BRPM | HEIGHT: 78 IN | BODY MASS INDEX: 33.78 KG/M2 | TEMPERATURE: 97.1 F | HEART RATE: 92 BPM | WEIGHT: 292 LBS | DIASTOLIC BLOOD PRESSURE: 99 MMHG | SYSTOLIC BLOOD PRESSURE: 146 MMHG

## 2023-01-01 DIAGNOSIS — G89.29 OTHER CHRONIC PAIN: ICD-10-CM

## 2023-01-01 DIAGNOSIS — Z11.52 ENCOUNTER FOR SCREENING LABORATORY TESTING FOR SEVERE ACUTE RESPIRATORY SYNDROME CORONAVIRUS 2 (SARS-COV-2): ICD-10-CM

## 2023-01-01 DIAGNOSIS — F10.229 ALCOHOL DEPENDENCE WITH INTOXICATION WITH COMPLICATION (H): Primary | ICD-10-CM

## 2023-01-01 DIAGNOSIS — F10.239 ALCOHOL DEPENDENCE WITH WITHDRAWAL WITH COMPLICATION (H): ICD-10-CM

## 2023-01-01 DIAGNOSIS — R07.9 CHRONIC CHEST PAIN: ICD-10-CM

## 2023-01-01 DIAGNOSIS — R07.89 OTHER CHEST PAIN: ICD-10-CM

## 2023-01-01 DIAGNOSIS — G89.29 CHRONIC CHEST PAIN: ICD-10-CM

## 2023-01-01 LAB
ALBUMIN SERPL-MCNC: 3.5 G/DL (ref 3.4–5)
ALBUMIN SERPL-MCNC: 4 G/DL (ref 3.4–5)
ALBUMIN SERPL-MCNC: 4.1 G/DL (ref 3.4–5)
ALP SERPL-CCNC: 76 U/L (ref 40–150)
ALP SERPL-CCNC: 89 U/L (ref 40–150)
ALP SERPL-CCNC: 90 U/L (ref 40–150)
ALT SERPL W P-5'-P-CCNC: 116 U/L (ref 0–70)
ALT SERPL W P-5'-P-CCNC: 155 U/L (ref 0–70)
ALT SERPL W P-5'-P-CCNC: 157 U/L (ref 0–70)
AMPHETAMINES UR QL SCN: ABNORMAL
ANION GAP SERPL CALCULATED.3IONS-SCNC: 18 MMOL/L (ref 3–14)
ANION GAP SERPL CALCULATED.3IONS-SCNC: 8 MMOL/L (ref 3–14)
AST SERPL W P-5'-P-CCNC: 171 U/L (ref 0–45)
AST SERPL W P-5'-P-CCNC: 96 U/L (ref 0–45)
AST SERPL W P-5'-P-CCNC: ABNORMAL U/L
ATRIAL RATE - MUSE: 84 BPM
BARBITURATES UR QL: ABNORMAL
BASOPHILS # BLD AUTO: 0 10E3/UL (ref 0–0.2)
BASOPHILS NFR BLD AUTO: 1 %
BENZODIAZ UR QL: ABNORMAL
BILIRUB DIRECT SERPL-MCNC: 0.3 MG/DL (ref 0–0.2)
BILIRUB SERPL-MCNC: 0.9 MG/DL (ref 0.2–1.3)
BILIRUB SERPL-MCNC: 0.9 MG/DL (ref 0.2–1.3)
BILIRUB SERPL-MCNC: 1.3 MG/DL (ref 0.2–1.3)
BUN SERPL-MCNC: 5 MG/DL (ref 7–30)
BUN SERPL-MCNC: 8 MG/DL (ref 7–30)
CALCIUM SERPL-MCNC: 9.4 MG/DL (ref 8.5–10.1)
CALCIUM SERPL-MCNC: 9.7 MG/DL (ref 8.5–10.1)
CANNABINOIDS UR QL SCN: ABNORMAL
CHLORIDE BLD-SCNC: 103 MMOL/L (ref 94–109)
CHLORIDE BLD-SCNC: 104 MMOL/L (ref 94–109)
CHOLEST SERPL-MCNC: 165 MG/DL
CO2 SERPL-SCNC: 21 MMOL/L (ref 20–32)
CO2 SERPL-SCNC: 24 MMOL/L (ref 20–32)
COCAINE UR QL: ABNORMAL
CREAT SERPL-MCNC: 0.77 MG/DL (ref 0.66–1.25)
CREAT SERPL-MCNC: 0.78 MG/DL (ref 0.66–1.25)
D DIMER PPP FEU-MCNC: 1.08 UG/ML FEU (ref 0–0.5)
DIASTOLIC BLOOD PRESSURE - MUSE: NORMAL MMHG
EOSINOPHIL # BLD AUTO: 0 10E3/UL (ref 0–0.7)
EOSINOPHIL NFR BLD AUTO: 0 %
ERYTHROCYTE [DISTWIDTH] IN BLOOD BY AUTOMATED COUNT: 12.8 % (ref 10–15)
ERYTHROCYTE [DISTWIDTH] IN BLOOD BY AUTOMATED COUNT: 12.9 % (ref 10–15)
ETHANOL SERPL-MCNC: 0.1 G/DL
FLUAV RNA SPEC QL NAA+PROBE: NEGATIVE
FLUBV RNA RESP QL NAA+PROBE: NEGATIVE
GFR SERPL CREATININE-BSD FRML MDRD: >90 ML/MIN/1.73M2
GFR SERPL CREATININE-BSD FRML MDRD: >90 ML/MIN/1.73M2
GGT SERPL-CCNC: 345 U/L (ref 0–75)
GLUCOSE BLD-MCNC: 111 MG/DL (ref 70–99)
GLUCOSE BLD-MCNC: 139 MG/DL (ref 70–99)
HCT VFR BLD AUTO: 50.4 % (ref 40–53)
HCT VFR BLD AUTO: 52.4 % (ref 40–53)
HDLC SERPL-MCNC: 67 MG/DL
HGB BLD-MCNC: 17.3 G/DL (ref 13.3–17.7)
HGB BLD-MCNC: 18.4 G/DL (ref 13.3–17.7)
IMM GRANULOCYTES # BLD: 0 10E3/UL
IMM GRANULOCYTES NFR BLD: 0 %
INTERPRETATION ECG - MUSE: NORMAL
LDLC SERPL CALC-MCNC: 73 MG/DL
LIPASE SERPL-CCNC: 153 U/L (ref 73–393)
LYMPHOCYTES # BLD AUTO: 0.7 10E3/UL (ref 0.8–5.3)
LYMPHOCYTES NFR BLD AUTO: 14 %
MCH RBC QN AUTO: 34.1 PG (ref 26.5–33)
MCH RBC QN AUTO: 34.8 PG (ref 26.5–33)
MCHC RBC AUTO-ENTMCNC: 34.3 G/DL (ref 31.5–36.5)
MCHC RBC AUTO-ENTMCNC: 35.1 G/DL (ref 31.5–36.5)
MCV RBC AUTO: 101 FL (ref 78–100)
MCV RBC AUTO: 97 FL (ref 78–100)
MONOCYTES # BLD AUTO: 0.8 10E3/UL (ref 0–1.3)
MONOCYTES NFR BLD AUTO: 16 %
NEUTROPHILS # BLD AUTO: 3.3 10E3/UL (ref 1.6–8.3)
NEUTROPHILS NFR BLD AUTO: 69 %
NONHDLC SERPL-MCNC: 98 MG/DL
NRBC # BLD AUTO: 0 10E3/UL
NRBC BLD AUTO-RTO: 0 /100
OPIATES UR QL SCN: ABNORMAL
P AXIS - MUSE: 60 DEGREES
PLATELET # BLD AUTO: 178 10E3/UL (ref 150–450)
PLATELET # BLD AUTO: 243 10E3/UL (ref 150–450)
POTASSIUM BLD-SCNC: 3.6 MMOL/L (ref 3.4–5.3)
POTASSIUM BLD-SCNC: 3.6 MMOL/L (ref 3.4–5.3)
PR INTERVAL - MUSE: 144 MS
PROT SERPL-MCNC: 7 G/DL (ref 6.8–8.8)
PROT SERPL-MCNC: 7.4 G/DL (ref 6.8–8.8)
PROT SERPL-MCNC: 7.6 G/DL (ref 6.8–8.8)
QRS DURATION - MUSE: 84 MS
QT - MUSE: 406 MS
QTC - MUSE: 479 MS
R AXIS - MUSE: 60 DEGREES
RBC # BLD AUTO: 4.97 10E6/UL (ref 4.4–5.9)
RBC # BLD AUTO: 5.4 10E6/UL (ref 4.4–5.9)
RSV RNA SPEC NAA+PROBE: NEGATIVE
SARS-COV-2 RNA RESP QL NAA+PROBE: NEGATIVE
SODIUM SERPL-SCNC: 136 MMOL/L (ref 133–144)
SODIUM SERPL-SCNC: 142 MMOL/L (ref 133–144)
SYSTOLIC BLOOD PRESSURE - MUSE: NORMAL MMHG
T AXIS - MUSE: 62 DEGREES
TRIGL SERPL-MCNC: 123 MG/DL
TROPONIN I SERPL HS-MCNC: 4 NG/L
TSH SERPL DL<=0.005 MIU/L-ACNC: 3.22 MU/L (ref 0.4–4)
VENTRICULAR RATE- MUSE: 84 BPM
WBC # BLD AUTO: 4.8 10E3/UL (ref 4–11)
WBC # BLD AUTO: 5.2 10E3/UL (ref 4–11)

## 2023-01-01 PROCEDURE — 93010 ELECTROCARDIOGRAM REPORT: CPT | Performed by: FAMILY MEDICINE

## 2023-01-01 PROCEDURE — 84155 ASSAY OF PROTEIN SERUM: CPT | Performed by: FAMILY MEDICINE

## 2023-01-01 PROCEDURE — 250N000013 HC RX MED GY IP 250 OP 250 PS 637: Performed by: FAMILY MEDICINE

## 2023-01-01 PROCEDURE — 36415 COLL VENOUS BLD VENIPUNCTURE: CPT | Performed by: PHYSICIAN ASSISTANT

## 2023-01-01 PROCEDURE — 87637 SARSCOV2&INF A&B&RSV AMP PRB: CPT | Performed by: FAMILY MEDICINE

## 2023-01-01 PROCEDURE — 258N000001 HC RX 258: Performed by: FAMILY MEDICINE

## 2023-01-01 PROCEDURE — 85025 COMPLETE CBC W/AUTO DIFF WBC: CPT | Performed by: FAMILY MEDICINE

## 2023-01-01 PROCEDURE — 128N000004 HC R&B CD ADULT

## 2023-01-01 PROCEDURE — 250N000009 HC RX 250: Performed by: FAMILY MEDICINE

## 2023-01-01 PROCEDURE — 85014 HEMATOCRIT: CPT | Performed by: PHYSICIAN ASSISTANT

## 2023-01-01 PROCEDURE — 99285 EMERGENCY DEPT VISIT HI MDM: CPT | Mod: 25 | Performed by: FAMILY MEDICINE

## 2023-01-01 PROCEDURE — 80053 COMPREHEN METABOLIC PANEL: CPT | Performed by: PHYSICIAN ASSISTANT

## 2023-01-01 PROCEDURE — 99239 HOSP IP/OBS DSCHRG MGMT >30: CPT | Performed by: PSYCHIATRY & NEUROLOGY

## 2023-01-01 PROCEDURE — 84443 ASSAY THYROID STIM HORMONE: CPT | Performed by: PSYCHIATRY & NEUROLOGY

## 2023-01-01 PROCEDURE — 250N000011 HC RX IP 250 OP 636: Performed by: FAMILY MEDICINE

## 2023-01-01 PROCEDURE — 36415 COLL VENOUS BLD VENIPUNCTURE: CPT | Performed by: FAMILY MEDICINE

## 2023-01-01 PROCEDURE — 85379 FIBRIN DEGRADATION QUANT: CPT | Performed by: FAMILY MEDICINE

## 2023-01-01 PROCEDURE — 71275 CT ANGIOGRAPHY CHEST: CPT

## 2023-01-01 PROCEDURE — 82977 ASSAY OF GGT: CPT | Performed by: PSYCHIATRY & NEUROLOGY

## 2023-01-01 PROCEDURE — 84484 ASSAY OF TROPONIN QUANT: CPT | Performed by: FAMILY MEDICINE

## 2023-01-01 PROCEDURE — 99223 1ST HOSP IP/OBS HIGH 75: CPT | Mod: AI | Performed by: PSYCHIATRY & NEUROLOGY

## 2023-01-01 PROCEDURE — 250N000013 HC RX MED GY IP 250 OP 250 PS 637: Performed by: PSYCHIATRY & NEUROLOGY

## 2023-01-01 PROCEDURE — 250N000013 HC RX MED GY IP 250 OP 250 PS 637: Performed by: PHYSICIAN ASSISTANT

## 2023-01-01 PROCEDURE — 36415 COLL VENOUS BLD VENIPUNCTURE: CPT | Performed by: PSYCHIATRY & NEUROLOGY

## 2023-01-01 PROCEDURE — 258N000003 HC RX IP 258 OP 636: Performed by: FAMILY MEDICINE

## 2023-01-01 PROCEDURE — 80061 LIPID PANEL: CPT | Performed by: PSYCHIATRY & NEUROLOGY

## 2023-01-01 PROCEDURE — 96375 TX/PRO/DX INJ NEW DRUG ADDON: CPT | Performed by: FAMILY MEDICINE

## 2023-01-01 PROCEDURE — 83690 ASSAY OF LIPASE: CPT | Performed by: FAMILY MEDICINE

## 2023-01-01 PROCEDURE — C9803 HOPD COVID-19 SPEC COLLECT: HCPCS | Performed by: FAMILY MEDICINE

## 2023-01-01 PROCEDURE — 82077 ASSAY SPEC XCP UR&BREATH IA: CPT | Performed by: FAMILY MEDICINE

## 2023-01-01 PROCEDURE — 93005 ELECTROCARDIOGRAM TRACING: CPT | Performed by: FAMILY MEDICINE

## 2023-01-01 PROCEDURE — HZ2ZZZZ DETOXIFICATION SERVICES FOR SUBSTANCE ABUSE TREATMENT: ICD-10-PCS | Performed by: PSYCHIATRY & NEUROLOGY

## 2023-01-01 PROCEDURE — 96365 THER/PROPH/DIAG IV INF INIT: CPT | Mod: 59 | Performed by: FAMILY MEDICINE

## 2023-01-01 PROCEDURE — 80307 DRUG TEST PRSMV CHEM ANLYZR: CPT | Performed by: FAMILY MEDICINE

## 2023-01-01 PROCEDURE — 96366 THER/PROPH/DIAG IV INF ADDON: CPT | Performed by: FAMILY MEDICINE

## 2023-01-01 PROCEDURE — 80053 COMPREHEN METABOLIC PANEL: CPT | Performed by: FAMILY MEDICINE

## 2023-01-01 PROCEDURE — 82248 BILIRUBIN DIRECT: CPT | Performed by: FAMILY MEDICINE

## 2023-01-01 PROCEDURE — C9113 INJ PANTOPRAZOLE SODIUM, VIA: HCPCS | Performed by: FAMILY MEDICINE

## 2023-01-01 PROCEDURE — 96361 HYDRATE IV INFUSION ADD-ON: CPT | Performed by: FAMILY MEDICINE

## 2023-01-01 PROCEDURE — 99221 1ST HOSP IP/OBS SF/LOW 40: CPT | Performed by: PHYSICIAN ASSISTANT

## 2023-01-01 RX ORDER — TRAZODONE HYDROCHLORIDE 50 MG/1
50 TABLET, FILM COATED ORAL
Status: DISCONTINUED | OUTPATIENT
Start: 2023-01-01 | End: 2023-01-01 | Stop reason: HOSPADM

## 2023-01-01 RX ORDER — MULTIPLE VITAMINS W/ MINERALS TAB 9MG-400MCG
1 TAB ORAL DAILY
Status: DISCONTINUED | OUTPATIENT
Start: 2023-01-01 | End: 2023-01-01 | Stop reason: HOSPADM

## 2023-01-01 RX ORDER — FOLIC ACID 1 MG/1
1 TABLET ORAL DAILY
Status: DISCONTINUED | OUTPATIENT
Start: 2023-01-01 | End: 2023-01-01 | Stop reason: HOSPADM

## 2023-01-01 RX ORDER — AMOXICILLIN 250 MG
1 CAPSULE ORAL 2 TIMES DAILY PRN
Status: DISCONTINUED | OUTPATIENT
Start: 2023-01-01 | End: 2023-01-01 | Stop reason: HOSPADM

## 2023-01-01 RX ORDER — IOPAMIDOL 755 MG/ML
100 INJECTION, SOLUTION INTRAVASCULAR ONCE
Status: COMPLETED | OUTPATIENT
Start: 2023-01-01 | End: 2023-01-01

## 2023-01-01 RX ORDER — LANOLIN ALCOHOL/MO/W.PET/CERES
1000 CREAM (GRAM) TOPICAL DAILY
Status: DISCONTINUED | OUTPATIENT
Start: 2023-01-01 | End: 2023-01-01 | Stop reason: HOSPADM

## 2023-01-01 RX ORDER — ATENOLOL 50 MG/1
50 TABLET ORAL DAILY PRN
Status: DISCONTINUED | OUTPATIENT
Start: 2023-01-01 | End: 2023-01-01 | Stop reason: HOSPADM

## 2023-01-01 RX ORDER — DIAZEPAM 10 MG
10 TABLET ORAL ONCE
Status: COMPLETED | OUTPATIENT
Start: 2023-01-01 | End: 2023-01-01

## 2023-01-01 RX ORDER — LOPERAMIDE HCL 2 MG
2 CAPSULE ORAL 4 TIMES DAILY PRN
Status: DISCONTINUED | OUTPATIENT
Start: 2023-01-01 | End: 2023-01-01 | Stop reason: HOSPADM

## 2023-01-01 RX ORDER — MAGNESIUM HYDROXIDE/ALUMINUM HYDROXICE/SIMETHICONE 120; 1200; 1200 MG/30ML; MG/30ML; MG/30ML
30 SUSPENSION ORAL EVERY 4 HOURS PRN
Status: DISCONTINUED | OUTPATIENT
Start: 2023-01-01 | End: 2023-01-01 | Stop reason: HOSPADM

## 2023-01-01 RX ORDER — DISULFIRAM 250 MG/1
250 TABLET ORAL DAILY
Qty: 30 TABLET | Refills: 3 | Status: SHIPPED | OUTPATIENT
Start: 2023-01-01

## 2023-01-01 RX ORDER — ONDANSETRON 4 MG/1
4 TABLET, ORALLY DISINTEGRATING ORAL EVERY 6 HOURS PRN
Status: DISCONTINUED | OUTPATIENT
Start: 2023-01-01 | End: 2023-01-01 | Stop reason: HOSPADM

## 2023-01-01 RX ORDER — IBUPROFEN 600 MG/1
600 TABLET, FILM COATED ORAL EVERY 6 HOURS PRN
Status: DISCONTINUED | OUTPATIENT
Start: 2023-01-01 | End: 2023-01-01 | Stop reason: HOSPADM

## 2023-01-01 RX ORDER — GABAPENTIN 100 MG/1
100 CAPSULE ORAL EVERY 6 HOURS PRN
Status: DISCONTINUED | OUTPATIENT
Start: 2023-01-01 | End: 2023-01-01 | Stop reason: HOSPADM

## 2023-01-01 RX ORDER — SODIUM CHLORIDE 9 MG/ML
INJECTION, SOLUTION INTRAVENOUS CONTINUOUS
Status: DISCONTINUED | OUTPATIENT
Start: 2023-01-01 | End: 2023-01-01 | Stop reason: HOSPADM

## 2023-01-01 RX ORDER — MULTIPLE VITAMINS W/ MINERALS TAB 9MG-400MCG
1 TAB ORAL DAILY
Status: DISCONTINUED | OUTPATIENT
Start: 2023-01-01 | End: 2023-01-01

## 2023-01-01 RX ORDER — FOLIC ACID 1 MG/1
1 TABLET ORAL DAILY
Status: DISCONTINUED | OUTPATIENT
Start: 2023-01-01 | End: 2023-01-01

## 2023-01-01 RX ORDER — CLONIDINE HYDROCHLORIDE 0.1 MG/1
0.1 TABLET ORAL 2 TIMES DAILY PRN
Status: DISCONTINUED | OUTPATIENT
Start: 2023-01-01 | End: 2023-01-01 | Stop reason: HOSPADM

## 2023-01-01 RX ORDER — ACAMPROSATE CALCIUM 333 MG/1
666 TABLET, DELAYED RELEASE ORAL 3 TIMES DAILY
Qty: 180 TABLET | Refills: 3 | Status: SHIPPED | OUTPATIENT
Start: 2023-01-01

## 2023-01-01 RX ORDER — DIAZEPAM 5 MG
5-20 TABLET ORAL EVERY 30 MIN PRN
Status: DISCONTINUED | OUTPATIENT
Start: 2023-01-01 | End: 2023-01-01

## 2023-01-01 RX ORDER — FOLIC ACID 1 MG/1
1 TABLET ORAL DAILY
Qty: 30 TABLET | Refills: 1 | Status: SHIPPED | OUTPATIENT
Start: 2023-01-01

## 2023-01-01 RX ORDER — DIAZEPAM 5 MG
5-20 TABLET ORAL EVERY 30 MIN PRN
Status: DISCONTINUED | OUTPATIENT
Start: 2023-01-01 | End: 2023-01-01 | Stop reason: HOSPADM

## 2023-01-01 RX ORDER — ONDANSETRON 2 MG/ML
8 INJECTION INTRAMUSCULAR; INTRAVENOUS ONCE
Status: COMPLETED | OUTPATIENT
Start: 2023-01-01 | End: 2023-01-01

## 2023-01-01 RX ADMIN — DIAZEPAM 15 MG: 5 TABLET ORAL at 15:17

## 2023-01-01 RX ADMIN — FOLIC ACID 1 MG: 1 TABLET ORAL at 15:17

## 2023-01-01 RX ADMIN — FOLIC ACID 1 MG: 1 TABLET ORAL at 08:10

## 2023-01-01 RX ADMIN — DIAZEPAM 10 MG: 5 TABLET ORAL at 04:07

## 2023-01-01 RX ADMIN — ATENOLOL 50 MG: 50 TABLET ORAL at 08:35

## 2023-01-01 RX ADMIN — DIAZEPAM 10 MG: 5 TABLET ORAL at 17:40

## 2023-01-01 RX ADMIN — SODIUM CHLORIDE 90 ML: 9 INJECTION, SOLUTION INTRAVENOUS at 12:47

## 2023-01-01 RX ADMIN — CYANOCOBALAMIN TAB 1000 MCG 1000 MCG: 1000 TAB at 08:06

## 2023-01-01 RX ADMIN — ONDANSETRON 8 MG: 2 INJECTION INTRAMUSCULAR; INTRAVENOUS at 10:13

## 2023-01-01 RX ADMIN — PANTOPRAZOLE SODIUM 40 MG: 40 INJECTION, POWDER, FOR SOLUTION INTRAVENOUS at 15:31

## 2023-01-01 RX ADMIN — PROCHLORPERAZINE EDISYLATE 10 MG: 5 INJECTION INTRAMUSCULAR; INTRAVENOUS at 12:03

## 2023-01-01 RX ADMIN — DIAZEPAM 10 MG: 10 TABLET ORAL at 10:13

## 2023-01-01 RX ADMIN — DIAZEPAM 10 MG: 5 TABLET ORAL at 13:29

## 2023-01-01 RX ADMIN — DIAZEPAM 10 MG: 5 TABLET ORAL at 00:34

## 2023-01-01 RX ADMIN — GABAPENTIN 100 MG: 100 CAPSULE ORAL at 08:10

## 2023-01-01 RX ADMIN — THIAMINE HCL TAB 100 MG 100 MG: 100 TAB at 08:10

## 2023-01-01 RX ADMIN — SODIUM CHLORIDE 1000 ML: 9 INJECTION, SOLUTION INTRAVENOUS at 10:12

## 2023-01-01 RX ADMIN — THIAMINE HCL TAB 100 MG 100 MG: 100 TAB at 08:06

## 2023-01-01 RX ADMIN — THIAMINE HCL TAB 100 MG 100 MG: 100 TAB at 15:17

## 2023-01-01 RX ADMIN — FOLIC ACID: 5 INJECTION, SOLUTION INTRAMUSCULAR; INTRAVENOUS; SUBCUTANEOUS at 11:37

## 2023-01-01 RX ADMIN — GABAPENTIN 100 MG: 100 CAPSULE ORAL at 20:46

## 2023-01-01 RX ADMIN — FOLIC ACID 1 MG: 1 TABLET ORAL at 08:05

## 2023-01-01 RX ADMIN — MULTIPLE VITAMINS W/ MINERALS TAB 1 TABLET: TAB at 08:10

## 2023-01-01 RX ADMIN — GABAPENTIN 100 MG: 100 CAPSULE ORAL at 08:06

## 2023-01-01 RX ADMIN — DIAZEPAM 10 MG: 5 TABLET ORAL at 20:26

## 2023-01-01 RX ADMIN — IOPAMIDOL 77 ML: 755 INJECTION, SOLUTION INTRAVENOUS at 12:44

## 2023-01-01 RX ADMIN — MULTIPLE VITAMINS W/ MINERALS TAB 1 TABLET: TAB at 08:05

## 2023-01-01 RX ADMIN — MULTIPLE VITAMINS W/ MINERALS TAB 1 TABLET: TAB at 15:17

## 2023-01-01 ASSESSMENT — ACTIVITIES OF DAILY LIVING (ADL)
ORAL_HYGIENE: INDEPENDENT
ADLS_ACUITY_SCORE: 54
VISION_MANAGEMENT: VISION
ADLS_ACUITY_SCORE: 54
ADLS_ACUITY_SCORE: 54
WEAR_GLASSES_OR_BLIND: YES
ADLS_ACUITY_SCORE: 54
ORAL_HYGIENE: INDEPENDENT
DRESS: INDEPENDENT
ADLS_ACUITY_SCORE: 54
ADLS_ACUITY_SCORE: 54
LAUNDRY: WITH SUPERVISION
ADLS_ACUITY_SCORE: 54
TOILETING_ISSUES: NO
ADLS_ACUITY_SCORE: 54
WALKING_OR_CLIMBING_STAIRS_DIFFICULTY: NO
ADLS_ACUITY_SCORE: 54
ADLS_ACUITY_SCORE: 54
DOING_ERRANDS_INDEPENDENTLY_DIFFICULTY: NO
DIFFICULTY_EATING/SWALLOWING: NO
FALL_HISTORY_WITHIN_LAST_SIX_MONTHS: NO
ADLS_ACUITY_SCORE: 54
ADLS_ACUITY_SCORE: 54
ADLS_ACUITY_SCORE: 39
DRESS: INDEPENDENT
ADLS_ACUITY_SCORE: 54
CONCENTRATING,_REMEMBERING_OR_MAKING_DECISIONS_DIFFICULTY: NO
ADLS_ACUITY_SCORE: 69
ORAL_HYGIENE: INDEPENDENT
HYGIENE/GROOMING: INDEPENDENT
ADLS_ACUITY_SCORE: 54
ADLS_ACUITY_SCORE: 54
LAUNDRY: WITH SUPERVISION
ADLS_ACUITY_SCORE: 54
ADLS_ACUITY_SCORE: 54
DRESS: STREET CLOTHES
ORAL_HYGIENE: INDEPENDENT
LAUNDRY: WITH SUPERVISION
HYGIENE/GROOMING: INDEPENDENT
CHANGE_IN_FUNCTIONAL_STATUS_SINCE_ONSET_OF_CURRENT_ILLNESS/INJURY: NO
HYGIENE/GROOMING: INDEPENDENT
ADLS_ACUITY_SCORE: 39
ADLS_ACUITY_SCORE: 54
ADLS_ACUITY_SCORE: 39
DRESSING/BATHING_DIFFICULTY: NO
HYGIENE/GROOMING: INDEPENDENT
DRESS: INDEPENDENT

## 2023-01-11 NOTE — PROGRESS NOTES
01/11/23 1703   Patient Belongings   Did you bring any home meds/supplements to the hospital?  No   Patient Belongings other (see comments)   Belongings Search Yes   Clothing Search Yes   Second Staff Ismael Killian       STORAGE BIN:   Coat, shoes, wallet, change, keys    MED ROOM BIN:   Cell phone    SECURITY:   2 id, visa, MC, ebt, $113.00cash  A             Admission:  I am responsible for any personal items that are not sent to the safe or pharmacy.  [facility] is not responsible for loss, theft or damage of any property in my possession.    Signature:  _________________________________ Date: _______  Time: _____                                              Staff Signature:  ____________________________ Date: ________  Time: _____      2nd Staff person, if patient is unable/unwilling to sign:    Signature: ________________________________ Date: ________  Time: _____   Discharge:  [facility] has returned all of my personal belongings:    Signature: _________________________________ Date: ________  Time: _____                                          Staff Signature:  ____________________________ Date: ________  Time: _____

## 2023-01-11 NOTE — PLAN OF CARE
"  Problem: Alcohol Withdrawal  Goal: Alcohol Withdrawal Symptom Control  Outcome: Progressing   [facility] Admission Note    S = Situation:   Sebastián Nogueira is a 27 year old year old male with a chief complaint of Addiction Problem (Seeking detox for alcohol)    Voluntary admission to 3A for Alcohol withdrawal and detox.    B  = Background:   Substance use history: Alcohol and Marijuana   Mental health history: None   Medical history: Pulmonary Embolism and vitamin B 12 deficient   Legal history: None   Treatment history: yes   Living situation: Live alone   Recent life stressors: None      A  =  Assessment:   During admission interview, pt affect was calm, co operative with bright affect. Endorsed Anxiety 6/10, Denied depression, HI/SI/SIB and other psych symptom. Contracted for safety. Patient denied History of withdrawal seizure and DTs. Patient reported drinking 1L of Vodka daily for the past month. Endorsed regular use of Marijuana.  He reported his last drink was at midnight and stated he throw up more than 20 times before going to ED.      MSSA of 8 on admission assessment, 10 mg valium was given for alcohol withdrawal. No complain of Nausea/Vomitting.     BP (!) 149/99   Pulse 97   Temp 97.8  F (36.6  C) (Oral)   Resp 17   Ht 1.981 m (6' 6\")   Wt 132.5 kg (292 lb)   SpO2 97%   BMI 33.74 kg/m       R =   Request or Recommendation:   Alcohol withdrawal will be monitored and treated using MSSA with valium  Pt will meet with psychiatry, internal medicine, and case management tomorrow.  At the time of admission, pt reports discharge plan is to be Detox after that, he will like to go to meetings for treatment.                "

## 2023-01-11 NOTE — ED PROVIDER NOTES
[facility] EMERGENCY DEPARTMENT ([facility])     January 11, 2023     History     Chief Complaint   Patient presents with     Addiction Problem     Seeking detox for alcohol     HPI  Sebastián Nogueira is a 27 year old male with a past medical history including alcohol dependence, LEN, PE and DVT who presents to the Emergency Department seeking detox from alcohol. The patient reports he has been drinking alcohol on and off for his whole life but recently it has worsened over the last few months.  He states that he drinks about a liter of vodka a day, sometimes a little more and sometimes a little less.  His last drink was at about midnight this morning.  He endorses about 20 episodes of vomiting this morning as well as feeling very dehydrated with generalized body aches.  Of note, he endorses chest discomfort but states that this has been an ongoing problem for months.  He reports that his chest pain improves when he drinks alcohol.    The patient notes that he normally gets shakes and sweats when he withdraws.  He notes a history of withdrawal hallucinations about a year ago but denies history of withdrawal seizure.  He states that his past withdrawal has never been this bad.    Otherwise, the patient reports a history of neuropathy in his hands and feet. He states that his right leg sometimes gets swollen ever since his DVT. Denies current leg pain.      Past Medical History  Past Medical History:   Diagnosis Date     Alcohol abuse      DVT (deep venous thrombosis) (H)      Hypertriglyceridemia      Marijuana use      Pulmonary embolism (H)      Past Surgical History:   Procedure Laterality Date     APPENDECTOMY       gabapentin (NEURONTIN) 400 MG capsule  multivitamin w/minerals (THERA-VIT-M) tablet      Allergies   Allergen Reactions     Naltrexone      High feel  - ill effect      Past medical history, past surgical history, medications, and allergies were reviewed with the patient. Additional pertinent  "items: LEN, DVT retrieved from Care Everywhere.    Family History  Family History   Problem Relation Age of Onset     Dementia Paternal Grandfather      Family history was reviewed with the patient. Additional pertinent items: None    Social History  Social History     Tobacco Use     Smoking status: Never     Smokeless tobacco: Never     Tobacco comments:     smokes maryjuana daily and e-cigarettes if someone else has one but not regularly   Substance Use Topics     Alcohol use: Yes     Comment: 1 pint and three beers a day     Drug use: Yes     Types: Marijuana      Social history was reviewed with the patient. Additional pertinent items: None     A medically appropriate review of systems was performed with pertinent positives and negatives noted in the HPI, and all other systems negative.    Physical Exam   BP: (!) 146/89  Pulse: 114  Temp: 97.7  F (36.5  C)  Resp: 16  Height: 198.1 cm (6' 6\")  Weight: 132.5 kg (292 lb)  SpO2: 97 %  Physical Exam  Vitals and nursing note reviewed.   Constitutional:       General: He is not in acute distress.     Appearance: He is not diaphoretic.   HENT:      Head: Atraumatic.   Eyes:      General: No scleral icterus.     Pupils: Pupils are equal, round, and reactive to light.   Cardiovascular:      Rate and Rhythm: Regular rhythm. Tachycardia present.      Heart sounds: Normal heart sounds.      Comments: Heart rate is 115 and regular  Pulmonary:      Effort: No respiratory distress.      Breath sounds: Normal breath sounds.   Abdominal:      General: Bowel sounds are normal.      Palpations: Abdomen is soft.      Tenderness: There is no abdominal tenderness.   Musculoskeletal:         General: No tenderness.   Skin:     General: Skin is warm.      Findings: No rash.   Neurological:      General: No focal deficit present.      Mental Status: He is oriented to person, place, and time.   Psychiatric:         Mood and Affect: Mood normal.         Behavior: Behavior normal.         " Thought Content: Thought content normal.         Judgment: Judgment normal.           ED Course, Procedures, & Data     9:54 AM  The patient was seen and examined by Aguilar Steel MD in Room [facility].    Procedures       ED Course Selections:        EKG Interpretation:      Interpreted by Aguilar Steel MD  Time reviewed: 1001  Symptoms at time of EKG: Chronic chest pain  Rhythm: normal sinus   Rate: normal  Axis: normal  Ectopy: none  Conduction: normal  ST Segments/ T Waves: No ST-T wave changes  Q Waves: none  Comparison to prior: No old EKG available    Clinical Impression: normal EKG                     Results for orders placed or performed during the hospital encounter of 01/11/23   CT Chest Pulmonary Embolism w Contrast     Status: None    Narrative    CT CHEST PULMONARY EMBOLISM W CONTRAST 1/11/2023 12:55 PM    CLINICAL HISTORY: Chest pain, elevated D-dimer, prior history of PE no  longer on anticoagulation  TECHNIQUE: CT angiogram chest during arterial phase injection IV  contrast. 2D and 3D MIP reconstructions were performed by the CT  technologist. Dose reduction techniques were used.     CONTRAST: 77mL Isovue 370    COMPARISON: Chest CT 7/27/2022    FINDINGS:  ANGIOGRAM CHEST: Pulmonary arteries are normal caliber and negative  for pulmonary emboli. Thoracic aorta is negative for dissection. No CT  evidence of right heart strain.    LUNGS AND PLEURA: Lungs are clear. No pleural effusion.    MEDIASTINUM/AXILLAE: Normal.    CORONARY ARTERY CALCIFICATION: None.    UPPER ABDOMEN: Diffuse hepatic steatosis.    MUSCULOSKELETAL: No acute bony abnormality.      Impression    IMPRESSION:  1.  No pulmonary embolus or other acute abnormality in the chest.  2.  Hepatic steatosis.    GRETTA SMITH MD         SYSTEM ID:  [id number]   Comprehensive metabolic panel     Status: Abnormal   Result Value Ref Range    Sodium 142 133 - 144 mmol/L    Potassium 3.6 3.4 - 5.3 mmol/L    Chloride 103 94 - 109 mmol/L    Carbon  Dioxide (CO2) 21 20 - 32 mmol/L    Anion Gap 18 (H) 3 - 14 mmol/L    Urea Nitrogen 8 7 - 30 mg/dL    Creatinine 0.77 0.66 - 1.25 mg/dL    Calcium 9.7 8.5 - 10.1 mg/dL    Glucose 139 (H) 70 - 99 mg/dL    Alkaline Phosphatase 90 40 - 150 U/L    AST       (H) 0 - 70 U/L    Protein Total 7.6 6.8 - 8.8 g/dL    Albumin 4.1 3.4 - 5.0 g/dL    Bilirubin Total 0.9 0.2 - 1.3 mg/dL    GFR Estimate >90 >60 mL/min/1.73m2   Troponin I     Status: Normal   Result Value Ref Range    Troponin I High Sensitivity 4 <79 ng/L   Ethyl Alcohol Level     Status: Abnormal   Result Value Ref Range    Alcohol ethyl 0.10 (H) <=0.01 g/dL   CBC with platelets and differential     Status: Abnormal   Result Value Ref Range    WBC Count 4.8 4.0 - 11.0 10e3/uL    RBC Count 5.40 4.40 - 5.90 10e6/uL    Hemoglobin 18.4 (H) 13.3 - 17.7 g/dL    Hematocrit 52.4 40.0 - 53.0 %    MCV 97 78 - 100 fL    MCH 34.1 (H) 26.5 - 33.0 pg    MCHC 35.1 31.5 - 36.5 g/dL    RDW 12.9 10.0 - 15.0 %    Platelet Count 243 150 - 450 10e3/uL    % Neutrophils 69 %    % Lymphocytes 14 %    % Monocytes 16 %    % Eosinophils 0 %    % Basophils 1 %    % Immature Granulocytes 0 %    NRBCs per 100 WBC 0 <1 /100    Absolute Neutrophils 3.3 1.6 - 8.3 10e3/uL    Absolute Lymphocytes 0.7 (L) 0.8 - 5.3 10e3/uL    Absolute Monocytes 0.8 0.0 - 1.3 10e3/uL    Absolute Eosinophils 0.0 0.0 - 0.7 10e3/uL    Absolute Basophils 0.0 0.0 - 0.2 10e3/uL    Absolute Immature Granulocytes 0.0 <=0.4 10e3/uL    Absolute NRBCs 0.0 10e3/uL   Asymptomatic Influenza A/B & SARS-CoV2 (COVID-19) Virus PCR Multiplex Nasopharyngeal     Status: Normal    Specimen: Nasopharyngeal; Swab   Result Value Ref Range    Influenza A PCR Negative Negative    Influenza B PCR Negative Negative    RSV PCR Negative Negative    SARS CoV2 PCR Negative Negative    Narrative    Testing was performed using the Xpert Xpress CoV2/Flu/RSV Assay on the [employer] Instrument. This test should be ordered for the detection of  SARS-CoV-2 and influenza viruses in individuals who meet clinical and/or epidemiological criteria. Test performance is unknown in asymptomatic patients. This test is for in vitro diagnostic use under the FDA EUA for laboratories certified under CLIA to perform high or moderate complexity testing. This test has not been FDA cleared or approved. A negative result does not rule out the presence of PCR inhibitors in the specimen or target RNA in concentration below the limit of detection for the assay. If only one viral target is positive but coinfection with multiple targets is suspected, the sample should be re-tested with another FDA cleared, approved, or authorized test, if coinfection would change clinical management. This test was validated by the [facility]. These laboratories are certified under the Clinical Laboratory Improvement Amendments of 1988 (CLIA-88) as qualified to perform high complexity laboratory testing.   Lipase     Status: Normal   Result Value Ref Range    Lipase 153 73 - 393 U/L   D dimer quantitative     Status: Abnormal   Result Value Ref Range    D-Dimer Quantitative 1.08 (H) 0.00 - 0.50 ug/mL FEU    Narrative    This D-dimer assay is intended for use in conjunction with a clinical pretest probability assessment model to exclude pulmonary embolism (PE) and deep venous thrombosis (DVT) in outpatients suspected of PE or DVT. The cut-off value is 0.50 ug/mL FEU.   Drug abuse screen 1 urine (ED)     Status: Abnormal   Result Value Ref Range    Amphetamines Urine Screen Negative Screen Negative    Barbiturates Urine Screen Negative Screen Negative    Benzodiazepines Urine Screen Positive (A) Screen Negative    Cannabinoids Urine Screen Positive (A) Screen Negative    Cocaine Urine Screen Negative Screen Negative    Opiates Urine Screen Negative Screen Negative   Hepatic panel     Status: Abnormal   Result Value Ref Range    Bilirubin Total 0.9 0.2 - 1.3 mg/dL    Bilirubin  Direct 0.3 (H) 0.0 - 0.2 mg/dL    Protein Total 7.4 6.8 - 8.8 g/dL    Albumin 4.0 3.4 - 5.0 g/dL    Alkaline Phosphatase 89 40 - 150 U/L     (H) 0 - 45 U/L     (H) 0 - 70 U/L   Extra Tube     Status: None ()    Narrative    The following orders were created for panel order Extra Tube.  Procedure                               Abnormality         Status                     ---------                               -----------         ------                     Extra Blue Top Tube[[id number]]                                                           Please view results for these tests on the individual orders.   EKG 12-lead, tracing only     Status: None (Preliminary result)   Result Value Ref Range    Systolic Blood Pressure  mmHg    Diastolic Blood Pressure  mmHg    Ventricular Rate 84 BPM    Atrial Rate 84 BPM    WV Interval 144 ms    QRS Duration 84 ms     ms    QTc 479 ms    P Axis 60 degrees    R AXIS 60 degrees    T Axis 62 degrees    Interpretation ECG Sinus rhythm  Normal ECG      CBC with platelets differential     Status: Abnormal    Narrative    The following orders were created for panel order CBC with platelets differential.  Procedure                               Abnormality         Status                     ---------                               -----------         ------                     CBC with platelets and d...[[id number]]  Abnormal            Final result                 Please view results for these tests on the individual orders.   Urine Drugs of Abuse Screen     Status: Abnormal    Narrative    The following orders were created for panel order Urine Drugs of Abuse Screen.  Procedure                               Abnormality         Status                     ---------                               -----------         ------                     Drug abuse screen 1 urin...[[id number]]  Abnormal            Final result                 Please view results for these tests on the  individual orders.     Medications   0.9% sodium chloride BOLUS (0 mLs Intravenous Stopped 1/11/23 1315)     Followed by   sodium chloride 0.9% infusion (has no administration in time range)   pantoprazole (PROTONIX) IV push injection 40 mg (has no administration in time range)   diazepam (VALIUM) tablet 5-20 mg (10 mg Oral Given 1/11/23 1329)   thiamine (B-1) tablet 100 mg (has no administration in time range)   folic acid (FOLVITE) tablet 1 mg (has no administration in time range)   multivitamin w/minerals (THERA-VIT-M) tablet 1 tablet (has no administration in time range)   ondansetron (ZOFRAN) injection 8 mg (8 mg Intravenous Given 1/11/23 1013)   diazepam (VALIUM) tablet 10 mg (10 mg Oral Given 1/11/23 1013)   dextrose 5% and 0.45% NaCl 1,000 mL with Infuvite Adult 10 mL, thiamine 100 mg, folic acid 1 mg infusion ( Intravenous Restarted 1/11/23 1358)   prochlorperazine (COMPAZINE) injection 10 mg (10 mg Intravenous Given 1/11/23 1203)   iopamidol (ISOVUE-370) solution 100 mL (77 mLs Intravenous Given 1/11/23 1244)   sodium chloride 0.9 % bag 500mL for CT scan flush use (90 mLs As instructed Given 1/11/23 1247)     Labs Ordered and Resulted from Time of ED Arrival to Time of ED Departure   COMPREHENSIVE METABOLIC PANEL - Abnormal       Result Value    Sodium 142      Potassium 3.6      Chloride 103      Carbon Dioxide (CO2) 21      Anion Gap 18 (*)     Urea Nitrogen 8      Creatinine 0.77      Calcium 9.7      Glucose 139 (*)     Alkaline Phosphatase 90      AST         (*)     Protein Total 7.6      Albumin 4.1      Bilirubin Total 0.9      GFR Estimate >90     ETHYL ALCOHOL LEVEL - Abnormal    Alcohol ethyl 0.10 (*)    CBC WITH PLATELETS AND DIFFERENTIAL - Abnormal    WBC Count 4.8      RBC Count 5.40      Hemoglobin 18.4 (*)     Hematocrit 52.4      MCV 97      MCH 34.1 (*)     MCHC 35.1      RDW 12.9      Platelet Count 243      % Neutrophils 69      % Lymphocytes 14      % Monocytes 16      %  Eosinophils 0      % Basophils 1      % Immature Granulocytes 0      NRBCs per 100 WBC 0      Absolute Neutrophils 3.3      Absolute Lymphocytes 0.7 (*)     Absolute Monocytes 0.8      Absolute Eosinophils 0.0      Absolute Basophils 0.0      Absolute Immature Granulocytes 0.0      Absolute NRBCs 0.0     D DIMER QUANTITATIVE - Abnormal    D-Dimer Quantitative 1.08 (*)    DRUG ABUSE SCREEN 1 URINE (ED) - Abnormal    Amphetamines Urine Screen Negative      Barbiturates Urine Screen Negative      Benzodiazepines Urine Screen Positive (*)     Cannabinoids Urine Screen Positive (*)     Cocaine Urine Screen Negative      Opiates Urine Screen Negative     HEPATIC FUNCTION PANEL - Abnormal    Bilirubin Total 0.9      Bilirubin Direct 0.3 (*)     Protein Total 7.4      Albumin 4.0      Alkaline Phosphatase 89       (*)      (*)    TROPONIN I - Normal    Troponin I High Sensitivity 4     INFLUENZA A/B & SARS-COV2 PCR MULTIPLEX - Normal    Influenza A PCR Negative      Influenza B PCR Negative      RSV PCR Negative      SARS CoV2 PCR Negative     LIPASE - Normal    Lipase 153       CT Chest Pulmonary Embolism w Contrast   Final Result   IMPRESSION:   1.  No pulmonary embolus or other acute abnormality in the chest.   2.  Hepatic steatosis.      GRETTA SMITH MD            SYSTEM ID:  [id number]             Medical Decision Making  The patient presented with a problem that is a chronic illness severe exacerbation, progression, or side effect of treatment and an acute health issue posing potential threat to life or bodily function.    The patient's evaluation involved:  review of 3+ prior external note(s) (ED visit June 23, 2022 admit 11 of 2022, ED visit 2021)  ordering and review of 3+ test(s) (see separate area of note for details)  review of 2 test result(s) ordered prior to this encounter (LFTs from prior visits, echocardiogram September 2022)    The patient's management involved a parenteral controlled  substance, drug therapy requiring intensive monitoring and a decision regarding hospitalization.      Assessment & Plan    27-year-old male with a history of alcohol dependence as well as a history of PE and DVT who presents seeking detox of alcohol.  Been drinking up to a liter a day for several months.  Has not consumed alcohol since yesterday and was vomiting for most of the night.  Patient has been unable to stop drinking in the community due to both physical and psychological factors.  He does also complain of some chronic chest pain present for months.  His EKG and troponin are normal.  He was treated with fluids, tonics, Zofran.  He is much improved.  He is tolerating oral benzodiazepines.  With regards to his chest pain,  Initial differential diagnosis included a life-threatening cause of chest pain such as a ACS, PE, dissection, pneumonia, pneumothorax, pericarditis ,Boerhaave tear, and other life-threatening as well as nonlife threatening causes of acute chest pain.  Patient's initial EKG normal, initial troponin normal.  The calculated HEART score is 1.  The patient has a history of PE and DVT and his D-dimer is slightly elevated.  He is not hypoxic and he does not have complaints of shortness of breath, subsequently a chest CT scan does not show any acute PE or infiltrate or other acute abnormality.  There is no clinical evidence to support a diagnosis of pericarditis or myocarditis.  No clinical evidence of congestive heart failure.  Patient has symmetric pulse and BP, no congenital connective tissue disorder, no widening of mediastinum or secondary signs of dissection on chest x-ray, and no recent history to support acquired risk factors for Thoracic Aortic Dissection (Pregnancy, cocaine abuse, weight lifting, or significant chest trauma).  I do not feel further work-up for dissection is indicated.  Overall based on the entirety of the evaluation today there does not appear to be any evidence of an  acute life-threatening etiology of symptoms.  Patient appears medically cleared to be admitted voluntarily to detox.  His other labs including LFTs and lipase are not substantially changed from previous and showed no acute toxic abnormality that cannot be managed on the detox unit.  He appears to be an appropriate candidate for voluntary detox admission.  He is unable to stop drinking in the community without assistance.    I have reviewed the nursing notes. I have reviewed the findings, diagnosis, plan and need for follow up with the patient.    New Prescriptions    No medications on file       Final diagnoses:   Alcohol dependence with withdrawal with complication (H)   Chronic chest pain     I, Ya Steel, am serving as a trained medical scribe to document services personally performed by Aguilar Steel MD, based on the provider's statements to me.   I, Aguilar Steel MD, was physically present and have reviewed and verified the accuracy of this note documented by Ya Steel.    Aguilar Steel MD  [facility] EMERGENCY DEPARTMENT  1/11/2023     Aguilar Steel MD  01/11/23 1452

## 2023-01-11 NOTE — ED TRIAGE NOTES
Triage Assessment     Row Name 01/11/23 0933       Triage Assessment (Adult)    Airway WDL WDL       Respiratory WDL    Respiratory WDL WDL       Skin Circulation/Temperature WDL    Skin Circulation/Temperature WDL WDL       Cardiac WDL    Cardiac WDL WDL       Peripheral/Neurovascular WDL    Peripheral Neurovascular WDL WDL       Cognitive/Neuro/Behavioral WDL    Cognitive/Neuro/Behavioral WDL WDL

## 2023-01-11 NOTE — ED TRIAGE NOTES
States that he has been drinking 1 liter of Vodka per day, last drink was at midnight, no history of seizures

## 2023-01-11 NOTE — TELEPHONE ENCOUNTER
S: [facility] , Provider Dr. Steel calling at 2:11pm with clinical on a 27 year old/Male presenting for alcohol detox.     B: Pt presents for ETOH detox.   Currently reports drinking 1L of vodka, Daily, for months.    Patient reports last use was Midnight.  Pt CHEPE: .10  Pt  endorses hx of DT  Pt  denies hx of seizures. Last seizure: N/A  Pt endorsing the following symptoms of withdrawal: Sweating (Rapid Pulse)  Shaky / Jittery / Tremors  Nausea / Vomiting  Anxiety / Worried  MSSA Score: 15- received valium upon admission    Pt denies acute mental health or medical concerns.   Pt endorses other drug use: Cannabis Amount/frequency: regular use    Does Pt have a detox care plan in [facility]? No  Does pt present with specific needs, assistive devices, or exclusionary criteria? None  Is the patient ambulating, eating and drinking in the ED? Yes, vomiting has stopped    A: Pt meets criteria to be presented for IP detox admission. Patient is voluntary  COVID: Negative  Utox: Positive for Benzo: received valium before testing, and THC  CMP: Abnormalities:   CBC: WNL   EKG: Normal. Chronic chest pain. No abdominal pain.  Chest Pulmonary: Normal.     R: Patient cleared and ready for behavioral bed placement: Yes    Presenting for admission to /    2:30pm Intake paged Dr. Horne.     2:43pm Intake received a call from Dr. Horne. This pt has been accepted for admission to [facility].    2:45pm Intake called [facility] and provided disposition to charge nurseLupillo. Nurse report: Next shift, charge will call.     2:47pm Intake called [place] [facility] and provided placement information to [facility].

## 2023-01-12 NOTE — PLAN OF CARE
Problem: Alcohol Withdrawal  Goal: Alcohol Withdrawal Symptom Control  Outcome: Progressing   Goal Outcome Evaluation:    Plan of Care Reviewed With: patient    Patient is pleasant most of the shift. MSSA score 6 & 1, his last Valium was at 04:07. He denied pain including chest pain, depression,si/hi/hallucinations and contracted for safety. Anxiety was 6/10 rechecked 0, and gabapentin was given with good effect. Patient stated that he will like to discharge tomorrow. Will continue to monitor.   Vital signs were 149/100, , rechecked 140/96, 109, Atenolol was given

## 2023-01-12 NOTE — PROGRESS NOTES
Writer met with patient to discuss after care plans, patient stated he already has meetings that he attends. However, have not been attending the meetings due to his relapse. Patient stated he has a padma that is in recovery as well and plans to call him up to attend AA meetings and get his self back into a supportive recovery environment. Patient would only like detox.

## 2023-01-12 NOTE — PLAN OF CARE
Problem: Sleep Disturbance  Goal: Adequate Sleep/Rest  Outcome: Progressing     MSSA 8  and 9, 10 mg of valium was given x 2 for withdrawal sympoms.Patient appeared to be sleeping during safety checks .No signs of distress or wakefulness noted.  He seemed to have greater 6.5 hrs. thus far.

## 2023-01-12 NOTE — CONSULTS
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     In the ED, the patient complained of chronic chest pain. EKG and troponin reassuring. D dimer obtained due to hx of PE, which was elevated prompting CT PE study which was negative. He has been tachycardic.He received 2L of fluid and a dose of atenolol.     # Alcohol withdrawal, hx of alcohol use disorder   MSSA 6 this shift.  Denies hx of withdrawal seizures, last occurring Pt reports drinking daily. Blood EtOH on arrival was 0.1  - Continue MSSA   - Folvite, multi-vites, thiamine supplementation   - Further management per Psychiatry   - Seizures precautions     # Elevated hemoglobin  - HGB 18.4 on presentation   - non smoker  - will recheck in the AM    # Elevated blood pressure  - up to 160/112, likely due to withdrawal, not on antihypertensives at home  - PRN clonidine with parameters    # Elevated LFT  # Hepatic steatosis  - ,   Plan  - repeat LFT as outpatient    # Hx of PE/DVT 10/2021  - Provoked, not on AC chronically     # Chest pain  - worked up in the ED. Negative troponin. EKG without ischemic changes. CTA negative for pe.   - Improved, unclear etiology, though Heart score only 1, low suspicion for any life threatening cause of chest pain    - Biventricular heart failure, HFrEF   - after subacute presentation of bilateral submassive pulmonary  emboli. NYHA II, improving. EF was mildly decreased 45-50% initially, improved to low normal 50% on recent echo. Possibly some component of alcohol use. Last seen by Cardiology 12/2/21.   - Cardiology follow-up in 6 months.   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 The signal abnormality in the spinal cord appears to be within inverted V-shaped on the axial sequences consistent with B12 deficiency, felt to be 2/2 to chronic nitrous oxide use, possible poor po intake 2/2 chronic etoh use.  Plan  - continue B12 supplement    Medicine will continue to follow to follow up labs    Aguilar Plummer PA-C  Internal Medicine [facility]  Page job code [id number] (3B), [phone] (3A), or [phone] ([facility] and )  Text paging via [facility] is appreciated  January 12, 2023         HPI:   Sebastáin Nogueira is a 27 year old male with PMHx as above. Patient reports long standing history of alcohol use disorder with multiple prior treatments, last drink yesterday, seeking detox, feeling anxious but overall much better.     He has had sharp intermittent chest pain for several months worse, no reliable trigger but not worse with exertion. Currently feels improved. Denies any shortness of breat associated with this. No radiation of the pain.     His previous neurologic symptoms/numbness/tingling have improved with b12 supplement which he continues at home.     He feels dehydrated with poor po intake due to nausea, some vomiting,  "better now, no hematemesis.     Patient denies any fevers or chills. He denies any abdominal pain. Denies diarrhea. He denies any numbness/tingling/weakness in the extremities. He denies any leg swelling.     BP (!) 149/100   Pulse 109   Temp 97.3  F (36.3  C) (Temporal)   Resp 16   Ht 1.981 m (6' 6\")   Wt 132.5 kg (292 lb)   SpO2 98%   BMI 33.74 kg/m      Negative troponin  HGB 18   Lipase  D dimer 1.08     Past Medical History:     Past Medical History:   Diagnosis Date     Alcohol abuse      DVT (deep venous thrombosis) (H)      Hypertriglyceridemia      Marijuana use      Pulmonary embolism (H)         Reviewed and updated in [place].     Past Surgical History:      Past Surgical History:   Procedure Laterality Date     APPENDECTOMY           Social History:     Social History     Tobacco Use     Smoking status: Never     Smokeless tobacco: Never     Tobacco comments:     smokes maryjuana daily and e-cigarettes if someone else has one but not regularly   Substance Use Topics     Alcohol use: Yes     Comment: 1 pint and three beers a day     Drug use: Yes     Types: Marijuana        Family History:     Family History   Problem Relation Age of Onset     Dementia Paternal Grandfather          Allergies:     Allergies   Allergen Reactions     Naltrexone      High feel  - ill effect          Medications:   Reviewed. Please see MAR     Review of Systems:   10 point ROS of systems including Constitutional, Eyes, Respiratory, Cardiovascular, Gastroenterology, Genitourinary, Integumentary, Muscularskeletal, Psychiatric were all negative except for pertinent positives noted in my HPI.    OBJECTIVE   Physical Exam:   Vitals were reviewed  Blood pressure (!) 140/96, pulse 109, temperature 97.3  F (36.3  C), temperature source Temporal, resp. rate 16, height 1.981 m (6' 6\"), weight 132.5 kg (292 lb), SpO2 98 %.  General: Alert and oriented x3. Somewhat tremulous   HEENT: Anicteric sclera, MMM  Cardiovascular: RRR, S1S2. " No murmur noted  Lungs: CTAB without wheezing or crackles   GI: Abdomen soft, non-tender without rebound or guarding. + Bowel sounds.  Vascular: No peripheral edema, distal pulses palpable  Neurologic: No focal deficits, CN II-XII grossly intact  Skin: No jaundice, rashes, or lesions        Data:          Medical Decision Making       45 MINUTES SPENT BY ME on the date of service doing chart review, history, exam, documentation & further activities per the note.        Lab Results   Component Value Date     01/11/2023     04/26/2021    Lab Results   Component Value Date    CHLORIDE 103 01/11/2023    CHLORIDE 101 04/26/2021    Lab Results   Component Value Date    BUN 8 01/11/2023    BUN 7 04/26/2021      Lab Results   Component Value Date    POTASSIUM 3.6 01/11/2023    POTASSIUM 3.4 04/26/2021    Lab Results   Component Value Date    CO2 21 01/11/2023    CO2 27 04/26/2021    Lab Results   Component Value Date    CR 0.77 01/11/2023    CR 0.77 04/26/2021        Lab Results   Component Value Date    WBC 4.8 01/11/2023    HGB 18.4 (H) 01/11/2023    HCT 52.4 01/11/2023    MCV 97 01/11/2023     01/11/2023     Lab Results   Component Value Date    WBC 4.8 01/11/2023

## 2023-01-12 NOTE — PLAN OF CARE
Behavioral Team Discussion: (1/12/2023)    Continued Stay Criteria/Rationale: Patient admitted for Alcohol Use Disorder.  Plan: The following services will be provided to the patient; psychiatric assessment, medication management, therapeutic milieu, individual and group support, and skills groups.   Participants: 3A Provider: Dr. Chandan Horne MD; 3A RN: Garfield Ohara RN; 3A CM's: Carl Cline.  Summary/Recommendation: Providers will assess today for treatment recommendations, discharge planning, and aftercare plans. CM will meet with pt for discharge planning.   Medical/Physical: Patient reports chest discomfort, chest discomfort improves when drinking.  Precautions:   Behavioral Orders   Procedures     Code 1 - Restrict to Unit     Routine Programming     As clinically indicated     Status 15     Every 15 minutes.     Withdrawal precautions     Rationale for change in precautions or plan: N/A  Progress: Initial.    ASAM Dimension Scale Ratings:  Dimension 1: 3 Client tolerates and chrissy with withdrawal discomfort poorly. Client has severe intoxication, such that the client endangers self or others, or intoxication has not abated with less intensive levels of services. Client displays severe signs and symptoms; or risk of severe, but manageable withdrawal; or withdrawal worsening despite detox at less intensive level.  Dimension 2: 1 Client tolerates and chrissy with physical discomfort and is able to get the services that the client needs.  Dimension 3: 2 Client has difficulty with impulse control and lacks coping skills. Client has thoughts of suicide or harm to others without means; however, the thoughts may interfere with participation in some treatment activities. Client has difficulty functioning in significant life areas. Client has moderate symptoms of emotional, behavioral, or cognitive problems. Client is able to participate in most treatment activities.  Dimension 4: 3 Client displays inconsistent  compliance, minimal awareness of either the client's addiction or mental disorder, and is minimally cooperative.  Dimension 5: 3 Client has poor recognition and understanding of relapse and recidivism issues and displays moderately high vulnerability for further substance use or mental health problems. Client has few coping skills and rarely applies coping skills.  Dimension 6: 3 Client is not engaged in structured, meaningful activity and the client's peers, family, significant other, and living environment are unsupportive, or there is significant criminal justice system involvement.

## 2023-01-12 NOTE — H&P
"[facility] [place]   Psychiatric History and Physical  Admission date: 1/11/2023        Chief Complaint:   \"I'm feeling a lot better.\"         HPI:     The patient is a 26yo male with a history of alcohol use disorder who was admitted to detoxify from alcohol. He reports that he is feeling \"a lot better today.\" Says that he was \"vomiting a lot yesterday\". Still some nausea but was able to eat some breakfast. Slept better. Feels \"a little anxious\" but denies SI. Says that he had some side effects when starting Naltrexone where he felt \"a little fuzzy\" but denies any allergies and found it helpful. He would like to be back on it and Antabuse as well. Wants to be able to return to work so plans to attend AA meetings at discharge. Does have a history of DTs but denies any current AH or VH.      Per ER:  Sebastián Nogueira is a 27 year old male with a past medical history including alcohol dependence, LEN, PE and DVT who presents to the Emergency Department seeking detox from alcohol. The patient reports he has been drinking alcohol on and off for his whole life but recently it has worsened over the last few months.  He states that he drinks about a liter of vodka a day, sometimes a little more and sometimes a little less.  His last drink was at about midnight this morning.  He endorses about 20 episodes of vomiting this morning as well as feeling very dehydrated with generalized body aches.  Of note, he endorses chest discomfort but states that this has been an ongoing problem for months.  He reports that his chest pain improves when he drinks alcohol.     The patient notes that he normally gets shakes and sweats when he withdraws.  He notes a history of withdrawal hallucinations about a year ago but denies history of withdrawal seizure.  He states that his past withdrawal has never been this bad.     Otherwise, the patient reports a history of neuropathy in his hands and feet. He states that " his right leg sometimes gets swollen ever since his DVT. Denies current leg pain.        Past Psychiatric History:     Denies any history of suicide attempts.         Substance Use and History:     Alcohol use disorder. Denies withdrawal seizures. Some hallucinations with withdrawal about one year ago. Denies any allergic reaction to Naltrexone. Has been on this as well as Antabuse to good impact.   Cannabis abuse.         Past Medical History:   PAST MEDICAL HISTORY:   Past Medical History:   Diagnosis Date     Alcohol abuse      DVT (deep venous thrombosis) (H)      Hypertriglyceridemia      Marijuana use      Pulmonary embolism (H)        PAST SURGICAL HISTORY:   Past Surgical History:   Procedure Laterality Date     APPENDECTOMY               Family History:   FAMILY HISTORY:   Family History   Problem Relation Age of Onset     Dementia Paternal Grandfather            Social History:   Please see the full psychosocial profile from the clinical treatment coordinator.   SOCIAL HISTORY:   Social History     Tobacco Use     Smoking status: Never     Smokeless tobacco: Never     Tobacco comments:     smokes maryjuana daily and e-cigarettes if someone else has one but not regularly   Substance Use Topics     Alcohol use: Yes     Comment: 1 pint and three beers a day            Physical ROS:   The patient endorsed nausea. The remainder of 10-point review of systems was negative except as noted in HPI.         PTA Medications:     Medications Prior to Admission   Medication Sig Dispense Refill Last Dose     gabapentin (NEURONTIN) 400 MG capsule Take 400 mg by mouth daily as needed (Anxiety)    More than a month     multivitamin w/minerals (THERA-VIT-M) tablet Take 1 tablet by mouth daily 30 tablet 0 1/10/2023     vitamin B-12 (CYANOCOBALAMIN) 100 MCG tablet Take 1,000 mcg by mouth daily   1/10/2023          Allergies:     Allergies   Allergen Reactions     Naltrexone      High feel  - ill effect           Labs:      Recent Results (from the past 48 hour(s))   EKG 12-lead, tracing only    Collection Time: 01/11/23 10:00 AM   Result Value Ref Range    Systolic Blood Pressure  mmHg    Diastolic Blood Pressure  mmHg    Ventricular Rate 84 BPM    Atrial Rate 84 BPM    CO Interval 144 ms    QRS Duration 84 ms     ms    QTc 479 ms    P Axis 60 degrees    R AXIS 60 degrees    T Axis 62 degrees    Interpretation ECG Sinus rhythm  Normal ECG      Comprehensive metabolic panel    Collection Time: 01/11/23 10:08 AM   Result Value Ref Range    Sodium 142 133 - 144 mmol/L    Potassium 3.6 3.4 - 5.3 mmol/L    Chloride 103 94 - 109 mmol/L    Carbon Dioxide (CO2) 21 20 - 32 mmol/L    Anion Gap 18 (H) 3 - 14 mmol/L    Urea Nitrogen 8 7 - 30 mg/dL    Creatinine 0.77 0.66 - 1.25 mg/dL    Calcium 9.7 8.5 - 10.1 mg/dL    Glucose 139 (H) 70 - 99 mg/dL    Alkaline Phosphatase 90 40 - 150 U/L    AST       (H) 0 - 70 U/L    Protein Total 7.6 6.8 - 8.8 g/dL    Albumin 4.1 3.4 - 5.0 g/dL    Bilirubin Total 0.9 0.2 - 1.3 mg/dL    GFR Estimate >90 >60 mL/min/1.73m2   Troponin I    Collection Time: 01/11/23 10:08 AM   Result Value Ref Range    Troponin I High Sensitivity 4 <79 ng/L   Ethyl Alcohol Level    Collection Time: 01/11/23 10:08 AM   Result Value Ref Range    Alcohol ethyl 0.10 (H) <=0.01 g/dL   CBC with platelets and differential    Collection Time: 01/11/23 10:08 AM   Result Value Ref Range    WBC Count 4.8 4.0 - 11.0 10e3/uL    RBC Count 5.40 4.40 - 5.90 10e6/uL    Hemoglobin 18.4 (H) 13.3 - 17.7 g/dL    Hematocrit 52.4 40.0 - 53.0 %    MCV 97 78 - 100 fL    MCH 34.1 (H) 26.5 - 33.0 pg    MCHC 35.1 31.5 - 36.5 g/dL    RDW 12.9 10.0 - 15.0 %    Platelet Count 243 150 - 450 10e3/uL    % Neutrophils 69 %    % Lymphocytes 14 %    % Monocytes 16 %    % Eosinophils 0 %    % Basophils 1 %    % Immature Granulocytes 0 %    NRBCs per 100 WBC 0 <1 /100    Absolute Neutrophils 3.3 1.6 - 8.3 10e3/uL    Absolute Lymphocytes 0.7 (L) 0.8 - 5.3  "10e3/uL    Absolute Monocytes 0.8 0.0 - 1.3 10e3/uL    Absolute Eosinophils 0.0 0.0 - 0.7 10e3/uL    Absolute Basophils 0.0 0.0 - 0.2 10e3/uL    Absolute Immature Granulocytes 0.0 <=0.4 10e3/uL    Absolute NRBCs 0.0 10e3/uL   Lipase    Collection Time: 01/11/23 10:08 AM   Result Value Ref Range    Lipase 153 73 - 393 U/L   Asymptomatic Influenza A/B & SARS-CoV2 (COVID-19) Virus PCR Multiplex Nasopharyngeal    Collection Time: 01/11/23 10:47 AM    Specimen: Nasopharyngeal; Swab   Result Value Ref Range    Influenza A PCR Negative Negative    Influenza B PCR Negative Negative    RSV PCR Negative Negative    SARS CoV2 PCR Negative Negative   D dimer quantitative    Collection Time: 01/11/23 11:00 AM   Result Value Ref Range    D-Dimer Quantitative 1.08 (H) 0.00 - 0.50 ug/mL FEU   Hepatic panel    Collection Time: 01/11/23 11:37 AM   Result Value Ref Range    Bilirubin Total 0.9 0.2 - 1.3 mg/dL    Bilirubin Direct 0.3 (H) 0.0 - 0.2 mg/dL    Protein Total 7.4 6.8 - 8.8 g/dL    Albumin 4.0 3.4 - 5.0 g/dL    Alkaline Phosphatase 89 40 - 150 U/L     (H) 0 - 45 U/L     (H) 0 - 70 U/L   Drug abuse screen 1 urine (ED)    Collection Time: 01/11/23 12:37 PM   Result Value Ref Range    Amphetamines Urine Screen Negative Screen Negative    Barbiturates Urine Screen Negative Screen Negative    Benzodiazepines Urine Screen Positive (A) Screen Negative    Cannabinoids Urine Screen Positive (A) Screen Negative    Cocaine Urine Screen Negative Screen Negative    Opiates Urine Screen Negative Screen Negative          Physical and Psychiatric Examination:     BP (!) 157/101 (BP Location: Left arm)   Pulse 91   Temp 97.5  F (36.4  C) (Oral)   Resp 16   Ht 1.981 m (6' 6\")   Wt 132.5 kg (292 lb)   SpO2 97%   BMI 33.74 kg/m    Weight is 292 lbs 0 oz  Body mass index is 33.74 kg/m .    Physical Exam:  I have reviewed the physical exam as documented by the medical team and agree with findings and assessment and have no " additional findings to add at this time.    Mental Status Exam:  Appearance: awake, alert and adequately groomed  Attitude:  cooperative  Eye Contact:  good  Mood:  anxious  Affect:  mood congruent  Speech:  clear, coherent  Language: fluent and intact in English  Psychomotor, Gait, Musculoskeletal:  no evidence of tardive dyskinesia, dystonia, or tics  Thought Process:  goal oriented  Associations:  no loose associations  Thought Content:  no evidence of suicidal ideation or homicidal ideation and no evidence of psychotic thought  Insight:  fair  Judgement:  fair  Oriented to:  time, person, and place  Attention Span and Concentration:  intact  Recent and Remote Memory:  fair  Fund of Knowledge:  appropriate         Admission Diagnoses:      Alcohol dependence with withdrawal with complication (H)  Cannabis abuse.          Assessment & Plan:     1) MSSA with Valium.   2) Removed allergy to Naltrexone as patient denies any allergic reactions to this medication and indeed found it helpful.   3) Will prescribe Naltrexone and Antabuse at discharge.   4) Patient plans to detox only and resume AA meetings.     Disposition Plan   Reason for ongoing admission: requires detoxification from substance that poses a risk of bodily harm during withdrawal period  Discharge location: home with self-care  Discharge Medications: not ordered  Follow-up Appointments: not scheduled  Legal Status: voluntary    Entered by: Chandan Horne MD on 1/12/2023 at 4:44 AM

## 2023-01-13 NOTE — DISCHARGE SUMMARY
"Psychiatric Discharge Summary    Sebastián Nogueira MRN# [id number]   Age: 27 year old YOB: 1995     Date of Admission:  1/11/2023  Date of Discharge:  1/13/2023  3:03 PM  Admitting Physician:  Chandan Horne MD  Discharge Physician:  Chandan Horne MD          Event Leading to Hospitalization:   The patient is a 26yo male with a history of alcohol use disorder who was admitted to detoxify from alcohol. He reports that he is feeling \"a lot better today.\" Says that he was \"vomiting a lot yesterday\". Still some nausea but was able to eat some breakfast. Slept better. Feels \"a little anxious\" but denies SI. Says that he had some side effects when starting Naltrexone where he felt \"a little fuzzy\" but denies any allergies and found it helpful. He would like to be back on it and Antabuse as well. Wants to be able to return to work so plans to attend AA meetings at discharge. Does have a history of DTs but denies any current AH or VH.         See Admission note by Chandan Horne MD for additional details.          Diagnoses:     Alcohol dependence with withdrawal with complication (H)  Cannabis abuse.                  Labs:        Lab Results   Component Value Date     01/11/2023     04/26/2021    Lab Results   Component Value Date    CHLORIDE 103 01/11/2023    CHLORIDE 101 04/26/2021    Lab Results   Component Value Date    BUN 8 01/11/2023    BUN 7 04/26/2021      Lab Results   Component Value Date    POTASSIUM 3.6 01/11/2023    POTASSIUM 3.4 04/26/2021    Lab Results   Component Value Date    CO2 21 01/11/2023    CO2 27 04/26/2021    Lab Results   Component Value Date    CR 0.77 01/11/2023    CR 0.77 04/26/2021          Lab Results   Component Value Date    WBC 4.8 01/11/2023    HGB 18.4 (H) 01/11/2023    HCT 52.4 01/11/2023    MCV 97 01/11/2023     01/11/2023     Lab Results   Component Value Date     (H) 01/11/2023     (H) 01/11/2023     (H) " 01/12/2023    ALKPHOS 89 01/11/2023    BILITOTAL 0.9 01/11/2023     Lab Results   Component Value Date    TSH 3.22 01/12/2023            Consults:   Consultation during this admission received from internal medicine:  Sebastián Nogueira is a 27 year old male with PMHx significant for 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      In the ED, the patient complained of chronic chest pain. EKG and troponin reassuring. D dimer obtained due to hx of PE, which was elevated prompting CT PE study which was negative. He has been tachycardic.He received 2L of fluid and a dose of atenolol.      # Alcohol withdrawal, hx of alcohol use disorder   MSSA 6 this shift.  Denies hx of withdrawal seizures, last occurring Pt reports drinking daily. Blood EtOH on arrival was 0.1  - Continue MSSA   - Folvite, multi-vites, thiamine supplementation   - Further management per Psychiatry   - Seizures precautions      # Elevated hemoglobin  - HGB 18.4 on presentation   - non smoker  - will recheck in the AM     # Elevated blood pressure  - up to 160/112, likely due to withdrawal, not on antihypertensives at home  - PRN clonidine with parameters     # Elevated LFT  # Hepatic steatosis  - ,   Plan  - repeat LFT as outpatient     # Hx of PE/DVT 10/2021  - Provoked, not on AC chronically      # Chest pain  - worked up in the ED. Negative troponin. EKG without ischemic changes. CTA negative for pe.   - Improved, unclear etiology, though Heart score only 1, low suspicion for any life threatening cause of chest pain     - Biventricular heart failure, HFrEF   - after subacute presentation of bilateral submassive pulmonary emboli. NYHA II, improving. EF was mildly decreased 45-50% initially, improved to low normal 50% on recent echo. Possibly some component of alcohol use. Last seen by Cardiology 12/2/21.   - Cardiology follow-up in 6 months.   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 The signal abnormality in the spinal cord appears to be within inverted V-shaped on the axial sequences consistent with B12 deficiency, felt to be 2/2 to chronic nitrous oxide use, possible poor po intake 2/2 chronic etoh use.  Plan  - continue B12 supplement         Hospital Course:   Sebastián Nogueira was admitted to [facility] with attending Chandan Horne MD as a voluntary patient. The patient was placed under status 15 (15 minute checks) to ensure patient safety.   MSSA protocol was initiated due to the patient's history of alcohol abuse and concern for withdrawal symptoms.  CBC, BMP and utox obtained.    All outpatient medications were continued with the exception of Antabuse which was restarted at discharge. Campral was provided at discharge as well.     Sebastián Nogueira did participate in groups and was visible in the milieu.     The patient's symptoms of withdrawal improved.     Sebastián Nogueira was released to home. At the time of discharge Sebastián Nogueira was determined to not be a danger to himself or others. At the current time of discharge, the patient does not meet criteria for involuntary hospitalization. On the day of discharge, the patient reports that they do not have suicidal or homicidal ideation and would never hurt themselves or others. Steps taken to minimize risk include: assessing patient s behavior and thought process daily during  hospital stay, discharging patient with adequate plan for follow up for mental and physical health and discussing safety plan of returning to the hospital should the patient ever have thoughts of harming themselves or others. Therefore, based on all available evidence including the factors cited above, the patient does not appear to be at imminent risk for self-harm, and is appropriate for outpatient level of care.           Discharge Medications:     Current Discharge Medication List      START taking these medications    Details   acamprosate (CAMPRAL) 333 MG EC tablet Take 2 tablets (666 mg) by mouth 3 times daily  Qty: 180 tablet, Refills: 3    Associated Diagnoses: Alcohol dependence with withdrawal with complication (H)      disulfiram (ANTABUSE) 250 MG tablet Take 1 tablet (250 mg) by mouth daily  Qty: 30 tablet, Refills: 3    Associated Diagnoses: Alcohol dependence with intoxication with complication (H)      folic acid (FOLVITE) 1 MG tablet Take 1 tablet (1 mg) by mouth daily  Qty: 30 tablet, Refills: 1    Associated Diagnoses: Alcohol dependence with intoxication with complication (H)         CONTINUE these medications which have NOT CHANGED    Details   gabapentin (NEURONTIN) 400 MG capsule Take 400 mg by mouth daily as needed (Anxiety)       multivitamin w/minerals (THERA-VIT-M) tablet Take 1 tablet by mouth daily  Qty: 30 tablet, Refills: 0    Associated Diagnoses: Alcohol dependence with intoxication with complication (H)      vitamin B-12 (CYANOCOBALAMIN) 100 MCG tablet Take 1,000 mcg by mouth daily                  Psychiatric Examination:   Appearance:  awake, alert and adequately groomed  Attitude:  cooperative  Eye Contact:  good  Mood:  better  Affect:  mood congruent  Speech:  clear, coherent  Psychomotor Behavior:  no evidence of tardive dyskinesia, dystonia, or tics  Thought Process:  goal oriented  Associations:  no loose associations  Thought Content:  no evidence of suicidal ideation or  homicidal ideation and no evidence of psychotic thought  Insight:  fair  Judgment:  fair  Oriented to:  time, person, and place  Attention Span and Concentration:  intact  Recent and Remote Memory:  fair  Language: Able to read and write  Fund of Knowledge: appropriate  Muscle Strength and Tone: normal  Gait and Station: Normal         Discharge Plan:   Continue medications as above.     Major Treatments, Procedures and Findings:  You were treated for Alcohol detoxification using Valium dosed according to patient's MSSA Score. As an outpatient you will be prescribed Antabuse, Campral,Gabapentin and multivitamins, please take this medication as prescribed until it is gone. You did not complete a chemical dependency assessment. You had labs drawn and those results were reviewed with you. Please take a copy of your lab work with you to your next primary care provider appointment.     Symptoms to Report:  If you experience more anxiety, confusion, sleeplessness, deep sadness or thoughts of suicide, notify your treatment team or notify your primary care provider. IF ANY OF THE SYMPTOMS YOU ARE EXPERIENCING ARE A MEDICAL EMERGENCY CALL 911 IMMEDIATELY.      Lifestyle Adjustment: Adjust your lifestyle to get enough sleep, relaxation, exercise and good nutrition. Continue to develop healthy coping skills to decrease stress and promote a sober living environment. Do not use mood altering substances including alcohol, illegal drugs or addictive medications other than what is currently prescribed.      Disposition: Please return home.     Facts about COVID19 at www.cdc.gov/COVID19 and www.[place].gov/covid19     Keeping hands clean is one of the most important steps we can take to avoid getting sick and spreading germs to others.  Please wash your hands frequently and lather with soap for at least 20 seconds!     Follow-up Appointment:   Appointment Date/Time:  Feb. 13th 2023,@ 2:10pm, Primary Care Giver: Dr. Hugo Purvis    Address:  [facility]  Phone Number: [phone]     Attestation:    The patient was seen and evaluated by me. I spent more than 30 minutes on discharge day activities. Chandan Horne MD

## 2023-01-13 NOTE — PLAN OF CARE
"  Problem: Alcohol Withdrawal  Goal: Alcohol Withdrawal Symptom Control  Outcome: Progressing   Goal Outcome Evaluation:    Plan of Care Reviewed With: patient          Patient continue on detox. Visible in the Milieu with bright affect, calm and co operative. Engaged with peers , Attended group therapy and participated in group. Rated anxiety 5/10. PRN Gabapentin was given with effective outcome. Denied depression, HI/SI/SIB and other psych symptoms. Patient contracted for safety. No c/o pain.  MSSA of 2 and 2. No valium given. Patient compliant with medications. Patient will be discharge tomorrow. Plans is to go AA meeting for treatment. Staff will continue to monitor patient and intervene as needed. /88 (BP Location: Left arm, Patient Position: Sitting, Cuff Size: Adult Large)   Pulse 74   Temp 97.3  F (36.3  C) (Temporal)   Resp 16   Ht 1.981 m (6' 6\")   Wt 132.5 kg (292 lb)   SpO2 98%   BMI 33.74 kg/m            "

## 2023-01-13 NOTE — PLAN OF CARE
"  Problem: Alcohol Withdrawal  Goal: Alcohol Withdrawal Symptom Control  Outcome: Adequate for Care Transition   Goal Outcome Evaluation: pt met criteria to be taken \"out of detox.\"      Patient has not required any valium for alcohol detox for > 24 hours. All MSSA scores since that time have been less than 8. Pt is now removed from alcohol detox monitoring status.                "

## 2023-01-13 NOTE — PLAN OF CARE
Goal Outcome Evaluation:    Plan of Care Reviewed With: patient        Patient is discharging to home, he has an order for acamprosate (CAMPRAL)but the pharmacy is waiting for insurance prior authorization. Patient is leaving with his other medications and will call pharmacy to pickup the Campral tomorrow.   Patient was pleasant, social with peers and staff. He was visible for the most part of the shift. He denied anxiety, depression,si/hi/hallucinations and pain.  Patient verbalized understanding of his discharge instructions and medications. He was escorted to the exit door by a staff member.

## 2023-01-13 NOTE — PLAN OF CARE
Problem: Behavioral Health Plan of Care  Goal: Optimal Comfort and Wellbeing  Outcome: Ongoing, Progressing    Behavioral  Pt appeared sleeping comfortably overnight; no behavioral concerns noted;     Medical  Pt out of detox from alcohol.   No new medical concerns noted.

## 2023-01-13 NOTE — PROGRESS NOTES
Brief Medicine Progress Note     Chart checked today.     Labs improved today. Hemoglobin and anion gap normalized. Patient tolerating PO. Medicine will sign off.    Currently, medically stable and internal medicine will sign off. Please contact if future questions or concerns arise. Thank you for the opportunity to be a part of this patient's care.       Aguilar Plummer  Internal Medicine [facility]

## 2023-01-13 NOTE — DISCHARGE INSTRUCTIONS
Behavioral Discharge Planning and Instructions  THANK YOU FOR CHOOSING [address]  [phone]    Summary: You were admitted to [facility] on 1/11/23 for detoxification from Alcohol Use Disorder.  A medical exam was performed that included lab work. You have met with a  and opted to returning home and attending you AA meetings.  Please take care and make your recovery a daily priority, Sebastián!  It was a pleasure working with you and the entire treatment team here wishes you the very best in your recovery!     Recommendation:  Please return home and attend AA meetings as discussed.     Main Diagnoses:  Per Dr. Chandan Pete MD;  303.90 (F10.20) Alcohol Use Disorder Severe    Major Treatments, Procedures and Findings:  You were treated for Alcohol detoxification using Valium dosed according to patient's MSSA Score. As an outpatient you will be prescribed Antabuse, Campral,Gabapentin and multivitamins, please take this medication as prescribed until it is gone. You did not complete a chemical dependency assessment. You had labs drawn and those results were reviewed with you. Please take a copy of your lab work with you to your next primary care provider appointment.    Symptoms to Report:  If you experience more anxiety, confusion, sleeplessness, deep sadness or thoughts of suicide, notify your treatment team or notify your primary care provider. IF ANY OF THE SYMPTOMS YOU ARE EXPERIENCING ARE A MEDICAL EMERGENCY CALL 911 IMMEDIATELY.     Lifestyle Adjustment: Adjust your lifestyle to get enough sleep, relaxation, exercise and good nutrition. Continue to develop healthy coping skills to decrease stress and promote a sober living environment. Do not use mood altering substances including alcohol, illegal drugs or addictive medications other than what is currently prescribed.     Disposition: Please return home.    Facts about COVID19 at www.cdc.gov/COVID19 and [place].gov/covid19    Keeping hands  clean is one of the most important steps we can take to avoid getting sick and spreading germs to others.  Please wash your hands frequently and lather with soap for at least 20 seconds!    Follow-up Appointment:   Appointment Date/Time:  Feb. 13th 2023,@ 2:10pm, Primary Care Giver: Dr. Hugo Purvis    Address: [facility]  Phone Number: [phone]     Recovery apps for your phone to locate current in person and zoom recovery meetings  [place] - meeting elena  AA  - meeting elena  Meeting guide - meeting elena  Quick NA meeting - meeting elena  Amanda- has various apps    Resources:  *due to covid-19 most AA/NA meetings are being held online however some are in-person or a hybrid combination please check online to verify*  Need Support Now? If you or someone you know is struggling or in crisis, help is available. Call or text AdexLink6 or chat [employer]  AA meetings search for them at: https://aa-intergroup.org (worldwide meeting listings)  AA meetings for [place] area can be found online at: https://a[place].org (click local online meetings listings)  NA meetings for [place] area can be found online at: https://www.nam[place].org  (click find a meeting)  AA and NA Sponsors are excellent resources for support and you can find one at any support group meeting.   Alcoholics Anonymous (https://aa.org/): for information 24 hours/day  AA Intergroup service office in [place] (http://www.aastpaul.org/) 791.919.6560  AA Intergroup service office in [facility]: [phone]. (http://www.a[place].org/)  Narcotics Anonymous (www.nam[place].org) (701) 562-2736  https://aafairviewriverside.org/meetings  SMART Recovery - self management for addiction recovery:  www.smartrecovery.org  Pathways ~ A Health Crisis Resource & Support Center:  748.334.8757.  https://prescribetoprevent.org/patient-education/videos/  http://www.harmreduction.org  [facility] [phone]  Support Group:  AA/NA  and Sponsor/support.  National [place] on Mental Illness (www.[place].myla.org): 705.306.9832 or 819-153-9224.  Alcoholics Anonymous (www.alcoholics-anonymous.org): Check your phone book for your local chapter.  Suicide Awareness Voices of Education (SAVE) (www.save.org): 652-057-KNMZ (4883)  National Suicide Prevention Line (www.mentalhealthmn.org): 882-261-NOHL (6724)  Mental Health Consumer/Survivor Network of [place] (www.mhcsn.net): 578.868.1013 or 116-446-0829  Mental Health Association of [place] (www.mentalhealth.org): 747.671.4160 or 392-736-2885   Substance Abuse and Mental Health Services (www.samhsa.gov)  [place] Opioid Prevention Coalition: www.opioidcoalition.org    [place] Recovery Connection ([facility])  [facility] connects people seeking recovery to resources that help foster and sustain long-term recovery.  Whether you are seeking resources for treatment, transportation, housing, job training, education, health care or other pathways to recovery, [facility] is a great place to start.  555.771.7770.  www.[place]arec[place].org    Great Pod casts for nutrition and wellness  Listen on Apple Podcasts  [employer] [facility]   Nutrition       Understand the connection between what you eat and how you feel. Hosted by licensed nutritionists and dietitians from [employer] [facility] we share practical, real-life solutions for healthier living through nutrition.     General Medication Instructions:   See your medication sheet(s) for instructions.   Take all medications as prescribed.  Make no changes unless your primary care provider suggests them.   Go to all your primary care provider visits.  Be sure to have all your required lab tests. This way, your medicines can be refilled on time.  Do not use any forms of alcohol.    Please Note:  If you have any questions at anytime after you are discharged please call [facility] detox unit 3[facility] at [phone].  [facility], Behavioral  Intake [phone]  Medical Records call [phone]  Outpatient Behavioral Intake call [phone]  LP+ Wait List/Bed Availability call [phone]    Please remember to take all of your behavioral discharge planning and lab paperwork to any follow up appointments, it contains your lab results, diagnosis, medication list and discharge recommendations.      THANK YOU FOR CHOOSING [facility]

## 2023-01-15 NOTE — PROGRESS NOTES
Clinic Care Coordination Contact  [facility]: Post-Discharge Note  SITUATION                                                      Admission:    Admission Date: 01/11/23   Reason for Admission: Alcohol dependence with withdrawal with complication (H)  Cannabis abuse.  Discharge:   Discharge Date: 01/13/23  Discharge Diagnosis: Alcohol dependence with withdrawal with complication (H)  Cannabis abuse.    BACKGROUND                                                      Per hospital discharge summary and inpatient provider notes:  Sebastián Nogueira was admitted to [facility] with attending Chandan Horne MD as a voluntary patient. The patient was placed under status 15 (15 minute checks) to ensure patient safety.   MSSA protocol was initiated due to the patient's history of alcohol abuse and concern for withdrawal symptoms.  CBC, BMP and utox obtained.     All outpatient medications were continued with the exception of Antabuse which was restarted at discharge. Campral was provided at discharge as well.      Sebastián Nogueira did participate in groups and was visible in the milieu.      The patient's symptoms of withdrawal improved.      Sebastián Nogueira was released to home. At the time of discharge Sebastián Nogueira was determined to not be a danger to himself or others. At the current time of discharge, the patient does not meet criteria for involuntary hospitalization. On the day of discharge, the patient reports that they do not have suicidal or homicidal ideation and would never hurt themselves or others. Steps taken to minimize risk include: assessing patient s behavior and thought process daily during hospital stay, discharging patient with adequate plan for follow up for mental and physical health and discussing safety plan of returning to the hospital should the patient ever have thoughts of harming themselves or others. Therefore, based on all available evidence including the factors cited above, the  patient does not appear to be at imminent risk for self-harm, and is appropriate for outpatient level of care.     ASSESSMENT           Discharge Assessment  How are you doing now that you are home?: Pt reports that he started drinking again. He has been calling [facility] to see if there is any availability. He was advised to call back on Monday. Pt reports he has a support system that can get him back to the hospital for detox. denies futher assistance at this time.  How are your symptoms? (Red Flag symptoms escalate to triage hotline per guidelines): Unchanged  Do you feel your condition is stable enough to be safe at home until your provider visit?: Yes  Does the patient have their discharge instructions? : Yes  Does the patient have questions regarding their discharge instructions? : No  Were you started on any new medications or were there changes to any of your previous medications? : Yes  Does the patient have all of their medications?: Yes  Do you have questions regarding any of your medications? : No  Do you have all of your needed medical supplies or equipment (DME)?  (i.e. oxygen tank, CPAP, cane, etc.): Yes  Discharge follow-up appointment scheduled within 14 calendar days? : No (Pt plans to return to hospital)         PLAN                                                      Outpatient Plan:  FEB 13  Provider Visit with Delia San MD  Monday Feb 13, 2023 2:10 PM    Future Appointments   Date Time Provider Department Center   2/13/2023  2:30 PM Delia San MD [facility]         For any urgent concerns, please contact our 24 hour nurse triage line: [phone] ([phone])         MONA Da Silva  Connected Care Resource Center  [facility]     *Connected Care Resource Team does NOT follow patient ongoing. Referrals are identified based on internal discharge reports and the outreach is to ensure patient has an understanding of their discharge instructions.